# Patient Record
Sex: FEMALE | Race: WHITE | NOT HISPANIC OR LATINO | Employment: FULL TIME | URBAN - METROPOLITAN AREA
[De-identification: names, ages, dates, MRNs, and addresses within clinical notes are randomized per-mention and may not be internally consistent; named-entity substitution may affect disease eponyms.]

---

## 2017-12-01 ENCOUNTER — OFFICE VISIT (OUTPATIENT)
Dept: OBSTETRICS AND GYNECOLOGY | Facility: CLINIC | Age: 32
End: 2017-12-01
Payer: COMMERCIAL

## 2017-12-01 VITALS
WEIGHT: 127 LBS | HEIGHT: 63 IN | SYSTOLIC BLOOD PRESSURE: 118 MMHG | DIASTOLIC BLOOD PRESSURE: 80 MMHG | BODY MASS INDEX: 22.5 KG/M2

## 2017-12-01 DIAGNOSIS — Z01.419 ENCOUNTER FOR GYNECOLOGICAL EXAMINATION WITHOUT ABNORMAL FINDING: Primary | ICD-10-CM

## 2017-12-01 PROCEDURE — 99395 PREV VISIT EST AGE 18-39: CPT | Mod: S$GLB,,, | Performed by: OBSTETRICS & GYNECOLOGY

## 2017-12-01 PROCEDURE — 99999 PR PBB SHADOW E&M-EST. PATIENT-LVL II: CPT | Mod: PBBFAC,,, | Performed by: OBSTETRICS & GYNECOLOGY

## 2017-12-01 PROCEDURE — 88175 CYTOPATH C/V AUTO FLUID REDO: CPT

## 2017-12-01 NOTE — PROGRESS NOTES
PT HERE FOR ANNUAL.  DOENS'T LIKE THE IUD. FEELS IT DURING INTERCOURSE. WANTS TO TRY FOR PREGNANCY IN AUGUST.    ROS:  GENERAL: No fever, chills, fatigability or weight loss.  VULVAR: No pain, no lesions and no itching.  VAGINAL: No relaxation, no itching, no discharge, no abnormal bleeding and no lesions.  ABDOMEN: No abdominal pain. Denies nausea. Denies vomiting. No diarrhea. No constipation  BREAST: Denies pain. No lumps. No discharge.  URINARY: No incontinence, no nocturia, no frequency and no dysuria.  CARDIOVASCULAR: No chest pain. No shortness of breath. No leg cramps.  NEUROLOGICAL: No headaches. No vision changes.  The remainder of the review of systems was negative.    PE:  General Appearance: normal weight And Well developed. Well nourished. In no acute distress.  Vulva: Lesions: No.  Urethral Meatus: Normal size. Normal location. No lesions. No prolapse.  Urethra: No masses. No tenderness. No prolapse. No scarring.  Bladder: No masses. No tenderness.  Vagina: Mucosa NI:yes discharge no, atrophy no, cystocele no or rectocele no.  Cervix: Lesion: no  Stenotic: no Cervical motion tenderness: no  IUD STRINGS SEEN  Uterus: Uterus size: 5 weeks. Support good. Uterus size: Normal  Adnexa: Masses: No Tenderness: No CDS Nodularity: No  Abdomen: normal weight No masses. No tenderness.  Breasts: No bilateral masses. No bilateral discharge. No bilateral tenderness. No bilateral fibrocystic changes.  Neck: No thyroid enlargement. No thyroid masses.  Skin: Rashes: No      PROCEDURES:    PLAN:     DIAGNOSIS:  1. Encounter for gynecological examination without abnormal finding        MEDICATIONS & ORDERS:  Orders Placed This Encounter    Liquid-based pap smear, screening       Patient was counseled today on the new ACS guidelines for cervical cytology screening as well as the current recommendations for breast cancer screening. She was counseled to follow up with her PCP for other routine health maintenance.  Counseling session lasted approximately 10 minutes, and all her questions were answered.         FOLLOW-UP: With me in 12 month OR PRN REMOVAL

## 2017-12-11 ENCOUNTER — TELEPHONE (OUTPATIENT)
Dept: OBSTETRICS AND GYNECOLOGY | Facility: CLINIC | Age: 32
End: 2017-12-11

## 2018-05-23 ENCOUNTER — OFFICE VISIT (OUTPATIENT)
Dept: OPTOMETRY | Facility: CLINIC | Age: 33
End: 2018-05-23
Payer: COMMERCIAL

## 2018-05-23 DIAGNOSIS — H04.123 DRY EYE SYNDROME OF BOTH EYES: ICD-10-CM

## 2018-05-23 DIAGNOSIS — H40.013 OPEN ANGLE WITH BORDERLINE FINDINGS OF BOTH EYES: Primary | ICD-10-CM

## 2018-05-23 DIAGNOSIS — H52.13 MYOPIA OF BOTH EYES: ICD-10-CM

## 2018-05-23 PROCEDURE — 92015 DETERMINE REFRACTIVE STATE: CPT | Mod: S$GLB,,, | Performed by: OPTOMETRIST

## 2018-05-23 PROCEDURE — 92004 COMPRE OPH EXAM NEW PT 1/>: CPT | Mod: S$GLB,,, | Performed by: OPTOMETRIST

## 2018-05-23 PROCEDURE — 99999 PR PBB SHADOW E&M-EST. PATIENT-LVL II: CPT | Mod: PBBFAC,,, | Performed by: OPTOMETRIST

## 2018-05-23 NOTE — PROGRESS NOTES
HPI     Last eye exam was approximately 2-3 years ago.  Patient states had glasses but rarely wore them except at night. Was told   by last eye doctor that she should be monitored for glaucoma due to large   optic nerves.   Patient denies diplopia, headaches, flashes/floaters, and pain.      Last edited by Vonda Zhao on 5/23/2018  2:44 PM. (History)            Assessment /Plan     For exam results, see Encounter Report.    Open angle with borderline findings of both eyes  -     Christine Visual Field - OU - Extended - Both Eyes; Future  -     OCT - Optic Nerve; Future    Dry eye syndrome of both eyes    Myopia of both eyes              1.  Due to increased c/d ratio.  Did testing 3 years ago--normal.  Low risk for glaucoma.  Will schedule hvf and oct.  If normal monitor yearly.  2.  Recommend artificial tears at least 2x/day OU.  Works on computer all day.  3.  Distance rx given.  Eye health normal OU.

## 2018-08-03 ENCOUNTER — PROCEDURE VISIT (OUTPATIENT)
Dept: OBSTETRICS AND GYNECOLOGY | Facility: CLINIC | Age: 33
End: 2018-08-03
Payer: COMMERCIAL

## 2018-08-03 VITALS
HEIGHT: 64 IN | SYSTOLIC BLOOD PRESSURE: 100 MMHG | DIASTOLIC BLOOD PRESSURE: 62 MMHG | BODY MASS INDEX: 22.02 KG/M2 | WEIGHT: 129 LBS

## 2018-08-03 DIAGNOSIS — Z30.432 ENCOUNTER FOR IUD REMOVAL: Primary | ICD-10-CM

## 2018-08-03 PROCEDURE — 58301 REMOVE INTRAUTERINE DEVICE: CPT | Mod: S$GLB,,, | Performed by: OBSTETRICS & GYNECOLOGY

## 2018-08-03 NOTE — PROCEDURES
Removal of Intrauterine Device  Date/Time: 8/3/2018 4:52 PM  Performed by: KEARA ELIZABETH JR  Authorized by: KEARA ELIZABETH JR   Local anesthesia used: no    Anesthesia:  Local anesthesia used: no    Sedation:  Patient sedated: no  Patient tolerance: Patient tolerated the procedure well with no immediate complications          Lindy Ferraro is a 32 y.o. female  presents for IUD removal because WANTS PREGNANCY.      PRE-IUD REMOVAL COUNSELING:  The patient was advised of minimal risks of bleeding and pain and she agrees to proceed.    PROCEDURE:  TIME OUT PERFORMED.  IUD strings were  visualized at the os and grasped. IUD removed with gentle traction.  The patient tolerated the procedure well.    ASSESSMENT:  Contraceptive Management / Removal IUD. V25.0.    POST IUD REMOVAL COUNSELING:  Expect period-like flow to occur after Mirena IUD removal and periods to return to pre-IUD pattern.  Manage post IUD removal cramping with NSAIDs, Tylenol or Rx per [employer].    POST IUD REMOVAL CONTRACEPTION:  If planning pregnancy, RX for prenatal vitamins.    Counseling lasted approximately 15 minutes and all her questions were answered.    FOLLOW-UP: With me for annual gyn exam or prn.

## 2018-09-25 ENCOUNTER — TELEPHONE (OUTPATIENT)
Dept: OBSTETRICS AND GYNECOLOGY | Facility: CLINIC | Age: 33
End: 2018-09-25

## 2018-09-25 NOTE — TELEPHONE ENCOUNTER
----- Message from Marlene Walker sent at 9/25/2018  3:04 PM CDT -----  Contact: CAITY FERGUSON [[id number]]      Can the clinic reply in [facility]: no    Who Called: CAITY FERGUSON [[id number]]    Date of Positive Preg Test: 09/25/18    1st day of Last Menstrual Cycle: 08/25/18    List Any Difficulties: no    What Number to Call Back: [phone]        Left message for patient

## 2018-10-01 ENCOUNTER — TELEPHONE (OUTPATIENT)
Dept: OBSTETRICS AND GYNECOLOGY | Facility: CLINIC | Age: 33
End: 2018-10-01

## 2018-10-01 DIAGNOSIS — N91.2 AMENORRHEA: Primary | ICD-10-CM

## 2018-10-01 NOTE — TELEPHONE ENCOUNTER
----- Message from Marleen Arguello LPN sent at 10/1/2018 11:19 AM CDT -----  Pt wants to see Ebony.  Joseph Hawley      ----- Message -----  From: Fallon Hatfield MA  Sent: 10/1/2018  11:14 AM  To: Jose ANDERS Jr Staff        ----- Message -----  From: Lan Browne  Sent: 10/1/2018  11:06 AM  To: Ebony Vyas Staff    Pt returning your call [phone]        Spoke with patient patient states she couldn't talk at the moment. Patient states I will have to call her back. Advised patient I'm currently in clinic and taking calls between patients.

## 2018-10-02 ENCOUNTER — TELEPHONE (OUTPATIENT)
Dept: OBSTETRICS AND GYNECOLOGY | Facility: CLINIC | Age: 33
End: 2018-10-02

## 2018-10-02 NOTE — TELEPHONE ENCOUNTER
----- Message from Celina Oswald sent at 10/1/2018  2:12 PM CDT -----  Contact: pt            Name of Who is Calling: Lindy      What is the request in detail: returning call back to clinic. Please call and advise      Can the clinic reply by [facility]: no      [facility][phone] after 4pm      Spoke with patient to schedule patient new ob and ultrasound

## 2018-10-17 ENCOUNTER — PATIENT MESSAGE (OUTPATIENT)
Dept: OBSTETRICS AND GYNECOLOGY | Facility: CLINIC | Age: 33
End: 2018-10-17

## 2018-10-24 ENCOUNTER — HOSPITAL ENCOUNTER (EMERGENCY)
Facility: OTHER | Age: 33
Discharge: HOME | End: 2018-10-24
Attending: EMERGENCY MEDICINE
Payer: COMMERCIAL

## 2018-10-24 VITALS
SYSTOLIC BLOOD PRESSURE: 161 MMHG | TEMPERATURE: 98 F | DIASTOLIC BLOOD PRESSURE: 82 MMHG | WEIGHT: 135 LBS | RESPIRATION RATE: 16 BRPM | HEART RATE: 75 BPM | OXYGEN SATURATION: 100 % | BODY MASS INDEX: 23.05 KG/M2 | HEIGHT: 64 IN

## 2018-10-24 DIAGNOSIS — R10.12 LUQ ABDOMINAL PAIN: Primary | ICD-10-CM

## 2018-10-24 DIAGNOSIS — O21.9 NAUSEA AND VOMITING IN PREGNANCY: ICD-10-CM

## 2018-10-24 DIAGNOSIS — R10.13 EPIGASTRIC ABDOMINAL PAIN: ICD-10-CM

## 2018-10-24 DIAGNOSIS — R10.13 EPIGASTRIC PAIN: ICD-10-CM

## 2018-10-24 LAB
ALBUMIN SERPL BCP-MCNC: 4.8 G/DL
ALP SERPL-CCNC: 60 U/L
ALT SERPL W/O P-5'-P-CCNC: 14 U/L
ANION GAP SERPL CALC-SCNC: 12 MMOL/L
AST SERPL-CCNC: 21 U/L
B-HCG UR QL: POSITIVE
BASOPHILS # BLD AUTO: 0.02 K/UL
BASOPHILS NFR BLD: 0.3 %
BILIRUB SERPL-MCNC: 0.7 MG/DL
BILIRUB UR QL STRIP: NEGATIVE
BUN SERPL-MCNC: 10 MG/DL
CALCIUM SERPL-MCNC: 10.1 MG/DL
CHLORIDE SERPL-SCNC: 102 MMOL/L
CLARITY UR: CLEAR
CO2 SERPL-SCNC: 23 MMOL/L
COLOR UR: YELLOW
CREAT SERPL-MCNC: 0.7 MG/DL
CTP QC/QA: YES
DIFFERENTIAL METHOD: NORMAL
EOSINOPHIL # BLD AUTO: 0.1 K/UL
EOSINOPHIL NFR BLD: 0.7 %
ERYTHROCYTE [DISTWIDTH] IN BLOOD BY AUTOMATED COUNT: 12 %
EST. GFR  (AFRICAN AMERICAN): >60 ML/MIN/1.73 M^2
EST. GFR  (NON AFRICAN AMERICAN): >60 ML/MIN/1.73 M^2
GLUCOSE SERPL-MCNC: 86 MG/DL
GLUCOSE UR QL STRIP: NEGATIVE
HCT VFR BLD AUTO: 41.4 %
HGB BLD-MCNC: 14.2 G/DL
HGB UR QL STRIP: NEGATIVE
KETONES UR QL STRIP: NEGATIVE
LEUKOCYTE ESTERASE UR QL STRIP: NEGATIVE
LIPASE SERPL-CCNC: 36 U/L
LYMPHOCYTES # BLD AUTO: 1.7 K/UL
LYMPHOCYTES NFR BLD: 23.1 %
MCH RBC QN AUTO: 31 PG
MCHC RBC AUTO-ENTMCNC: 34.3 G/DL
MCV RBC AUTO: 90 FL
MONOCYTES # BLD AUTO: 0.4 K/UL
MONOCYTES NFR BLD: 5.8 %
NEUTROPHILS # BLD AUTO: 5.2 K/UL
NEUTROPHILS NFR BLD: 70 %
NITRITE UR QL STRIP: NEGATIVE
PH UR STRIP: 7 [PH] (ref 5–8)
PLATELET # BLD AUTO: 226 K/UL
PMV BLD AUTO: 9.9 FL
POTASSIUM SERPL-SCNC: 3.5 MMOL/L
PROT SERPL-MCNC: 8.4 G/DL
PROT UR QL STRIP: NEGATIVE
RBC # BLD AUTO: 4.58 M/UL
SODIUM SERPL-SCNC: 137 MMOL/L
SP GR UR STRIP: 1.01 (ref 1–1.03)
URN SPEC COLLECT METH UR: NORMAL
UROBILINOGEN UR STRIP-ACNC: NEGATIVE EU/DL
WBC # BLD AUTO: 7.41 K/UL

## 2018-10-24 PROCEDURE — 99284 EMERGENCY DEPT VISIT MOD MDM: CPT | Mod: 25

## 2018-10-24 PROCEDURE — 85025 COMPLETE CBC W/AUTO DIFF WBC: CPT

## 2018-10-24 PROCEDURE — 81025 URINE PREGNANCY TEST: CPT | Performed by: PHYSICIAN ASSISTANT

## 2018-10-24 PROCEDURE — 93010 ELECTROCARDIOGRAM REPORT: CPT | Mod: ,,, | Performed by: INTERNAL MEDICINE

## 2018-10-24 PROCEDURE — 93005 ELECTROCARDIOGRAM TRACING: CPT

## 2018-10-24 PROCEDURE — 80053 COMPREHEN METABOLIC PANEL: CPT

## 2018-10-24 PROCEDURE — 83690 ASSAY OF LIPASE: CPT

## 2018-10-24 PROCEDURE — 81003 URINALYSIS AUTO W/O SCOPE: CPT

## 2018-10-24 RX ORDER — FAMOTIDINE 10 MG/ML
20 INJECTION INTRAVENOUS
Status: DISCONTINUED | OUTPATIENT
Start: 2018-10-24 | End: 2018-10-24 | Stop reason: HOSPADM

## 2018-10-24 NOTE — ED TRIAGE NOTES
"+intermittent mid epigastric pain x1 week described as "stabbing" that has progressively gotten worse. Pt c/o n/v but denies fever, chills, dysuria, vaginal bleeding/discharge. Pt is 8 weeks  pregnant   "

## 2018-10-24 NOTE — ED PROVIDER NOTES
Encounter Date: 10/24/2018       History     Chief Complaint   Patient presents with    Abdominal Pain     LUQ abdominal pain with N/V x 1 week.      33-year-old female with no significant past medical history presents to the emergency department with complaints of left upper quadrant/epigastric abdominal pain. She states that she is approximately 8 weeks gestation with her 1st pregnancy.  She denies any pelvic pain or lower abdominal cramping.  She denies any vaginal bleeding or discharge. She states that she does have scheduled appointment with OBGYN tomorrow.  She states that she did e-mail the clinic due to her concern of her pain being persistent last week however was told that cramping was common in pregnancy and to make sure that she was staying hydrated.  She does report some changes in her diet admitting that she is not eating as much vegetables as she normally eats.  She states that she is vegetarian.  She complains of some associated constipation.  She states that she is drinking plenty of water at home.  She does report nausea vomiting. She denies any nausea at this time and states that it is worse when she is hungry.  She states that the pain is located to the left upper part of her stomach and became worse while walking today.  She states that the pain soft me in my tracks.  She states is currently a 6/10.  No current treatment.  She denies fever chills      The history is provided by the patient.     Review of patient's allergies indicates:   Allergen Reactions    Asa [aspirin] Other (See Comments)     Blood disorder per Patient    Pcn [penicillins]     Sulfa (sulfonamide antibiotics)      History reviewed. No pertinent past medical history.  Past Surgical History:   Procedure Laterality Date    INTRAUTERINE DEVICE INSERTION  2016    MIKI---REMOVED     Family History   Problem Relation Age of Onset    Breast cancer Neg Hx     Colon cancer Neg Hx     Ovarian cancer Neg Hx     Glaucoma Neg  Hx     Cataracts Neg Hx     Macular degeneration Neg Hx      Social History     Tobacco Use    Smoking status: Never Smoker    Smokeless tobacco: Never Used   Substance Use Topics    Alcohol use: Yes    Drug use: No     Review of Systems   Constitutional: Negative for chills and fever.   HENT: Negative for sore throat.    Respiratory: Negative for shortness of breath.    Cardiovascular: Negative for chest pain.   Gastrointestinal: Positive for abdominal pain, constipation, nausea and vomiting. Negative for diarrhea.   Genitourinary: Negative for difficulty urinating, dysuria, flank pain, frequency, hematuria, urgency, vaginal bleeding and vaginal discharge.   Musculoskeletal: Negative for back pain.   Skin: Negative for rash.   Neurological: Negative for weakness.   Hematological: Does not bruise/bleed easily.       Physical Exam     Initial Vitals [10/24/18 1808]   BP Pulse Resp Temp SpO2   (!) 161/82 75 16 98.4 °F (36.9 °C) 100 %      MAP       --         Physical Exam    Nursing note and vitals reviewed.  Constitutional: Vital signs are normal. She appears well-developed and well-nourished. She is not diaphoretic.  Non-toxic appearance. No distress.   HENT:   Head: Normocephalic and atraumatic.   Right Ear: External ear normal.   Left Ear: External ear normal.   Nose: Nose normal.   Mouth/Throat: Oropharynx is clear and moist.   Eyes: Conjunctivae and lids are normal. No scleral icterus.   Neck: Normal range of motion and phonation normal. Neck supple.   Cardiovascular: Normal rate, regular rhythm and normal heart sounds. Exam reveals no gallop and no friction rub.    No murmur heard.  Pulmonary/Chest: Effort normal and breath sounds normal. No respiratory distress. She has no decreased breath sounds. She has no wheezes. She has no rhonchi. She has no rales.   Abdominal: Soft. Normal appearance and bowel sounds are normal. She exhibits no distension. There is tenderness in the epigastric area and left upper  quadrant. There is no rigidity, no rebound, no guarding, no CVA tenderness, no tenderness at McBurney's point and negative Dobbs's sign.   Musculoskeletal: Normal range of motion.   No obvious deformities, moving all extremities, normal gait   Neurological: She is alert and oriented to person, place, and time. No sensory deficit.   Skin: Skin is warm, dry and intact. No lesion and no rash noted. No erythema.   Psychiatric: She has a normal mood and affect. Her speech is normal and behavior is normal. Judgment normal. Cognition and memory are normal.         ED Course   Procedures  Labs Reviewed   POCT URINE PREGNANCY - Abnormal; Notable for the following components:       Result Value    POC Preg Test, Ur Positive (*)     All other components within normal limits   CBC W/ AUTO DIFFERENTIAL   COMPREHENSIVE METABOLIC PANEL   LIPASE   URINALYSIS, REFLEX TO URINE CULTURE    Narrative:     Preferred Collection Type->Urine, Clean Catch     EKG Readings: (Independently Interpreted)   Initial Reading: No STEMI. Rhythm: Normal Sinus Rhythm. Heart Rate: 71. Ectopy: No Ectopy. Conduction: Normal. ST Segments: Normal ST Segments. T Waves: Normal. Clinical Impression: Normal Sinus Rhythm       Imaging Results    None          Medical Decision Making:   History:   Old Medical Records: I decided to obtain old medical records.  Initial Assessment:   33-year-old female with complaints consistent left upper quadrant abdominal pain and epigastric pain with reported nausea and vomiting at home.  Afebrile neurovascularly intact.  She is alert, healthy and nontoxic appearing.  She is in no apparent distress but does appear anxious.  On exam she has reproducible pain to the left upper quadrant and epigastric region.  Rest of her abdomen is soft and nontender with no evidence of acute surgical abdomen.  She denies NSAID use.  She states that the symptoms have been present for over a week however her gradually worsened.  She is currently 8  weeks gestation with her 1st pregnancy.  No active emesis in the emergency department and she denies any nausea at this time.  Independently Interpreted Test(s):   I have ordered and independently interpreted EKG Reading(s) - see prior notes  Clinical Tests:   Lab Tests: Ordered and Reviewed  Medical Tests: Ordered and Reviewed  ED Management:  Plan for IV fluids, Pepcid and blood work and urine.  Orders were placed. Per nurse, patient refuses IV, pepcid and fluids. States that she just wants to make sure nothing is wrong.  UPT is positive here.  Urinalysis, CBC, CMP and lipase obtained. Urinalysis shows no evidence of serious bacterial infection, UTI pyelonephritis.  No elevation in white blood cell count H&H stable. No electrolyte abnormality noted.  Lipase is not elevated.  UPT is positive. EKG was also obtained and consistent with normal sinus rhythm with no evidence of acute ischemia or STEMI.  Patient's pain is to the upper abdomen, specifically epigastric region and left upper quadrant.  She has no pain or tenderness palpation to the pelvic region.  She denies any abnormal vaginal discharge or bleeding.  At this time I do not feel that further workup is indicated.  Discussed with patient that her symptoms may be secondary to some gastritis.  I did consider gastric ulcer however patient denies NSAID use in follows pretty bland diet.  She does report change in diet recently since being pregnant.  Informed patient that this change could be causing some worsening symptoms as well. Discussed with patient that she can take Pepcid as it is a safe medication to use during pregnancy and urged to do so if her pain persists.  I do not believe that these symptoms are suggestive of ectopic pregnancy or miscarriage.  I do not feel that emergent ultrasound is indicated at this time based on history and exam.  Patient has scheduled follow-up with OBGYN tomorrow.  She is urged to follow up as scheduled or return to emergency  department for any worsening signs or symptoms. She states understanding agrees this plan.  This is the extent of patient's complaints today.  This patient was discussed with the attending physician who agrees with treatment plan.  Other:   I have discussed this case with another health care provider.       <> Summary of the Discussion: Liza  This note was created using "[employer]" Medical dictation.  There may be typographical errors secondary to dictation.                        Clinical Impression:     1. LUQ abdominal pain    2. Epigastric pain    3. Epigastric abdominal pain    4. Nausea and vomiting in pregnancy              Disposition:   Disposition: Discharged  Condition: Stable                        Sarah Roldan PA-C  10/24/18 2010

## 2018-10-25 ENCOUNTER — OFFICE VISIT (OUTPATIENT)
Dept: OBSTETRICS AND GYNECOLOGY | Facility: CLINIC | Age: 33
End: 2018-10-25
Attending: OBSTETRICS & GYNECOLOGY
Payer: COMMERCIAL

## 2018-10-25 ENCOUNTER — PROCEDURE VISIT (OUTPATIENT)
Dept: OBSTETRICS AND GYNECOLOGY | Facility: CLINIC | Age: 33
End: 2018-10-25
Payer: COMMERCIAL

## 2018-10-25 VITALS
HEIGHT: 64 IN | WEIGHT: 130.94 LBS | SYSTOLIC BLOOD PRESSURE: 110 MMHG | DIASTOLIC BLOOD PRESSURE: 70 MMHG | BODY MASS INDEX: 22.35 KG/M2

## 2018-10-25 DIAGNOSIS — Z34.90 PREGNANCY WITH ONE FETUS, ANTEPARTUM: Primary | ICD-10-CM

## 2018-10-25 DIAGNOSIS — N91.2 AMENORRHEA: ICD-10-CM

## 2018-10-25 DIAGNOSIS — R10.13 EPIGASTRIC PAIN: ICD-10-CM

## 2018-10-25 DIAGNOSIS — O36.80X0 ENCOUNTER TO DETERMINE FETAL VIABILITY OF PREGNANCY, SINGLE OR UNSPECIFIED FETUS: ICD-10-CM

## 2018-10-25 DIAGNOSIS — Z36.89 ESTABLISH GESTATIONAL AGE, ULTRASOUND: ICD-10-CM

## 2018-10-25 PROCEDURE — 87491 CHLMYD TRACH DNA AMP PROBE: CPT

## 2018-10-25 PROCEDURE — 99999 PR PBB SHADOW E&M-EST. PATIENT-LVL IV: CPT | Mod: PBBFAC,,, | Performed by: OBSTETRICS & GYNECOLOGY

## 2018-10-25 PROCEDURE — 87086 URINE CULTURE/COLONY COUNT: CPT

## 2018-10-25 PROCEDURE — 99214 OFFICE O/P EST MOD 30 MIN: CPT | Mod: S$GLB,,, | Performed by: OBSTETRICS & GYNECOLOGY

## 2018-10-25 PROCEDURE — 3008F BODY MASS INDEX DOCD: CPT | Mod: CPTII,S$GLB,, | Performed by: OBSTETRICS & GYNECOLOGY

## 2018-10-25 PROCEDURE — 76801 OB US < 14 WKS SINGLE FETUS: CPT | Mod: S$GLB,,, | Performed by: OBSTETRICS & GYNECOLOGY

## 2018-10-25 NOTE — PROGRESS NOTES
"HPI: Pt is a 33 y.o. female who presents complaining of missed menses and (+) home UPT. She denies vaginal bleeding or abdominal pain. 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  Has not had need for any GI F/U since childhood for this.  She presents today with her  Efrain, she was recommended to come here by her sister in law who I delivered earlier this year.      ROS:  GENERAL: Feeling well overall.   SKIN: Denies rash or lesions.   HEAD: Denies head injury or headache.   NODES: Denies enlarged lymph nodes.   CHEST: Denies chest pain or shortness of breath.   CARDIOVASCULAR: Denies palpitations or left sided chest pain.   ABDOMEN: No abdominal pain, constipation, diarrhea or rectal bleeding.   URINARY: No dysuria, hematuria, or burning on urination.  REPRODUCTIVE: See HPI.   BREASTS: Denies pain, lumps, or nipple discharge.   HEMATOLOGIC: No easy bruisability or excessive bleeding.   MUSCULOSKELETAL: Denies joint pain or swelling.   NEUROLOGIC: Denies syncope or weakness.   PSYCHIATRIC: Denies depression, anxiety or mood swings.    PE:   /70   Ht 5' 4" (1.626 m)   Wt 59.4 kg (130 lb 15.3 oz)   LMP 08/25/2018   BMI 22.48 kg/m²     APPEARANCE: Well nourished, well developed, in no acute distress.  AFFECT: WNL, alert and oriented x 3.  SKIN: No acne or hirsutism.  NECK: Neck symmetric, without masses or thyromegaly.  NODES: No inguinal, cervical, axillary or femoral lymph node enlargement.  CHEST: Good respiratory effort.   PELVIC: deferred  EXTREMITIES: No edema.    PROCEDURES:  - UPT: positive  - Urine dip: negative  - Dating U/S: to be scheduled with GynUS      Diagnosis:  1. Pregnancy with one fetus, antepartum    2. Jejunal atresia    3. Amenorrhea        Plan:     Orders Placed This Encounter    Urine culture    C. " trachomatis/N. gonorrhoeae by AMP DNA    US Abdomen Complete    Ambulatory consult to Gastroenterology    US MFM Procedure ([facility])       - Rx: Prenatal vitamins  - She does want 1st trimester screening with MFM.     Patient was counseled today on routine 1st trimester precautions, including vaginal bleeding and abdominal pain. We also discussed proper weight gain based on the [place] [employer]'s recommendations based on her pre-pregnancy weight, foods to avoid in pregnancy (i.e. sushi, fish that are high in mercury, cold deli meat, and unpasteurized cheeses), environmental precautions such as cat litter, and prenatal vitamin options (i.e. stool softener, DHA).     Total face to face time spent with the patient was 30 minutes and over half spent in counseling on the above related issues.     Follow-up with me in 4 weeks for OB check, labs at that time as she had labs drawn last night at ED visit.

## 2018-10-25 NOTE — PROCEDURES
Obstetrical ultrasound completed today.  See report in imaging section of [facility].

## 2018-10-27 LAB
C TRACH DNA SPEC QL NAA+PROBE: NOT DETECTED
N GONORRHOEA DNA SPEC QL NAA+PROBE: NOT DETECTED

## 2018-10-28 LAB — BACTERIA UR CULT: NO GROWTH

## 2018-11-08 ENCOUNTER — OFFICE VISIT (OUTPATIENT)
Dept: GASTROENTEROLOGY | Facility: CLINIC | Age: 33
End: 2018-11-08
Payer: COMMERCIAL

## 2018-11-08 VITALS
HEIGHT: 64 IN | WEIGHT: 135.13 LBS | HEART RATE: 67 BPM | DIASTOLIC BLOOD PRESSURE: 77 MMHG | BODY MASS INDEX: 23.07 KG/M2 | SYSTOLIC BLOOD PRESSURE: 128 MMHG

## 2018-11-08 DIAGNOSIS — R10.13 EPIGASTRIC PAIN: ICD-10-CM

## 2018-11-08 DIAGNOSIS — Z34.90 PREGNANCY, UNSPECIFIED GESTATIONAL AGE: ICD-10-CM

## 2018-11-08 DIAGNOSIS — R10.9 ABDOMINAL PAIN, UNSPECIFIED ABDOMINAL LOCATION: Primary | ICD-10-CM

## 2018-11-08 PROCEDURE — 3008F BODY MASS INDEX DOCD: CPT | Mod: CPTII,S$GLB,, | Performed by: INTERNAL MEDICINE

## 2018-11-08 PROCEDURE — 99203 OFFICE O/P NEW LOW 30 MIN: CPT | Mod: S$GLB,,, | Performed by: INTERNAL MEDICINE

## 2018-11-08 PROCEDURE — 99999 PR PBB SHADOW E&M-EST. PATIENT-LVL III: CPT | Mod: PBBFAC,,, | Performed by: INTERNAL MEDICINE

## 2018-11-09 ENCOUNTER — TELEPHONE (OUTPATIENT)
Dept: GASTROENTEROLOGY | Facility: CLINIC | Age: 33
End: 2018-11-09

## 2018-11-09 ENCOUNTER — HOSPITAL ENCOUNTER (OUTPATIENT)
Dept: RADIOLOGY | Facility: HOSPITAL | Age: 33
Discharge: HOME | End: 2018-11-09
Attending: INTERNAL MEDICINE
Payer: COMMERCIAL

## 2018-11-09 DIAGNOSIS — R10.9 ABDOMINAL PAIN, UNSPECIFIED ABDOMINAL LOCATION: ICD-10-CM

## 2018-11-09 PROCEDURE — 76705 ECHO EXAM OF ABDOMEN: CPT | Mod: 26,XS,, | Performed by: RADIOLOGY

## 2018-11-09 PROCEDURE — 76700 US EXAM ABDOM COMPLETE: CPT | Mod: 26,,, | Performed by: RADIOLOGY

## 2018-11-09 PROCEDURE — 76705 ECHO EXAM OF ABDOMEN: CPT | Mod: TC

## 2018-11-09 PROCEDURE — 76700 US EXAM ABDOM COMPLETE: CPT | Mod: TC

## 2018-11-09 NOTE — TELEPHONE ENCOUNTER
----- Message from Sunny Wilkins MD sent at 11/9/2018  2:01 PM CST -----  Hi, Please let Mrs Ferraro know that the ultrasound of her abdomen was normal. No evidence of any abnormalities or hernia on the scan.  Thanks, Dr Wilkins

## 2018-11-09 NOTE — PROGRESS NOTES
[facility]    Reason for visit: The encounter diagnosis was Abdominal pain, unspecified abdominal location.  Referring Provider/PCP: Milla Roque MD    History of Present Illness:  Lindy Ferraro is a 33 y.o. 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 Of note, Ms Ferraro is currently in her first trimester of pregnancy (GA 9 +4/7, due date 7/10/19).    She notes that as an infant she underwent surgery for jejunal atresia (only one operation) with reportedly no complications. She was evaluated as a teenager due to abdominal pain with negative ?UGI series, but otherwise has not had any complications from her surgery.    She presents today for evaluation of mid-epigastric pain. She first noted the onset of abdominal pain ~1 month ago after exercising (running). She noted the pain slowly estephanie, was a stabbing pain, and reached its maximum intensity on 10/24/18 when she felt like she could not move due to the pain so went to the ED. At the ED she was evaluated, with negative CMP, CBC, Lipase, and was discharged home feeling better. She notes that since then her pain has been improving and decreasing in frequency (currently 2x/week, 1hr duration) but due to the pain and prior GI history, her OBGYN wanted to have her evaluated by GI.    She localizes the pain to the left lateral aspect of her abdominal incision. She did not previously have any pain in this location, and currently rates her pain 0/10. She denies any associated symptoms during the worsening of her pain such as nausea (though endorses some nausea with pregnancy), vomiting, inability to tolerate PO intake, constipation (moving bowels q2 days, non-bloody, non-melenic) or obstipation. Denies any NSAID/OTC or herbal medications. Denies any RUQ abdominal pain, no increased pain with fatty foods or radiation of the pain to the back.  Denies any history of dysphagia or reflux. Pain is not positional and does not increase with movement.       PEndoHx:  - None    Review of Systems:   Constitutional: no fever, chills or sweats. Increased weight with pregnancy   Eyes: no visual changes   ENT: no sore throat or dysphagia  Respiratory: no cough or shortness of breath   Cardiovascular: no chest pain or palpitations   Gastrointestinal: as per HPI  Hematologic/Lymphatic: no easy bruising or lymphadenopathy   Musculoskeletal: no arthralgias or myalgias   Neurological: no change in mental status  Behavioral/Psych: no change in mood    Medical History:  Past Medical History:   Diagnosis Date    Jejunal atresia     repaired as an infant       Past Surgical History:   Procedure Laterality Date    INTRAUTERINE DEVICE INSERTION  2016    MIKI---REMOVED       Family History   Problem Relation Age of Onset    Breast cancer Neg Hx     Colon cancer Neg Hx     Ovarian cancer Neg Hx     Glaucoma Neg Hx     Cataracts Neg Hx     Macular degeneration Neg Hx    No family history of esophageal, stomach, pancreatic, SI/CRC. No family history of breast, ovarian, uterine, kidney, uterine or bladder cancer.     Social History     Socioeconomic History    Marital status:      Spouse name: Efrain    Number of children: 0    Years of education: Not on file    Highest education level: Not on file   Social Needs    Financial resource strain: Not on file    Food insecurity - worry: Not on file    Food insecurity - inability: Not on file    Transportation needs - medical: Not on file    Transportation needs - non-medical: Not on file   Occupational History    Not on file   Tobacco Use    Smoking status: Never Smoker    Smokeless tobacco: Never Used   Substance and Sexual Activity    Alcohol use: Yes    Drug use: No    Sexual activity: Yes     Partners: Male     Birth control/protection: IUD     Comment: s/p removal of miki   Other Topics Concern     Not on file   Social History Narrative    Not on file   No smoking, alcohol or drug use. , first pregnancy. Works as an  for the state.    Current Outpatient Medications on File Prior to Visit   Medication Sig Dispense Refill    prenatal vit/iron fum/folic ac (PRENATAL 1+1 ORAL) Take by mouth.       No current facility-administered medications on file prior to visit.        Review of patient's allergies indicates:   Allergen Reactions    Asa [aspirin] Other (See Comments)     Blood disorder per Patient    Pcn [penicillins]     Sulfa (sulfonamide antibiotics)        Physical Exam:  General: Alert and Oriented x3, no distress. Friendly young female, well dressed and groomed.  Vitals:    11/08/18 1545   BP: 128/77   Pulse: 67     HEENT: Normocephalic, Atraumatic. No scleral icterus.  Lymph: No cervical lymphadenopathy  Resp: Good air entry bilaterally, no adventitious sounds.  Cardiac: S1 and S2 normal.  Abdomen: Normoactive bowel sounds. Non-distended. Normal tympany. Soft. Tender to palpation minimal tenderness with palpation to the lateral aspect of the horrizontal scar.. No peritoneal signs. No hernia identified with standing position.  Extremities: No peripheral edema. Normal bilateral pedal and radial pulses.  Neurologic: No gross neurological Deficits  Psych: Calm, cooperative. Normal mood and affect.    Laboratory:  Lab Results   Component Value Date     10/24/2018    K 3.5 10/24/2018     10/24/2018    CO2 23 10/24/2018    BUN 10 10/24/2018    CREATININE 0.7 10/24/2018    CALCIUM 10.1 10/24/2018    ANIONGAP 12 10/24/2018    ESTGFRAFRICA >60 10/24/2018    EGFRNONAA >60 10/24/2018       Lab Results   Component Value Date    ALT 14 10/24/2018    AST 21 10/24/2018    ALKPHOS 60 10/24/2018    BILITOT 0.7 10/24/2018       Lab Results   Component Value Date    WBC 7.41 10/24/2018    HGB 14.2 10/24/2018    HCT 41.4 10/24/2018    MCV 90 10/24/2018     10/24/2018     Lab  Results   Component Value Date    LIPASE 36 10/24/2018         Microbiology:  No Pertinent Microbiology    Imaging:  No Pertinent Imaging    Assessment:  Lindy Ferraro is a 33 y.o. female who is presenting for initial evaluation of epigastric pain.    Unclear the etiology of her epigastric pain at this time. Given the lack of associated GI symptoms with the pain, and negative lab workup, and reassuring physical examination today it is unlikely there is an underlying GI pathology such as cholelithiasis, biliary colic, pancreatitis. Normal CBC, and no symptoms or risk factors (i.e. Smoking, NSAIDs) to suggest PUD. Symptoms are not suggestive of SBO from adhesions. No visible hernia at site of prior surgery. Given she had exercised the day prior to onset of symptoms and focal tenderness this could be MSK related. Given she is in first trimester of pregnancy, unlikely the uterus (would still be in the pelvis) would be in the region of her abdominal incision or be causing any mass effect symptoms.    Plan:  1. Given pregnancy would like to avoid any radiation exposure, therefore recommend further evaluation with abdominal ultrasound to exclude any abnormality and to evaluate for hernia  2. If symptoms continuing to resolve will hold on further evaluation such as imaging or endoscopic studies until post-  3. If symptoms are found to be from hernia can consider general surgery consultation to discuss management though recommended to avoid any unnecessary surgical procedures while pregnant  4. CRC Screening: Not currently applicable. No family history of CRC or colon polyps. Standard screening to start at 50 years old.  5. Follow-up in about 8 weeks (around 1/3/2019).    Sunny Wilkins MD  Gastroenterology Fellow (PGY IV)  Phone: [phone]  Pager: [phone]    Orders Placed This Encounter   Procedures    US Abdomen Complete

## 2018-11-09 NOTE — PROGRESS NOTES
I was present with Sunny Wilkins MD the fellow during the above evaluation, including history and exam.  I discussed the case with the fellow and agree with the findings and plan as documented in the fellow's note.

## 2018-11-27 ENCOUNTER — PROCEDURE VISIT (OUTPATIENT)
Dept: MATERNAL FETAL MEDICINE | Facility: CLINIC | Age: 33
End: 2018-11-27
Attending: OBSTETRICS & GYNECOLOGY
Payer: COMMERCIAL

## 2018-11-27 ENCOUNTER — PATIENT MESSAGE (OUTPATIENT)
Dept: ADMINISTRATIVE | Facility: OTHER | Age: 33
End: 2018-11-27

## 2018-11-27 ENCOUNTER — LAB VISIT (OUTPATIENT)
Dept: LAB | Facility: OTHER | Age: 33
End: 2018-11-27
Attending: OBSTETRICS & GYNECOLOGY
Payer: COMMERCIAL

## 2018-11-27 ENCOUNTER — PATIENT OUTREACH (OUTPATIENT)
Dept: OTHER | Facility: OTHER | Age: 33
End: 2018-11-27

## 2018-11-27 ENCOUNTER — ROUTINE PRENATAL (OUTPATIENT)
Dept: OBSTETRICS AND GYNECOLOGY | Facility: CLINIC | Age: 33
End: 2018-11-27
Attending: OBSTETRICS & GYNECOLOGY
Payer: COMMERCIAL

## 2018-11-27 VITALS
WEIGHT: 135.56 LBS | BODY MASS INDEX: 23.27 KG/M2 | DIASTOLIC BLOOD PRESSURE: 78 MMHG | SYSTOLIC BLOOD PRESSURE: 114 MMHG

## 2018-11-27 VITALS — BODY MASS INDEX: 23.65 KG/M2 | WEIGHT: 137.81 LBS

## 2018-11-27 DIAGNOSIS — Z36.82 ENCOUNTER FOR ANTENATAL SCREENING FOR NUCHAL TRANSLUCENCY: ICD-10-CM

## 2018-11-27 DIAGNOSIS — Z34.90 PREGNANCY WITH ONE FETUS, ANTEPARTUM: ICD-10-CM

## 2018-11-27 DIAGNOSIS — Z36.89 ENCOUNTER FOR FETAL ANATOMIC SURVEY: Primary | ICD-10-CM

## 2018-11-27 PROCEDURE — 36415 COLL VENOUS BLD VENIPUNCTURE: CPT

## 2018-11-27 PROCEDURE — 81508 FTL CGEN ABNOR TWO PROTEINS: CPT

## 2018-11-27 PROCEDURE — 0502F SUBSEQUENT PRENATAL CARE: CPT | Mod: S$GLB,,, | Performed by: OBSTETRICS & GYNECOLOGY

## 2018-11-27 PROCEDURE — 99499 UNLISTED E&M SERVICE: CPT | Mod: S$GLB,,, | Performed by: OBSTETRICS & GYNECOLOGY

## 2018-11-27 PROCEDURE — 76813 OB US NUCHAL MEAS 1 GEST: CPT | Mod: S$GLB,,, | Performed by: OBSTETRICS & GYNECOLOGY

## 2018-11-27 NOTE — PROGRESS NOTES
Doing well, NT today, discussed test, u/s, etc...  Precautions reinforced.  URI symptoms, discussed interventions.  F/U in 4 weeks for routine PNV

## 2018-11-27 NOTE — TELEPHONE ENCOUNTER
Initial introduction with Ms. Lindy Crhis Ferraro completed and the role of the health coaches for Connected [facility]OM was explained. Will send info via [facility] as well.    Reviewed the importance of taking weights and blood pressure readings, using the health  as a resource for physical activity and dietary habits, and that [facility] messages will be sent for weeks 20, 30, and 37 home urine tests.  Reviewed that the patient should contact her OB team with any specific questions regarding her pregnancy, and to contact [facility] or 911 in the case of a medical emergency.

## 2018-11-27 NOTE — PROGRESS NOTES
Obstetrical ultrasound completed today.  See report in imaging section of [facility].

## 2018-11-29 LAB
# FETUSES US: NORMAL
AGE AT DELIVERY: 31
B-HCG MOM SERPL: NORMAL
B-HCG SERPL-ACNC: 94.3 IU/ML
FET CRL US.MEAS: 66.9 MM
FET NASAL BONE LENGTH US.MEAS: NORMAL MM
FET NUCHAL FOLD MOM THICKNESS US.MEAS: NORMAL
FET NUCHAL FOLD THICKNESS US.MEAS: 1.5 MM
FET TS 21 RISK FROM MAT AGE: NORMAL
GA (DAYS): 0 D
GA (WEEKS): 13 WK
IDDM PATIENT QL: NORMAL
INTEGRATED SCN PATIENT-IMP: NEGATIVE
PAPP-A MOM SERPL: NORMAL
PAPP-A SERPL-MCNC: NORMAL NG/ML
SEQUENTIAL SCREEN I INTERP.: NORMAL
SMOKING STATUS FTND: NO
TS 18 RISK FETUS: NORMAL
TS 21 RISK FETUS: NORMAL
US DATE: NORMAL

## 2018-12-06 ENCOUNTER — TELEPHONE (OUTPATIENT)
Dept: OBSTETRICS AND GYNECOLOGY | Facility: CLINIC | Age: 33
End: 2018-12-06

## 2018-12-06 ENCOUNTER — PATIENT MESSAGE (OUTPATIENT)
Dept: OBSTETRICS AND GYNECOLOGY | Facility: CLINIC | Age: 33
End: 2018-12-06

## 2018-12-06 NOTE — TELEPHONE ENCOUNTER
Spoke with patient regarding her message. Advised patient she still need to get the second part of the test done. Patient verbalized and understand

## 2018-12-14 ENCOUNTER — LAB VISIT (OUTPATIENT)
Dept: LAB | Facility: OTHER | Age: 33
End: 2018-12-14
Attending: OBSTETRICS & GYNECOLOGY
Payer: COMMERCIAL

## 2018-12-14 ENCOUNTER — PATIENT MESSAGE (OUTPATIENT)
Dept: OBSTETRICS AND GYNECOLOGY | Facility: CLINIC | Age: 33
End: 2018-12-14

## 2018-12-14 DIAGNOSIS — R35.0 URINARY FREQUENCY: Primary | ICD-10-CM

## 2018-12-14 DIAGNOSIS — R35.0 URINARY FREQUENCY: ICD-10-CM

## 2018-12-14 PROCEDURE — 87086 URINE CULTURE/COLONY COUNT: CPT

## 2018-12-15 LAB — BACTERIA UR CULT: NORMAL

## 2018-12-21 ENCOUNTER — LAB VISIT (OUTPATIENT)
Dept: LAB | Facility: OTHER | Age: 33
End: 2018-12-21
Attending: OBSTETRICS & GYNECOLOGY
Payer: COMMERCIAL

## 2018-12-21 ENCOUNTER — ROUTINE PRENATAL (OUTPATIENT)
Dept: OBSTETRICS AND GYNECOLOGY | Facility: CLINIC | Age: 33
End: 2018-12-21
Attending: OBSTETRICS & GYNECOLOGY
Payer: COMMERCIAL

## 2018-12-21 VITALS
DIASTOLIC BLOOD PRESSURE: 74 MMHG | SYSTOLIC BLOOD PRESSURE: 126 MMHG | BODY MASS INDEX: 23.95 KG/M2 | WEIGHT: 139.56 LBS

## 2018-12-21 DIAGNOSIS — Z34.90 PREGNANCY WITH ONE FETUS, ANTEPARTUM: ICD-10-CM

## 2018-12-21 DIAGNOSIS — R00.2 HEART PALPITATIONS: Primary | ICD-10-CM

## 2018-12-21 DIAGNOSIS — R00.2 HEART PALPITATIONS: ICD-10-CM

## 2018-12-21 LAB
ALBUMIN SERPL BCP-MCNC: 3.5 G/DL
ALP SERPL-CCNC: 67 U/L
ALT SERPL W/O P-5'-P-CCNC: 14 U/L
ANION GAP SERPL CALC-SCNC: 9 MMOL/L
AST SERPL-CCNC: 17 U/L
BILIRUB SERPL-MCNC: 0.5 MG/DL
BUN SERPL-MCNC: 7 MG/DL
CALCIUM SERPL-MCNC: 9.2 MG/DL
CHLORIDE SERPL-SCNC: 104 MMOL/L
CO2 SERPL-SCNC: 23 MMOL/L
CREAT SERPL-MCNC: 0.7 MG/DL
EST. GFR  (AFRICAN AMERICAN): >60 ML/MIN/1.73 M^2
EST. GFR  (NON AFRICAN AMERICAN): >60 ML/MIN/1.73 M^2
GLUCOSE SERPL-MCNC: 96 MG/DL
POTASSIUM SERPL-SCNC: 3.5 MMOL/L
PROT SERPL-MCNC: 6.9 G/DL
SODIUM SERPL-SCNC: 136 MMOL/L

## 2018-12-21 PROCEDURE — 80053 COMPREHEN METABOLIC PANEL: CPT

## 2018-12-21 PROCEDURE — 0502F SUBSEQUENT PRENATAL CARE: CPT | Mod: S$GLB,,, | Performed by: OBSTETRICS & GYNECOLOGY

## 2018-12-21 PROCEDURE — 36415 COLL VENOUS BLD VENIPUNCTURE: CPT

## 2018-12-21 PROCEDURE — 81511 FTL CGEN ABNOR FOUR ANAL: CPT

## 2018-12-21 NOTE — PROGRESS NOTES
Round ligament pain- discussed  SI joint pain, sent to PT.  Discussed weight gain and normal expectations.  Doing well otherwise, precautions reinforced.  F/U in 4 weeks, sequential II today.

## 2018-12-24 LAB
# FETUSES US: NORMAL
AFP MOM SERPL: 0.78
AFP SERPL-MCNC: 28.1 NG/ML
AGE AT DELIVERY: 31
B-HCG MOM SERPL: 0.9
B-HCG SERPL-ACNC: 30.9 IU/ML
COLLECT DATE BLD: NORMAL
COLLECT DATE: NORMAL
FET NASAL BONE LENGTH US.MEAS: NORMAL MM
FET NUCHAL FOLD MOM THICKNESS US.MEAS: 0.9
FET NUCHAL FOLD THICKNESS US.MEAS: 1.5 MM
FET TS 21 RISK FROM MAT AGE: NORMAL
GA (DAYS): 0 D
GA (WEEKS): 13 WK
GA METHOD: NORMAL
GEST. AGE (DAYS) 2ND SAMPLE (SS2): 3
GEST. AGE (WKS) 2ND SAMPLE (SS2): 16
IDDM PATIENT QL: NORMAL
INHIBIN A MOM SERPL: 0.73
INHIBIN A SERPL-MCNC: 125.1 PG/ML
INTEGRATED SCN PATIENT-IMP: NEGATIVE
PAPP-A MOM SERPL: 2.06
PAPP-A SERPL-MCNC: NORMAL NG/ML
SEQUENTIAL SCREEN PART 2 INTERP: NORMAL
TS 18 RISK FETUS: NORMAL
TS 21 RISK FETUS: NORMAL
U ESTRIOL MOM SERPL: 0.71
U ESTRIOL SERPL-MCNC: 0.65 NG/ML

## 2019-01-15 ENCOUNTER — PROCEDURE VISIT (OUTPATIENT)
Dept: MATERNAL FETAL MEDICINE | Facility: CLINIC | Age: 34
End: 2019-01-15
Attending: OBSTETRICS & GYNECOLOGY
Payer: COMMERCIAL

## 2019-01-15 DIAGNOSIS — Z36.89 ENCOUNTER FOR FETAL ANATOMIC SURVEY: ICD-10-CM

## 2019-01-15 PROCEDURE — 99499 NO LOS: ICD-10-PCS | Mod: S$GLB,,, | Performed by: OBSTETRICS & GYNECOLOGY

## 2019-01-15 PROCEDURE — 99499 UNLISTED E&M SERVICE: CPT | Mod: S$GLB,,, | Performed by: OBSTETRICS & GYNECOLOGY

## 2019-01-15 PROCEDURE — 76805 PR US, OB 14+WKS, TRANSABD, SINGLE GESTATION: ICD-10-PCS | Mod: S$GLB,,, | Performed by: OBSTETRICS & GYNECOLOGY

## 2019-01-15 PROCEDURE — 76805 OB US >/= 14 WKS SNGL FETUS: CPT | Mod: S$GLB,,, | Performed by: OBSTETRICS & GYNECOLOGY

## 2019-01-19 ENCOUNTER — HOSPITAL ENCOUNTER (EMERGENCY)
Facility: OTHER | Age: 34
Discharge: HOME | End: 2019-01-19
Attending: OBSTETRICS & GYNECOLOGY
Payer: COMMERCIAL

## 2019-01-19 VITALS
DIASTOLIC BLOOD PRESSURE: 65 MMHG | RESPIRATION RATE: 18 BRPM | HEART RATE: 75 BPM | SYSTOLIC BLOOD PRESSURE: 119 MMHG | TEMPERATURE: 99 F

## 2019-01-19 DIAGNOSIS — Z3A.19 19 WEEKS GESTATION OF PREGNANCY: ICD-10-CM
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
## 2019-01-19 PROCEDURE — 99282 EMERGENCY DEPT VISIT SF MDM: CPT

## 2019-01-19 PROCEDURE — 99283 EMERGENCY DEPT VISIT LOW MDM: CPT | Mod: ,,, | Performed by: OBSTETRICS & GYNECOLOGY

## 2019-01-19 PROCEDURE — 99283 PR EMERGENCY DEPT VISIT,LEVEL III: ICD-10-PCS | Mod: ,,, | Performed by: OBSTETRICS & GYNECOLOGY

## 2019-01-19 NOTE — DISCHARGE INSTRUCTIONS
Call clinic [phone] or L & D after hours at [phone] for vaginal bleeding, leakage of fluids, contractions 4-5 in one hour, decreased fetal movements ( 10 kicks in 2 hours), headache not relieved by Tylenol, blurry vision, or temp of 100.4 or greater.  Begin doing fetal kick counts, at least 10 movements in 2 hours starting at 28 weeks gestation.  Keep next clinic appointment

## 2019-01-19 NOTE — ED PROVIDER NOTES
Encounter Date: 2019       History     Chief Complaint   Patient presents with    Fall     19 at 8pm     34 yo  presents at 19.5 weeks after a fall while walking her dogs. Patient states she tripped on the sidewalk and fell to her right elbow and side. She was bleeding from the elbow, now resolved. She denies abdominal trauma. She reports intermittant fetal movement, no contractions, vaginal bleeding or loss of fluid.  Pregnancy has been routine with Dr. Beck, patient is a connected mom.          Review of patient's allergies indicates:   Allergen Reactions    Asa [aspirin] Other (See Comments)     Blood disorder per Patient    Pcn [penicillins]     Sulfa (sulfonamide antibiotics)      Past Medical History:   Diagnosis Date    Jejunal atresia     repaired as an infant     Past Surgical History:   Procedure Laterality Date    INTRAUTERINE DEVICE INSERTION      MIKI---REMOVED     Family History   Problem Relation Age of Onset    Breast cancer Neg Hx     Colon cancer Neg Hx     Ovarian cancer Neg Hx     Glaucoma Neg Hx     Cataracts Neg Hx     Macular degeneration Neg Hx      Social History     Tobacco Use    Smoking status: Never Smoker    Smokeless tobacco: Never Used   Substance Use Topics    Alcohol use: Yes    Drug use: No     Review of Systems   Constitutional: Negative for fever.   HENT: Negative for sore throat.    Respiratory: Negative for shortness of breath.    Cardiovascular: Negative for chest pain.   Gastrointestinal: Positive for abdominal distention (Gravid). Negative for abdominal pain and nausea.   Genitourinary: Negative for dysuria, vaginal bleeding and vaginal discharge.   Musculoskeletal: Negative for back pain.   Skin: Positive for wound (Slight right elbow abrasion). Negative for rash.   Neurological: Negative for weakness.   Hematological: Does not bruise/bleed easily.       Physical Exam     Initial Vitals [19 1026]   BP Pulse Resp Temp SpO2   119/65 75  18 98.9 °F (37.2 °C) --      MAP       --         Physical Exam    Nursing note and vitals reviewed.  Constitutional: She appears well-developed and well-nourished. She is not diaphoretic. No distress.   HENT:   Head: Normocephalic and atraumatic.   Eyes: Conjunctivae and EOM are normal.   Neck: Normal range of motion.   Cardiovascular: Normal rate.   Pulmonary/Chest: No respiratory distress.   Abdominal: Soft.   Musculoskeletal: Normal range of motion.   Neurological: She is alert and oriented to person, place, and time.   Skin: Skin is warm and dry.   Psychiatric: She has a normal mood and affect.     OB LABOR EXAM:   Pre-Term Labor: No.   Membranes ruptured: No.               Amniotic Fluid Color: no fluid.     Comments: FHR: 135         ED Course   Procedures  Labs Reviewed - No data to display       Imaging Results    None          Medical Decision Making:   ED Management:  Reassured patient re absence of s/s placental abruption, encouraged to continue using precautions re walking the dogs.                      Clinical Impression:   The primary encounter diagnosis was Fall (on)(from) sidewalk curb, initial encounter. A diagnosis of 19 weeks gestation of pregnancy was also pertinent to this visit.                             Emely Wallis MD  01/19/19 7705

## 2019-01-23 ENCOUNTER — PATIENT MESSAGE (OUTPATIENT)
Dept: ADMINISTRATIVE | Facility: OTHER | Age: 34
End: 2019-01-23

## 2019-01-31 ENCOUNTER — OFFICE VISIT (OUTPATIENT)
Dept: GASTROENTEROLOGY | Facility: CLINIC | Age: 34
End: 2019-01-31
Payer: COMMERCIAL

## 2019-01-31 VITALS
WEIGHT: 154.13 LBS | SYSTOLIC BLOOD PRESSURE: 114 MMHG | DIASTOLIC BLOOD PRESSURE: 73 MMHG | HEIGHT: 64 IN | HEART RATE: 69 BPM | BODY MASS INDEX: 26.31 KG/M2

## 2019-01-31 DIAGNOSIS — R10.11 RUQ ABDOMINAL PAIN: Primary | ICD-10-CM

## 2019-01-31 PROCEDURE — 3008F PR BODY MASS INDEX (BMI) DOCUMENTED: ICD-10-PCS | Mod: CPTII,S$GLB,, | Performed by: INTERNAL MEDICINE

## 2019-01-31 PROCEDURE — 99999 PR PBB SHADOW E&M-EST. PATIENT-LVL III: CPT | Mod: PBBFAC,,, | Performed by: INTERNAL MEDICINE

## 2019-01-31 PROCEDURE — 99999 PR PBB SHADOW E&M-EST. PATIENT-LVL III: ICD-10-PCS | Mod: PBBFAC,,, | Performed by: INTERNAL MEDICINE

## 2019-01-31 PROCEDURE — 99213 OFFICE O/P EST LOW 20 MIN: CPT | Mod: S$GLB,,, | Performed by: INTERNAL MEDICINE

## 2019-01-31 PROCEDURE — 99213 PR OFFICE/OUTPT VISIT, EST, LEVL III, 20-29 MIN: ICD-10-PCS | Mod: S$GLB,,, | Performed by: INTERNAL MEDICINE

## 2019-01-31 PROCEDURE — 3008F BODY MASS INDEX DOCD: CPT | Mod: CPTII,S$GLB,, | Performed by: INTERNAL MEDICINE

## 2019-01-31 NOTE — PROGRESS NOTES
[facility]    Reason for visit: There were no encounter diagnoses.  Referring Provider/PCP: Milla Roque MD    History of Present Illness:  Lindy Ferraro is a 33 y.o. female with a history of pregnancy ( at 21wk3d), jejunal atresia s/p resection (infancy) who is presenting for follow-up evaluation of abdominal pain.    She was seen in GI clinic (18) due to mid-epigastric abdominal pain of 1 month duration which started after exercising. The pain culminated in ED presentation with negative laboratory workup (CMP, CBC, lipase) and was referred to GI clinic for evaluation. During our initial interview her pain localized to left lateral aspect of her abdominal incision (from prior jejunal resection), and was currently 0/10. Denied any other GI symptoms on review. Given first trimester pregnancy negative laboratory evaluation and resolved symptoms we recommended conservative management. An abdominal ultrasound was negative, including evaluating for incisional hernia.    She reports that she has been doing well since she was last in the clinic. She reports the incisional-located pain has fully resolved, but she has developed an intermittent RUQ pain. She notes the pain is primarily after eating when she is sitting down. She notes that it generally only occurs after lunch. The pain is at most 5/10 but notes that at baseline not severe enough that she would have presented for evaluation. Pain does not radiate. No associated nausea/vomiting. Has not noticed any specific food group that provokes the pain. Generally lasts for <1hr before alleviating with standing/stretching. Does not notice the pain outside of eating; not associated with physical activity (running or tennis). No diarrhea, constipation, reflux, melena, hematochezia or hematemesis. Otherwise review of systems unremarkable.    No smoking, drinking, drugs. No NSAIDs.    PEndoHx:  - none    Review of Systems:    Constitutional: no fever, chills. change in weight  (weight gain)  Eyes: no visual changes   ENT: no sore throat or dysphagia  Respiratory: no cough or shortness of breath   Cardiovascular: no chest pain or palpitations   Gastrointestinal: as per HPI  Hematologic/Lymphatic: no easy bruising or lymphadenopathy   Musculoskeletal: no arthralgias or myalgias   Neurological: no change in mental status  Behavioral/Psych: no change in mood    Medical History:  Past Medical History:   Diagnosis Date    Jejunal atresia     repaired as an infant       Past Surgical History:   Procedure Laterality Date    INTRAUTERINE DEVICE INSERTION  2016    MIKI---REMOVED       Family History   Problem Relation Age of Onset    Breast cancer Neg Hx     Colon cancer Neg Hx     Ovarian cancer Neg Hx     Glaucoma Neg Hx     Cataracts Neg Hx     Macular degeneration Neg Hx        Social History     Socioeconomic History    Marital status:      Spouse name: Efrain    Number of children: 0    Years of education: Not on file    Highest education level: Not on file   Social Needs    Financial resource strain: Not on file    Food insecurity - worry: Not on file    Food insecurity - inability: Not on file    Transportation needs - medical: Not on file    Transportation needs - non-medical: Not on file   Occupational History    Not on file   Tobacco Use    Smoking status: Never Smoker    Smokeless tobacco: Never Used   Substance and Sexual Activity    Alcohol use: Yes    Drug use: No    Sexual activity: Yes     Partners: Male     Birth control/protection: IUD     Comment: s/p removal of miki   Other Topics Concern    Not on file   Social History Narrative    Not on file       Current Outpatient Medications on File Prior to Visit   Medication Sig Dispense Refill    prenatal vit/iron fum/folic ac (PRENATAL 1+1 ORAL) Take by mouth.      FLUCELVAX QUAD [id number], PF, 60 mcg (15 mcg x 4)/0.5 mL Syrg vaccine TO BE  ADMINISTERED BY PHARMACIST FOR IMMUNIZATION  0     No current facility-administered medications on file prior to visit.        Review of patient's allergies indicates:   Allergen Reactions    Asa [aspirin] Other (See Comments)     Blood disorder per Patient    Pcn [penicillins]     Sulfa (sulfonamide antibiotics)        Physical Exam:  General: Alert and Oriented x3, no distress. Well dressed and groomed, no distress.  Vitals:    01/31/19 1602   BP: 114/73   Pulse: 69     HEENT: Normocephalic, Atraumatic. No scleral icterus.  Lymph: No cervical lymphadenopathy  Resp: Good air entry bilaterally, no adventitious sounds.  Cardiac: S1 and S2 normal.  Abdomen: Normoactive bowel sounds. Non-distended. Normal tympany. Soft. Non-tender. No peritoneal signs. Localizes pain to the right anterior costal margin ~2nd lowest ICS. No skin changes. Pain not reproduced with palpation. No palpable gallbladder or liver. Negative murphys.  Extremities: No peripheral edema. Normal bilateral pedal and radial pulses.  Neurologic: No gross neurological Deficits  Psych: Calm, cooperative. Normal mood and affect.    Laboratory:  Lab Results   Component Value Date     12/21/2018    K 3.5 12/21/2018     12/21/2018    CO2 23 12/21/2018    BUN 7 12/21/2018    CREATININE 0.7 12/21/2018    CALCIUM 9.2 12/21/2018    ANIONGAP 9 12/21/2018    ESTGFRAFRICA >60 12/21/2018    EGFRNONAA >60 12/21/2018       Lab Results   Component Value Date    ALT 14 12/21/2018    AST 17 12/21/2018    ALKPHOS 67 12/21/2018    BILITOT 0.5 12/21/2018       Lab Results   Component Value Date    WBC 7.41 10/24/2018    HGB 14.2 10/24/2018    HCT 41.4 10/24/2018    MCV 90 10/24/2018     10/24/2018       Microbiology:  No Pertinent Microbiology    Imaging:  - Abd US (11/9/18) no masses or hernia. Normal liver. No CBD dilatation (2mm). GB unremarkable. No cholelithiasis. Visualized pancreas, IVC, and abdominal aorta unremarkable. Renal evaluation  unremarkable.      Assessment:  Lindy Ferraro is a 33 y.o. female who is presenting for follow-up evaluation of Abdominal pain.    The initial pain for which she was evaluated in 11/2018 has resolved but she is noting the development of a new RUQ discomfort. Unclear the etiology of her RUQ pain. Pain could be biliary colic, though she had a negative RUQ US 11/2018 for cholelithiasis; although would be at risk to develop gallstones in the setting of pregnancy. Does not endorse any reflux symptoms and would no atypical localization for reflux/gastritis. Could be MSK related as localizes to ribs though would expect changes on examination.       Plan:  1. Discussed with Ms Ferraro that the etiology of her pain is unclear at this time. We discussed that concern would be for potential cholelithiasis though we have less of a suspicion given recent negative ultrasound. We discussed/offered a repeat abd US though cautioned her that even if we saw gallstones now present we would be hesitant to completely ascribe her symptoms to her gallbladder and would be hesitant to recommend a CCY while pregnant (did discuss that ideal timing were this required would be in 2nd trimester).  2. She will monitor the pain and evolution of symptoms, if worsening/inability to tolerate PO/any new symptoms will present for evaluation/call the clinic. At this time we will likely repeat abdominal ultrasound for further evaluation.  3. Otherwise, if symptoms persist beyond delivery encouraged her to call and schedule a follow-up appointment during which we will consider further imaging (I.e. CT scan) and likely EGD.  4. CRC Screening: start at 50 years old  No Follow-up on file.    Sunny Wilkins MD  Gastroenterology Fellow (PGY IV)  Phone: [phone]    No orders of the defined types were placed in this encounter.

## 2019-02-21 ENCOUNTER — PATIENT MESSAGE (OUTPATIENT)
Dept: OBSTETRICS AND GYNECOLOGY | Facility: CLINIC | Age: 34
End: 2019-02-21

## 2019-02-21 ENCOUNTER — ROUTINE PRENATAL (OUTPATIENT)
Dept: OBSTETRICS AND GYNECOLOGY | Facility: CLINIC | Age: 34
End: 2019-02-21
Attending: OBSTETRICS & GYNECOLOGY
Payer: COMMERCIAL

## 2019-02-21 VITALS
DIASTOLIC BLOOD PRESSURE: 72 MMHG | WEIGHT: 154.75 LBS | BODY MASS INDEX: 26.57 KG/M2 | SYSTOLIC BLOOD PRESSURE: 112 MMHG

## 2019-02-21 DIAGNOSIS — Z34.90 PREGNANCY WITH ONE FETUS, ANTEPARTUM: Primary | ICD-10-CM

## 2019-02-21 PROCEDURE — 99999 PR PBB SHADOW E&M-EST. PATIENT-LVL II: CPT | Mod: PBBFAC,,, | Performed by: OBSTETRICS & GYNECOLOGY

## 2019-02-21 PROCEDURE — 0502F SUBSEQUENT PRENATAL CARE: CPT | Mod: CPTII,S$GLB,, | Performed by: OBSTETRICS & GYNECOLOGY

## 2019-02-21 PROCEDURE — 99999 PR PBB SHADOW E&M-EST. PATIENT-LVL II: ICD-10-PCS | Mod: PBBFAC,,, | Performed by: OBSTETRICS & GYNECOLOGY

## 2019-02-21 PROCEDURE — 0502F PR SUBSEQUENT PRENATAL CARE: ICD-10-PCS | Mod: CPTII,S$GLB,, | Performed by: OBSTETRICS & GYNECOLOGY

## 2019-02-22 NOTE — PROGRESS NOTES
Doing well, discussed anatomy scan.  Discussed glucose screen.  Some rib pain (RUQ) normal GB u/s discussed options for management including PT, stretching and acupuncture.  Will consider these options.  Recent fall, discussed changing some exercise, will stop playing tennis.  F/U in 4 weeks.  Precautions reviewed.

## 2019-03-19 ENCOUNTER — LAB VISIT (OUTPATIENT)
Dept: LAB | Facility: OTHER | Age: 34
End: 2019-03-19
Attending: OBSTETRICS & GYNECOLOGY
Payer: COMMERCIAL

## 2019-03-19 DIAGNOSIS — Z34.90 PREGNANCY WITH ONE FETUS, ANTEPARTUM: ICD-10-CM

## 2019-03-19 LAB
BASOPHILS # BLD AUTO: 0.02 K/UL
BASOPHILS NFR BLD: 0.2 %
DIFFERENTIAL METHOD: ABNORMAL
EOSINOPHIL # BLD AUTO: 0.1 K/UL
EOSINOPHIL NFR BLD: 1.1 %
ERYTHROCYTE [DISTWIDTH] IN BLOOD BY AUTOMATED COUNT: 12.3 %
GLUCOSE SERPL-MCNC: 92 MG/DL
HCT VFR BLD AUTO: 36.5 %
HGB BLD-MCNC: 12.2 G/DL
LYMPHOCYTES # BLD AUTO: 1.5 K/UL
LYMPHOCYTES NFR BLD: 17.8 %
MCH RBC QN AUTO: 31 PG
MCHC RBC AUTO-ENTMCNC: 33.4 G/DL
MCV RBC AUTO: 93 FL
MONOCYTES # BLD AUTO: 0.5 K/UL
MONOCYTES NFR BLD: 5.6 %
NEUTROPHILS # BLD AUTO: 6.1 K/UL
NEUTROPHILS NFR BLD: 75.1 %
PLATELET # BLD AUTO: 192 K/UL
PMV BLD AUTO: 10.5 FL
RBC # BLD AUTO: 3.93 M/UL
WBC # BLD AUTO: 8.16 K/UL

## 2019-03-19 PROCEDURE — 85025 COMPLETE CBC W/AUTO DIFF WBC: CPT

## 2019-03-19 PROCEDURE — 82950 GLUCOSE TEST: CPT

## 2019-03-19 PROCEDURE — 36415 COLL VENOUS BLD VENIPUNCTURE: CPT

## 2019-03-21 ENCOUNTER — LAB VISIT (OUTPATIENT)
Dept: LAB | Facility: OTHER | Age: 34
End: 2019-03-21
Attending: OBSTETRICS & GYNECOLOGY
Payer: COMMERCIAL

## 2019-03-21 ENCOUNTER — ROUTINE PRENATAL (OUTPATIENT)
Dept: OBSTETRICS AND GYNECOLOGY | Facility: CLINIC | Age: 34
End: 2019-03-21
Attending: OBSTETRICS & GYNECOLOGY
Payer: COMMERCIAL

## 2019-03-21 VITALS
SYSTOLIC BLOOD PRESSURE: 110 MMHG | WEIGHT: 160.06 LBS | DIASTOLIC BLOOD PRESSURE: 78 MMHG | BODY MASS INDEX: 27.47 KG/M2

## 2019-03-21 DIAGNOSIS — Z34.90 PREGNANCY WITH ONE FETUS, ANTEPARTUM: Primary | ICD-10-CM

## 2019-03-21 DIAGNOSIS — O12.03 SWELLING OF LOWER EXTREMITY DURING PREGNANCY IN THIRD TRIMESTER: ICD-10-CM

## 2019-03-21 DIAGNOSIS — Z34.90 PREGNANCY WITH ONE FETUS, ANTEPARTUM: ICD-10-CM

## 2019-03-21 LAB
ABO + RH BLD: NORMAL
BLD GP AB SCN CELLS X3 SERPL QL: NORMAL

## 2019-03-21 PROCEDURE — 86703 HIV-1/HIV-2 1 RESULT ANTBDY: CPT

## 2019-03-21 PROCEDURE — 86901 BLOOD TYPING SEROLOGIC RH(D): CPT

## 2019-03-21 PROCEDURE — 81220 CFTR GENE COM VARIANTS: CPT

## 2019-03-21 PROCEDURE — 86762 RUBELLA ANTIBODY: CPT

## 2019-03-21 PROCEDURE — 87340 HEPATITIS B SURFACE AG IA: CPT

## 2019-03-21 PROCEDURE — 99999 PR PBB SHADOW E&M-EST. PATIENT-LVL II: ICD-10-PCS | Mod: PBBFAC,,, | Performed by: OBSTETRICS & GYNECOLOGY

## 2019-03-21 PROCEDURE — 86592 SYPHILIS TEST NON-TREP QUAL: CPT

## 2019-03-21 PROCEDURE — 99999 PR PBB SHADOW E&M-EST. PATIENT-LVL II: CPT | Mod: PBBFAC,,, | Performed by: OBSTETRICS & GYNECOLOGY

## 2019-03-21 PROCEDURE — 36415 COLL VENOUS BLD VENIPUNCTURE: CPT

## 2019-03-21 PROCEDURE — 0502F SUBSEQUENT PRENATAL CARE: CPT | Mod: CPTII,S$GLB,, | Performed by: OBSTETRICS & GYNECOLOGY

## 2019-03-21 PROCEDURE — 0502F PR SUBSEQUENT PRENATAL CARE: ICD-10-PCS | Mod: CPTII,S$GLB,, | Performed by: OBSTETRICS & GYNECOLOGY

## 2019-03-21 NOTE — PROGRESS NOTES
Some upper abd pain near old scar, discussed some of this can be normal, denies nausea, vomiting or issues with digestion.  Continued rib pain, discussed normals/ abnormals.  denies VB/LOF/Ctxns, reports good fetal movement. Precautions for Bleeding/ ROM/ Decreased fetal movement/ Pre-E reviewed.  F/U scheduled 4 weeks (connected mom), labs that were missing from Prenatal labs ordered.

## 2019-03-22 LAB
HBV SURFACE AG SERPL QL IA: NEGATIVE
HIV 1+2 AB+HIV1 P24 AG SERPL QL IA: NEGATIVE
RPR SER QL: NORMAL
RUBV IGG SER-ACNC: 52.2 IU/ML
RUBV IGG SER-IMP: REACTIVE

## 2019-03-25 LAB — CFTR MUT ANL BLD/T: NORMAL

## 2019-04-03 ENCOUNTER — PATIENT MESSAGE (OUTPATIENT)
Dept: ADMINISTRATIVE | Facility: OTHER | Age: 34
End: 2019-04-03

## 2019-04-05 ENCOUNTER — TELEPHONE (OUTPATIENT)
Dept: OBSTETRICS AND GYNECOLOGY | Facility: CLINIC | Age: 34
End: 2019-04-05

## 2019-04-05 ENCOUNTER — ROUTINE PRENATAL (OUTPATIENT)
Dept: OBSTETRICS AND GYNECOLOGY | Facility: CLINIC | Age: 34
End: 2019-04-05
Payer: COMMERCIAL

## 2019-04-05 VITALS — WEIGHT: 158.5 LBS | BODY MASS INDEX: 27.21 KG/M2 | SYSTOLIC BLOOD PRESSURE: 122 MMHG | DIASTOLIC BLOOD PRESSURE: 72 MMHG

## 2019-04-05 DIAGNOSIS — Z3A.30 30 WEEKS GESTATION OF PREGNANCY: Primary | ICD-10-CM

## 2019-04-05 DIAGNOSIS — O36.8131 DECREASED FETAL MOVEMENTS IN THIRD TRIMESTER, FETUS 1 OF MULTIPLE GESTATION: ICD-10-CM

## 2019-04-05 PROCEDURE — 99999 PR PBB SHADOW E&M-EST. PATIENT-LVL II: CPT | Mod: PBBFAC,,, | Performed by: NURSE PRACTITIONER

## 2019-04-05 PROCEDURE — 0502F SUBSEQUENT PRENATAL CARE: CPT | Mod: CPTII,S$GLB,, | Performed by: NURSE PRACTITIONER

## 2019-04-05 PROCEDURE — 99999 PR PBB SHADOW E&M-EST. PATIENT-LVL II: ICD-10-PCS | Mod: PBBFAC,,, | Performed by: NURSE PRACTITIONER

## 2019-04-05 PROCEDURE — 0502F PR SUBSEQUENT PRENATAL CARE: ICD-10-PCS | Mod: CPTII,S$GLB,, | Performed by: NURSE PRACTITIONER

## 2019-04-05 NOTE — TELEPHONE ENCOUNTER
Pt coming to clinic for decreased fetal movement. Pt would like to avoid LUDY if at all possible. Appt made and will schedule prenatal testing today as well.

## 2019-04-05 NOTE — TELEPHONE ENCOUNTER
Patient called concern decrease fetal movement for a couple of hours. Patient states she been drinking cold water and laying on her side to see if that would help. Advised patient if she still not feeling any movement to go to L&D, but patient states she didn't want to go there due to be being charged $300 dollars. Inform patient Dr Beck was in a meeting this morning, I spoke with Kaylyn and explained what was going on with the patient. Kaylyn agreed with seeing the patient this morning.

## 2019-04-05 NOTE — PROGRESS NOTES
Here for routine OB appt at 30w4d, with c/o decreased FM this morning. Baby is usually very active so she never does kick counts. This morning she only felt 6 kicks despite eating, changing positions, and drinking cold water. Since she has been in clinic and in the waiting room she is now feeling more movement. + FHTs in clinic. Declines PNT today. FKC reinforced. Denies LOF, denies VB, denies contractions.  Reviewed warning signs of Labor and Preeclampsia.  Daily FM counts reinforced.  F/U scheduled 2 weeks

## 2019-04-18 ENCOUNTER — CLINICAL SUPPORT (OUTPATIENT)
Dept: OBSTETRICS AND GYNECOLOGY | Facility: CLINIC | Age: 34
End: 2019-04-18
Attending: OBSTETRICS & GYNECOLOGY
Payer: COMMERCIAL

## 2019-04-18 VITALS — WEIGHT: 163.13 LBS | BODY MASS INDEX: 28 KG/M2 | SYSTOLIC BLOOD PRESSURE: 122 MMHG | DIASTOLIC BLOOD PRESSURE: 74 MMHG

## 2019-04-18 DIAGNOSIS — Z3A.32 32 WEEKS GESTATION OF PREGNANCY: Primary | ICD-10-CM

## 2019-04-18 PROCEDURE — 99999 PR PBB SHADOW E&M-EST. PATIENT-LVL II: CPT | Mod: PBBFAC,,, | Performed by: OBSTETRICS & GYNECOLOGY

## 2019-04-18 PROCEDURE — 99999 PR PBB SHADOW E&M-EST. PATIENT-LVL II: ICD-10-PCS | Mod: PBBFAC,,, | Performed by: OBSTETRICS & GYNECOLOGY

## 2019-04-18 PROCEDURE — 90471 IMMUNIZATION ADMIN: CPT | Mod: S$GLB,,, | Performed by: OBSTETRICS & GYNECOLOGY

## 2019-04-18 PROCEDURE — 90715 TDAP VACCINE 7 YRS/> IM: CPT | Mod: S$GLB,,, | Performed by: OBSTETRICS & GYNECOLOGY

## 2019-04-18 PROCEDURE — 0502F SUBSEQUENT PRENATAL CARE: CPT | Mod: CPTII,S$GLB,, | Performed by: OBSTETRICS & GYNECOLOGY

## 2019-04-18 PROCEDURE — 90471 TDAP VACCINE GREATER THAN OR EQUAL TO 7YO IM: ICD-10-PCS | Mod: S$GLB,,, | Performed by: OBSTETRICS & GYNECOLOGY

## 2019-04-18 PROCEDURE — 90715 TDAP VACCINE GREATER THAN OR EQUAL TO 7YO IM: ICD-10-PCS | Mod: S$GLB,,, | Performed by: OBSTETRICS & GYNECOLOGY

## 2019-04-18 PROCEDURE — 0502F PR SUBSEQUENT PRENATAL CARE: ICD-10-PCS | Mod: CPTII,S$GLB,, | Performed by: OBSTETRICS & GYNECOLOGY

## 2019-04-18 NOTE — PROGRESS NOTES
Doing well. Had GI viral illness the other week. Spec of blood in stool this morning. No constipation. Good FM. No OB complaints.  Vertex by bedside US.  FU in 2 weeks with Dr. Nargis Eldridge today.    Estela Guillory MD  Obstetrics and Gynecology  [facility]

## 2019-04-18 NOTE — PROGRESS NOTES
Tdap given IM, right deltoid, pt denies pain prior to and following injection. Pt instructed to wait in clinic 15 min following injection and report any signs of reaction.

## 2019-04-22 ENCOUNTER — PATIENT MESSAGE (OUTPATIENT)
Dept: ADMINISTRATIVE | Facility: OTHER | Age: 34
End: 2019-04-22

## 2019-05-03 ENCOUNTER — ROUTINE PRENATAL (OUTPATIENT)
Dept: OBSTETRICS AND GYNECOLOGY | Facility: CLINIC | Age: 34
End: 2019-05-03
Attending: OBSTETRICS & GYNECOLOGY
Payer: COMMERCIAL

## 2019-05-03 VITALS
DIASTOLIC BLOOD PRESSURE: 72 MMHG | BODY MASS INDEX: 28.14 KG/M2 | WEIGHT: 163.94 LBS | SYSTOLIC BLOOD PRESSURE: 114 MMHG

## 2019-05-03 DIAGNOSIS — Z34.90 PREGNANCY WITH ONE FETUS, ANTEPARTUM: ICD-10-CM

## 2019-05-03 PROCEDURE — 0502F SUBSEQUENT PRENATAL CARE: CPT | Mod: CPTII,S$GLB,, | Performed by: OBSTETRICS & GYNECOLOGY

## 2019-05-03 PROCEDURE — 0502F PR SUBSEQUENT PRENATAL CARE: ICD-10-PCS | Mod: CPTII,S$GLB,, | Performed by: OBSTETRICS & GYNECOLOGY

## 2019-05-10 ENCOUNTER — LAB VISIT (OUTPATIENT)
Dept: LAB | Facility: OTHER | Age: 34
End: 2019-05-10
Payer: COMMERCIAL

## 2019-05-10 ENCOUNTER — ROUTINE PRENATAL (OUTPATIENT)
Dept: OBSTETRICS AND GYNECOLOGY | Facility: CLINIC | Age: 34
End: 2019-05-10
Payer: COMMERCIAL

## 2019-05-10 VITALS — SYSTOLIC BLOOD PRESSURE: 118 MMHG | BODY MASS INDEX: 28.49 KG/M2 | WEIGHT: 166 LBS | DIASTOLIC BLOOD PRESSURE: 76 MMHG

## 2019-05-10 DIAGNOSIS — Z3A.35 35 WEEKS GESTATION OF PREGNANCY: ICD-10-CM

## 2019-05-10 DIAGNOSIS — Z3A.35 35 WEEKS GESTATION OF PREGNANCY: Primary | ICD-10-CM

## 2019-05-10 LAB
BASOPHILS # BLD AUTO: 0.01 K/UL (ref 0–0.2)
BASOPHILS NFR BLD: 0.1 % (ref 0–1.9)
DIFFERENTIAL METHOD: ABNORMAL
EOSINOPHIL # BLD AUTO: 0.1 K/UL (ref 0–0.5)
EOSINOPHIL NFR BLD: 0.9 % (ref 0–8)
ERYTHROCYTE [DISTWIDTH] IN BLOOD BY AUTOMATED COUNT: 13.1 % (ref 11.5–14.5)
HCT VFR BLD AUTO: 37.4 % (ref 37–48.5)
HGB BLD-MCNC: 12.4 G/DL (ref 12–16)
LYMPHOCYTES # BLD AUTO: 1.8 K/UL (ref 1–4.8)
LYMPHOCYTES NFR BLD: 17.9 % (ref 18–48)
MCH RBC QN AUTO: 29.9 PG (ref 27–31)
MCHC RBC AUTO-ENTMCNC: 33.2 G/DL (ref 32–36)
MCV RBC AUTO: 90 FL (ref 82–98)
MONOCYTES # BLD AUTO: 0.6 K/UL (ref 0.3–1)
MONOCYTES NFR BLD: 5.6 % (ref 4–15)
NEUTROPHILS # BLD AUTO: 7.6 K/UL (ref 1.8–7.7)
NEUTROPHILS NFR BLD: 75.1 % (ref 38–73)
PLATELET # BLD AUTO: 208 K/UL (ref 150–350)
PMV BLD AUTO: 10.1 FL (ref 9.2–12.9)
RBC # BLD AUTO: 4.15 M/UL (ref 4–5.4)
WBC # BLD AUTO: 10.07 K/UL (ref 3.9–12.7)

## 2019-05-10 PROCEDURE — 0502F PR SUBSEQUENT PRENATAL CARE: ICD-10-PCS | Mod: CPTII,S$GLB,, | Performed by: NURSE PRACTITIONER

## 2019-05-10 PROCEDURE — 99999 PR PBB SHADOW E&M-EST. PATIENT-LVL II: CPT | Mod: PBBFAC,,, | Performed by: NURSE PRACTITIONER

## 2019-05-10 PROCEDURE — 87081 CULTURE SCREEN ONLY: CPT

## 2019-05-10 PROCEDURE — 85025 COMPLETE CBC W/AUTO DIFF WBC: CPT

## 2019-05-10 PROCEDURE — 86703 HIV-1/HIV-2 1 RESULT ANTBDY: CPT

## 2019-05-10 PROCEDURE — 36415 COLL VENOUS BLD VENIPUNCTURE: CPT

## 2019-05-10 PROCEDURE — 86592 SYPHILIS TEST NON-TREP QUAL: CPT

## 2019-05-10 PROCEDURE — 99999 PR PBB SHADOW E&M-EST. PATIENT-LVL II: ICD-10-PCS | Mod: PBBFAC,,, | Performed by: NURSE PRACTITIONER

## 2019-05-10 PROCEDURE — 0502F SUBSEQUENT PRENATAL CARE: CPT | Mod: CPTII,S$GLB,, | Performed by: NURSE PRACTITIONER

## 2019-05-10 NOTE — PROGRESS NOTES
Here for routine OB appt at 35w4d, with no complaints.  Reports good FM.  Denies LOF, denies VB, denies contractions. Occ BHC. Answered questions regarding discharge in pregnancy, when to go to hospital, GBS results with PCN allergy, varicose vein in right leg versus DVT s/s. Gets occ dizzy spells that resolve on their own. Precautions reviewed.   Reviewed warning signs of Labor and Preeclampsia.  Daily FM counts reinforced.  F/U scheduled 1 week  Labs and GBS today    
29-Nov-2018

## 2019-05-13 LAB
HIV 1+2 AB+HIV1 P24 AG SERPL QL IA: NEGATIVE
RPR SER QL: NORMAL

## 2019-05-14 LAB — BACTERIA SPEC AEROBE CULT: NORMAL

## 2019-05-16 ENCOUNTER — ROUTINE PRENATAL (OUTPATIENT)
Dept: OBSTETRICS AND GYNECOLOGY | Facility: CLINIC | Age: 34
End: 2019-05-16
Attending: OBSTETRICS & GYNECOLOGY
Payer: COMMERCIAL

## 2019-05-16 VITALS — BODY MASS INDEX: 28.84 KG/M2 | DIASTOLIC BLOOD PRESSURE: 70 MMHG | WEIGHT: 168 LBS | SYSTOLIC BLOOD PRESSURE: 104 MMHG

## 2019-05-16 DIAGNOSIS — Z30.9 ENCOUNTER FOR CONTRACEPTIVE MANAGEMENT, UNSPECIFIED TYPE: Primary | ICD-10-CM

## 2019-05-16 PROCEDURE — 0502F PR SUBSEQUENT PRENATAL CARE: ICD-10-PCS | Mod: CPTII,S$GLB,, | Performed by: OBSTETRICS & GYNECOLOGY

## 2019-05-16 PROCEDURE — 99999 PR PBB SHADOW E&M-EST. PATIENT-LVL II: CPT | Mod: PBBFAC,,, | Performed by: OBSTETRICS & GYNECOLOGY

## 2019-05-16 PROCEDURE — 99999 PR PBB SHADOW E&M-EST. PATIENT-LVL II: ICD-10-PCS | Mod: PBBFAC,,, | Performed by: OBSTETRICS & GYNECOLOGY

## 2019-05-16 PROCEDURE — 0502F SUBSEQUENT PRENATAL CARE: CPT | Mod: CPTII,S$GLB,, | Performed by: OBSTETRICS & GYNECOLOGY

## 2019-05-17 DIAGNOSIS — Z30.9 ENCOUNTER FOR CONTRACEPTIVE MANAGEMENT, UNSPECIFIED TYPE: Primary | ICD-10-CM

## 2019-05-20 ENCOUNTER — ROUTINE PRENATAL (OUTPATIENT)
Dept: OBSTETRICS AND GYNECOLOGY | Facility: CLINIC | Age: 34
End: 2019-05-20
Attending: OBSTETRICS & GYNECOLOGY
Payer: COMMERCIAL

## 2019-05-20 VITALS
DIASTOLIC BLOOD PRESSURE: 72 MMHG | WEIGHT: 168.19 LBS | SYSTOLIC BLOOD PRESSURE: 112 MMHG | BODY MASS INDEX: 28.87 KG/M2

## 2019-05-20 DIAGNOSIS — Z34.90 PREGNANCY WITH ONE FETUS, ANTEPARTUM: Primary | ICD-10-CM

## 2019-05-20 PROCEDURE — 99999 PR PBB SHADOW E&M-EST. PATIENT-LVL II: CPT | Mod: PBBFAC,,, | Performed by: OBSTETRICS & GYNECOLOGY

## 2019-05-20 PROCEDURE — 0502F PR SUBSEQUENT PRENATAL CARE: ICD-10-PCS | Mod: CPTII,S$GLB,, | Performed by: OBSTETRICS & GYNECOLOGY

## 2019-05-20 PROCEDURE — 0502F SUBSEQUENT PRENATAL CARE: CPT | Mod: CPTII,S$GLB,, | Performed by: OBSTETRICS & GYNECOLOGY

## 2019-05-20 PROCEDURE — 99999 PR PBB SHADOW E&M-EST. PATIENT-LVL II: ICD-10-PCS | Mod: PBBFAC,,, | Performed by: OBSTETRICS & GYNECOLOGY

## 2019-05-20 NOTE — PROGRESS NOTES
Position verified by u/s.  Noted to have multiple, adequate pockets of fluid.  Patient seen and examined.  No complaints, denies VB/LOF/Ctxns, reports good fetal movement. Precautions for Bleeding/ ROM/ Decreased fetal movement/ Pre-E reviewed.  F/U scheduled 1 weeks

## 2019-05-21 ENCOUNTER — PATIENT MESSAGE (OUTPATIENT)
Dept: EMERGENCY MEDICINE | Facility: OTHER | Age: 34
End: 2019-05-21

## 2019-05-22 ENCOUNTER — TELEPHONE (OUTPATIENT)
Dept: OBSTETRICS AND GYNECOLOGY | Facility: CLINIC | Age: 34
End: 2019-05-22

## 2019-05-22 NOTE — TELEPHONE ENCOUNTER
----- Message from Kaylyn Hernandez NP sent at 5/22/2019  7:42 AM CDT -----  Regarding: FW: Roxanne  Please see below. Does not look like IUD is covered under specialty pharmacy.  ----- Message -----  From: Tin Alvarado  Sent: 5/21/2019   4:55 PM  To: Kaylyn Hernandez NP  Subject: Roxanne Diaz     [facility] received a prescription for Roxanne.   IUD's are excluded under the patient's pharmacy benefits. [facility] is unable to bill medical claims for medications.     The medication itself, and the administration of the medication, will both have to be billed under the medical benefit and may require a prior authorization. Please contact [facility] with any questions at [phone].     Thank you,  Tin

## 2019-05-28 ENCOUNTER — PATIENT MESSAGE (OUTPATIENT)
Dept: OBSTETRICS AND GYNECOLOGY | Facility: CLINIC | Age: 34
End: 2019-05-28

## 2019-05-30 ENCOUNTER — LAB VISIT (OUTPATIENT)
Dept: LAB | Facility: OTHER | Age: 34
End: 2019-05-30
Attending: OBSTETRICS & GYNECOLOGY
Payer: COMMERCIAL

## 2019-05-30 ENCOUNTER — TELEPHONE (OUTPATIENT)
Dept: OBSTETRICS AND GYNECOLOGY | Facility: CLINIC | Age: 34
End: 2019-05-30

## 2019-05-30 ENCOUNTER — ROUTINE PRENATAL (OUTPATIENT)
Dept: OBSTETRICS AND GYNECOLOGY | Facility: CLINIC | Age: 34
End: 2019-05-30
Attending: OBSTETRICS & GYNECOLOGY
Payer: COMMERCIAL

## 2019-05-30 VITALS
DIASTOLIC BLOOD PRESSURE: 60 MMHG | SYSTOLIC BLOOD PRESSURE: 110 MMHG | WEIGHT: 170.19 LBS | BODY MASS INDEX: 29.21 KG/M2

## 2019-05-30 DIAGNOSIS — Z34.90 PREGNANCY WITH ONE FETUS, ANTEPARTUM: ICD-10-CM

## 2019-05-30 DIAGNOSIS — L29.9 ITCHING: Primary | ICD-10-CM

## 2019-05-30 DIAGNOSIS — F41.9 ANXIETY: ICD-10-CM

## 2019-05-30 DIAGNOSIS — L29.9 ITCHING: ICD-10-CM

## 2019-05-30 LAB
ALBUMIN SERPL BCP-MCNC: 2.9 G/DL (ref 3.5–5.2)
ALP SERPL-CCNC: 213 U/L (ref 55–135)
ALT SERPL W/O P-5'-P-CCNC: 13 U/L (ref 10–44)
ANION GAP SERPL CALC-SCNC: 8 MMOL/L (ref 8–16)
AST SERPL-CCNC: 15 U/L (ref 10–40)
BILIRUB SERPL-MCNC: 0.4 MG/DL (ref 0.1–1)
BUN SERPL-MCNC: 6 MG/DL (ref 6–20)
CALCIUM SERPL-MCNC: 9 MG/DL (ref 8.7–10.5)
CHLORIDE SERPL-SCNC: 106 MMOL/L (ref 95–110)
CO2 SERPL-SCNC: 23 MMOL/L (ref 23–29)
CREAT SERPL-MCNC: 0.6 MG/DL (ref 0.5–1.4)
EST. GFR  (AFRICAN AMERICAN): >60 ML/MIN/1.73 M^2
EST. GFR  (NON AFRICAN AMERICAN): >60 ML/MIN/1.73 M^2
GLUCOSE SERPL-MCNC: 72 MG/DL (ref 70–110)
POTASSIUM SERPL-SCNC: 3.8 MMOL/L (ref 3.5–5.1)
PROT SERPL-MCNC: 6.5 G/DL (ref 6–8.4)
SODIUM SERPL-SCNC: 137 MMOL/L (ref 136–145)

## 2019-05-30 PROCEDURE — 99999 PR PBB SHADOW E&M-EST. PATIENT-LVL II: ICD-10-PCS | Mod: PBBFAC,,, | Performed by: OBSTETRICS & GYNECOLOGY

## 2019-05-30 PROCEDURE — 99999 PR PBB SHADOW E&M-EST. PATIENT-LVL II: CPT | Mod: PBBFAC,,, | Performed by: OBSTETRICS & GYNECOLOGY

## 2019-05-30 PROCEDURE — 82239 BILE ACIDS TOTAL: CPT

## 2019-05-30 PROCEDURE — 0502F SUBSEQUENT PRENATAL CARE: CPT | Mod: CPTII,S$GLB,, | Performed by: OBSTETRICS & GYNECOLOGY

## 2019-05-30 PROCEDURE — 36415 COLL VENOUS BLD VENIPUNCTURE: CPT

## 2019-05-30 PROCEDURE — 0502F PR SUBSEQUENT PRENATAL CARE: ICD-10-PCS | Mod: CPTII,S$GLB,, | Performed by: OBSTETRICS & GYNECOLOGY

## 2019-05-30 PROCEDURE — 80053 COMPREHEN METABOLIC PANEL: CPT

## 2019-05-30 NOTE — LETTER
May 30, 2019    Lindy Ferraro  [address] [address]              [facility] [id number] [facility] 5 [facility] 500  [address] [address]  Phone: [phone]  Fax: [phone]    To Whom It May Concern:    Ms. Lindy Ferraro is currently under our care for pregnancy she is 38 weeks 3 days. For this reason she is unable to participate in jury duty at this time. If you have any questions or concerns please call [phone].    Sincerely,    Lilliam Beck DO

## 2019-05-30 NOTE — PROGRESS NOTES
Doing well, occasional cramping.  Would like to schedule induction around due date, discussed [facility] trial and she is interested.  Has been seeing a therapist for the last month for anxiety and states that she is doing well, plans to continue in the PP period.  Discussed options for medication and early PP F/U.  Precautions reinforced.

## 2019-06-01 ENCOUNTER — HOSPITAL ENCOUNTER (EMERGENCY)
Facility: OTHER | Age: 34
Discharge: HOME | End: 2019-06-01
Attending: OBSTETRICS & GYNECOLOGY
Payer: COMMERCIAL

## 2019-06-01 VITALS
TEMPERATURE: 97 F | DIASTOLIC BLOOD PRESSURE: 71 MMHG | HEART RATE: 74 BPM | RESPIRATION RATE: 18 BRPM | SYSTOLIC BLOOD PRESSURE: 139 MMHG | OXYGEN SATURATION: 100 %

## 2019-06-01 DIAGNOSIS — Z3A.38 38 WEEKS GESTATION OF PREGNANCY: ICD-10-CM

## 2019-06-01 DIAGNOSIS — O36.8130 DECREASED FETAL MOVEMENTS IN THIRD TRIMESTER, SINGLE OR UNSPECIFIED FETUS: Primary | ICD-10-CM

## 2019-06-01 LAB — BILE AC SERPL-SCNC: 6 MCMOL/L

## 2019-06-01 PROCEDURE — 99283 PR EMERGENCY DEPT VISIT,LEVEL III: ICD-10-PCS | Mod: 25,,, | Performed by: OBSTETRICS & GYNECOLOGY

## 2019-06-01 PROCEDURE — 59025 PR FETAL 2N-STRESS TEST: ICD-10-PCS | Mod: 26,,, | Performed by: OBSTETRICS & GYNECOLOGY

## 2019-06-01 PROCEDURE — 59025 FETAL NON-STRESS TEST: CPT | Mod: 26,,, | Performed by: OBSTETRICS & GYNECOLOGY

## 2019-06-01 PROCEDURE — 99284 EMERGENCY DEPT VISIT MOD MDM: CPT | Mod: 25

## 2019-06-01 PROCEDURE — 99283 EMERGENCY DEPT VISIT LOW MDM: CPT | Mod: 25,,, | Performed by: OBSTETRICS & GYNECOLOGY

## 2019-06-01 PROCEDURE — 59025 FETAL NON-STRESS TEST: CPT

## 2019-06-01 NOTE — ED PROVIDER NOTES
Encounter Date: 2019       History     Chief Complaint   Patient presents with    Decreased Fetal Movement     HPI   Lindy Ferraro is a 33 y.o. [id number] at 38w5d presents complaining of decreased fetal movement upon awakening this am, now resolved. Patient denies s/s pre-eclampsia.  This IUP is uncomplicated.  Patient denies contractions, denies vaginal bleeding, denies LOF.   Fetal Movement: normal.     Review of patient's allergies indicates:   Allergen Reactions    Asa [aspirin] Other (See Comments)     Blood disorder per Patient    Pcn [penicillins]     Sulfa (sulfonamide antibiotics)      Past Medical History:   Diagnosis Date    Jejunal atresia     repaired as an infant     Past Surgical History:   Procedure Laterality Date    INTRAUTERINE DEVICE INSERTION      MIKI---REMOVED     Family History   Problem Relation Age of Onset    Breast cancer Neg Hx     Colon cancer Neg Hx     Ovarian cancer Neg Hx     Glaucoma Neg Hx     Cataracts Neg Hx     Macular degeneration Neg Hx      Social History     Tobacco Use    Smoking status: Never Smoker    Smokeless tobacco: Never Used   Substance Use Topics    Alcohol use: Yes    Drug use: No     Review of Systems   Constitutional: Negative for fever.   HENT: Negative for sore throat.    Respiratory: Negative for shortness of breath.    Cardiovascular: Negative for chest pain.   Gastrointestinal: Positive for abdominal distention (Gravid). Negative for abdominal pain and nausea.   Genitourinary: Negative for dysuria, vaginal bleeding and vaginal discharge.   Musculoskeletal: Negative for back pain.   Skin: Negative for rash.   Neurological: Negative for weakness and headaches.   Hematological: Does not bruise/bleed easily.       Physical Exam     Initial Vitals [19 0653]   BP Pulse Resp Temp SpO2   139/71 74 18 96.6 °F (35.9 °C) 100 %      MAP       --         Physical Exam    Constitutional: She appears well-developed and  well-nourished. No distress.   HENT:   Head: Normocephalic and atraumatic.   Cardiovascular: Normal rate and regular rhythm.   Pulmonary/Chest: No respiratory distress.   Abdominal: Soft.   Neurological: She is alert and oriented to person, place, and time.   Psychiatric: She has a normal mood and affect.     OB LABOR EXAM:   Pre-Term Labor: No.   Membranes ruptured: No.               Amniotic Fluid Color: no fluid.     Comments: FHT: reassuring, 120 bpm, mod BTB variability, accels present, decels absent  TOCO: occasional  Impression: reactive       ED Course   Procedures  Labs Reviewed - No data to display       Imaging Results    None          Medical Decision Making:   ED Management:  Patient reassured re resumption of fetal movement, reactive NST. Movement counts discussed.  S/s pre-eclampsia discussed, will follow up this week with MD, will call for headache, visual change, abdominal pain or BP>140/90                      Clinical Impression:       ICD-10-CM ICD-9-CM   1. Decreased fetal movements in third trimester, single or unspecified fetus O36.8130 655.73   2. 38 weeks gestation of pregnancy Z3A.38 V22.2                                Emely Wallis MD  06/01/19 0936

## 2019-06-07 ENCOUNTER — ROUTINE PRENATAL (OUTPATIENT)
Dept: OBSTETRICS AND GYNECOLOGY | Facility: CLINIC | Age: 34
End: 2019-06-07
Attending: OBSTETRICS & GYNECOLOGY
Payer: COMMERCIAL

## 2019-06-07 VITALS
SYSTOLIC BLOOD PRESSURE: 110 MMHG | BODY MASS INDEX: 28.38 KG/M2 | DIASTOLIC BLOOD PRESSURE: 70 MMHG | WEIGHT: 165.38 LBS

## 2019-06-07 DIAGNOSIS — F41.9 ANXIETY: ICD-10-CM

## 2019-06-07 DIAGNOSIS — Z34.90 PREGNANCY WITH ONE FETUS, ANTEPARTUM: ICD-10-CM

## 2019-06-07 PROCEDURE — 0502F PR SUBSEQUENT PRENATAL CARE: ICD-10-PCS | Mod: CPTII,S$GLB,, | Performed by: OBSTETRICS & GYNECOLOGY

## 2019-06-07 PROCEDURE — 0502F SUBSEQUENT PRENATAL CARE: CPT | Mod: CPTII,S$GLB,, | Performed by: OBSTETRICS & GYNECOLOGY

## 2019-06-07 NOTE — PROGRESS NOTES
Patient seen and examined.  No complaints, denies VB/LOF/Ctxns, reports good fetal movement. Precautions for Bleeding/ ROM/ Decreased fetal movement/ Pre-E reviewed.  Decreased movement over the weekend, was seen in OB ED and normal  Some hip pain.  Considering CORC, induction discussed

## 2019-06-10 DIAGNOSIS — Z34.90 ENCOUNTER FOR ELECTIVE INDUCTION OF LABOR: Primary | ICD-10-CM

## 2019-06-11 ENCOUNTER — HOSPITAL ENCOUNTER (OUTPATIENT)
Dept: PERINATAL CARE | Facility: OTHER | Age: 34
Discharge: HOME | End: 2019-06-11
Attending: OBSTETRICS & GYNECOLOGY
Payer: COMMERCIAL

## 2019-06-11 DIAGNOSIS — Z34.90 ENCOUNTER FOR ELECTIVE INDUCTION OF LABOR: ICD-10-CM

## 2019-06-11 DIAGNOSIS — Z34.90 ENCOUNTER FOR ELECTIVE INDUCTION OF LABOR: Primary | ICD-10-CM

## 2019-06-11 NOTE — PROGRESS NOTES
Patient was seen in  Testing for evaluation re [facility] Trial. Consents signed, NST was reactive with a baseline of 120 and few contractions. Her cervix was 1/60/-3. She was randomized to [place], # 3  were placed at 4 pm. She reported a pain score on insertion of 2.  Patient was discharged to home with a plan to return at 5 am for induction of labor. L+D charge nurse aware.

## 2019-06-12 ENCOUNTER — HOSPITAL ENCOUNTER (INPATIENT)
Facility: OTHER | Age: 34
LOS: 3 days | Discharge: HOME | End: 2019-06-15
Attending: OBSTETRICS & GYNECOLOGY | Admitting: OBSTETRICS & GYNECOLOGY
Payer: COMMERCIAL

## 2019-06-12 ENCOUNTER — ANESTHESIA EVENT (OUTPATIENT)
Dept: OBSTETRICS AND GYNECOLOGY | Facility: OTHER | Age: 34
End: 2019-06-12
Payer: COMMERCIAL

## 2019-06-12 ENCOUNTER — ANESTHESIA (OUTPATIENT)
Dept: OBSTETRICS AND GYNECOLOGY | Facility: OTHER | Age: 34
End: 2019-06-12
Payer: COMMERCIAL

## 2019-06-12 DIAGNOSIS — Z34.90 ENCOUNTER FOR INDUCTION OF LABOR: ICD-10-CM

## 2019-06-12 LAB
ABO + RH BLD: NORMAL
BASOPHILS # BLD AUTO: 0.02 K/UL (ref 0–0.2)
BASOPHILS NFR BLD: 0.2 % (ref 0–1.9)
BLD GP AB SCN CELLS X3 SERPL QL: NORMAL
DIFFERENTIAL METHOD: NORMAL
EOSINOPHIL # BLD AUTO: 0.1 K/UL (ref 0–0.5)
EOSINOPHIL NFR BLD: 0.8 % (ref 0–8)
ERYTHROCYTE [DISTWIDTH] IN BLOOD BY AUTOMATED COUNT: 13.8 % (ref 11.5–14.5)
HCT VFR BLD AUTO: 40.9 % (ref 37–48.5)
HGB BLD-MCNC: 13.8 G/DL (ref 12–16)
LYMPHOCYTES # BLD AUTO: 2.2 K/UL (ref 1–4.8)
LYMPHOCYTES NFR BLD: 24 % (ref 18–48)
MCH RBC QN AUTO: 30.5 PG (ref 27–31)
MCHC RBC AUTO-ENTMCNC: 33.7 G/DL (ref 32–36)
MCV RBC AUTO: 90 FL (ref 82–98)
MONOCYTES # BLD AUTO: 0.5 K/UL (ref 0.3–1)
MONOCYTES NFR BLD: 5.1 % (ref 4–15)
NEUTROPHILS # BLD AUTO: 6.3 K/UL (ref 1.8–7.7)
NEUTROPHILS NFR BLD: 69.6 % (ref 38–73)
PLATELET # BLD AUTO: 195 K/UL (ref 150–350)
PMV BLD AUTO: 10.4 FL (ref 9.2–12.9)
RBC # BLD AUTO: 4.53 M/UL (ref 4–5.4)
WBC # BLD AUTO: 9.09 K/UL (ref 3.9–12.7)

## 2019-06-12 PROCEDURE — 59409 OBSTETRICAL CARE: CPT | Mod: QY,,, | Performed by: ANESTHESIOLOGY

## 2019-06-12 PROCEDURE — 27200710 HC EPIDURAL INFUSION PUMP SET: Performed by: STUDENT IN AN ORGANIZED HEALTH CARE EDUCATION/TRAINING PROGRAM

## 2019-06-12 PROCEDURE — 59409 PRA ETRICAL CARE,VAG DELIV ONLY: ICD-10-PCS | Mod: QY,,, | Performed by: ANESTHESIOLOGY

## 2019-06-12 PROCEDURE — 59200 INSERT CERVICAL DILATOR: CPT

## 2019-06-12 PROCEDURE — 72100002 HC LABOR CARE, 1ST 8 HOURS

## 2019-06-12 PROCEDURE — 72100003 HC LABOR CARE, EA. ADDL. 8 HRS

## 2019-06-12 PROCEDURE — 25000003 PHARM REV CODE 250: Performed by: STUDENT IN AN ORGANIZED HEALTH CARE EDUCATION/TRAINING PROGRAM

## 2019-06-12 PROCEDURE — 11000001 HC ACUTE MED/SURG PRIVATE ROOM

## 2019-06-12 PROCEDURE — 63600175 PHARM REV CODE 636 W HCPCS: Performed by: STUDENT IN AN ORGANIZED HEALTH CARE EDUCATION/TRAINING PROGRAM

## 2019-06-12 PROCEDURE — 62326 NJX INTERLAMINAR LMBR/SAC: CPT | Performed by: ANESTHESIOLOGY

## 2019-06-12 PROCEDURE — 86850 RBC ANTIBODY SCREEN: CPT

## 2019-06-12 PROCEDURE — 85025 COMPLETE CBC W/AUTO DIFF WBC: CPT

## 2019-06-12 PROCEDURE — 27000181 HC CABLE, IUPC

## 2019-06-12 PROCEDURE — 27800517 HC TRAY,EPIDURAL-CONTINUOUS: Performed by: STUDENT IN AN ORGANIZED HEALTH CARE EDUCATION/TRAINING PROGRAM

## 2019-06-12 PROCEDURE — 51702 INSERT TEMP BLADDER CATH: CPT

## 2019-06-12 RX ORDER — SODIUM CHLORIDE 9 MG/ML
INJECTION, SOLUTION INTRAVENOUS
Status: DISCONTINUED | OUTPATIENT
Start: 2019-06-12 | End: 2019-06-15 | Stop reason: HOSPADM

## 2019-06-12 RX ORDER — OXYTOCIN/RINGER'S LACTATE 20/1000 ML
333 PLASTIC BAG, INJECTION (ML) INTRAVENOUS CONTINUOUS
Status: ACTIVE | OUTPATIENT
Start: 2019-06-12 | End: 2019-06-12

## 2019-06-12 RX ORDER — ONDANSETRON 8 MG/1
8 TABLET, ORALLY DISINTEGRATING ORAL EVERY 8 HOURS PRN
Status: DISCONTINUED | OUTPATIENT
Start: 2019-06-12 | End: 2019-06-15 | Stop reason: HOSPADM

## 2019-06-12 RX ORDER — MISOPROSTOL 200 UG/1
600 TABLET ORAL
Status: DISCONTINUED | OUTPATIENT
Start: 2019-06-12 | End: 2019-06-15 | Stop reason: HOSPADM

## 2019-06-12 RX ORDER — OXYTOCIN/RINGER'S LACTATE 20/1000 ML
41.65 PLASTIC BAG, INJECTION (ML) INTRAVENOUS CONTINUOUS
Status: ACTIVE | OUTPATIENT
Start: 2019-06-12 | End: 2019-06-12

## 2019-06-12 RX ORDER — DIPHENOXYLATE HYDROCHLORIDE AND ATROPINE SULFATE 2.5; .025 MG/1; MG/1
1 TABLET ORAL 4 TIMES DAILY PRN
Status: DISCONTINUED | OUTPATIENT
Start: 2019-06-12 | End: 2019-06-15 | Stop reason: HOSPADM

## 2019-06-12 RX ORDER — BUPIVACAINE HYDROCHLORIDE 2.5 MG/ML
INJECTION, SOLUTION EPIDURAL; INFILTRATION; INTRACAUDAL
Status: DISPENSED
Start: 2019-06-12 | End: 2019-06-13

## 2019-06-12 RX ORDER — METHYLERGONOVINE MALEATE 0.2 MG/ML
200 INJECTION INTRAVENOUS
Status: DISCONTINUED | OUTPATIENT
Start: 2019-06-12 | End: 2019-06-15 | Stop reason: HOSPADM

## 2019-06-12 RX ORDER — SODIUM CHLORIDE, SODIUM LACTATE, POTASSIUM CHLORIDE, CALCIUM CHLORIDE 600; 310; 30; 20 MG/100ML; MG/100ML; MG/100ML; MG/100ML
INJECTION, SOLUTION INTRAVENOUS CONTINUOUS
Status: DISCONTINUED | OUTPATIENT
Start: 2019-06-12 | End: 2019-06-13

## 2019-06-12 RX ORDER — OXYTOCIN/RINGER'S LACTATE 20/1000 ML
20 PLASTIC BAG, INJECTION (ML) INTRAVENOUS ONCE
Status: DISCONTINUED | OUTPATIENT
Start: 2019-06-12 | End: 2019-06-15 | Stop reason: HOSPADM

## 2019-06-12 RX ORDER — FENTANYL/BUPIVACAINE/NS/PF 2MCG/ML-.1
PLASTIC BAG, INJECTION (ML) INJECTION CONTINUOUS PRN
Status: DISCONTINUED | OUTPATIENT
Start: 2019-06-12 | End: 2019-06-13

## 2019-06-12 RX ORDER — OXYTOCIN/RINGER'S LACTATE 20/1000 ML
10 PLASTIC BAG, INJECTION (ML) INTRAVENOUS
Status: DISCONTINUED | OUTPATIENT
Start: 2019-06-12 | End: 2019-06-13

## 2019-06-12 RX ORDER — OXYTOCIN/RINGER'S LACTATE 20/1000 ML
2 PLASTIC BAG, INJECTION (ML) INTRAVENOUS CONTINUOUS
Status: DISCONTINUED | OUTPATIENT
Start: 2019-06-12 | End: 2019-06-13

## 2019-06-12 RX ORDER — CARBOPROST TROMETHAMINE 250 UG/ML
250 INJECTION, SOLUTION INTRAMUSCULAR
Status: DISCONTINUED | OUTPATIENT
Start: 2019-06-12 | End: 2019-06-15 | Stop reason: HOSPADM

## 2019-06-12 RX ORDER — OXYTOCIN/RINGER'S LACTATE 20/1000 ML
41.7 PLASTIC BAG, INJECTION (ML) INTRAVENOUS CONTINUOUS
Status: ACTIVE | OUTPATIENT
Start: 2019-06-12 | End: 2019-06-12

## 2019-06-12 RX ORDER — FENTANYL/BUPIVACAINE/NS/PF 2MCG/ML-.1
PLASTIC BAG, INJECTION (ML) INJECTION
Status: DISPENSED
Start: 2019-06-12 | End: 2019-06-13

## 2019-06-12 RX ADMIN — Medication 10 ML/HR: at 05:06

## 2019-06-12 RX ADMIN — Medication 2 MILLI-UNITS/MIN: at 06:06

## 2019-06-12 RX ADMIN — SODIUM CHLORIDE, SODIUM LACTATE, POTASSIUM CHLORIDE, AND CALCIUM CHLORIDE: .6; .31; .03; .02 INJECTION, SOLUTION INTRAVENOUS at 06:06

## 2019-06-12 RX ADMIN — SODIUM CHLORIDE, SODIUM LACTATE, POTASSIUM CHLORIDE, AND CALCIUM CHLORIDE: .6; .31; .03; .02 INJECTION, SOLUTION INTRAVENOUS at 01:06

## 2019-06-12 RX ADMIN — SODIUM CHLORIDE, SODIUM LACTATE, POTASSIUM CHLORIDE, AND CALCIUM CHLORIDE 1000 ML: .6; .31; .03; .02 INJECTION, SOLUTION INTRAVENOUS at 04:06

## 2019-06-12 NOTE — HPI
Lindy Ferraro is a 33 y.o. [id number] at 40w2d admitted for IOL. Patient part of [facility] trial. 3 laminaria placed yesterday due for removal this AM.    This IUP is uncomplicated.  Patient denies contractions, denies vaginal bleeding, denies LOF.   Fetal Movement: normal.

## 2019-06-12 NOTE — NURSING
Pt c/o intense back pain after epidural placement. Dr. Rodriguez called to bedside for SVE. Then anesth called to bedside for the following BP:      06/12/19 1713   Vital Signs   BP (!) 86/49   MAP (mmHg) 61

## 2019-06-12 NOTE — PROGRESS NOTES
LABOR PROGRESS NOTE    S:  MD to bedside for cervical exam. Complaints: No.  No epidural in place. Feeling contractions with increasing discomfort but not  Ready for epidural yet.    O: Temp:  [98 °F (36.7 °C)-98.9 °F (37.2 °C)] 98 °F (36.7 °C)  Pulse:  [60-86] 85  Resp:  [16-18] 16  SpO2:  [95 %-100 %] 100 %  BP: ()/(58-85) 105/74      FHT: 120 BPM/moderate beat to beat variability/+accels/-decels Cat 1 (reassuring)  CTX: q 2 minutes, pitocin 10 mU/min  SVE: 260/-2, fetal head is low in the pelvis. Cook balloon placed without difficulty.    ASSESSMENT:   33 y.o.  at 40w2d, IOL, enrolled in [facility]    FHT reassuring    Active Hospital Problems    Diagnosis  POA    *Encounter for induction of labor [Z34.90]  Not Applicable      Resolved Hospital Problems   No resolved problems to display.     PLAN:    Labor management  Continue Close Maternal/Fetal Monitoring.   Pitocin Augmentation per protocol, pit @ 10 mU  Recheck 4 hours or PRN    Labor course:  Admission 0600: arrival with 3 laminaria in place; 60/-3 when laminaria were removed; pitocin started @ 2 mU/min immediately after removal  0900: 2/60/-3, pitocin @ 10 mU/min, Cook balloon placed      Vonda Chanel MD

## 2019-06-12 NOTE — ANESTHESIA PREPROCEDURE EVALUATION
2019  Lindy Ferraro is a 33 y.o. female with PMH of G6PD deficiency and anxiety who presents for IOL secondary to dates.  Patient desires an epidural.     OB History    Para Term  AB Living   1 0 0 0 0 0   SAB TAB Ectopic Multiple Live Births   0 0 0 0        # Outcome Date GA Lbr Elie/2nd Weight Sex Delivery Anes PTL Lv   1 Current                Wt Readings from Last 1 Encounters:   19 0856 75 kg (165 lb 5.5 oz)       BP Readings from Last 3 Encounters:   19 115/68   19 110/70   19 139/71       Patient Active Problem List   Diagnosis    Epigastric pain    Pregnancy    Pregnancy with one fetus, antepartum    Anxiety    Encounter for induction of labor       Past Surgical History:   Procedure Laterality Date    INTRAUTERINE DEVICE INSERTION      ANNETTE---REMOVED       Social History     Socioeconomic History    Marital status:      Spouse name: Efrain    Number of children: 0    Years of education: Not on file    Highest education level: Not on file   Occupational History    Not on file   Social Needs    Financial resource strain: Not on file    Food insecurity:     Worry: Not on file     Inability: Not on file    Transportation needs:     Medical: Not on file     Non-medical: Not on file   Tobacco Use    Smoking status: Never Smoker    Smokeless tobacco: Never Used   Substance and Sexual Activity    Alcohol use: Yes    Drug use: No    Sexual activity: Yes     Partners: Male     Birth control/protection: IUD     Comment: s/p removal of annette   Lifestyle    Physical activity:     Days per week: Not on file     Minutes per session: Not on file    Stress: Not on file   Relationships    Social connections:     Talks on phone: Not on file     Gets together: Not on file     Attends [employer] witness service: Not on file     Active member  of club or organization: Not on file     Attends meetings of clubs or organizations: Not on file     Relationship status: Not on file   Other Topics Concern    Not on file   Social History Narrative    Not on file         Chemistry        Component Value Date/Time     05/30/2019 1902    K 3.8 05/30/2019 1902     05/30/2019 1902    CO2 23 05/30/2019 1902    BUN 6 05/30/2019 1902    CREATININE 0.6 05/30/2019 1902    GLU 72 05/30/2019 1902        Component Value Date/Time    CALCIUM 9.0 05/30/2019 1902    ALKPHOS 213 (H) 05/30/2019 1902    AST 15 05/30/2019 1902    ALT 13 05/30/2019 1902    BILITOT 0.4 05/30/2019 1902    ESTGFRAFRICA >60 05/30/2019 1902    EGFRNONAA >60 05/30/2019 1902            Lab Results   Component Value Date    WBC 10.07 05/10/2019    HGB 12.4 05/10/2019    HCT 37.4 05/10/2019    MCV 90 05/10/2019     05/10/2019       No results for input(s): PT, INR, PROTIME, APTT in the last 72 hours.                  Anesthesia Evaluation    I have reviewed the Patient Summary Reports.     I have reviewed the Medications.     Review of Systems  Anesthesia Hx:  No previous Anesthesia  Neg history of prior surgery. Denies Family Hx of Anesthesia complications.    Social:  Non-Smoker    Cardiovascular:  Cardiovascular Normal     Pulmonary:  Pulmonary Normal    Hepatic/GI:  Hepatic/GI Normal    Neurological:  Neurology Normal    Endocrine:  Endocrine Normal        Physical Exam  General:  Well nourished    Airway/Jaw/Neck:  Airway Findings: General Airway Assessment: Adult Mallampati: II  Improves to I with phonation.  TM Distance: Normal, at least 6 cm  Jaw/Neck Findings:     Neck ROM: Normal ROM      Dental:  Dental Findings: In tact   Chest/Lungs:  Chest/Lungs Findings: Clear to auscultation, Normal Respiratory Rate     Heart/Vascular:  Heart Findings: Rate: Normal  Rhythm: Regular Rhythm  Sounds: Normal             Anesthesia Plan  Type of Anesthesia, risks & benefits  discussed:  Anesthesia Type:  general, spinal, epidural  Patient's Preference:   Intra-op Monitoring Plan: standard ASA monitors  Intra-op Monitoring Plan Comments:   Post Op Pain Control Plan: multimodal analgesia  Post Op Pain Control Plan Comments:   Induction:   IV  Beta Blocker:  Patient is not currently on a Beta-Blocker (No further documentation required).       Informed Consent: Patient understands risks and agrees with Anesthesia plan.  Questions answered. Anesthesia consent signed with patient.  ASA Score: 2     Day of Surgery Review of History & Physical:    H&P update referred to the provider.         Ready For Surgery From Anesthesia Perspective.

## 2019-06-12 NOTE — ASSESSMENT & PLAN NOTE
- Consents signed and to chart  - Admit to Labor and Delivery unit  - Epidural per Anesthesia  - Draw CBC, T&S  - Notify Staff  - Ultrasound performed, fetus in cephalic position.   - Recheck in 2 hrs or PRN  - Post-Partum Hemorrhage risk - low     - 3 Laminaria removed  - Patient report no discomfort overnight  - Pitocin started immediately after laminaria removed

## 2019-06-12 NOTE — H&P
[facility]  Obstetrics  History & Physical    Patient Name: Lindy Ferraro  MRN: [id number]  Admission Date: 2019  Primary Care Provider: Milla Roque MD    Subjective:     Principal Problem:Encounter for induction of labor    History of Present Illness:  Lindy Ferraro is a 33 y.o. [id number] at 40w2d admitted for IOL. Patient part of [facility] trial. 3 laminaria placed yesterday due for removal this AM.    This IUP is uncomplicated.  Patient denies contractions, denies vaginal bleeding, denies LOF.   Fetal Movement: normal.           OB History    Para Term  AB Living   1 0 0 0 0 0   SAB TAB Ectopic Multiple Live Births   0 0 0 0 0      # Outcome Date GA Lbr Elie/2nd Weight Sex Delivery Anes PTL Lv   1 Current              Past Medical History:   Diagnosis Date    Jejunal atresia     repaired as an infant     Past Surgical History:   Procedure Laterality Date    INTRAUTERINE DEVICE INSERTION      ANNETTE---REMOVED       PTA Medications   Medication Sig    FLUCELVAX QUAD [id number], PF, 60 mcg (15 mcg x 4)/0.5 mL Syrg vaccine TO BE ADMINISTERED BY PHARMACIST FOR IMMUNIZATION    prenatal vit/iron fum/folic ac (PRENATAL 1+1 ORAL) Take by mouth.       Review of patient's allergies indicates:   Allergen Reactions    Asa [aspirin] Other (See Comments)     Blood disorder per Patient    Pcn [penicillins]     Sulfa (sulfonamide antibiotics)         Family History     None        Tobacco Use    Smoking status: Never Smoker    Smokeless tobacco: Never Used   Substance and Sexual Activity    Alcohol use: Yes    Drug use: No    Sexual activity: Yes     Partners: Male     Birth control/protection: IUD     Comment: s/p removal of annette     Review of Systems   Constitutional: Negative for activity change and fever.   Respiratory: Negative for cough and shortness of breath.    Cardiovascular: Negative for chest pain.   Gastrointestinal: Positive for bloating.  Negative for abdominal pain, nausea and vomiting.   Genitourinary: Negative for dyspareunia, vaginal bleeding, vaginal discharge and vaginal pain.      Objective:     Vital Signs (Most Recent):  Temp: 98.9 °F (37.2 °C) (19 0544)  Pulse: 69 (19 0548)  BP: 115/68 (19 0544)  SpO2: 97 % (19 0548) Vital Signs (24h Range):  Temp:  [98.9 °F (37.2 °C)] 98.9 °F (37.2 °C)  Pulse:  [63-69] 69  SpO2:  [97 %] 97 %  BP: (115)/(68) 115/68        There is no height or weight on file to calculate BMI.    FHT: Cat 1 (reassuring), 135 BPM, mod BTBV, pos accels  TOCO:  irregular    Physical Exam:   Constitutional: She is oriented to person, place, and time. She appears well-developed and well-nourished. No distress.    HENT:   Head: Normocephalic and atraumatic.    Eyes: Conjunctivae are normal. Right eye exhibits no discharge. Left eye exhibits no discharge.    Neck: Neck supple. No tracheal deviation present.    Cardiovascular: Normal rate.     Pulmonary/Chest: Effort normal.        Abdominal: Soft. Bowel sounds are normal.     Genitourinary: Vagina normal.               Neurological: She is alert and oriented to person, place, and time.    Skin: She is not diaphoretic.        Cervix:  Dilation:  2  Effacement:  60  Station: -3  Presentation: Vertex     Significant Labs:  Lab Results   Component Value Date    GROUPTRH O POS 2019    HEPBSAG Negative 2019    STREPBCULT No Group B Streptococcus isolated 05/10/2019       I have personallly reviewed all pertinent lab results from the last 24 hours.    Assessment/Plan:     33 y.o. female  at 40w2d for:    * Encounter for induction of labor  - Consents signed and to chart  - Admit to Labor and Delivery unit  - Epidural per Anesthesia  - Draw CBC, T&S  - Notify Staff  - Ultrasound performed, fetus in cephalic position.   - Recheck in 2 hrs or PRN  - Post-Partum Hemorrhage risk - low     - 3 Laminaria removed  - Patient report no discomfort  overnight  - Pitocin started immediately after laminaria removed        Fernando Ty MD  Obstetrics  [facility]

## 2019-06-12 NOTE — ANESTHESIA PROCEDURE NOTES
Epidural    Patient location during procedure: OB   Reason for block: primary anesthetic   Diagnosis: Labor   Start time: 6/12/2019 4:43 PM  Timeout: 6/12/2019 4:42 PM  End time: 6/12/2019 5:00 PM  Surgery related to: Vaginal Delivery  Staffing  Anesthesiologist: Phuong Curran MD  Resident/CRNA: Frankie Rock MD  Performed: resident/CRNA   Preanesthetic Checklist  Completed: patient identified, site marked, surgical consent, pre-op evaluation, timeout performed, IV checked, risks and benefits discussed, monitors and equipment checked, anesthesia consent given, hand hygiene performed and patient being monitored  Preparation  Patient position: sitting  Prep: ChloraPrep  Patient monitoring: Pulse Ox  Epidural  Skin Anesthetic: lidocaine 1%  Skin Wheal: 3 mL  Administration type: continuous  Approach: midline  Interspace: L3-4    Injection technique: LIO saline  Needle and Epidural Catheter  Needle type: Tuohy   Needle gauge: 17  Needle length: 3.5 inches  Needle insertion depth: 5 cm  Catheter type: springwound  Catheter size: 19 G  Catheter at skin depth: 9 cm  Test dose: 3 mL of lidocaine 1.5% with Epi 1-to-200,000  Additional Documentation: incremental injection, negative aspiration for heme and CSF, no paresthesia on injection, no signs/symptoms of IV or SA injection, no significant pain on injection and no significant complaints from patient  Needle localization: anatomical landmarks  Medications:  Volume per aspiration: 5 mL  Time between aspirations: 5 minutes  Assessment  Ease of block: easy  Patient's tolerance of the procedure: comfortable throughout block and no complaints  Additional Notes  10 cc administered via epidural from infusion bag.  No inadvertent dural puncture with Tuohy.

## 2019-06-12 NOTE — SUBJECTIVE & OBJECTIVE
OB History    Para Term  AB Living   1 0 0 0 0 0   SAB TAB Ectopic Multiple Live Births   0 0 0 0 0      # Outcome Date GA Lbr Elie/2nd Weight Sex Delivery Anes PTL Lv   1 Current              Past Medical History:   Diagnosis Date    Jejunal atresia     repaired as an infant     Past Surgical History:   Procedure Laterality Date    INTRAUTERINE DEVICE INSERTION      MIKI---REMOVED       PTA Medications   Medication Sig    FLUCELVAX QUAD [id number], PF, 60 mcg (15 mcg x 4)/0.5 mL Syrg vaccine TO BE ADMINISTERED BY PHARMACIST FOR IMMUNIZATION    prenatal vit/iron fum/folic ac (PRENATAL 1+1 ORAL) Take by mouth.       Review of patient's allergies indicates:   Allergen Reactions    Asa [aspirin] Other (See Comments)     Blood disorder per Patient    Pcn [penicillins]     Sulfa (sulfonamide antibiotics)         Family History     None        Tobacco Use    Smoking status: Never Smoker    Smokeless tobacco: Never Used   Substance and Sexual Activity    Alcohol use: Yes    Drug use: No    Sexual activity: Yes     Partners: Male     Birth control/protection: IUD     Comment: s/p removal of miki     Review of Systems   Constitutional: Negative for activity change and fever.   Respiratory: Negative for cough and shortness of breath.    Cardiovascular: Negative for chest pain.   Gastrointestinal: Positive for bloating. Negative for abdominal pain, nausea and vomiting.   Genitourinary: Negative for dyspareunia, vaginal bleeding, vaginal discharge and vaginal pain.      Objective:     Vital Signs (Most Recent):  Temp: 98.9 °F (37.2 °C) (19 0544)  Pulse: 69 (19 0548)  BP: 115/68 (19 0544)  SpO2: 97 % (19 0548) Vital Signs (24h Range):  Temp:  [98.9 °F (37.2 °C)] 98.9 °F (37.2 °C)  Pulse:  [63-69] 69  SpO2:  [97 %] 97 %  BP: (115)/(68) 115/68        There is no height or weight on file to calculate BMI.    FHT: Cat 1 (reassuring), 135 BPM, mod BTBV, pos accels  TOCO:   irregular    Physical Exam:   Constitutional: She is oriented to person, place, and time. She appears well-developed and well-nourished. No distress.    HENT:   Head: Normocephalic and atraumatic.    Eyes: Conjunctivae are normal. Right eye exhibits no discharge. Left eye exhibits no discharge.    Neck: Neck supple. No tracheal deviation present.    Cardiovascular: Normal rate.     Pulmonary/Chest: Effort normal.        Abdominal: Soft. Bowel sounds are normal.     Genitourinary: Vagina normal.               Neurological: She is alert and oriented to person, place, and time.    Skin: She is not diaphoretic.        Cervix:  Dilation:  2  Effacement:  60  Station: -3  Presentation: Vertex     Significant Labs:  Lab Results   Component Value Date    GROUPTRH O POS 03/21/2019    HEPBSAG Negative 03/21/2019    STREPBCULT No Group B Streptococcus isolated 05/10/2019       I have personallly reviewed all pertinent lab results from the last 24 hours.

## 2019-06-12 NOTE — PROGRESS NOTES
FHT STRIP NOTE:    Patient being induced. Enrolled in the [facility] trial. Began at 2 cm after removal of laminaria. She is currently on pitocin @ 14 mU/min with a Cook balloon in place. This pregnancy has been uncomplicated.     FHT: reassuring, 130 bpm, moderate BTB variability, +accels, -decels  TOCO: q2 min     Plan:   Fetal strip is reactive and reassuring  Cervical checks q4h with the balloon in place in latent labor  Contraction pattern is appropriate  Pitocin augmentation per protocol  Continuous fetal monitoring  Patient has not yet requested an epidural    Labor course:  Admission 0600: arrival with 3 laminaria in place; 2/60/-3 when laminaria were removed; pitocin started @ 2 mU/min immediately after removal  0900: 2/60/-3, pitocin @ 10 mU/min, Cook balloon placed  1100: FHT strip note    Vonda Chanel MD, PhD  OBGYN, PGY-3

## 2019-06-13 PROBLEM — Z34.90 ENCOUNTER FOR INDUCTION OF LABOR: Status: ACTIVE | Noted: 2019-06-13

## 2019-06-13 PROBLEM — Z34.90 ENCOUNTER FOR INDUCTION OF LABOR: Status: RESOLVED | Noted: 2019-06-12 | Resolved: 2019-06-13

## 2019-06-13 PROCEDURE — 25000003 PHARM REV CODE 250: Performed by: STUDENT IN AN ORGANIZED HEALTH CARE EDUCATION/TRAINING PROGRAM

## 2019-06-13 PROCEDURE — 11000001 HC ACUTE MED/SURG PRIVATE ROOM

## 2019-06-13 PROCEDURE — 59400 PR FULL ROUT OBSTE CARE,VAGINAL DELIV: ICD-10-PCS | Mod: GB,,, | Performed by: OBSTETRICS & GYNECOLOGY

## 2019-06-13 PROCEDURE — 27000181 HC CABLE, IUPC

## 2019-06-13 PROCEDURE — 72200005 HC VAGINAL DELIVERY LEVEL II

## 2019-06-13 PROCEDURE — 25000003 PHARM REV CODE 250

## 2019-06-13 PROCEDURE — 59400 OBSTETRICAL CARE: CPT | Mod: GB,,, | Performed by: OBSTETRICS & GYNECOLOGY

## 2019-06-13 RX ORDER — HYDROCORTISONE 25 MG/G
CREAM TOPICAL 3 TIMES DAILY PRN
Status: DISCONTINUED | OUTPATIENT
Start: 2019-06-13 | End: 2019-06-15 | Stop reason: HOSPADM

## 2019-06-13 RX ORDER — LIDOCAINE HYDROCHLORIDE 10 MG/ML
INJECTION INFILTRATION; PERINEURAL
Status: COMPLETED
Start: 2019-06-13 | End: 2019-06-13

## 2019-06-13 RX ORDER — DIPHENHYDRAMINE HCL 25 MG
25 CAPSULE ORAL EVERY 4 HOURS PRN
Status: DISCONTINUED | OUTPATIENT
Start: 2019-06-13 | End: 2019-06-15 | Stop reason: HOSPADM

## 2019-06-13 RX ORDER — HYDROCODONE BITARTRATE AND ACETAMINOPHEN 5; 325 MG/1; MG/1
1 TABLET ORAL EVERY 4 HOURS PRN
Status: DISCONTINUED | OUTPATIENT
Start: 2019-06-13 | End: 2019-06-15 | Stop reason: HOSPADM

## 2019-06-13 RX ORDER — DOCUSATE SODIUM 100 MG/1
200 CAPSULE, LIQUID FILLED ORAL 2 TIMES DAILY PRN
Status: DISCONTINUED | OUTPATIENT
Start: 2019-06-13 | End: 2019-06-15 | Stop reason: HOSPADM

## 2019-06-13 RX ORDER — ONDANSETRON 8 MG/1
8 TABLET, ORALLY DISINTEGRATING ORAL EVERY 8 HOURS PRN
Status: DISCONTINUED | OUTPATIENT
Start: 2019-06-13 | End: 2019-06-15 | Stop reason: HOSPADM

## 2019-06-13 RX ORDER — HYDROCODONE BITARTRATE AND ACETAMINOPHEN 10; 325 MG/1; MG/1
1 TABLET ORAL EVERY 4 HOURS PRN
Status: DISCONTINUED | OUTPATIENT
Start: 2019-06-13 | End: 2019-06-15 | Stop reason: HOSPADM

## 2019-06-13 RX ORDER — DIPHENHYDRAMINE HYDROCHLORIDE 50 MG/ML
25 INJECTION INTRAMUSCULAR; INTRAVENOUS EVERY 4 HOURS PRN
Status: DISCONTINUED | OUTPATIENT
Start: 2019-06-13 | End: 2019-06-15 | Stop reason: HOSPADM

## 2019-06-13 RX ORDER — OXYTOCIN/RINGER'S LACTATE 20/1000 ML
41.65 PLASTIC BAG, INJECTION (ML) INTRAVENOUS CONTINUOUS
Status: ACTIVE | OUTPATIENT
Start: 2019-06-13 | End: 2019-06-13

## 2019-06-13 RX ORDER — ACETAMINOPHEN 325 MG/1
650 TABLET ORAL EVERY 6 HOURS PRN
Status: DISCONTINUED | OUTPATIENT
Start: 2019-06-13 | End: 2019-06-15 | Stop reason: HOSPADM

## 2019-06-13 RX ADMIN — HYDROCODONE BITARTRATE AND ACETAMINOPHEN 1 TABLET: 10; 325 TABLET ORAL at 10:06

## 2019-06-13 RX ADMIN — HYDROCODONE BITARTRATE AND ACETAMINOPHEN 1 TABLET: 10; 325 TABLET ORAL at 04:06

## 2019-06-13 RX ADMIN — DOCUSATE SODIUM 200 MG: 100 CAPSULE, LIQUID FILLED ORAL at 10:06

## 2019-06-13 RX ADMIN — LIDOCAINE HYDROCHLORIDE 200 MG: 10 INJECTION, SOLUTION INFILTRATION; PERINEURAL at 04:06

## 2019-06-13 RX ADMIN — HYDROCODONE BITARTRATE AND ACETAMINOPHEN 1 TABLET: 10; 325 TABLET ORAL at 12:06

## 2019-06-13 RX ADMIN — HYDROCODONE BITARTRATE AND ACETAMINOPHEN 1 TABLET: 10; 325 TABLET ORAL at 08:06

## 2019-06-13 RX ADMIN — DOCUSATE SODIUM 200 MG: 100 CAPSULE, LIQUID FILLED ORAL at 01:06

## 2019-06-13 NOTE — PLAN OF CARE
Problem: Breastfeeding  Goal: Effective Breastfeeding  Based on pt's stated feeding goals, the Plan of care for today is for pt to do skin-to-skin, to feed frequently on demand, to observe for signs of effective milk transfer, to monitor voids and stools, and to call for assistance. Pt may breastfeed and/ or express breastmilk with hand expression, and provide EBM to baby with cup, or spoon, as needed. Pt verbalizes understanding.

## 2019-06-13 NOTE — PROGRESS NOTES
LABOR PROGRESS NOTE    S:  MD to bedside for cervical exam. Complaints: No.     O: Temp:  [98 °F (36.7 °C)-99.7 °F (37.6 °C)] 98.3 °F (36.8 °C)  Pulse:  [45-98] 61  Resp:  [16-18] 16  SpO2:  [94 %-100 %] 96 %  BP: ()/(49-85) 106/65      FHT: 130 BPM/moderate beat to beat variability/+accels/-decels Cat 1 (reassuring)  CTX: q 2 minutes  SVE: 6/80/-1, unchanged from previous check. IUPC placed.    ASSESSMENT:   33 y.o.  at 40w2d, IOL, enrolled in [facility]    FHT reassuring    Active Hospital Problems    Diagnosis  POA    *Encounter for induction of labor [Z34.90]  Not Applicable      Resolved Hospital Problems   No resolved problems to display.     PLAN:    Labor management  Continue Close Maternal/Fetal Monitoring.   Pitocin Augmentation per protocol, pit @ 12 mU  Recheck 2 hours or PRN    Labor course:  Admission 0600: arrival with 3 laminaria in place; 2/60/-3 when laminaria were removed; pitocin started @ 2 mU/min immediately after removal  0900: 2/60/-3, pitocin @ 10 mU/min, Cook balloon placed  1100: Reassuring FHT. Cook balloon remained in place.  1700: Decel after epidural. Cook balloon removed. 4 cm dilated. Pitocin stopped. Decels resolved with bolus.  1800: FHT reassuring. Pit restarted.  2030: 5/80/-2, AROM clear. Pit @ 4 mU/min  2230: 6/80/-1, pit @ 8 mU/min, head is transverse  0045: 6/80/-1, unchanged. Pit @ 12 mU/min. IUPC placed.      Claudia Wheat MD  OBGYN PGY-1

## 2019-06-13 NOTE — L&D DELIVERY NOTE
[facility]  Vaginal Delivery   Operative Note    SUMMARY     Normal spontaneous vaginal delivery of live infant, was placed on mothers abdomen for skin to skin and bulb suctioning performed.  Infant delivered position OA over intact perineum.  Nuchal cord: Yes, cord reduced following delivery.    Spontaneous delivery of placenta and IV pitocin given noting good uterine tone.  2nd degree laceration noted and repaired in normal fashion with 2-0 vicryl.  Patient tolerated delivery well. Sponge needle and lap counted correctly x2.    Indications:  (spontaneous vaginal delivery)  Pregnancy complicated by:   Patient Active Problem List   Diagnosis    Epigastric pain    Pregnancy    Pregnancy with one fetus, antepartum    Anxiety     (spontaneous vaginal delivery)    Encounter for induction of labor     Admitting GA: 40w3d    Delivery Information for  Ayaka Ferraro    Birth information:  YOB: 2017   Time of birth: 3:40 AM   Sex: female   Head Delivery Date/Time: 2019  3:40 AM   Delivery type: Vaginal, Spontaneous   Gestational Age: 40w3d    Delivery Providers    Delivering clinician:  Lilliam Beck DO   Provider Role    Claudia Wheat MD Resident    Essie Rock, RN Registered Nurse    Sarai Kessler, RN Registered Nurse    ST Humza Technician    Gabbie Bowman, RN Charge Nurse            Measurements    Weight:  2970 g  Length:  48.3 cm  Head circumference:  34.3 cm  Chest circumference:  33 cm         Apgars    Living status:  Living  Apgars:   1 min.:   5 min.:   10 min.:   15 min.:   20 min.:     Skin color:   1  1       Heart rate:   2  2       Reflex irritability:   2  2       Muscle tone:   2  2       Respiratory effort:   2  2       Total:   9  9       Apgars assigned by:  SARAI KESSLER RN         Operative Delivery    Forceps attempted?:  No  Vacuum extractor attempted?:  No         Shoulder Dystocia    Shoulder  dystocia present?:  No           Presentation    Presentation:  Vertex  Position:  Occiput Anterior           Interventions/Resuscitation    Method:  Bulb Suctioning, Tactile Stimulation       Cord    Vessels:  3 vessels  Complications:  Nuchal  Nuchal Intervention:  reduced  Nuchal Cord Description:  loose nuchal cord  Number of Loops:  1  Delayed Cord Clamping?:  No  Cord Clamped Date/Time:  2019  3:41 AM  Cord Blood Disposition:  Sent with Baby  Gases Sent?:  No  Stem Cell Collection (by MD):  No       Placenta    Placenta delivery date/time:  2019 0346  Placenta removal:  Spontaneous  Placenta appearance:  Intact  Placenta disposition:  discarded           Labor Events:       labor: No     Labor Onset Date/Time:         Dilation Complete Date/Time:         Start Pushing Date/Time:       Rupture Date/Time:              Rupture type:           Fluid Amount:        Fluid Color:        Fluid Odor:        Membrane Status (PeriCalm): ARM (Artificial Rupture)      Rupture Date/Time (PeriCalm): 2019 20:18:00      Fluid Amount (PeriCalm): Moderate      Fluid Color (PeriCalm): Clear       steroids: None     Antibiotics given for GBS: No     Induction:       Indications for induction:        Augmentation: oxytocin     Indications for augmentation: Ineffective Contraction Pattern     Labor complications: None     Additional complications:          Cervical ripening:                     Delivery:      Episiotomy: None     Indication for Episiotomy:       Perineal Lacerations: 2nd Repaired:  Yes   Periurethral Laceration:   Repaired:     Labial Laceration:   Repaired:     Sulcus Laceration:   Repaired:     Vaginal Laceration:   Repaired:     Cervical Laceration:   Repaired:     Repair suture:       Repair # of packets: 5     Last Value - EBL - Nursing (mL): 150     Sum - EBL - Nursing (mL): 150     Last Value - EBL - Anesthesia (mL):      Calculated QBL (mL):        Vaginal Sweep Performed: Yes      Surgicount Correct: Yes       Other providers:       Anesthesia    Method:  Epidural          Details (if applicable):  Trial of Labor      Categorization:      Priority:     Indications for :     Incision Type:       Additional  information:  Forceps:    Vacuum:    Breech:    Observed anomalies    Other (Comments):         Claudia Wheat MD  OBGYN PGY-1

## 2019-06-13 NOTE — PROGRESS NOTES
LABOR PROGRESS NOTE    S:  MD to bedside for cervical exam. Complaints: No. Epidural in place. Feeling contractions with some discomfort. Anesthesia at bedside to reassess.    O: Temp:  [98 °F (36.7 °C)-99.7 °F (37.6 °C)] 98.3 °F (36.8 °C)  Pulse:  [45-98] 80  Resp:  [16-18] 16  SpO2:  [95 %-100 %] 97 %  BP: ()/(49-85) 111/59      FHT: 130 BPM/moderate beat to beat variability/+accels/-decels Cat 1 (reassuring)  CTX: q 2 minutes  SVE: /-1    ASSESSMENT:   33 y.o.  at 40w2d, IOL, enrolled in [facility]    FHT reassuring    Active Hospital Problems    Diagnosis  POA    *Encounter for induction of labor [Z34.90]  Not Applicable      Resolved Hospital Problems   No resolved problems to display.     PLAN:    Labor management  Continue Close Maternal/Fetal Monitoring.   Pitocin Augmentation per protocol, pit @ 8 mU  May try peanut ball. IUPC if still 6 cm at next check.  Recheck 2 hours or PRN    Labor course:  Admission 0600: arrival with 3 laminaria in place; 60/-3 when laminaria were removed; pitocin started @ 2 mU/min immediately after removal  0900: 260/-3, pitocin @ 10 mU/min, Cook balloon placed  1100: Reassuring FHT. Cook balloon remained in place.  1700: Decel after epidural. Cook balloon removed. 4 cm dilated. Pitocin stopped. Decels resolved with bolus.  1800: FHT reassuring. Pit restarted.  2030: 5/80/-2, AROM clear. Pit @ 4 mU/min  2230: 80/-1, pit @ 8 mU/min, head is transverse      Claudia Wheat MD  OBGYN PGY-1

## 2019-06-13 NOTE — LACTATION NOTE
Infant sleepy, placed skin to skin and licking motions present. D/t perineal pain, difficult for pt to sit up, assisted to modified side lying/football, infant does not gape but licking and rooting at breast, no latch achieved after multiple attempts and position changes.  Hand expression achieved drops which were finger fed to infant. Infant placed skin to skin with FOB. Encouraged frequent skin to skin, attempting latch and if no latch achieved in 15 minutes to HE.   Lactation Basics education completed. LC reviewed Breastfeeding Guide and encouraged tracking feeds and output. Encouraged use of STS, frequent feeds on demand, and avoiding artificial nipples. Pt verbalized understanding.        06/13/19 1330   Maternal Assessment   Breast Shape round   Breast Density soft   Areola elastic   Nipples everted   Maternal Infant Feeding   Maternal Preparation breast care;hand hygiene   Maternal Emotional State anxious   Infant Positioning clutch/football   Signs of Milk Transfer   (no latch achieved)

## 2019-06-13 NOTE — DISCHARGE INSTRUCTIONS
Breastfeeding Discharge Instructions       Feed the baby at the earliest sign of hunger or comfort  o Hands to mouth, sucking motions  o Rooting or searching for something to suck on  o Dont wait for crying - it is a sign of distress     The feedings may be 8-12 times per 24hrs and will not follow a schedule   Avoid pacifiers and bottles for the first 4 weeks   Alternate the breast you start the feeding with, or start with the breast that feels the fullest   Switch breasts when the baby takes himself off the breast or falls asleep   Keep offering breasts until the baby looks full, no longer gives hunger signs, and stays asleep when placed on his back in the crib   If the baby is sleepy and wont wake for a feeding, put the baby skin-to-skin dressed in a diaper against the mothers bare chest   Sleep near your baby   The baby should be positioned and latched on to the breast correctly  o Chest-to-chest, chin in the breast  o Babys lips are flipped outward  o Babys mouth is stretched open wide like a shout  o Babys sucking should feel like tugging to the mother  - The baby should be drinking at the breast:  o You should hear swallowing or gulping throughout the feeding  o You should see milk on the babys lips when he comes off the breast  o Your breasts should be softer when the baby is finished feeding  o The baby should look relaxed at the end of feedings  o After the 4th day and your milk is in:  o The babys poop should turn bright yellow and be loose, watery, and seedy  o The baby should have at least 3-4 poops the size of the palm of your hand per day  o The baby should have at least 5-6 wet diapers per day  o The urine should be light yellow in color  You should drink when you are thirsty and eat a healthy diet when you are    hungry.     Take naps to get the rest you need.   Take medications and/or drink alcohol only with permission of your obstetrician    or the babys pediatrician.  You can  also call the Infant Risk Center,   ([phone]), Monday-Friday, 8am-5pm Central time, to get the most   up-to-date evidence-based information on the use of medications during   pregnancy and breastfeeding.      The baby should be examined by a pediatrician at 3-5 days of age.  Once your   milk comes in, the baby should be gaining at least ½ - 1oz each day and should be back to birthweight no later than 10-14 days of age.          Community Resources    [facility] Breastfeeding Warmline: [phone]   Local [facility] clinics: provide incentives and breastpumps to eligible mothers  [employer]he [employer] (LLLI):  mother-to-mother support group website        www.Jooobz!.BioDerm  Local La Leche League mother-to-mother support groups:        www.[employer]HI Hotels        La Leche League [facility]   Dr. Jeronimo Morris website for latch videos and general information:        www.breastfeedinginc.ca  Infant Risk Center is a call center that provides information about the safety of taking medications while breastfeeding.  Call 1-343.128.9088, M-F, 8am-5pm, CT.  International Lactation Consultant Association provides resources for assistance:        www.ilca.org  [place] Breastfeeding Coalition provides informationand resources for parents  and the community    www.LaBreastfeedingSupport.org     Aubree Harris is a mom-to-mom support group:                             www.nolanesting.Tizaro//breastfeedng-support/  Partners for Healthy Babies:  3-337-487-BABY(7149)  Cafe au Lait: a breastfeeding support group for women of color, 833.557.1586            [facility] can help obtain an insurance breastpump    Phone: [phone] ext 206     Address: [address] [place] [address] ( Across from [facility] next to [employer])    Information they will need:    Your name and phone number  Name of insured on health insurance plan  Policy number  Health insurance provider contact  phone number

## 2019-06-13 NOTE — PROGRESS NOTES
LABOR PROGRESS NOTE    S:  MD to bedside for cervical exam. Complaints: No. Epidural in place. Feeling contractions with some discomfort.    O: Temp:  [98 °F (36.7 °C)-99.7 °F (37.6 °C)] 98.3 °F (36.8 °C)  Pulse:  [45-98] 80  Resp:  [16-18] 16  SpO2:  [95 %-100 %] 97 %  BP: ()/(49-85) 111/59      FHT: 140 BPM/moderate beat to beat variability/+accels/-decels Cat 1 (reassuring)  CTX: q 2 minutes  SVE: 5/80/-2, AROM, clear fluid    ASSESSMENT:   33 y.o.  at 40w2d, IOL, enrolled in [facility]    FHT reassuring    Active Hospital Problems    Diagnosis  POA    *Encounter for induction of labor [Z34.90]  Not Applicable      Resolved Hospital Problems   No resolved problems to display.     PLAN:    Labor management  Continue Close Maternal/Fetal Monitoring.   Pitocin Augmentation per protocol, pit @ 10 mU  Recheck 4 hours or PRN    Labor course:  Admission 0600: arrival with 3 laminaria in place; 2/60/-3 when laminaria were removed; pitocin started @ 2 mU/min immediately after removal  0900: 2/60/-3, pitocin @ 10 mU/min, Cook balloon placed  1100: Reassuring FHT. Cook balloon remained in place.  1700: Decel after epidural. Cook balloon removed. 4 cm dilated. Pitocin stopped. Decels resolved with bolus.  1800: FHT reassuring. Pit restarted.  2030: 5/80/-2, AROM clear. Pit @ 4 mU/min      Claudia Wheat MD  OBGYN PGY-1

## 2019-06-14 LAB
BASOPHILS # BLD AUTO: 0.03 K/UL (ref 0–0.2)
BASOPHILS NFR BLD: 0.2 % (ref 0–1.9)
DIFFERENTIAL METHOD: ABNORMAL
EOSINOPHIL # BLD AUTO: 0.1 K/UL (ref 0–0.5)
EOSINOPHIL NFR BLD: 0.8 % (ref 0–8)
ERYTHROCYTE [DISTWIDTH] IN BLOOD BY AUTOMATED COUNT: 13.9 % (ref 11.5–14.5)
HCT VFR BLD AUTO: 33.1 % (ref 37–48.5)
HGB BLD-MCNC: 11 G/DL (ref 12–16)
LYMPHOCYTES # BLD AUTO: 2.5 K/UL (ref 1–4.8)
LYMPHOCYTES NFR BLD: 19.8 % (ref 18–48)
MCH RBC QN AUTO: 30.3 PG (ref 27–31)
MCHC RBC AUTO-ENTMCNC: 33.2 G/DL (ref 32–36)
MCV RBC AUTO: 91 FL (ref 82–98)
MONOCYTES # BLD AUTO: 0.6 K/UL (ref 0.3–1)
MONOCYTES NFR BLD: 4.5 % (ref 4–15)
NEUTROPHILS # BLD AUTO: 9.2 K/UL (ref 1.8–7.7)
NEUTROPHILS NFR BLD: 74.4 % (ref 38–73)
PLATELET # BLD AUTO: 166 K/UL (ref 150–350)
PMV BLD AUTO: 10.1 FL (ref 9.2–12.9)
RBC # BLD AUTO: 3.63 M/UL (ref 4–5.4)
WBC # BLD AUTO: 12.35 K/UL (ref 3.9–12.7)

## 2019-06-14 PROCEDURE — 11000001 HC ACUTE MED/SURG PRIVATE ROOM

## 2019-06-14 PROCEDURE — 85025 COMPLETE CBC W/AUTO DIFF WBC: CPT

## 2019-06-14 PROCEDURE — 25000003 PHARM REV CODE 250: Performed by: STUDENT IN AN ORGANIZED HEALTH CARE EDUCATION/TRAINING PROGRAM

## 2019-06-14 PROCEDURE — 36415 COLL VENOUS BLD VENIPUNCTURE: CPT

## 2019-06-14 RX ORDER — DEXTROMETHORPHAN HYDROBROMIDE, GUAIFENESIN 5; 100 MG/5ML; MG/5ML
650 LIQUID ORAL EVERY 8 HOURS
Qty: 30 TABLET | Refills: 1 | Status: SHIPPED | OUTPATIENT
Start: 2019-06-14 | End: 2020-09-25

## 2019-06-14 RX ADMIN — HYDROCODONE BITARTRATE AND ACETAMINOPHEN 1 TABLET: 10; 325 TABLET ORAL at 06:06

## 2019-06-14 RX ADMIN — DOCUSATE SODIUM 200 MG: 100 CAPSULE, LIQUID FILLED ORAL at 08:06

## 2019-06-14 RX ADMIN — HYDROCODONE BITARTRATE AND ACETAMINOPHEN 1 TABLET: 5; 325 TABLET ORAL at 05:06

## 2019-06-14 RX ADMIN — HYDROCODONE BITARTRATE AND ACETAMINOPHEN 1 TABLET: 10; 325 TABLET ORAL at 02:06

## 2019-06-14 RX ADMIN — DOCUSATE SODIUM 200 MG: 100 CAPSULE, LIQUID FILLED ORAL at 10:06

## 2019-06-14 RX ADMIN — HYDROCODONE BITARTRATE AND ACETAMINOPHEN 1 TABLET: 5; 325 TABLET ORAL at 10:06

## 2019-06-14 RX ADMIN — HYDROCODONE BITARTRATE AND ACETAMINOPHEN 1 TABLET: 10; 325 TABLET ORAL at 10:06

## 2019-06-14 NOTE — LACTATION NOTE
Infant skin to skin and rooting, minimal assistance to bring infant to right breast with wide gape and deep latch achieved after a few attempts. Encouraged breast compression and stimulation to keep infant active, swallows audible. Demonstrated to pt how to unlatch infant when sucking decreases, nipple round. Reviewed basics and encouraged frequent skin to skin with pt and FOB. Verbalized understanding and questions answered.        06/14/19 1400   Maternal Assessment   Breast Shape round   Breast Density soft   Areola elastic   Nipples everted   Maternal Infant Feeding   Maternal Emotional State assist needed   Infant Positioning cross-cradle;clutch/football   Signs of Milk Transfer audible swallow;infant jaw motion present  (with breast compression)   Pain with Feeding no   Nipple Shape After Feeding, Right round   Latch Assistance yes

## 2019-06-14 NOTE — PROGRESS NOTES
POSTPARTUM PROGRESS NOTE     Lindy Ferraro is a 33 y.o. female PPD #1 status post Spontaneous vaginal delivery at 40w3d in an uncomplicated pregnancy. Patient is doing well this morning. She denies nausea, vomiting, fever or chills.  Patient reports mild abdominal pain that is well relieved by oral pain medications. Lochia is mild and decreasing. Patient is voiding without difficulty and ambulating with no difficulty. She has passed flatus, and has not had BM.  Patient does plan to breast feed. Defer to post partum visit with Dr. Beck for contraception.     Objective:       Temp:  [97.9 °F (36.6 °C)-98.2 °F (36.8 °C)] 98.2 °F (36.8 °C)  Pulse:  [68-78] 73  Resp:  [18] 18  SpO2:  [96 %-99 %] 98 %  BP: ()/(58-74) 98/58    General:   alert, appears stated age and cooperative   Lungs:   clear to auscultation bilaterally   Heart:   regular rate and rhythm, S1, S2 normal, no murmur, click, rub or gallop   Abdomen:  soft, non-tender; bowel sounds normal; no masses,  no organomegaly   Uterus:  firm located at the umblicus.    Extremities: peripheral pulses normal, no pedal edema, no clubbing or cyanosis     Lab Review  Recent Results (from the past 4 hour(s))   CBC auto differential    Collection Time: 06/14/19  4:24 AM   Result Value Ref Range    WBC 12.35 3.90 - 12.70 K/uL    RBC 3.63 (L) 4.00 - 5.40 M/uL    Hemoglobin 11.0 (L) 12.0 - 16.0 g/dL    Hematocrit 33.1 (L) 37.0 - 48.5 %    Mean Corpuscular Volume 91 82 - 98 fL    Mean Corpuscular Hemoglobin 30.3 27.0 - 31.0 pg    Mean Corpuscular Hemoglobin Conc 33.2 32.0 - 36.0 g/dL    RDW 13.9 11.5 - 14.5 %    Platelets 166 150 - 350 K/uL    MPV 10.1 9.2 - 12.9 fL    Gran # (ANC) 9.2 (H) 1.8 - 7.7 K/uL    Lymph # 2.5 1.0 - 4.8 K/uL    Mono # 0.6 0.3 - 1.0 K/uL    Eos # 0.1 0.0 - 0.5 K/uL    Baso # 0.03 0.00 - 0.20 K/uL    Gran% 74.4 (H) 38.0 - 73.0 %    Lymph% 19.8 18.0 - 48.0 %    Mono% 4.5 4.0 - 15.0 %    Eosinophil% 0.8 0.0 - 8.0 %    Basophil% 0.2 0.0 -  1.9 %    Differential Method Automated        I/O    Intake/Output Summary (Last 24 hours) at 2019 0638  Last data filed at 2019 1800  Gross per 24 hour   Intake 350 ml   Output 3000 ml   Net -2650 ml        Assessment:     Patient Active Problem List   Diagnosis    Epigastric pain    Pregnancy    Pregnancy with one fetus, antepartum    Anxiety     (spontaneous vaginal delivery)    Encounter for induction of labor        Plan:   1. Postpartum care:  - Patient doing well. Continue routine management and advances.  - Continue PO pain meds. Pain well controlled.  - Heme: H/h: 13.8/40.9>11/33.1  - Encourage ambulation  - Contraception - defer to post partum visit with Dr. Beck  - Lactation consult PRN    Dispo: As patient meets milestones, will plan to discharge PPD#1-2    Oralia Alexander MD  OB/GYN  PGY-1    I have reviewed and concur with the resident's history, physical assessment and plan.  I have personally interviewed and examined the patient at bedside. Discharge tomorrow. Lactation consult today.     Estela Guillory MD  Obstetrics and Gynecology  [facility]

## 2019-06-14 NOTE — ANESTHESIA POSTPROCEDURE EVALUATION
Anesthesia Post Evaluation    Patient: Lindy Ferraro    Procedure(s) Performed: * No procedures listed *    Final Anesthesia Type: epidural  Patient location during evaluation: labor & delivery  Patient participation: Yes- Able to Participate  Level of consciousness: awake and alert  Post-procedure vital signs: reviewed and stable  Pain management: adequate  Airway patency: patent  PONV status at discharge: No PONV  Anesthetic complications: no      Cardiovascular status: blood pressure returned to baseline and stable  Respiratory status: room air, unassisted and spontaneous ventilation  Hydration status: euvolemic  Follow-up not needed.          Vitals Value Taken Time   /71 6/14/2019  7:45 AM   Temp 36.6 °C (97.8 °F) 6/14/2019  7:45 AM   Pulse 66 6/14/2019  7:45 AM   Resp 18 6/14/2019  7:45 AM   SpO2 98 % 6/14/2019  7:45 AM         No case tracking events are documented in the log.      Pain/Fish Score: Pain Rating Prior to Med Admin: 7 (6/14/2019  2:33 PM)  Pain Rating Post Med Admin: 3 (6/14/2019 11:30 AM)

## 2019-06-15 VITALS
BODY MASS INDEX: 29.02 KG/M2 | DIASTOLIC BLOOD PRESSURE: 66 MMHG | RESPIRATION RATE: 18 BRPM | TEMPERATURE: 98 F | OXYGEN SATURATION: 97 % | SYSTOLIC BLOOD PRESSURE: 112 MMHG | HEIGHT: 64 IN | HEART RATE: 70 BPM | WEIGHT: 170 LBS

## 2019-06-15 PROCEDURE — 25000003 PHARM REV CODE 250: Performed by: STUDENT IN AN ORGANIZED HEALTH CARE EDUCATION/TRAINING PROGRAM

## 2019-06-15 RX ORDER — HYDROCODONE BITARTRATE AND ACETAMINOPHEN 5; 325 MG/1; MG/1
1 TABLET ORAL EVERY 4 HOURS PRN
Qty: 8 TABLET | Refills: 0 | Status: SHIPPED | OUTPATIENT
Start: 2019-06-15 | End: 2019-06-27

## 2019-06-15 RX ADMIN — HYDROCODONE BITARTRATE AND ACETAMINOPHEN 1 TABLET: 5; 325 TABLET ORAL at 09:06

## 2019-06-15 RX ADMIN — HYDROCODONE BITARTRATE AND ACETAMINOPHEN 1 TABLET: 10; 325 TABLET ORAL at 04:06

## 2019-06-15 RX ADMIN — DOCUSATE SODIUM 200 MG: 100 CAPSULE, LIQUID FILLED ORAL at 09:06

## 2019-06-15 NOTE — LACTATION NOTE
Infant to right breast,wide gape and deep latch achieved, good tugs and pulls, audible swallows. Infant releases breast, mother independently latches infant to left breast, good tugs and pulls, audible swallows.    Lactation discharge education completed. Plan of care is for pt to follow basic breastfeeding education, frequent feeding on demand, and to monitor baby's voids and stools. Breastfeeding guide, including First Alert survey, resource list, and lactation warmline phone number reviewed. Pt to notify doctor for maternal or infant concerns, as reviewed with LC. Pt verbalizes understanding.        06/15/19 1000   Maternal Assessment   Breast Shape round   Breast Density soft   Areola elastic   Nipples everted   Left Nipple Symptoms tender   Right Nipple Symptoms tender   Maternal Infant Feeding   Maternal Emotional State assist needed   Infant Positioning clutch/football;cross-cradle   Signs of Milk Transfer audible swallow;infant jaw motion present   Pain with Feeding yes   Pain Location nipples, bilateral   Pain Description   (tender at latch, resolves <45 seconds)   Nipple Shape After Feeding, Left round   Nipple Shape After Feeding, Right round   Latch Assistance yes

## 2019-06-15 NOTE — DISCHARGE SUMMARY
Delivery Discharge Summary  Obstetrics      Primary OB Clinician: Lilliam Beck DO     Admission date: 2019  Discharge date: 06/15/2019    Disposition: To home, self care    Discharge Diagnosis List:      Patient Active Problem List   Diagnosis    Epigastric pain    Pregnancy    Pregnancy with one fetus, antepartum    Anxiety     (spontaneous vaginal delivery)    Encounter for induction of labor       Procedure:     Hospital Course:  Lindy Ferraro is a 33 y.o. now , PPD #2 who was admitted on 2019 at 40w2d for IOL. Patient was part of [facility] trial.  She was subsequently admitted to labor and delivery unit with signed consents.     Labor course was uncomplicated and resulted in  without complications.     Please see delivery note for further details. Her postpartum course was uncomplicated. On discharge day, patient's pain is controlled with oral pain medications. Pt is tolerating ambulation without SOB or CP, and regular diet without N/V. Reports lochia is mild. Denies any HA, vision changes, F/C, LE swelling. Denies any breast pain/soreness.    Pt in stable condition and ready for discharge. She has been instructed to start and/or continue medications and follow up with her obstetrics provider as listed below.    Pertinent studies:  CBC  Recent Labs   Lab 19  0540 19  0424   WBC 9.09 12.35   HGB 13.8 11.0*   HCT 40.9 33.1*   MCV 90 91    166      Immunization History   Administered Date(s) Administered    Tdap 2019        Delivery:    Episiotomy: None   Lacerations: 2nd   Repair suture:     Repair # of packets: 5   Blood loss (ml): 150     Birth information:  YOB: 2017   Time of birth: 3:40 AM   Sex: female   Delivery type: Vaginal, Spontaneous   Gestational Age: 40w3d    Delivery Clinician:      Other providers:       Additional  information:  Forceps:    Vacuum:    Breech:    Observed anomalies      Living?:           APGARS   One minute Five minutes Ten minutes   Skin color:         Heart rate:         Grimace:         Muscle tone:         Breathing:         Totals: 9  9        Placenta: Delivered:       appearance      Patient Instructions:   Current Discharge Medication List      START taking these medications    Details   acetaminophen (TYLENOL) 650 MG TbSR Take 1 tablet (650 mg total) by mouth every 8 (eight) hours.  Qty: 30 tablet, Refills: 1      HYDROcodone-acetaminophen (NORCO) 5-325 mg per tablet Take 1 tablet by mouth every 4 (four) hours as needed.  Qty: 8 tablet, Refills: 0         CONTINUE these medications which have NOT CHANGED    Details   prenatal vit/iron fum/folic ac (PRENATAL 1+1 ORAL) Take by mouth.         STOP taking these medications       FLUCELVAX QUAD [id number], PF, 60 mcg (15 mcg x 4)/0.5 mL Syrg vaccine Comments:   Reason for Stopping:               Discharge Procedure Orders   Other restrictions (specify):   Order Comments: Nothing in the vagina for six weeks     Notify your health care provider if you experience any of the following:  temperature >100.4     Notify your health care provider if you experience any of the following:  persistent nausea and vomiting or diarrhea     Notify your health care provider if you experience any of the following:  severe uncontrolled pain     Notify your health care provider if you experience any of the following:  difficulty breathing or increased cough     Notify your health care provider if you experience any of the following:  severe persistent headache     Notify your health care provider if you experience any of the following:  persistent dizziness, light-headedness, or visual disturbances     Notify your health care provider if you experience any of the following:  increased confusion or weakness     Notify your health care provider if you experience any of the following:   Order Comments: Heavy vaginal bleeding >1 pad/hour for greater than 2 hours     Activity as  tolerated       Follow-up Information     Lilliam Beck DO In 6 weeks.    Specialty:  Obstetrics and Gynecology  Why:  post partum visit  Contact information:  [address]  [facility] [id number] [phone]             Lilliam Beck DO In 1 week.    Specialty:  Obstetrics and Gynecology  Why:  post partum check in  Contact information:  [address]  [facility] [id number] [phone]                    Bonnie Rodriguez MD  OBGYN, PGY-1    I have reviewed and concur with the resident's history, physical assessment and plan.  I have personally interviewed and examined the patient at bedside.    Estela Guillory MD  Obstetrics and Gynecology  [facility]

## 2019-06-15 NOTE — PROGRESS NOTES
POSTPARTUM PROGRESS NOTE     Lindy Ferraro is a 33 y.o. female PPD #2 status post Spontaneous vaginal delivery at 40w3d in an uncomplicated pregnancy. Patient is doing well this morning. She denies nausea, vomiting, fever or chills.  Patient reports mild abdominal pain that is well relieved by oral pain medications. Lochia is mild and decreasing. Patient is voiding without difficulty and ambulating with no difficulty. She has passed flatus, and has not had BM.  Patient does plan to breast feed. Defer to post partum visit with Dr. Beck for contraception.     Objective:       Temp:  [97.8 °F (36.6 °C)-98.3 °F (36.8 °C)] 98.2 °F (36.8 °C)  Pulse:  [61-71] 71  Resp:  [18] 18  SpO2:  [96 %-98 %] 96 %  BP: (114-116)/(66-77) 114/66    General:   alert, appears stated age and cooperative   Lungs:   clear to auscultation bilaterally   Heart:   regular rate and rhythm, S1, S2 normal, no murmur, click, rub or gallop   Abdomen:  soft, non-tender; bowel sounds normal; no masses,  no organomegaly   Uterus:  firm located at the umblicus.    Extremities: peripheral pulses normal, no pedal edema, no clubbing or cyanosis     Lab Review  No results found for this or any previous visit (from the past 4 hour(s)).    I/O  No intake or output data in the 24 hours ending 06/15/19 0336     Assessment:     Patient Active Problem List   Diagnosis    Epigastric pain    Pregnancy    Pregnancy with one fetus, antepartum    Anxiety     (spontaneous vaginal delivery)    Encounter for induction of labor        Plan:   1. Postpartum care:  - Patient doing well. Continue routine management and advances.  - Continue PO pain meds. Pain well controlled.  - Heme: H/h: 13.8/40.9>11/33.1  - Encourage ambulation  - Contraception - defer to post partum visit with Dr. Beck  - Lactation consult PRN    Dispo: As patient meets milestones, will plan to discharge PPD#1-2    Bonnie Rodriguez MD  OBGYN, PGY-1    I have reviewed and concur with  the resident's history, physical assessment and plan.  I have personally interviewed and examined the patient at bedside. Desires discharge today. Interested in early postpartum visit in 1-2 weeks.     Estela Guillory MD  Obstetrics and Gynecology  [facility]

## 2019-06-16 ENCOUNTER — TELEPHONE (OUTPATIENT)
Dept: LACTATION | Facility: CLINIC | Age: 34
End: 2019-06-16

## 2019-06-16 NOTE — TELEPHONE ENCOUNTER
Pt called warmline to discuss baby having difficulty latching to one breast. Nipple is cracked. Recommendations: latch baby to easier breast then try more difficult breast. Pt can also hand express and give some ebm prior to latch, pt can pump for 5 minutes prior to latch. If baby is unable to latch, pt to pump for 15-20 minutes. Baby had 3 urine and 2 stools in last 24 hours. Pt will call  for more assistance if needed.

## 2019-06-18 ENCOUNTER — TELEPHONE (OUTPATIENT)
Dept: OBSTETRICS AND GYNECOLOGY | Facility: CLINIC | Age: 34
End: 2019-06-18

## 2019-06-18 NOTE — TELEPHONE ENCOUNTER
----- Message from Lan Browne sent at 6/18/2019  9:37 AM CDT -----  Please call pp pt [phone]        Returned patient call, patient states her nipples are cracked and bleeding. Patient states she called the on call and was told to call the office. Inform patient I will let Dr Beck know and will call the medication cream in for patient.         Jeronimo Nice Cream was called into pharmacy for patient.

## 2019-06-19 ENCOUNTER — TELEPHONE (OUTPATIENT)
Dept: OBSTETRICS AND GYNECOLOGY | Facility: OTHER | Age: 34
End: 2019-06-19

## 2019-06-19 ENCOUNTER — LACTATION CONSULT (OUTPATIENT)
Dept: LACTATION | Facility: CLINIC | Age: 34
End: 2019-06-19
Payer: COMMERCIAL

## 2019-06-19 PROCEDURE — 99199 UNLISTED SPECIAL SVC PX/RPRT: CPT | Mod: S$GLB,,, | Performed by: OBSTETRICS & GYNECOLOGY

## 2019-06-19 PROCEDURE — 99199 PR LACTATION CONSULT 1 HR: ICD-10-PCS | Mod: S$GLB,,, | Performed by: OBSTETRICS & GYNECOLOGY

## 2019-06-19 NOTE — PROGRESS NOTES
Pt here for assistance with latch. Pt has been breastfeeding baby on cue, more than 8 times daily. Pt having problems with sore nipples. Baby's weight check on 6/17/2019 6#3oz. Baby has jeovanny than 8 urine diapers daily and 3-4 stools (none large).     Pt with small breast. Nipples everted with scabbing to tip of nipples.3  Baby pink, active, and alert.  Baby placed on scale prior to breastfeeding. Baby's weight with diaper and gown 6#3oz 2818 grams.  Pt demonstrated positioning and latch. Pt shallowly latching baby to breast. Pt shown how to achieve deeper latch by bring baby to breast with open mouth. Baby nursing well with long rhythmic sucks. Pt able to achieve deep latch to both breast independently. Pt using breast compression and stimulation to keep baby actively nursing. Good tugs and pulls observed. Baby became sleepy during feeding, pt instructed to remove baby from breast, burp and relatch to breast. Baby more active.     Baby weight after nursing both breast 2878 grams. Gain of 60 grams.    Plan: Pt to breastfeed baby on cue at least 8 times daily. Pt to continue with use of APNO. Pt will latch baby deeply to breast as demonstrated during consult. Pt to observed for signs of milk transfer. Pt to pump 2 times daily for 10 minutes for extra stimulation. Pt  To call lc prn.

## 2019-06-20 ENCOUNTER — TELEPHONE (OUTPATIENT)
Dept: LACTATION | Facility: CLINIC | Age: 34
End: 2019-06-20

## 2019-06-20 NOTE — TELEPHONE ENCOUNTER
Baby is nursing longer and having heavier urine diapers and larger stools. Pt feels her breast are torres. She did pump this morning as recommended during consultation. Pt able to express 2.5 oz. Basic breastfeeding education provided. Questions answered. Pt to call  prn.

## 2019-06-27 ENCOUNTER — PATIENT MESSAGE (OUTPATIENT)
Dept: OBSTETRICS AND GYNECOLOGY | Facility: CLINIC | Age: 34
End: 2019-06-27

## 2019-06-27 ENCOUNTER — POSTPARTUM VISIT (OUTPATIENT)
Dept: OBSTETRICS AND GYNECOLOGY | Facility: CLINIC | Age: 34
End: 2019-06-27
Attending: OBSTETRICS & GYNECOLOGY
Payer: COMMERCIAL

## 2019-06-27 ENCOUNTER — TELEPHONE (OUTPATIENT)
Dept: OBSTETRICS AND GYNECOLOGY | Facility: CLINIC | Age: 34
End: 2019-06-27

## 2019-06-27 VITALS
WEIGHT: 151.25 LBS | BODY MASS INDEX: 25.82 KG/M2 | SYSTOLIC BLOOD PRESSURE: 110 MMHG | HEIGHT: 64 IN | DIASTOLIC BLOOD PRESSURE: 60 MMHG

## 2019-06-27 PROCEDURE — 3008F PR BODY MASS INDEX (BMI) DOCUMENTED: ICD-10-PCS | Mod: CPTII,S$GLB,, | Performed by: OBSTETRICS & GYNECOLOGY

## 2019-06-27 PROCEDURE — 99213 PR OFFICE/OUTPT VISIT, EST, LEVL III, 20-29 MIN: ICD-10-PCS | Mod: 24,S$GLB,, | Performed by: OBSTETRICS & GYNECOLOGY

## 2019-06-27 PROCEDURE — 99999 PR PBB SHADOW E&M-EST. PATIENT-LVL III: ICD-10-PCS | Mod: PBBFAC,,, | Performed by: OBSTETRICS & GYNECOLOGY

## 2019-06-27 PROCEDURE — 99999 PR PBB SHADOW E&M-EST. PATIENT-LVL III: CPT | Mod: PBBFAC,,, | Performed by: OBSTETRICS & GYNECOLOGY

## 2019-06-27 PROCEDURE — 99213 OFFICE O/P EST LOW 20 MIN: CPT | Mod: 24,S$GLB,, | Performed by: OBSTETRICS & GYNECOLOGY

## 2019-06-27 PROCEDURE — 3008F BODY MASS INDEX DOCD: CPT | Mod: CPTII,S$GLB,, | Performed by: OBSTETRICS & GYNECOLOGY

## 2019-06-27 NOTE — PROGRESS NOTES
"CC: Post-partum follow-up    HPI:  Lindy Ferraro is a 33 y.o. female  presents for post-partum visit s/p a  with 2nd degree laceration, states that she has had some pain where stitches are, and noticed increased vaginal pressure after getting out of the shower the other day.  She is breastfeeding and denies depression though reports some anxiety about the current situation.      Delivery Date: 2019  Delivery MD: Ebony  Gender: female  Birth Weight: 6 pounds 13 ounces  Breast Feeding: YES  Depression: NO    ROS:  GENERAL: No fever, chills, fatigability or weight loss.  VULVAR: No pain, no lesions and no itching.  VAGINAL: No relaxation, no itching, no discharge, no abnormal bleeding and no lesions.  ABDOMEN: No abdominal pain. Denies nausea. Denies vomiting. No diarrhea. No constipation  BREAST: Denies pain. No lumps. No discharge.  URINARY: No incontinence, no nocturia, no frequency and no dysuria.  CARDIOVASCULAR: No chest pain. No shortness of breath. No leg cramps.  NEUROLOGICAL: No headaches. No vision changes.    PHYSICAL EXAM:  /60   Ht 5' 4" (1.626 m)   Wt 68.6 kg (151 lb 3.8 oz)   LMP 2018   Breastfeeding? Yes   BMI 25.96 kg/m²    GEN: AAO x 3, appropriate mood and affect  PELVIC: normal external genitalia, healing vaginal laceration.  Slight prolapse of cervix, bladder, cervix approx 4cm from introitus.  No signs of infection of uterus or laceration.  PSYCH: normal mood and affect, denies depression or anxiety.        Diagnosis:  1.  (spontaneous vaginal delivery)    2. Obstetrical laceration        Plan:   Normal healing  Expected prolapse s/p , discussed pelvic floor PT after 6 weeks.         "

## 2019-06-27 NOTE — TELEPHONE ENCOUNTER
----- Message from Celina Oswald sent at 6/27/2019  8:21 AM CDT -----  Contact: pt  Name of Who is Calling: segun      What is the request in detail: requesting a call back to speak with nurse about problem with her stitches. I scheduled pt's postpartum appt for Tuesday due to the fact that she states that she needed to be seen sooner than 6 wks because something is wrong.Please call to advise      Can the clinic reply by [facility]: yes      [facility]: [phone]      Return patient call, patient states she wanted to get her stitches checked in vaginal area due to the pain she having. Schedule patient with the walk in clinic today.

## 2019-07-17 ENCOUNTER — PATIENT MESSAGE (OUTPATIENT)
Dept: OBSTETRICS AND GYNECOLOGY | Facility: CLINIC | Age: 34
End: 2019-07-17

## 2019-07-18 ENCOUNTER — TELEPHONE (OUTPATIENT)
Dept: OBSTETRICS AND GYNECOLOGY | Facility: CLINIC | Age: 34
End: 2019-07-18

## 2019-07-30 ENCOUNTER — POSTPARTUM VISIT (OUTPATIENT)
Dept: OBSTETRICS AND GYNECOLOGY | Facility: CLINIC | Age: 34
End: 2019-07-30
Attending: OBSTETRICS & GYNECOLOGY
Payer: COMMERCIAL

## 2019-07-30 VITALS
SYSTOLIC BLOOD PRESSURE: 100 MMHG | DIASTOLIC BLOOD PRESSURE: 78 MMHG | BODY MASS INDEX: 24.57 KG/M2 | WEIGHT: 143.94 LBS | HEIGHT: 64 IN

## 2019-07-30 DIAGNOSIS — F41.9 ANXIETY: ICD-10-CM

## 2019-07-30 DIAGNOSIS — Z34.90 PREGNANCY WITH ONE FETUS, ANTEPARTUM: ICD-10-CM

## 2019-07-30 RX ORDER — HYDROCORTISONE ACETATE 25 MG/1
25 SUPPOSITORY RECTAL 2 TIMES DAILY
Qty: 20 SUPPOSITORY | Refills: 2 | Status: SHIPPED | OUTPATIENT
Start: 2019-07-30 | End: 2019-08-09

## 2019-07-30 RX ORDER — ESTRADIOL 0.1 MG/G
1 CREAM VAGINAL DAILY
Qty: 42.5 G | Refills: 1 | Status: SHIPPED | OUTPATIENT
Start: 2019-07-30 | End: 2019-08-14

## 2019-07-30 NOTE — PROGRESS NOTES
"CC: Post-partum follow-up    HPI:  Lindy Ferraro is a 33 y.o. female  presents for post-partum visit s/p a  with 2nd degree laceration, states that she has had some pain where stitches are, and noticed increased vaginal pressure after getting out of the shower the other day.  She is breastfeeding and denies depression though reports some anxiety about the current situation.      Delivery Date: 2019  Delivery MD: Ebony  Gender: female  Birth Weight: 6 pounds 13 ounces  Breast Feeding: YES  Depression: NO    ROS:  GENERAL: No fever, chills, fatigability or weight loss.  VULVAR: No pain, no lesions and no itching.  VAGINAL: No relaxation, no itching, no discharge, no abnormal bleeding and no lesions.  ABDOMEN: No abdominal pain. Denies nausea. Denies vomiting. No diarrhea. No constipation  BREAST: Denies pain. No lumps. No discharge.  URINARY: No incontinence, no nocturia, no frequency and no dysuria.  CARDIOVASCULAR: No chest pain. No shortness of breath. No leg cramps.  NEUROLOGICAL: No headaches. No vision changes.    PHYSICAL EXAM:  /78 (BP Location: Right arm, Patient Position: Sitting, BP Method: Medium (Manual))   Ht 5' 4" (1.626 m)   Wt 65.3 kg (143 lb 15.4 oz)   LMP 2018   BMI 24.71 kg/m²    GEN: AAO x 3, appropriate mood and affect  PELVIC: normal external genitalia, healing vaginal laceration.  Slight prolapse of cervix, bladder, cervix approx 4cm from introitus.  No signs of infection of uterus or laceration.  PSYCH: normal mood and affect, denies depression or anxiety.        Diagnosis:  1. Anxiety    2. Pregnancy with one fetus, antepartum        Plan:   Normal healing  Expected prolapse s/p , discussed pelvic floor PT after 6 weeks.  Orders Placed This Encounter    hydrocortisone (ANUSOL-HC) 25 mg suppository    estradiol (ESTRACE) 0.01 % (0.1 mg/gram) vaginal cream       "

## 2019-07-31 ENCOUNTER — PATIENT MESSAGE (OUTPATIENT)
Dept: OBSTETRICS AND GYNECOLOGY | Facility: CLINIC | Age: 34
End: 2019-07-31

## 2019-08-13 ENCOUNTER — POSTPARTUM VISIT (OUTPATIENT)
Dept: OBSTETRICS AND GYNECOLOGY | Facility: CLINIC | Age: 34
End: 2019-08-13
Attending: OBSTETRICS & GYNECOLOGY
Payer: COMMERCIAL

## 2019-08-13 VITALS
SYSTOLIC BLOOD PRESSURE: 114 MMHG | HEIGHT: 64 IN | BODY MASS INDEX: 24 KG/M2 | DIASTOLIC BLOOD PRESSURE: 78 MMHG | WEIGHT: 140.56 LBS

## 2019-08-13 DIAGNOSIS — N81.9 FEMALE GENITAL PROLAPSE, UNSPECIFIED TYPE: Primary | ICD-10-CM

## 2019-08-13 DIAGNOSIS — Z30.9 ENCOUNTER FOR CONTRACEPTIVE MANAGEMENT, UNSPECIFIED TYPE: ICD-10-CM

## 2019-08-13 PROBLEM — R10.13 EPIGASTRIC PAIN: Status: RESOLVED | Noted: 2018-11-08 | Resolved: 2019-08-13

## 2019-08-13 PROBLEM — Z34.90 PREGNANCY WITH ONE FETUS, ANTEPARTUM: Status: RESOLVED | Noted: 2018-11-27 | Resolved: 2019-08-13

## 2019-08-13 PROBLEM — Z34.90 PREGNANCY: Status: RESOLVED | Noted: 2018-11-08 | Resolved: 2019-08-13

## 2019-08-13 PROBLEM — Z34.90 ENCOUNTER FOR INDUCTION OF LABOR: Status: RESOLVED | Noted: 2019-06-13 | Resolved: 2019-08-13

## 2019-08-13 PROCEDURE — 99213 OFFICE O/P EST LOW 20 MIN: CPT | Mod: 24,S$GLB,, | Performed by: OBSTETRICS & GYNECOLOGY

## 2019-08-13 PROCEDURE — 3008F PR BODY MASS INDEX (BMI) DOCUMENTED: ICD-10-PCS | Mod: CPTII,S$GLB,, | Performed by: OBSTETRICS & GYNECOLOGY

## 2019-08-13 PROCEDURE — 99213 PR OFFICE/OUTPT VISIT, EST, LEVL III, 20-29 MIN: ICD-10-PCS | Mod: 24,S$GLB,, | Performed by: OBSTETRICS & GYNECOLOGY

## 2019-08-13 PROCEDURE — 3008F BODY MASS INDEX DOCD: CPT | Mod: CPTII,S$GLB,, | Performed by: OBSTETRICS & GYNECOLOGY

## 2019-08-13 NOTE — PROGRESS NOTES
"CC: Post-partum follow-up    HPI:  Lindy Ferraro is a 33 y.o. female  presents for post-partum visit s/p a  with 2nd degree laceration, states that she has had some pain where stitches are, and noticed increased vaginal pressure after getting out of the shower the other day.  She is breastfeeding and denies depression though reports some anxiety about the current situation, this is improving, she is still seeing a therapist, she has also been attending a support group.  At last visit, she had some slow healing of the skin edge at the perineal laceration causing pain with sitting, this has improved and she feels like she has healed.  Reports that hemorrhoids have improved with suppositories and other interventions.      Delivery Date: 2019  Delivery MD: Ebony  Gender: female  Birth Weight: 6 pounds 13 ounces  Breast Feeding: YES  Depression: NO    ROS:  GENERAL: No fever, chills, fatigability or weight loss.  VULVAR: No pain, no lesions and no itching.  VAGINAL: No relaxation, no itching, no discharge, no abnormal bleeding and no lesions.  ABDOMEN: No abdominal pain. Denies nausea. Denies vomiting. No diarrhea. No constipation  BREAST: Denies pain. No lumps. No discharge.  URINARY: No incontinence, no nocturia, no frequency and no dysuria.  CARDIOVASCULAR: No chest pain. No shortness of breath. No leg cramps.  NEUROLOGICAL: No headaches. No vision changes.    PHYSICAL EXAM:  /78   Ht 5' 4" (1.626 m)   Wt 63.7 kg (140 lb 8.7 oz)   LMP 2018   Breastfeeding? Yes   BMI 24.12 kg/m²    GEN: AAO x 3, appropriate mood and affect  PELVIC: normal external genitalia, healed perineal and vaginal laceration, dense scar tissue at site of vaginal laceration, pain improved.  PSYCH: normal mood and affect, denies depression, anxiety is improving, sleeping more and able to rest well at night.        Diagnosis:  1. Female genital prolapse, unspecified type    2. Encounter for contraceptive " management, unspecified type        Plan:   Normal healing  Expected prolapse s/p , pelvic floor PT referral sent.    RTC for IUD insertion  Orders Placed This Encounter    Ambulatory referral to Physical Therapy    Device Authorization Order    levonorgestrel 14 mcg/24 hrs (3 yrs) 13.5 mg IUD 14 mcg

## 2019-08-14 ENCOUNTER — OFFICE VISIT (OUTPATIENT)
Dept: INTERNAL MEDICINE | Facility: CLINIC | Age: 34
End: 2019-08-14
Payer: COMMERCIAL

## 2019-08-14 VITALS
OXYGEN SATURATION: 98 % | WEIGHT: 149.5 LBS | HEIGHT: 64 IN | BODY MASS INDEX: 25.52 KG/M2 | HEART RATE: 67 BPM | SYSTOLIC BLOOD PRESSURE: 122 MMHG | DIASTOLIC BLOOD PRESSURE: 74 MMHG

## 2019-08-14 DIAGNOSIS — Z00.00 ANNUAL PHYSICAL EXAM: Primary | ICD-10-CM

## 2019-08-14 DIAGNOSIS — K64.9 HEMORRHOIDS, UNSPECIFIED HEMORRHOID TYPE: ICD-10-CM

## 2019-08-14 DIAGNOSIS — K59.00 CONSTIPATION, UNSPECIFIED CONSTIPATION TYPE: ICD-10-CM

## 2019-08-14 PROCEDURE — 99385 PREV VISIT NEW AGE 18-39: CPT | Mod: S$GLB,,, | Performed by: FAMILY MEDICINE

## 2019-08-14 PROCEDURE — 99999 PR PBB SHADOW E&M-EST. PATIENT-LVL III: CPT | Mod: PBBFAC,,, | Performed by: FAMILY MEDICINE

## 2019-08-14 PROCEDURE — 99385 PR PREVENTIVE VISIT,NEW,18-39: ICD-10-PCS | Mod: S$GLB,,, | Performed by: FAMILY MEDICINE

## 2019-08-14 PROCEDURE — 99999 PR PBB SHADOW E&M-EST. PATIENT-LVL III: ICD-10-PCS | Mod: PBBFAC,,, | Performed by: FAMILY MEDICINE

## 2019-08-14 NOTE — PATIENT INSTRUCTIONS
The white spots in your tonsils are called tonsil stones.    Try Dulcolax to help with constipation.     Eating a High-Fiber Diet  Fiber is what gives strength and structure to plants. Most grains, beans, vegetables, and fruits contain fiber. Foods rich in fiber are often low in calories and fat, and they fill you up more. They may also reduce your risks for certain health problems. To find out the amount of fiber in canned, packaged, or frozen foods, read the Nutrition Facts label. It tells you how much fiber is in a serving.    Types of fiber and their benefits  There are two types of fiber: insoluble and soluble. They both aid digestion and help you maintain a healthy weight.  · Insoluble fiber. This is found in whole grains, cereals, certain fruits and vegetables such as apple skin, corn, and carrots. Insoluble fiber may prevent constipation and reduce the risk for certain types of cancer.  · Soluble fiber. This type of fiber is in oats, beans, and certain fruits and vegetables such as strawberries and peas. Soluble fiber can reduce cholesterol, which may help lower the risk for heart disease. It also helps control blood sugar levels.  Look for high-fiber foods  Try these foods to add fiber to your diet:  · Whole-grain breads and cereals. Try to eat 6 to 8 ounces a day. Include wheat and oat bran cereals, whole-wheat muffins or toast, and corn tortillas in your meals.  · Fruits. Try to eat 2 cups a day. Apples, oranges, strawberries, pears, and bananas are good sources. (Note: Fruit juice is low in fiber.)  · Vegetables. Try to eat at least 2.5 cups a day. Add asparagus, carrots, broccoli, peas, and corn to your meals.  · Beans. One cup of cooked lentils gives you over 15 grams of fiber. Try navy beans, lentils, and chickpeas.  · Seeds. A small handful of seeds gives you about 3 grams of fiber. Try sunflower seeds.  Keep track of your fiber  Keep track of how much fiber you eat. Start by reading food labels.  Then eat a variety of foods high in fiber. As you begin to eat more fiber, ask your healthcare provider how much water you should be drinking to keep your digestive system working smoothly.  You should aim for a certain amount of fiber in your diet each day. If you are a woman, that amount is between 25 and 28 grams per day. Men should aim for 30 to 33 grams per day. After age 50, your daily fiber needs drop to 22 grams for women and 28 grams for men.  Before you reach for the fiber supplements, think about this. Fiber is found naturally in healthy whole foods. It gives you that feeling of fullness after you eat. Taking fiber supplements or eating fiber-enriched foods will not give you this full feeling.  Your fiber intake is a good measure for the quality of your overall diet. If you are missing out on your daily amount of fiber, you may be lacking other important nutrients as well.  Date Last Reviewed: 5/11/2015  © 4092-6742 [employer]. [address] [place] [address]. All rights reserved. This information is not intended as a substitute for professional medical care. Always follow your healthcare professional's instructions.        Treating Constipation    Constipation is a common and often uncomfortable problem. Constipation means you have bowel movements fewer than 3 times per week, or strain to pass hard, dry stool. It can last a short time. Or it can be a problem that never seems to go away. The good news is that it can often be treated and controlled.  Eat more fiber  One of the best ways to help treat constipation is to increase your fiber intake. You can do this either through diet or by using fiber supplements. Fiber (in whole grains, fruits, and vegetables) adds bulk and absorbs water to soften the stool. This helps the stool pass through the colon more easily. When you increase your fiber intake, do it slowly to avoid side effects such as bloating. Also increase the amount of  water that you drink. Eating more of the following foods can add fiber to your diet.  · High-fiber cereals  · Whole grains, bran, and brown rice  · Vegetables such as carrots, broccoli, and greens  · Fresh fruits (especially apples, pears, and dried fruits like raisins and apricots)  · Nuts and legumes (especially beans such as lentils, kidney beans, and lima beans)  Get physically active  Exercise helps improve the working of your colon which helps ease constipation. Try to get some physical activity every day. If you havent been active for a while, talk to your healthcare provider before starting again.  Laxatives  Your healthcare provider may suggest an over-the-counter product to help ease your constipation. He or she may suggest the use of bulk-forming agents or laxatives. The use of laxatives, if used as directed, is common and safe. Follow directions carefully when using them. See your healthcare provider for new-onset constipation, or long-term constipation, to rule out other causes such as medicines or thyroid disease.  Date Last Reviewed: 7/1/2016 © 2000-2017 [employer]. [address] [place] [address]. All rights reserved. This information is not intended as a substitute for professional medical care. Always follow your healthcare professional's instructions.        Treating Hemorrhoids: Self-Care    Follow your healthcare providers advice about caring for your hemorrhoids at home. Some treatments help relieve symptoms right away. Others involve making changes in your diet and exercise habits. These can help ease constipation and prevent hemorrhoid symptoms from coming back.  Relieving symptoms  Your healthcare provider may prescribe anti-inflammatory medicine to help ease your symptoms. The following tips will also help relieve pain and swelling.  · Take sitz baths. Taking a sitz bath means sitting in a few inches of warm bath water. Soaking for 10 minutes twice a day can  provide welcome relief from painful hemorrhoids. It can also help the area stay clean.  · Develop good bowel habits. Use the bathroom when you need to. Dont ignore the urge to move your bowels. This can lead to constipation, hard stools, and straining. Also, dont read while on the toilet. Sit only as long as needed. Wipe gently with soft, unscented toilet tissue or baby wipes.  · Use ice packs. Placing an ice pack on a thrombosed external hemorrhoid can help relieve pain right away. It will also help reduce the blood clot. Use the ice for 15 to 20 minutes at a time. Keep a cloth between the ice and your skin to prevent skin damage.  · Use other measures. Laxatives and enemas can help ease constipation. But use them only on your healthcare providers advice. For symptom relief, try using cotton pads soaked in witch hazel. These are available at most drugstores. Over-the-counter hemorrhoid ointments and petroleum jelly can also provide relief.  Add fiber to your diet  Adding fiber to your diet can help relieve constipation by making stools softer and easier to pass. To increase your fiber intake, your healthcare provider may recommend a bulking agent, such as psyllium. This is a high-fiber supplement available at most grocery stores and drugstores. Eating more fiber-rich foods will also help. There are two types of fiber:  · Insoluble fiber is the main ingredient in bulking agents. Its also found in foods such as wheat bran, whole-grain breads, fresh fruits, and vegetables.  · Soluble fiber is found in foods such as oat bran. Although soluble fiber is good for you, it may not ease constipation as much as foods high in insoluble fiber.  Drink more water  Along with a high-fiber diet, drinking more water can help ease constipation. This is because insoluble fiber absorbs water, making stools soft and bulky. Be sure to drink plenty of water throughout the day. Drinking fruit juices, such as prune juice or apple juice,  can also help prevent constipation.  Get more exercise  Regular exercise aids digestion and helps prevent constipation. Its also great for your health. So talk with your healthcare provider about starting an exercise program. Low-impact activities, such as swimming or walking, are good places to start. Take it easy at first. And remember to drink plenty of water when you exercise.  High-fiber foods  High-fiber foods offer many benefits. By making your stools softer, they help heal and prevent swollen hemorrhoids. They may also help reduce the risk of colon and rectal cancer. Best of all, theyre usually low in calories and taste great. Here are some examples of fiber-rich foods.  · Whole grains, such as wheat bran, corn bran, and brown rice.  · Vegetables, especially carrots, broccoli, cabbage, and peas.  · Fruits, such as apples, bananas, raisins, peaches, and pears.  · Nuts and legumes, especially peanuts, lentils, and kidney beans.  Easy ways to add fiber  The tips below offer some simple ways to add more high-fiber foods to your meals.  · Start your day with a high-fiber breakfast. Eat a wheat bran cereal along with a sliced banana. Or, try peanut butter on whole-wheat toast.  · Eat carrot sticks for snacks. Theyre easy to prepare, taste great, and are low in calories.  · Use whole-grain breads instead of white bread for sandwiches.  · Eat fruits for treats. Try an apple and some raisins instead of a candy bar.   Date Last Reviewed: 7/1/2016  © 9511-6312 [employer]. [address] [place] [address], [place] [address]. All rights reserved. This information is not intended as a substitute for professional medical care. Always follow your healthcare professional's instructions.

## 2019-08-14 NOTE — PROGRESS NOTES
Subjective:       Patient ID: Lindy Ferraro is a 33 y.o. female.    Chief Complaint: establish care    HPI  This patient is new to me.   Lindy Ferraro is a 33 y.o. year old female without significant PMH who presents today to establish care.     Complains of seeing white spots in her tonsils. Also has a little scratchy throat.     Dealing with hemorrhoids and constipation. Began during pregnancy. Has been using OTC hemorrhoid suppository Rx by gyn. Also seeing blood in stool.     She is currently seeing a counselor for post partum anxiety and this is helping.     OB/GYN History     LMP: none since delivery   Sexually active:  Contraception:     Health Maintenance  Pap smear: 19 - normal   Mammogram: n/a  Colon Cancer Screening: n/a  DEXA: n/a  Hepatitis C screening: n/a  Flu vaccine:   Tetanus vaccine: UTD  PNA vaccine: n/a  Shingles vaccine: n/a    I personally reviewed Past Medical History, Past Surgical History, Social History, and Family History    Review of Systems   Constitutional: Negative for activity change, chills, fatigue, fever and unexpected weight change.   HENT: Positive for sore throat. Negative for congestion, hearing loss, rhinorrhea and trouble swallowing.    Eyes: Negative for discharge and visual disturbance.   Respiratory: Negative for cough, chest tightness, shortness of breath and wheezing.    Cardiovascular: Negative for chest pain, palpitations and leg swelling.   Gastrointestinal: Positive for blood in stool and constipation. Negative for abdominal pain, diarrhea, nausea and vomiting.   Endocrine: Negative for polydipsia and polyuria.   Genitourinary: Negative for difficulty urinating, dysuria, frequency, hematuria, menstrual problem and urgency.   Musculoskeletal: Negative for arthralgias, joint swelling, myalgias and neck pain.   Skin: Negative for rash.   Neurological: Negative for dizziness, syncope, weakness and headaches.   Psychiatric/Behavioral:  "Negative for confusion, dysphoric mood and sleep disturbance. The patient is not nervous/anxious.        Objective:      Vitals:    08/14/19 1545   BP: 122/74   Pulse: 67   SpO2: 98%   Weight: 67.8 kg (149 lb 7.6 oz)   Height: 5' 4" (1.626 m)     Physical Exam   Constitutional: She is oriented to person, place, and time. She appears well-developed and well-nourished. No distress.   HENT:   Head: Normocephalic and atraumatic.   Right Ear: Hearing normal.   Left Ear: Hearing normal.   Nose: Nose normal.   Mouth/Throat: Oropharynx is clear and moist and mucous membranes are normal. No oropharyngeal exudate.   Presence of tonsil stone bilaterally    Eyes: Pupils are equal, round, and reactive to light. Conjunctivae and lids are normal.   Neck: Normal range of motion. No thyroid mass and no thyromegaly present.   Cardiovascular: Normal rate, regular rhythm, S1 normal, S2 normal and intact distal pulses.   No murmur heard.  No lower extremity edema.    Pulmonary/Chest: Effort normal and breath sounds normal. No respiratory distress.   Abdominal: Soft. Normal appearance and bowel sounds are normal. There is no tenderness.   Lymphadenopathy:     She has no cervical adenopathy.        Right: No supraclavicular adenopathy present.        Left: No supraclavicular adenopathy present.   Neurological: She is alert and oriented to person, place, and time.   Skin: Skin is warm and dry. No rash noted.   Psychiatric: She has a normal mood and affect. Her behavior is normal. Thought content normal.   Nursing note and vitals reviewed.      Assessment:       1. Annual physical exam    2. Constipation, unspecified constipation type    3. Hemorrhoids, unspecified hemorrhoid type        Plan:   Diagnoses and all orders for this visit:    Annual physical exam  - Flu vaccine due in the fall. She would like to wait to order lab work - will do next year. Pap up to date.     Constipation, unspecified constipation type  Hemorrhoids, unspecified " hemorrhoid type  - Patient given information about dealing with both conditions. Will try Dulcolax for constipation. If bleeding or rectal pain continues, will refer to GI.

## 2019-08-30 PROBLEM — M62.9 MUSCLE DYSFUNCTION: Status: ACTIVE | Noted: 2019-08-30

## 2019-08-30 PROBLEM — R53.1 GENERAL WEAKNESS: Status: ACTIVE | Noted: 2019-08-30

## 2019-09-04 ENCOUNTER — CLINICAL SUPPORT (OUTPATIENT)
Dept: REHABILITATION | Facility: OTHER | Age: 34
End: 2019-09-04
Attending: OBSTETRICS & GYNECOLOGY
Payer: COMMERCIAL

## 2019-09-04 DIAGNOSIS — R53.1 GENERAL WEAKNESS: ICD-10-CM

## 2019-09-04 DIAGNOSIS — M62.9 MUSCLE DYSFUNCTION: ICD-10-CM

## 2019-09-04 PROCEDURE — 97140 MANUAL THERAPY 1/> REGIONS: CPT

## 2019-09-04 PROCEDURE — 97161 PT EVAL LOW COMPLEX 20 MIN: CPT

## 2019-09-04 PROCEDURE — 97530 THERAPEUTIC ACTIVITIES: CPT | Mod: 59

## 2019-09-04 NOTE — PLAN OF CARE
OUTPATIENT PHYSICAL THERAPY EVALUATION        Name: Lindy Ferraro  Clinic Number: [id number]    Therapy Diagnosis:   Encounter Diagnoses   Name Primary?    General weakness     Muscle dysfunction      Physician: Lilliam Beck DO    Physician Orders: PT Eval and Treat   Medical Diagnosis: female genital prolapse  Evaluation Date: 2019  Authorization: 20  Plan of Care Certification Period: 2019 to 19    Today's Date: 2019  Visit #:   Time In: 1608  Time Out: 1705  Total Billable Time: 57 minutes    Precautions: universal      HISTORY      Lindy is a 34 y.o. female evaluated on 2019    Physician:  Lilliam Beck DO   Diagnosis:   Encounter Diagnoses   Name Primary?   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    angelika singer ointment Apply topically. Apply after feeding. Do not wash off. This compounded medication expires in 30 days.    prenatal vit/iron fum/folic ac (PRENATAL 1+1 ORAL) Take by mouth.     No current facility-administered medications for this visit.        Allergies:   Review of patient's allergies indicates:   Allergen Reactions    Asa [aspirin] Other (See Comments)     Blood disorder per Patient    Ibuprofen     Pcn [penicillins]     Sulfa (sulfonamide antibiotics)         OB/GYN History:   childbirth vaginal delivery and episiotomy  (2nd degree tear)      SUBJECTIVE     Date of onset:  "6-13-19  History of current complaint: Pt reports increased pelvic pressure that started after the birth of her baby. She has the sensation that "there is a tampon in my vagina."  She reports this sensation at least 10% of the time.  She has been limiting her work out routines due to fear of making her prolapse symptoms worse.  She reports the pelvic pressure is noticeable with running.  She is worried she might also have some stress incontinence.  She reports she continues to have pain from a second degree tear especially with vaginal exams.  She had external and internal hemorrhoids after delivery.  Reports pain with BM.      Activities that cause symptoms: vigorous activity or exercise, sexual activity and defecating    Previous treatment included none     Reproductive Symptoms  Sexually active: No- worried about pain  Pain: Yes  Dysfunction: Yes    Bladder/Bowel History: no issues reported  Difficulty initiating urine stream: Yes  Urine stream: strong  Complete emptying: Yes  Bladder leakage: Yes  Frequency of incidents: with running   Amount leaked (urine): drops  Urinary Urgency: No      Frequency of bowel movements: once every 1-2 days  Difficulty initiating BM: Yes but is avoiding straining as much as possible  Quality/Shape of BM: [place] Stool Chart 3-5  Colon leakage: No  Bowel Urgency: No    Pain:  Location: Pelvic pain   Current 0/10, worst 5/10, best 0/10 (with 0 being the lowest and 10 being the highest)  Pelvic Pain Duration Occurs only during provocation  Pain description: tender and sharp  Aggravating Factors: Vaginal exam/provocation      Social History  Previous treatment included medical management. No PT this calendar year.      Abuse History: none reported    Occupation: Pt works as a .      Home Environment: Pt lives with their family.     PLOF: Pt was independent with all ADLs and iADLs without pain, no reports of incontinence of bladder.        Pts goals: Eliminate pelvic " pressure, improve confidence in ability to exercise and have a comfortable vaginal exam.        OBJECTIVE     ORTHO SCREEN  Posture: WNL  Pelvic alignment: pelvic obliquity noted      ABDOMINALS  Palpation: moderate restrictions noted in abdominal wall musculature and connective tissue  Abdominal strength: Rectus: 3+/5     TA: 3+/5     Chaperone: refused  Consent signed: Yes    VAGINAL PELVIC FLOOR EXAM    EXTERNAL ASSESSMENT  Introitus: WNL  Skin condition: WNL  Scarring: episiotomy scar noted   Sensation: hypersensitivity noted   Pain:  Q tip test positive  Pelvic Clock Assessment: negative  Voluntary contraction: no lift observed.  Pt rocks pelvis up and back  Specificity: patient contracts: abdominal wall and gluts   Involuntary contraction: bulge  Bearing down: bulge  Perineal descent: absent    INTERNAL ASSESSMENT  Pain: tender areas noted as follows: introitus, bulbocavernosus, ischiocavernosus   Sensation: able to localize pressure appropriately  Vaginal vault: roomy   Muscle Bulk: hypertonus   Muscle Power: 2/5   Quality of contraction: slow rise, slow relaxation and minimal lift felt in 3rd layer of pelvic floor   Coordination: tends to hold breath during PFM contration   Prolapse check:Grade 1 cystocele        TREATMENT    Treatment Time In: 1630  Treatment Time Out: 1705  Total Treatment time separate from Evaluation: 35 minutes    Manual Therapy to develop flexibility, extensibility and desensitization for 15 minutes including: scar mobilization of perineal scar    Therapeutic Activity Patient participated in dynamic functional therapeutic activities to improve functional performance for 15 minutes. Including: Education as described below.     Education provided:   - Instructed on general anatomy/physiology  - Role of therapy in multi-disciplinary team  - Instructed in purpose of physical therapy and the benefits/risks of treatment  - Instructed in alternative methods of treatment  - Risks of refusing  "treatment  - POC and goals for therapy  - Instructed on general anatomy/physiology of urinary/bowel system     Also educated in: anatomy/physiology of pelvic floor and perineal stretching/massage    Patient/guardian agreed to treatment plan.     No spiritual or educational barriers to learning provided    Written Home Exercises Provided:   Pt was provided with a written copy of exercises to perform at home. Susan demonstrated good  understanding of the education provided.     See EMR under Patient Instructions for exercises provided 9/4/2019.    ASSESSMENT      This is a 34 y.o. female referred to outpatient physical therapy and presents with a medical diagnosis of pelvic prolapse. Pt reports increased pelvic pressure that started after the birth of her baby on 6-13-19. She has the sensation that "there is a tampon in my vagina."  She reports this sensation at least 10% of the time.  She has been limiting her work out routines due to fear of making her prolapse symptoms worse.  She reports the pelvic pressure is noticeable with running.  She is worried she might also have some stress incontinence.  She reports she continues to have pain from a second degree tear especially with vaginal exams.  She had external and internal hemorrhoids after delivery.  Reports pain with BM.  Pt  reports that the pain is chronic is impacting ADL participation.  Examination reveals lumbopelvic dysfunction and pelvic floor coordination deficits and increased muscular tone with soft tissue restrictions. The is expected to benefit from skilled intervention to work towards improving lumbopelvic dysfunction, pelvic floor relaxation and coordination as well as improving overall strength and ROM in order to return towards prior level of function and ADL participation.      Educational/Spiritual/Cultural needs: none  Pt's spiritual, cultural and educational needs considered and pt agreeable to plan of care and goals as stated below: " "    Abuse/Neglect: no signs  Nutritional Status: WDWN   Fall Risk: No    Anticipated Barriers for therapy: none    Medical Necessity is demonstrated by the following  History  Co-morbidities and personal factors that may impact the plan of care Co-morbidities:   recent vaginal delivery    Personal Factors:   no deficits     low   Examination  Body Structures and Functions, activity limitations and participation restrictions that may impact the plan of care Body Regions/Systems/Functions:  pelvic floor tenderness, adhered/painful perineal scar and decreased pelvic muscle strength     Activity Limitations:  Pain with ADLs    Participation Restrictions:  relationship with spouse/partner, well woman's exam, ADL participation affected by pain and Pelvic pressure with ADLs.          low   Clinical Presentation stable and uncomplicated low   Decision Making/ Complexity Score: low     Short Term Goals: 4 weeks   Pt to perform "the knack" prior to coughing, laughing or sneezing to decrease risk of incontinence.  Pt to demonstrate proper diaphragmatic breathing to help with calming the nervous system in order to decrease pain and to improve abdominal wall musculature extensibility.   Pt to demonstrate good body mechanics when performing ADLs such as lifting and bending to decrease strain to lumbopelvic structures and reduce risk of further injury.  Pt to demonstrate a decrease in scar restrictions to no more than mild restrictions needed to help decrease pain with functional mobility.  Pt to report a decrease in pain to no more than 4 at it's worst with vaginal provocation.    Pt to report a decrease in pelvic pressure and fullness to no more than 5% of the time to demonstrate improving pelvic support of pelvic structures.    Pt to demonstrate proper positioning on commode with breathing techniques to decrease strain with BM to enable pt to feel empty after BM.   Pt to demonstrate independence with performing bowel massage to " help with gut motility.     Long Term Goals: 12 weeks   Pt to report elimination of incontinence with ADLs to demonstrate improved pelvic floor muscle strength and coordination.  Pt to report being able to have a comfortable vaginal exam without significant increase in pelvic pain.  Pt to be able to perform a 5 second kegel x 10 reps to demonstrate improving strength and endurance needed for continence.   Pt to report a decrease in pain to no more than 2 at it's worst with vaginal provocation.    Pt to report a decrease in pelvic pressure and fullness to no more than 2% of the time to demonstrate improving pelvic support of pelvic structures.       PLAN     Certification Period: 9/4/2019 to 11-29-19    Outpatient Physical Therapy 2 time(s) every  week(s) for 12 weeks.     Physical therapy will include: therapeutic exercises, therapeutic activity, neuromuscular re-education, manual therapy, modalities PRN, dry needling and self care/home management to achieve established goals.     At next visit: scar mobilization, further assess abdominal wall for DR, hip stretching        Therapist: Margarita Atwood, PT  9/4/2019

## 2019-09-04 NOTE — PATIENT INSTRUCTIONS
Home Exercise Program: 09/04/2019     SCAR MOBILIZATION  - Gently massage your scar in all directions both superficially along the skin and deeply.   - Massage the area AROUND your scar, as well as directly on it.   - Apply only as much pressure as you can tolerate, so that you can do this again the next day.     Do for 3-5 minutes every day.        PERINEAL MASSAGE    1. Wash hands thoroughly with antibacterial soap.  2. Lay down comfortably with your back and head well supported by several pillows.  3. Apply water-based lubricant (ie. Slippery stuff, coconut oil, olive oil) on your thumbs and perineum.  4. Place one or both thumbs just inside the vagina.  5. Gently press downward, then slowly continue the stretch up both sides of the vaginal opening.   6. The amount of pressure for the stretch may cause discomfort, but not pain (ie. You can replicate the stretching sensation by bending your finger backward). Don't go above 4-5/10 pain during or after the exericse.  7. Focus on relaxed breathing and keeping the pelvic floor muscles dropped.   8. Continue for 3-5  minutes, 3-4 times per week.    STOP if there is increased bleeding, an infection, or extreme pain.         Some may prefer their partner to assist them or to perform the massage for them. Your partner needs to use clean hands and gently insert one or two index fingers inside the lower part of the vagina and follow the steps listed above. It is important to tell your partner how much pressure to apply without causing pain.

## 2019-09-05 ENCOUNTER — PATIENT MESSAGE (OUTPATIENT)
Dept: OBSTETRICS AND GYNECOLOGY | Facility: CLINIC | Age: 34
End: 2019-09-05

## 2019-10-10 NOTE — PROGRESS NOTES
Physical Therapy Daily Treatment Note     Name: Lindy Ferraro  Clinic Number: [id number]    Therapy Diagnosis:   Encounter Diagnoses   Name Primary?    General weakness     Muscle dysfunction      Physician: Lilliam Beck DO    Visit Date: 10/11/2019    Physician Orders: PT Eval and Treat  Medical Diagnosis: female genital prolapse  Evaluation Date: 9-4-19  Authorization Period Expiration: 12-31-19  Plan of Care Certification Period: 11-29-19  Visit #/Visits authorized: 2/12     Time In: 810  Time Out: 9035  Total Billable Time: 53 minutes    Precautions: Standard    Subjective     Pt reports: She has less pain with her scar on the outside but inside vaginal canal.  She partially compliant with home exercise program.  Response to previous treatment: no issues reported  Functional change: decrease in perineal scar    Pain: 0/10  Location: pelvic pain     Objective     Therapeutic Exercise to develop  strength for 13 minutes including:   Groin stretch 3x30 sec  Supine HS stretch with strap 3x30 sec Ramon  Piriformis stretch 3x30 sec Ramon  Happy baby stretch edu (pants did not allow the stretch today)      Manual Therapy to develop flexibility, extensibility and desensitization for 40 minutes including: soft tissue mobilization of abdominal wall musculature and connective tissue and scar mobilization of abdominal wall scar and internal pelvic floor scar tissue (CF and gentle stretching)   Sacral PA gr III   Internal PF STM of 3rd layer ramon     Home Exercises Provided and Patient Education Provided     Education provided:   anatomy/physiology of pelvic floor  Discussed progression of plan of care with patient; educated pt in activity modification; reviewed HEP with pt. Pt demonstrated and verbalized understanding of all instruction and was provided with a handout of HEP (see Patient Instructions).    Written Home Exercises Provided: yes.  Exercises were reviewed and Susan was able to demonstrate them  "prior to the end of the session.  Susan demonstrated good  understanding of the education provided.     See EMR under Patient Instructions for exercises provided 10/11/2019.    Assessment     Pt noted to have a 2 finger-with diastasis recti at and above umbilicus. Worked on addressing restrictions in pelvic floor musculature and scar tissue.  Additionally worked on abdominal wall soft tissue restrictions to help with pain.  Initiated hip stretching program to decrease strain to pelvic structures.   Pt will continue to benefit from skilled outpatient physical therapy to address the deficits listed in the problem list box on initial evaluation, provide pt/family education and to maximize pt's level of independence in the home and community environment.       Susan is progressing well towards her goals.   Pt prognosis is Excellent.     Medical necessity demonstrated by: pelvic pain with ADLs   Anticipated barriers to physical therapy: none    Progress towards goals:  excellent  Goals:   Short Term Goals: 4 weeks   Pt to perform "the knack" prior to coughing, laughing or sneezing to decrease risk of incontinence.  Pt to demonstrate proper diaphragmatic breathing to help with calming the nervous system in order to decrease pain and to improve abdominal wall musculature extensibility.   Pt to demonstrate good body mechanics when performing ADLs such as lifting and bending to decrease strain to lumbopelvic structures and reduce risk of further injury.  Pt to demonstrate a decrease in scar restrictions to no more than mild restrictions needed to help decrease pain with functional mobility.  Pt to report a decrease in pain to no more than 4 at it's worst with vaginal provocation.    Pt to report a decrease in pelvic pressure and fullness to no more than 5% of the time to demonstrate improving pelvic support of pelvic structures.    Pt to demonstrate proper positioning on commode with breathing techniques to decrease strain " with BM to enable pt to feel empty after BM.   Pt to demonstrate independence with performing bowel massage to help with gut motility.      Long Term Goals: 12 weeks   Pt to report elimination of incontinence with ADLs to demonstrate improved pelvic floor muscle strength and coordination.  Pt to report being able to have a comfortable vaginal exam without significant increase in pelvic pain.  Pt to be able to perform a 5 second kegel x 10 reps to demonstrate improving strength and endurance needed for continence.   Pt to report a decrease in pain to no more than 2 at it's worst with vaginal provocation.    Pt to report a decrease in pelvic pressure and fullness to no more than 2% of the time to demonstrate improving pelvic support of pelvic structures.     New/Revised goals:  Continue with current established goals at this time.      Pt's spiritual, cultural and educational needs considered and pt agreeable to plan of care and goals.    Plan     Continue with established Plan of Care, working toward established PT goals.    At next visit:  scar mobilization, DR lopez,     aMrgarita Atwood, PT   10/10/2019

## 2019-10-11 ENCOUNTER — CLINICAL SUPPORT (OUTPATIENT)
Dept: REHABILITATION | Facility: OTHER | Age: 34
End: 2019-10-11
Attending: FAMILY MEDICINE
Payer: COMMERCIAL

## 2019-10-11 DIAGNOSIS — R53.1 GENERAL WEAKNESS: ICD-10-CM

## 2019-10-11 DIAGNOSIS — M62.9 MUSCLE DYSFUNCTION: ICD-10-CM

## 2019-10-11 PROCEDURE — 97110 THERAPEUTIC EXERCISES: CPT

## 2019-10-11 PROCEDURE — 97140 MANUAL THERAPY 1/> REGIONS: CPT

## 2019-10-11 NOTE — PATIENT INSTRUCTIONS
"Home Exercise Program: 10/11/2019     ABDOMINAL WALL MOBILIZATION  - Gently massage your abdominal wall muscles in all directions both superficially and deeply.   - Apply as much pressure as you are able to tolerate without more than a slight increase in discomfort.    - While applying pressure you can perform circular motions or you can "scoop and pull" your tissue up for a sustained stretch.      Do for 3-5  minutes 3-4x/wk.       SCAR MOBILIZATION  - Gently massage your scar in all directions both superficially along the skin and deeply.   - Massage the area AROUND your scar, as well as directly on it.   - Do not pull your scar apart with both hands - Use one hand/finger to anchor and use the other to pull along or across the scar.  - Apply only as much pressure as you can tolerate, so that you can do this again the next day.     Do for 3-5 minutes 3-4x/wk.       Hip and Pelvic Stretching Program                             "

## 2019-10-16 ENCOUNTER — CLINICAL SUPPORT (OUTPATIENT)
Dept: REHABILITATION | Facility: OTHER | Age: 34
End: 2019-10-16
Attending: OBSTETRICS & GYNECOLOGY
Payer: COMMERCIAL

## 2019-10-16 DIAGNOSIS — M62.9 MUSCLE DYSFUNCTION: ICD-10-CM

## 2019-10-16 DIAGNOSIS — R53.1 GENERAL WEAKNESS: ICD-10-CM

## 2019-10-16 PROCEDURE — 97110 THERAPEUTIC EXERCISES: CPT

## 2019-10-16 PROCEDURE — 97140 MANUAL THERAPY 1/> REGIONS: CPT

## 2019-10-16 PROCEDURE — 97112 NEUROMUSCULAR REEDUCATION: CPT

## 2019-10-16 NOTE — PROGRESS NOTES
Physical Therapy Daily Treatment Note     Name: Lindy Ferraro  Clinic Number: [id number]    Therapy Diagnosis:   Encounter Diagnoses   Name Primary?    General weakness     Muscle dysfunction      Physician: Lilliam Beck DO    Visit Date: 10/16/2019    Physician Orders: PT Eval and Treat  Medical Diagnosis: female genital prolapse  Evaluation Date: 9-4-19  Authorization Period Expiration: 12-31-19  Plan of Care Certification Period: 11-29-19  Visit #/Visits authorized: 3/12     Time In: 810  Time Out: 906  Total Billable Time: 56 minutes    Precautions: Standard    Subjective     Pt reports: Minimal issues reported with her pelvic pressure.    She partially compliant with home exercise program.  Response to previous treatment: abdominal wall soreness for about 20 min.  Functional change: decrease in perineal scar    Pain: 0/10  Location: pelvic pain     Objective     Therapeutic Exercise to develop  strength for 10 minutes including:   Bridge x 10  Clamshells and reverse clamshells   hooklying hip ABD with GTB 5 sec x 10    Not performed today:  Groin stretch 3x30 sec  Supine HS stretch with strap 3x30 sec Richard  Piriformis stretch 3x30 sec Richard  Happy baby stretch edu (pants did not allow the stretch today)    Neuromuscular Re-education to develop Coordination for 8 minutes including:   Pt edu in TA contraction.  Pt struggles to isolate TA initially but improves with practice. Tactile and verbal cues required for correct performance.   TA 5 sec x 5 reps   TA+BKFO/Marching x10 reps.         Manual Therapy to develop flexibility, extensibility and desensitization for 38 minutes including: soft tissue mobilization of abdominal wall musculature and connective tissue and scar mobilization of abdominal wall scar and internal pelvic floor scar tissue (CF and gentle stretching)   Internal PF STM of 3rd layer richard     Home Exercises Provided and Patient Education Provided     Education provided:  "  anatomy/physiology of pelvic floor  Discussed progression of plan of care with patient; educated pt in activity modification; reviewed HEP with pt. Pt demonstrated and verbalized understanding of all instruction and was provided with a handout of HEP (see Patient Instructions).    Written Home Exercises Provided: yes.  Exercises were reviewed and Susan was able to demonstrate them prior to the end of the session.  Susan demonstrated good  understanding of the education provided.     See EMR under Patient Instructions for exercises provided 10/11/2019.    Assessment     Pt noted to have a 2 finger-with diastasis recti at and above umbilicus.     Continued working on addressing restrictions in pelvic floor musculature and scar tissue.  Additionally worked on abdominal wall soft tissue restrictions to help with pain.  Initiated hip an core strengthening program to decrease strain to pelvic structures.   Pt will continue to benefit from skilled outpatient physical therapy to address the deficits listed in the problem list box on initial evaluation, provide pt/family education and to maximize pt's level of independence in the home and community environment.       Susan is progressing well towards her goals.   Pt prognosis is Excellent.     Medical necessity demonstrated by: pelvic pain with ADLs   Anticipated barriers to physical therapy: none    Progress towards goals:  excellent  Goals:   Short Term Goals: 4 weeks   Pt to perform "the knack" prior to coughing, laughing or sneezing to decrease risk of incontinence.  Pt to demonstrate proper diaphragmatic breathing to help with calming the nervous system in order to decrease pain and to improve abdominal wall musculature extensibility.   Pt to demonstrate good body mechanics when performing ADLs such as lifting and bending to decrease strain to lumbopelvic structures and reduce risk of further injury.  Pt to demonstrate a decrease in scar restrictions to no more than " mild restrictions needed to help decrease pain with functional mobility.  Pt to report a decrease in pain to no more than 4 at it's worst with vaginal provocation.    Pt to report a decrease in pelvic pressure and fullness to no more than 5% of the time to demonstrate improving pelvic support of pelvic structures.    Pt to demonstrate proper positioning on commode with breathing techniques to decrease strain with BM to enable pt to feel empty after BM.   Pt to demonstrate independence with performing bowel massage to help with gut motility.      Long Term Goals: 12 weeks   Pt to report elimination of incontinence with ADLs to demonstrate improved pelvic floor muscle strength and coordination.  Pt to report being able to have a comfortable vaginal exam without significant increase in pelvic pain.  Pt to be able to perform a 5 second kegel x 10 reps to demonstrate improving strength and endurance needed for continence.   Pt to report a decrease in pain to no more than 2 at it's worst with vaginal provocation.    Pt to report a decrease in pelvic pressure and fullness to no more than 2% of the time to demonstrate improving pelvic support of pelvic structures.     New/Revised goals:  Continue with current established goals at this time.      Pt's spiritual, cultural and educational needs considered and pt agreeable to plan of care and goals.    Plan     Continue with established Plan of Care, working toward established PT goals.    At next visit:  scar mobilization, DR lopez,     Margarita Atwood, PT   10/16/2019

## 2019-10-16 NOTE — PATIENT INSTRUCTIONS
Home Exercise Program: 10/16/2019                Home Exercise Program: 10/16/2019    Hip Strengthening

## 2019-10-18 ENCOUNTER — CLINICAL SUPPORT (OUTPATIENT)
Dept: REHABILITATION | Facility: OTHER | Age: 34
End: 2019-10-18
Attending: FAMILY MEDICINE
Payer: COMMERCIAL

## 2019-10-18 DIAGNOSIS — M62.9 MUSCLE DYSFUNCTION: ICD-10-CM

## 2019-10-18 DIAGNOSIS — R53.1 GENERAL WEAKNESS: ICD-10-CM

## 2019-10-18 PROCEDURE — 97112 NEUROMUSCULAR REEDUCATION: CPT

## 2019-10-18 PROCEDURE — 97110 THERAPEUTIC EXERCISES: CPT

## 2019-10-18 PROCEDURE — 97140 MANUAL THERAPY 1/> REGIONS: CPT

## 2019-10-18 NOTE — PROGRESS NOTES
Physical Therapy Daily Treatment Note     Name: Lindy Ferraro  Clinic Number: [id number]    Therapy Diagnosis:   Encounter Diagnoses   Name Primary?    General weakness     Muscle dysfunction      Physician: Lilliam Beck DO    Visit Date: 10/18/2019    Physician Orders: PT Eval and Treat  Medical Diagnosis: female genital prolapse  Evaluation Date: 9-4-19  Authorization Period Expiration: 12-31-19  Plan of Care Certification Period: 11-29-19  Visit #/Visits authorized: 4/12     Time In: 810  Time Out: 900  Total Billable Time: 50 minutes    Precautions: Standard    Subjective     Pt reports: no issues reported.    Response to previous treatment: pelvic floor swelling for about an hour.    Functional change: increased discomfort with sitting for an hour     Pain: 0/10  Location: pelvic pain     Objective     Therapeutic Exercise to develop  strength for 8 minutes including:   Clamshells and reverse clamshells   hooklying hip ABD with GTB 5 sec x 10    Not performed today:  Bridge x 10  Groin stretch 3x30 sec  Supine HS stretch with strap 3x30 sec Richard  Piriformis stretch 3x30 sec Richard  Happy baby stretch edu (pants did not allow the stretch today)    Neuromuscular Re-education to develop Coordination for 10 minutes including:   TA 5 sec x 5 reps   TA+BKFO/Marching 5 se x10 reps.         Manual Therapy to develop flexibility, extensibility and desensitization for 30 minutes including: soft tissue mobilization of abdominal wall musculature and connective tissue and scar mobilization of abdominal wall scar and internal pelvic floor scar tissue (CF and gentle stretching)   Internal PF STM of 3rd layer richard     Home Exercises Provided and Patient Education Provided     Education provided:   anatomy/physiology of pelvic floor  Discussed progression of plan of care with patient; educated pt in activity modification; reviewed HEP with pt. Pt demonstrated and verbalized understanding of all instruction and was  "provided with a handout of HEP (see Patient Instructions).    Written Home Exercises Provided: yes.  Exercises were reviewed and Susan was able to demonstrate them prior to the end of the session.  Susan demonstrated good  understanding of the education provided.     See EMR under Patient Instructions for exercises provided 10/11/2019.    Assessment     Pt noted to have a 2 finger-with diastasis recti at and above umbilicus.     Continued working on addressing restrictions in pelvic floor musculature and scar tissue.  Continued  hip and core strengthening program to decrease strain to pelvic structures.   Improved ability to contract TrA today. Pt will continue to benefit from skilled outpatient physical therapy to address the deficits listed in the problem list box on initial evaluation, provide pt/family education and to maximize pt's level of independence in the home and community environment.       Susan is progressing well towards her goals.   Pt prognosis is Excellent.     Medical necessity demonstrated by: pelvic pain with ADLs   Anticipated barriers to physical therapy: none    Progress towards goals:  excellent  Goals:   Short Term Goals: 4 weeks   Pt to perform "the knack" prior to coughing, laughing or sneezing to decrease risk of incontinence.  Pt to demonstrate proper diaphragmatic breathing to help with calming the nervous system in order to decrease pain and to improve abdominal wall musculature extensibility.   Pt to demonstrate good body mechanics when performing ADLs such as lifting and bending to decrease strain to lumbopelvic structures and reduce risk of further injury.  Pt to demonstrate a decrease in scar restrictions to no more than mild restrictions needed to help decrease pain with functional mobility.  Pt to report a decrease in pain to no more than 4 at it's worst with vaginal provocation.    Pt to report a decrease in pelvic pressure and fullness to no more than 5% of the time to " demonstrate improving pelvic support of pelvic structures.    Pt to demonstrate proper positioning on commode with breathing techniques to decrease strain with BM to enable pt to feel empty after BM.   Pt to demonstrate independence with performing bowel massage to help with gut motility.      Long Term Goals: 12 weeks   Pt to report elimination of incontinence with ADLs to demonstrate improved pelvic floor muscle strength and coordination.  Pt to report being able to have a comfortable vaginal exam without significant increase in pelvic pain.  Pt to be able to perform a 5 second kegel x 10 reps to demonstrate improving strength and endurance needed for continence.   Pt to report a decrease in pain to no more than 2 at it's worst with vaginal provocation.    Pt to report a decrease in pelvic pressure and fullness to no more than 2% of the time to demonstrate improving pelvic support of pelvic structures.     New/Revised goals:  Continue with current established goals at this time.      Pt's spiritual, cultural and educational needs considered and pt agreeable to plan of care and goals.    Plan     Continue with established Plan of Care, working toward established PT goals.    At next visit:  scar mobilization, DR lopez, hip and core strengthening     Margarita Atwood, PT   10/18/2019

## 2019-10-22 ENCOUNTER — CLINICAL SUPPORT (OUTPATIENT)
Dept: REHABILITATION | Facility: OTHER | Age: 34
End: 2019-10-22
Attending: OBSTETRICS & GYNECOLOGY
Payer: COMMERCIAL

## 2019-10-22 DIAGNOSIS — M62.9 MUSCLE DYSFUNCTION: ICD-10-CM

## 2019-10-22 DIAGNOSIS — R53.1 GENERAL WEAKNESS: ICD-10-CM

## 2019-10-22 PROCEDURE — 97110 THERAPEUTIC EXERCISES: CPT

## 2019-10-22 PROCEDURE — 97140 MANUAL THERAPY 1/> REGIONS: CPT

## 2019-10-22 NOTE — PROGRESS NOTES
Physical Therapy Daily Treatment Note     Name: Lindy Ferraro  Clinic Number: [id number]    Therapy Diagnosis:   Encounter Diagnoses   Name Primary?    General weakness     Muscle dysfunction      Physician: Lilliam Beck DO    Visit Date: 10/22/2019    Physician Orders: PT Eval and Treat  Medical Diagnosis: female genital prolapse  Evaluation Date: 9-4-19  Authorization Period Expiration: 12-31-19  Plan of Care Certification Period: 11-29-19  Visit #/Visits authorized: 5/12     Time In: 810  Time Out: 900  Total Billable Time: 50 minutes    Precautions: Standard    Subjective     Pt reports: no issues reported.    Response to previous treatment: no issues reported  Functional change: less pain with ADLs    Pain: 0/10 without provocation  Location: pelvic pain     Objective     Therapeutic Exercise to develop  strength for 10 minutes including:   Clamshells and reverse clamshells  x15 reps   hooklying hip ABD with GTB 5 sec x 15  Bridge 5 sec x 15  Open books 10 sec x 10    Not performed today:  Groin stretch 3x30 sec  Supine HS stretch with strap 3x30 sec Richard  Piriformis stretch 3x30 sec Richard  Happy baby stretch edu (pants did not allow the stretch today)    Neuromuscular Re-education to develop Coordination for 0 minutes including:   TA 5 sec x 5 reps   TA+BKFO/Marching 5 se x10 reps.         Manual Therapy to develop flexibility, extensibility and desensitization for 40 minutes including: scar mobilization of  internal pelvic floor scar tissue (CF and gentle stretching)   Internal PF STM of 1st and 3rd layer richard   Review edu on self-stretching techniques.     Home Exercises Provided and Patient Education Provided     Education provided:   anatomy/physiology of pelvic floor  Discussed progression of plan of care with patient; educated pt in activity modification; reviewed HEP with pt. Pt demonstrated and verbalized understanding of all instruction and was provided with a handout of HEP (see  "Patient Instructions).    Written Home Exercises Provided: yes.  Exercises were reviewed and Susan was able to demonstrate them prior to the end of the session.  Susan demonstrated good  understanding of the education provided.     See EMR under Patient Instructions for exercises provided 10/11/2019.    Assessment     Pt noted to have a 2 finger-with diastasis recti at and above umbilicus.     Continued working on addressing restrictions in pelvic floor musculature and scar tissue.  Continued  hip and core strengthening program to decrease strain to pelvic structures.   Pt able to tolerate increased pressure with pelvic scar mobilization.  Pt will continue to benefit from skilled outpatient physical therapy to address the deficits listed in the problem list box on initial evaluation, provide pt/family education and to maximize pt's level of independence in the home and community environment.       Susan is progressing well towards her goals.   Pt prognosis is Excellent.     Medical necessity demonstrated by: pelvic pain with ADLs   Anticipated barriers to physical therapy: none    Progress towards goals:  excellent  Goals:   Short Term Goals: 4 weeks   Pt to perform "the knack" prior to coughing, laughing or sneezing to decrease risk of incontinence.  Pt to demonstrate proper diaphragmatic breathing to help with calming the nervous system in order to decrease pain and to improve abdominal wall musculature extensibility.   Pt to demonstrate good body mechanics when performing ADLs such as lifting and bending to decrease strain to lumbopelvic structures and reduce risk of further injury.  Pt to demonstrate a decrease in scar restrictions to no more than mild restrictions needed to help decrease pain with functional mobility.  Pt to report a decrease in pain to no more than 4 at it's worst with vaginal provocation.    Pt to report a decrease in pelvic pressure and fullness to no more than 5% of the time to demonstrate " improving pelvic support of pelvic structures.    Pt to demonstrate proper positioning on commode with breathing techniques to decrease strain with BM to enable pt to feel empty after BM.   Pt to demonstrate independence with performing bowel massage to help with gut motility.      Long Term Goals: 12 weeks   Pt to report elimination of incontinence with ADLs to demonstrate improved pelvic floor muscle strength and coordination.  Pt to report being able to have a comfortable vaginal exam without significant increase in pelvic pain.  Pt to be able to perform a 5 second kegel x 10 reps to demonstrate improving strength and endurance needed for continence.   Pt to report a decrease in pain to no more than 2 at it's worst with vaginal provocation.    Pt to report a decrease in pelvic pressure and fullness to no more than 2% of the time to demonstrate improving pelvic support of pelvic structures.     New/Revised goals:  Continue with current established goals at this time.      Pt's spiritual, cultural and educational needs considered and pt agreeable to plan of care and goals.    Plan     Continue with established Plan of Care, working toward established PT goals.    At next visit:  scar mobilization, DR lopez, hip and core strengthening, positioning on commode for BM with breathing technique      Margarita Atwood, PT   10/22/2019

## 2019-10-24 NOTE — PROGRESS NOTES
Physical Therapy Daily Treatment Note     Name: Lindy Ferraro  Clinic Number: [id number]    Therapy Diagnosis:   Encounter Diagnoses   Name Primary?    General weakness     Muscle dysfunction      Physician: Lilliam Beck DO    Visit Date: 10/25/2019    Physician Orders: PT Eval and Treat  Medical Diagnosis: female genital prolapse  Evaluation Date: 9-4-19  Authorization Period Expiration: 12-31-19  Plan of Care Certification Period: 11-29-19  Visit #/Visits authorized: 6/12     Time In: 805  Time Out: 900  Total Billable Time: 55 minutes    Precautions: Standard    Subjective     Pt reports: no issues reported.    Response to previous treatment: no issues reported  Functional change: less pain with ADLs    Pain: 0/10 without provocation  Location: pelvic pain     Objective     Therapeutic Exercise to develop  strength for 10 minutes including:   Clamshells and reverse clamshells  x15 reps   hooklying hip ABD with GTB 5 sec x 15  Bridge 5 sec x 15      Not performed today:  Open books 10 sec x 10Groin stretch 3x30 sec  Supine HS stretch with strap 3x30 sec Richard  Piriformis stretch 3x30 sec Richard  Happy baby stretch edu (pants did not allow the stretch today)    Neuromuscular Re-education to develop Coordination for 15 minutes including:     TA+Marching 5 sec x10 reps.   Fire hydrant x10 Richard  Quad hip ext x 10 Richard     Not performed today:  TA+BKFO 5 sec x10 reps.       Manual Therapy to develop flexibility, extensibility and desensitization for 25 minutes including: scar mobilization of  internal pelvic floor scar tissue (CF and gentle stretching)   Internal PF STM of 1st and 3rd layer richard       Home Exercises Provided and Patient Education Provided     Education provided:   anatomy/physiology of pelvic floor  Discussed progression of plan of care with patient; educated pt in activity modification; reviewed HEP with pt. Pt demonstrated and verbalized understanding of all instruction and was provided  "with a handout of HEP (see Patient Instructions).    Written Home Exercises Provided: yes.  Exercises were reviewed and Susan was able to demonstrate them prior to the end of the session.  Susan demonstrated good  understanding of the education provided.     See EMR under Patient Instructions for exercises provided 10/11/2019.    Assessment     Pt noted to have a 2 finger-with diastasis recti at and above umbilicus.     Continued working on addressing restrictions in pelvic floor musculature and scar tissue.  Continued  hip and core strengthening program to decrease strain to pelvic structures.   Pt able to tolerate increased pressure with pelvic scar mobilization.  Pt will continue to benefit from skilled outpatient physical therapy to address the deficits listed in the problem list box on initial evaluation, provide pt/family education and to maximize pt's level of independence in the home and community environment.       Susan is progressing well towards her goals.   Pt prognosis is Excellent.     Medical necessity demonstrated by: pelvic pain with ADLs   Anticipated barriers to physical therapy: none    Progress towards goals:  excellent  Goals:   Short Term Goals: 4 weeks   Pt to perform "the knack" prior to coughing, laughing or sneezing to decrease risk of incontinence.  Pt to demonstrate proper diaphragmatic breathing to help with calming the nervous system in order to decrease pain and to improve abdominal wall musculature extensibility.   Pt to demonstrate good body mechanics when performing ADLs such as lifting and bending to decrease strain to lumbopelvic structures and reduce risk of further injury.  Pt to demonstrate a decrease in scar restrictions to no more than mild restrictions needed to help decrease pain with functional mobility.  Pt to report a decrease in pain to no more than 4 at it's worst with vaginal provocation.    Pt to report a decrease in pelvic pressure and fullness to no more than 5% " of the time to demonstrate improving pelvic support of pelvic structures.    Pt to demonstrate proper positioning on commode with breathing techniques to decrease strain with BM to enable pt to feel empty after BM.   Pt to demonstrate independence with performing bowel massage to help with gut motility.      Long Term Goals: 12 weeks   Pt to report elimination of incontinence with ADLs to demonstrate improved pelvic floor muscle strength and coordination.  Pt to report being able to have a comfortable vaginal exam without significant increase in pelvic pain.  Pt to be able to perform a 5 second kegel x 10 reps to demonstrate improving strength and endurance needed for continence.   Pt to report a decrease in pain to no more than 2 at it's worst with vaginal provocation.    Pt to report a decrease in pelvic pressure and fullness to no more than 2% of the time to demonstrate improving pelvic support of pelvic structures.     New/Revised goals:  Continue with current established goals at this time.      Pt's spiritual, cultural and educational needs considered and pt agreeable to plan of care and goals.    Plan     Continue with established Plan of Care, working toward established PT goals.    At next visit:  scar mobilization, DR lopez, hip and core strengthening, positioning on commode for BM with breathing technique      Margarita Atwood, PT   10/24/2019

## 2019-10-25 ENCOUNTER — CLINICAL SUPPORT (OUTPATIENT)
Dept: REHABILITATION | Facility: OTHER | Age: 34
End: 2019-10-25
Attending: OBSTETRICS & GYNECOLOGY
Payer: COMMERCIAL

## 2019-10-25 DIAGNOSIS — M62.9 MUSCLE DYSFUNCTION: ICD-10-CM

## 2019-10-25 DIAGNOSIS — R53.1 GENERAL WEAKNESS: ICD-10-CM

## 2019-10-25 PROCEDURE — 97110 THERAPEUTIC EXERCISES: CPT

## 2019-10-25 PROCEDURE — 97140 MANUAL THERAPY 1/> REGIONS: CPT

## 2019-10-25 PROCEDURE — 97112 NEUROMUSCULAR REEDUCATION: CPT

## 2019-10-28 ENCOUNTER — TELEPHONE (OUTPATIENT)
Dept: OBSTETRICS AND GYNECOLOGY | Facility: CLINIC | Age: 34
End: 2019-10-28

## 2019-10-28 ENCOUNTER — OFFICE VISIT (OUTPATIENT)
Dept: OBSTETRICS AND GYNECOLOGY | Facility: CLINIC | Age: 34
End: 2019-10-28
Payer: COMMERCIAL

## 2019-10-28 VITALS — WEIGHT: 147 LBS | BODY MASS INDEX: 25.23 KG/M2 | DIASTOLIC BLOOD PRESSURE: 80 MMHG | SYSTOLIC BLOOD PRESSURE: 124 MMHG

## 2019-10-28 DIAGNOSIS — O92.79 CLOGGED DUCT, POSTPARTUM: Primary | ICD-10-CM

## 2019-10-28 DIAGNOSIS — O92.79 ENGORGEMENT OF BREASTS, POSTPARTUM: ICD-10-CM

## 2019-10-28 PROCEDURE — 99214 PR OFFICE/OUTPT VISIT, EST, LEVL IV, 30-39 MIN: ICD-10-PCS | Mod: S$GLB,,, | Performed by: NURSE PRACTITIONER

## 2019-10-28 PROCEDURE — 3008F PR BODY MASS INDEX (BMI) DOCUMENTED: ICD-10-PCS | Mod: CPTII,S$GLB,, | Performed by: NURSE PRACTITIONER

## 2019-10-28 PROCEDURE — 99999 PR PBB SHADOW E&M-EST. PATIENT-LVL II: ICD-10-PCS | Mod: PBBFAC,,, | Performed by: NURSE PRACTITIONER

## 2019-10-28 PROCEDURE — 3008F BODY MASS INDEX DOCD: CPT | Mod: CPTII,S$GLB,, | Performed by: NURSE PRACTITIONER

## 2019-10-28 PROCEDURE — 99214 OFFICE O/P EST MOD 30 MIN: CPT | Mod: S$GLB,,, | Performed by: NURSE PRACTITIONER

## 2019-10-28 PROCEDURE — 99999 PR PBB SHADOW E&M-EST. PATIENT-LVL II: CPT | Mod: PBBFAC,,, | Performed by: NURSE PRACTITIONER

## 2019-10-28 RX ORDER — CLINDAMYCIN HYDROCHLORIDE 300 MG/1
300 CAPSULE ORAL EVERY 8 HOURS
Qty: 21 CAPSULE | Refills: 0 | Status: SHIPPED | OUTPATIENT
Start: 2019-10-28 | End: 2019-11-04

## 2019-10-28 NOTE — PROGRESS NOTES
Lindy Ferraro  complains of left breast pain that began 5 days ago.  She denies fever.  She is breastfeeding or pumping.  She complains of breast mass. She reports recent issues with latch and sizing of flanges of her breast pump, issues with latch began in the past week after her daughter's tongue tie was corrected. Expresses significant emotional distress related to current situation. She has tried supplements, warm compresses, increased feedings/pumping, breast massage without relief of clogged ducts, engorgement, and pain.        ROS:  GENERAL: No fever, chills, fatigability or weight loss.  VULVAR: No pain, no lesions and no itching.  VAGINAL: No relaxation, no itching, no discharge, no abnormal bleeding and no lesions.  ABDOMEN: No abdominal pain. Denies nausea. Denies vomiting. No diarrhea. No constipation  BREAST: Pain and lumps. No discharge.  URINARY: No incontinence, no nocturia, no frequency and no dysuria.  CARDIOVASCULAR: No chest pain. No shortness of breath. No leg cramps.  NEUROLOGICAL: no headaches. No vision changes.      Vitals:    10/28/19 1648   BP: 124/80     GENERAL: healthy, alert, cooperative, crying  Neck: normal to inspection and palpation and neck supply, no thryomegaly  LYMPH: no supraclavicular or axillary lymphadenopathy  BREAST: positive findings: Mild engorgment of right breast. Palpable 4 cm lump at 12'o'clock region of left breast and palpable 3 cm lump at 4'o'clock region of left breast with mild erythema. Areas are TTP. Moderate engorgement of left breast.  ABDOMEN: Normal, benign. and no masses, hepatosplenomegaly, hernias    Lindy was seen today for mastitis.    Diagnoses and all orders for this visit:    Clogged duct, postpartum  -     clindamycin (CLEOCIN) 300 MG capsule; Take 1 capsule (300 mg total) by mouth every 8 (eight) hours. for 7 days    Engorgement of breasts, postpartum  -     clindamycin (CLEOCIN) 300 MG capsule; Take 1 capsule (300 mg total)  by mouth every 8 (eight) hours. for 7 days    Patient was scheduled for lactation consult tomorrow morning at 9:30 AM for further evaluation of her issues regarding latch and flange sizing. No signs of acute infection. Oral temperature 98.9 F without reports fever/chills. Will give prescription for Clindamycin to be taken only if patient experiences fever, verbalized understanding. FU at [facility] prn.      GIOVANNY Nichols

## 2019-10-28 NOTE — TELEPHONE ENCOUNTER
----- Message from Josefina Alaniz sent at 10/28/2019  3:25 PM CDT -----  Contact: pt  Name of Who is Calling:CAITY FERGUSON [[id number]]       What is the request in detail: Pt is requesting a call back from clinical team in regard to having possible mastitis. Please contact to further discuss and advise.           Can the clinic reply by [facility]: No         [facility] [phone]       Spoke with patient inform patient per Kaylyn to see if patient can come in to the walk in clinic to be checked. Schedule patient for today at 4:20. Patient verbalized and understand

## 2019-10-29 ENCOUNTER — LACTATION CONSULT (OUTPATIENT)
Dept: LACTATION | Facility: CLINIC | Age: 34
End: 2019-10-29
Payer: COMMERCIAL

## 2019-10-29 ENCOUNTER — TELEPHONE (OUTPATIENT)
Dept: OBSTETRICS AND GYNECOLOGY | Facility: CLINIC | Age: 34
End: 2019-10-29

## 2019-10-29 ENCOUNTER — PATIENT MESSAGE (OUTPATIENT)
Dept: OBSTETRICS AND GYNECOLOGY | Facility: CLINIC | Age: 34
End: 2019-10-29

## 2019-10-29 DIAGNOSIS — N63.25 BREAST LUMP ON LEFT SIDE AT 12 O'CLOCK POSITION: Primary | ICD-10-CM

## 2019-10-29 DIAGNOSIS — O91.13 ABSCESS OF BREAST ASSOCIATED WITH LACTATION: Primary | ICD-10-CM

## 2019-10-29 PROCEDURE — 99199 UNLISTED SPECIAL SVC PX/RPRT: CPT | Mod: S$GLB,,, | Performed by: OBSTETRICS & GYNECOLOGY

## 2019-10-29 PROCEDURE — 99199 PR LACTATION CONSULT 1 HR: ICD-10-PCS | Mod: S$GLB,,, | Performed by: OBSTETRICS & GYNECOLOGY

## 2019-10-29 NOTE — PROGRESS NOTES
Consult start: 930  Mother wants to come in and talk with LC about her mastitis/plugged ducts.    Baby's - 2017    Her baby had a revision 3 weeks ago by [facility] and is going to [facility] for follow up. Baby also DX with Laryngomalacia and reflux.    MD gave mother an RX of ABXS for Mastitis but told her not to take them unless she has fever. Mother is allergic to first line drug per LC's conversation with mother on 10/28/2019.     On 10/28/2019 LC gave mother instructions on plugged ducts and mastitis by phone but mother would still like to be seen. Baby sleeps throught the night.     Mother presents in lactation office to be seen by LC    LC can feel large lump in left breast at about 12 o'clock. LC checked flange size and the 21cm ones mother is using are fine. Nipple moves in and out freely. Mother reports using massage, heat, vibration, frequent pumping and nursing but lump gets smaller but does not go down completely.     Mother is also concerned because she has started her period. LC explained to mother she may see a decrease in milk during her period but most women see an increase after period ends. Mother needs to empty 8 or more times in 24 hours with no more than a 5 hour stretch at night. Mother may be starting to get more plugged ducts due to baby sleeping longer. Mother may have to pump or feed after 5 hours to decrease plugged ducts.    LC put slight pressure on lump while mother pumped, lump went down but is still there. Mother did a 30 minute pump session and LC can still feel lump.    Mother reports a good milk supply of about 25oz a day. Baby goes to  and will take a 4 oz EBM bottle at . Baby goes to DR. Lara. Baby was 13 lbs at last visit. MD concerned about low wt gain. Baby just had a tongue tie revised and is seeing an SLP at [facility]. Baby was able to transfer 3 oz after revision. Baby is going back for another weight check in 2 weeks to check on  weight issue. Mother has milk in freezer if baby needs more supplement.    TANYA sent a message to mother's OBGYN Dr. Beck, requested mother be sent for an U/S to rule out abscess. TANYA has had a patient in the past with an abscess that had no redness or fever. MD to advise about ABXS.    TANYA will call to check on Mother on Friday Nov, 1st.

## 2019-10-30 ENCOUNTER — HOSPITAL ENCOUNTER (OUTPATIENT)
Dept: RADIOLOGY | Facility: HOSPITAL | Age: 34
Discharge: HOME | End: 2019-10-30
Attending: OBSTETRICS & GYNECOLOGY
Payer: COMMERCIAL

## 2019-10-30 VITALS — HEIGHT: 64 IN | BODY MASS INDEX: 25.1 KG/M2 | WEIGHT: 147 LBS

## 2019-10-30 DIAGNOSIS — O91.13 ABSCESS OF BREAST ASSOCIATED WITH LACTATION: ICD-10-CM

## 2019-10-30 PROCEDURE — 76642 US BREAST LEFT LIMITED: ICD-10-PCS | Mod: 26,LT,, | Performed by: RADIOLOGY

## 2019-10-30 PROCEDURE — 76642 ULTRASOUND BREAST LIMITED: CPT | Mod: TC,PO,LT

## 2019-10-30 PROCEDURE — 76642 ULTRASOUND BREAST LIMITED: CPT | Mod: 26,LT,, | Performed by: RADIOLOGY

## 2019-12-09 ENCOUNTER — TELEPHONE (OUTPATIENT)
Dept: INTERNAL MEDICINE | Facility: CLINIC | Age: 34
End: 2019-12-09

## 2019-12-09 NOTE — TELEPHONE ENCOUNTER
----- Message from Adele Lynn sent at 12/9/2019  8:55 AM CST -----  Contact: Pt   Name of Who is Calling: LINDY FERGUSON [[id number]]    What is the request in detail: Lindy Ferguson calling regarding to having pink eye pt needs something called into the pharmacy for it she wanted to know did she have to come in yo be seen for this ............Please contact to further discuss and advise      Can the clinic reply by [facility]: Yes     [facility]:  [phone] (home)

## 2019-12-10 ENCOUNTER — OFFICE VISIT (OUTPATIENT)
Dept: INTERNAL MEDICINE | Facility: CLINIC | Age: 34
End: 2019-12-10
Payer: COMMERCIAL

## 2019-12-10 VITALS
WEIGHT: 150.13 LBS | BODY MASS INDEX: 25.63 KG/M2 | SYSTOLIC BLOOD PRESSURE: 112 MMHG | DIASTOLIC BLOOD PRESSURE: 70 MMHG | HEIGHT: 64 IN | HEART RATE: 59 BPM | OXYGEN SATURATION: 97 %

## 2019-12-10 DIAGNOSIS — B30.9 VIRAL CONJUNCTIVITIS OF RIGHT EYE: Primary | ICD-10-CM

## 2019-12-10 PROCEDURE — 3008F BODY MASS INDEX DOCD: CPT | Mod: CPTII,S$GLB,, | Performed by: FAMILY MEDICINE

## 2019-12-10 PROCEDURE — 99213 OFFICE O/P EST LOW 20 MIN: CPT | Mod: S$GLB,,, | Performed by: FAMILY MEDICINE

## 2019-12-10 PROCEDURE — 99999 PR PBB SHADOW E&M-EST. PATIENT-LVL III: CPT | Mod: PBBFAC,,, | Performed by: FAMILY MEDICINE

## 2019-12-10 PROCEDURE — 3008F PR BODY MASS INDEX (BMI) DOCUMENTED: ICD-10-PCS | Mod: CPTII,S$GLB,, | Performed by: FAMILY MEDICINE

## 2019-12-10 PROCEDURE — 99999 PR PBB SHADOW E&M-EST. PATIENT-LVL III: ICD-10-PCS | Mod: PBBFAC,,, | Performed by: FAMILY MEDICINE

## 2019-12-10 PROCEDURE — 99213 PR OFFICE/OUTPT VISIT, EST, LEVL III, 20-29 MIN: ICD-10-PCS | Mod: S$GLB,,, | Performed by: FAMILY MEDICINE

## 2019-12-10 NOTE — PATIENT INSTRUCTIONS
Conjunctivitis, Viral    Viral conjunctivitis (sometimes called pink eye) is a common infection of the eye. It is very contagious. Touching the infected eye, then touching another person passes this infection. It can also be spread from one eye to the other in this same way. The most common symptoms include redness, discharge from the eye, swollen eyelids, and a gritty or scratchy feeling in the eye.  This condition will take about 7 to 10 days to go away. Artificial tears (available without a prescription) are often recommended to moisten and clean the eyes. Antibiotic eye drops often are not recommended because they will not kill the virus. But sometimes they may be prescribed by eye doctors. This is to prevent a second, bacterial infection.  Home care  · Apply a towel soaked in cool water to the affected eye 3 to 4 times a day (just before applying medicine to the eye).  · It is common to have mucus drainage during the night, causing the eyelids to become crusted by morning. Use a warm, wet cloth to wipe this away.  · Launder cloths used to clean the eye after one use. Do not reuse them.  · If antibiotic medicines are prescribed, take them exactly as directed. Do not stop taking them until you are told to.  · You may use acetaminophen or ibuprofen to control pain, unless another medicine was prescribed. (Note:If you have chronic liver or kidney disease, or if you have ever had a stomach ulcer or gastrointestinal bleeding, talk with your healthcare provider before using these medicines.) Aspirin should never be used in anyone under 18 years of age who is ill with a fever. It may cause severe liver damage.  · Wash your hands before and after touching the affected eye. This helps to prevent spreading the infection to your other eye and to others.  · The infected person should avoid sharing towels, washcloths, and bedding with others. This is to prevent spreading the infection.  · This illness is contagious during  the first week. Children with this illness should be kept out of day care and school until the redness clears.  Follow-up care  Follow up with your healthcare provider, or as advised.  When to seek medical advice  Call your healthcare provider right away if any of these occur:  · Worsening vision  · Increasing pain in the eye  · Increasing swelling or redness of the eyelid  · Redness spreading to the face around the eye  · Large amount of green or yellow drainage from the eye  · Severe itching in or around the eye  · Fever of 100.4°F (38°C) or higher  Date Last Reviewed: 6/15/2015  © 7729-2588 [employer]. [address] [place] [address], [place] [address]. All rights reserved. This information is not intended as a substitute for professional medical care. Always follow your healthcare professional's instructions.

## 2019-12-10 NOTE — PROGRESS NOTES
"Subjective:       Patient ID: Lindy Ferraro is a 34 y.o. female.    Chief Complaint:  Red eye    HPI  Lindy Ferraro is a 34 y.o. year old female without significant PMH who presents today complaining of red eye.      Right eye is itchy, watery. Waking up with drainage and crusty eyes. Began 3 days ago. Some itching in left eye. No vision changes. Denies eye pain. She does not wear contacts.    Patient mentions a co-worker who was diagnosed with pinkeye and was given an ointment that resolved their symptoms in 24 hrs.  Patient would like this or ointment prescribed.  However, she does not want any antibiotics as this is pink eye.    Of note, she is currently breast-feeding.    OB/GYN History     LMP:   Sexually active:  Contraception:     Health Maintenance  Pap smear: 19 - normal   Mammogram: n/a  Colon Cancer Screening: n/a  DEXA: n/a  Hepatitis C screening: n/a  Flu vaccine: UTD  Tetanus vaccine: UTD  PNA vaccine: n/a  Shingles vaccine: n/a    I personally reviewed Past Medical History, Past Surgical History, Social History, and Family History    Review of Systems   Constitutional: Negative for chills, fatigue and fever.   HENT: Negative for congestion, hearing loss and rhinorrhea.    Eyes: Positive for discharge, redness and itching. Negative for visual disturbance.   Respiratory: Negative for cough, shortness of breath and wheezing.    Cardiovascular: Negative for chest pain.   Gastrointestinal: Negative for abdominal pain, constipation, diarrhea, nausea and vomiting.   Skin: Negative for rash.   Neurological: Negative for dizziness, syncope and headaches.   Psychiatric/Behavioral: Negative for dysphoric mood and sleep disturbance. The patient is not nervous/anxious.        Objective:      Vitals:    12/10/19 1416   BP: 112/70   Pulse: (!) 59   SpO2: 97%   Weight: 68.1 kg (150 lb 2.1 oz)   Height: 5' 4" (1.626 m)     Physical Exam   Constitutional: She is oriented to person, " place, and time. She appears well-developed and well-nourished. No distress.   HENT:   Head: Normocephalic and atraumatic.   Eyes: Pupils are equal, round, and reactive to light. EOM and lids are normal. Left conjunctiva is not injected.   Mild right conjunctival injection.  No active drainage from right eye.   Neck: Normal range of motion.   Cardiovascular: Normal rate, regular rhythm and normal heart sounds.   No murmur heard.  Pulmonary/Chest: Effort normal and breath sounds normal. No respiratory distress.   Neurological: She is alert and oriented to person, place, and time.   Skin: Skin is warm and dry. No rash noted.   Psychiatric: She has a normal mood and affect. Her behavior is normal. Thought content normal.   Nursing note and vitals reviewed.      Assessment:       1. Viral conjunctivitis of right eye        Plan:     Viral conjunctivitis of right eye  Discussed conservative treatment of pinkeye with patient.  Discussed importance of good hand hygiene and keeping her hand weight from her eyes and face.  Discussed with patient that antibiotic drops and ointments are unlikely to be effective in improving her symptoms.  She does have a young baby at home and I advised her to try to have the baby avoid touching her face as much as possible.  Patient is okay to return to work tomorrow assuming there is no active drainage and she practices good hand hygiene.  Advised patient to contact me or return to clinic if her symptoms worsen or do not improve.

## 2019-12-10 NOTE — LETTER
December 10, 2019      [facility] [place] [address] [facility] [address]  [facility] [address]  Phone: [phone]  Fax: [phone]       Patient: Lindy Ferraro   YOB: 1985  Date of Visit: 12/10/2019    To Whom It May Concern:    Jayden Ferraro  was at [facility] on 12/10/2019.She may return to work on 12/11/2019 with no restrictions. If you have any questions or concerns, or if I can be of further assistance, please do not hesitate to contact me.    Sincerely,    Ayaka Smith MA

## 2020-04-13 ENCOUNTER — DOCUMENTATION ONLY (OUTPATIENT)
Dept: REHABILITATION | Facility: HOSPITAL | Age: 35
End: 2020-04-13

## 2020-04-13 PROBLEM — M62.9 MUSCLE DYSFUNCTION: Status: RESOLVED | Noted: 2019-08-30 | Resolved: 2020-04-13

## 2020-04-13 PROBLEM — R53.1 GENERAL WEAKNESS: Status: RESOLVED | Noted: 2019-08-30 | Resolved: 2020-04-13

## 2020-04-13 NOTE — PROGRESS NOTES
PHYSICAL THERAPY DISCHARGE SUMMARY     Name: Lindy Ferraro  Clinic Number: [id number]    Diagnosis: female genital prolapse  Physician: No ref. provider found  Treatment Orders: PT Eval and Treat  Past Medical History:   Diagnosis Date    Jejunal atresia     repaired as an infant       Initial visit: 9-4-19  Date of Last visit: 10-25-19  Total Visits Received: 6    ASSESSMENT   Status Towards Goals Met:  Not met     Goals Not achieved and why:   Pt has not scheduled any additional visits and has not returned to therapy.     Discharge reason : Non-Compliance with attendance and Pt has not re-scheduled further follow-up sessions      PLAN   This patient is discharged from Physical Therapy Services.         Margarita Atwood, PT   4/13/2020

## 2020-07-13 ENCOUNTER — NURSE TRIAGE (OUTPATIENT)
Dept: ADMINISTRATIVE | Facility: CLINIC | Age: 35
End: 2020-07-13

## 2020-07-13 NOTE — TELEPHONE ENCOUNTER
Caller c/o headache, sore throat, heaviness in chest. X 5 days.   Unsure of exposure, would like testing info. Has 1 year old who had temp elevation during the week of 102.  Unsure of cause,  Ped did not think it was related to immunizations.     Reason for Disposition   COVID-19 Testing, questions about   [1] Adult with possible COVID-19 symptoms AND [2] triager concerned about severity of symptoms or other causes    Additional Information   Negative: SEVERE difficulty breathing (e.g., struggling for each breath, speaks in single words)   Negative: Difficult to awaken or acting confused (e.g., disoriented, slurred speech)   Negative: Bluish (or gray) lips or face now   Negative: Shock suspected (e.g., cold/pale/clammy skin, too weak to stand, low BP, rapid pulse)   Negative: Sounds like a life-threatening emergency to the triager   Negative: SEVERE or constant chest pain or pressure (Exception: mild central chest pain, present only when coughing)   Negative: MODERATE difficulty breathing (e.g., speaks in phrases, SOB even at rest, pulse 100-120)   Negative: [1] COVID-19 exposure AND [2] NO symptoms   Negative: COVID-19 and Breastfeeding, questions about   Negative: MILD difficulty breathing (e.g., minimal/no SOB at rest, SOB with walking, pulse <100)   Negative: Chest pain   Negative: Patient sounds very sick or weak to the triager   Negative: Fever > 103 F (39.4 C)   Negative: [1] Fever > 101 F (38.3 C) AND [2] age > 60   Negative: [1] Fever > 100.0 F (37.8 C) AND [2] bedridden (e.g., nursing home patient, CVA, chronic illness, recovering from surgery)   Negative: HIGH RISK patient (e.g., age > 64 years, diabetes, heart or lung disease, weak immune system)   Negative: Cough present > 3 weeks   Negative: [1] Continuous (nonstop) coughing interferes with work or school AND [2] no improvement using cough treatment per protocol   Negative: [1] Fever returns after gone for over 24 hours AND [2]  symptoms worse or not improved   Negative: Fever present > 3 days (72 hours)   Negative: [1] COVID-19 infection suspected by caller or triager AND [2] mild symptoms (cough, fever, or others) AND [3] no complications or SOB   Negative: COVID-19, questions about   Negative: COVID-19 Prevention and Healthy Living, questions about   Negative: COVID-19 Home Isolation, questions about   Negative: [1] COVID-19 diagnosed by HCP (doctor, NP or PA) AND [2] mild symptoms (e.g., cough, fever, others) AND [3] no complications or SOB   Negative: [1] COVID-19 diagnosed by positive lab test AND [2] mild symptoms (e.g., cough, fever, others) AND [3] no complications or SOB   Negative: Severe difficulty breathing (struggling for each breath, unable to speak or cry, making grunting noises with each breath, severe retractions) (Triage tip: Listen to the child's breathing.)   Negative: Slow, shallow, weak breathing   Negative: Bluish (or gray) lips or face now   Negative: Difficult to awaken or not alert when awake   Negative: Very weak (doesn't move or make eye contact)   Negative: Sounds like a life-threatening emergency to the triager   Negative: Stridor (harsh, raspy sound heard with breathing in) confirmed by triager   Negative: [1] COVID-19 exposure AND [2] NO symptoms   Negative: Difficulty breathing confirmed by triager BUT not severe (includes tight breathing and hard breathing)   Negative: Ribs are pulling in with each breath (retractions)   Negative: Age < 12 weeks with fever 100.4 F (38.0 C) or higher rectally   Negative: SEVERE chest pain (excruciating)   Negative: Child sounds very sick or weak to the triager    Protocols used: CORONAVIRUS (COVID-19) DIAGNOSED OR [id number], CORONAVIRUS (COVID-19) DIAGNOSED OR [id number]

## 2020-07-14 ENCOUNTER — OFFICE VISIT (OUTPATIENT)
Dept: URGENT CARE | Facility: CLINIC | Age: 35
End: 2020-07-14
Payer: COMMERCIAL

## 2020-07-14 VITALS
SYSTOLIC BLOOD PRESSURE: 132 MMHG | DIASTOLIC BLOOD PRESSURE: 84 MMHG | TEMPERATURE: 97 F | OXYGEN SATURATION: 100 % | HEART RATE: 64 BPM

## 2020-07-14 DIAGNOSIS — R05.9 COUGH: ICD-10-CM

## 2020-07-14 DIAGNOSIS — R51.9 HEAD ACHE: ICD-10-CM

## 2020-07-14 DIAGNOSIS — R06.02 SHORTNESS OF BREATH: ICD-10-CM

## 2020-07-14 PROCEDURE — 99214 PR OFFICE/OUTPT VISIT, EST, LEVL IV, 30-39 MIN: ICD-10-PCS | Mod: S$GLB,,, | Performed by: FAMILY MEDICINE

## 2020-07-14 PROCEDURE — U0003 INFECTIOUS AGENT DETECTION BY NUCLEIC ACID (DNA OR RNA); SEVERE ACUTE RESPIRATORY SYNDROME CORONAVIRUS 2 (SARS-COV-2) (CORONAVIRUS DISEASE [COVID-19]), AMPLIFIED PROBE TECHNIQUE, MAKING USE OF HIGH THROUGHPUT TECHNOLOGIES AS DESCRIBED BY CMS-2020-01-R: HCPCS

## 2020-07-14 PROCEDURE — 99214 OFFICE O/P EST MOD 30 MIN: CPT | Mod: S$GLB,,, | Performed by: FAMILY MEDICINE

## 2020-07-14 NOTE — PATIENT INSTRUCTIONS

## 2020-07-14 NOTE — PROGRESS NOTES
Subjective:       Patient ID: Lindy Ferraro is a 34 y.o. female.    Vitals:  temperature is 96.9 °F (36.1 °C). Her blood pressure is 132/84 and her pulse is 64. Her oxygen saturation is 100%.     Chief Complaint: Cough   34-year-old female with 7 day history of cough and sore throat shortness of breath and headache.  History of exercise-induced asthma, although this feels different.  No fever.      Constitution: Negative for chills, sweating, fatigue and fever.   HENT: Positive for sore throat. Negative for ear pain, congestion, sinus pain, sinus pressure and voice change.    Neck: Negative for painful lymph nodes.   Eyes: Negative for eye redness.   Respiratory: Positive for cough and shortness of breath. Negative for chest tightness, sputum production, bloody sputum, COPD, stridor, wheezing and asthma.    Gastrointestinal: Negative for nausea and vomiting.   Musculoskeletal: Negative for muscle ache.   Skin: Negative for rash.   Allergic/Immunologic: Negative for seasonal allergies and asthma.   Neurological: Positive for headaches.   Hematologic/Lymphatic: Negative for swollen lymph nodes.       Objective:      Physical Exam   Constitutional: She is oriented to person, place, and time. She appears well-developed. She is cooperative.  Non-toxic appearance. She does not appear ill. No distress.   HENT:   Head: Normocephalic and atraumatic.   Ears:   Right Ear: Hearing, tympanic membrane, external ear and ear canal normal.   Left Ear: Hearing, tympanic membrane, external ear and ear canal normal.   Nose: Nose normal. No mucosal edema, rhinorrhea or nasal deformity. No epistaxis. Right sinus exhibits no maxillary sinus tenderness and no frontal sinus tenderness. Left sinus exhibits no maxillary sinus tenderness and no frontal sinus tenderness.   Mouth/Throat: Uvula is midline, oropharynx is clear and moist and mucous membranes are normal. Mucous membranes are moist. No trismus in the jaw. Normal dentition.  No uvula swelling. No oropharyngeal exudate, posterior oropharyngeal edema or posterior oropharyngeal erythema.   Eyes: Conjunctivae and lids are normal. No scleral icterus.   Neck: Trachea normal, full passive range of motion without pain and phonation normal. Neck supple. No neck rigidity. No edema and no erythema present.   Cardiovascular: Normal rate, regular rhythm, normal heart sounds and normal pulses.   Pulmonary/Chest: Effort normal and breath sounds normal. No stridor. No respiratory distress. She has no decreased breath sounds. She has no wheezes. She has no rhonchi. She has no rales.   Abdominal: Normal appearance.   Musculoskeletal: Normal range of motion.         General: No deformity.   Neurological: She is alert and oriented to person, place, and time. She exhibits normal muscle tone. Coordination normal.   Skin: Skin is warm, dry, intact, not diaphoretic and not pale. Psychiatric: Her speech is normal and behavior is normal. Judgment and thought content normal.   Nursing note and vitals reviewed.        Assessment:       1. Shortness of breath    2. Cough    3. Head ache        Plan:         Shortness of breath  -     COVID-19 Routine Screening    Cough  -     COVID-19 Routine Screening    Head ache  -     COVID-19 Routine Screening    WE WILL CALL IN 4-6 DAYS WITH ANY POSITIVE RESULT, OR WE SEE YOU HAVE NOT VIEWED ON PATIENT PORTAL

## 2020-07-17 LAB — SARS-COV-2 RNA RESP QL NAA+PROBE: NOT DETECTED

## 2020-08-23 ENCOUNTER — HOSPITAL ENCOUNTER (EMERGENCY)
Facility: OTHER | Age: 35
Discharge: HOME | End: 2020-08-23
Attending: EMERGENCY MEDICINE
Payer: COMMERCIAL

## 2020-08-23 VITALS
DIASTOLIC BLOOD PRESSURE: 77 MMHG | HEIGHT: 64 IN | HEART RATE: 76 BPM | RESPIRATION RATE: 18 BRPM | WEIGHT: 150 LBS | SYSTOLIC BLOOD PRESSURE: 144 MMHG | BODY MASS INDEX: 25.61 KG/M2 | OXYGEN SATURATION: 100 %

## 2020-08-23 DIAGNOSIS — H10.213 CHEMICAL CONJUNCTIVITIS OF BOTH EYES: Primary | ICD-10-CM

## 2020-08-23 DIAGNOSIS — H11.89 CONJUNCTIVAL IRRITATION: ICD-10-CM

## 2020-08-23 PROCEDURE — 25000003 PHARM REV CODE 250: Performed by: EMERGENCY MEDICINE

## 2020-08-23 PROCEDURE — 99283 EMERGENCY DEPT VISIT LOW MDM: CPT

## 2020-08-23 RX ORDER — ACETAMINOPHEN 325 MG/1
650 TABLET ORAL
Status: COMPLETED | OUTPATIENT
Start: 2020-08-23 | End: 2020-08-23

## 2020-08-23 RX ORDER — TETRACAINE HYDROCHLORIDE 5 MG/ML
2 SOLUTION OPHTHALMIC
Status: COMPLETED | OUTPATIENT
Start: 2020-08-23 | End: 2020-08-23

## 2020-08-23 RX ORDER — ERYTHROMYCIN 5 MG/G
OINTMENT OPHTHALMIC
Status: COMPLETED | OUTPATIENT
Start: 2020-08-23 | End: 2020-08-23

## 2020-08-23 RX ORDER — ERYTHROMYCIN 5 MG/G
OINTMENT OPHTHALMIC
Qty: 1 TUBE | Refills: 1 | Status: SHIPPED | OUTPATIENT
Start: 2020-08-23 | End: 2020-11-18

## 2020-08-23 RX ADMIN — FLUORESCEIN SODIUM 1 EACH: 1 STRIP OPHTHALMIC at 08:08

## 2020-08-23 RX ADMIN — ERYTHROMYCIN 1 INCH: 5 OINTMENT OPHTHALMIC at 10:08

## 2020-08-23 RX ADMIN — ACETAMINOPHEN 650 MG: 325 TABLET ORAL at 08:08

## 2020-08-23 RX ADMIN — TETRACAINE HYDROCHLORIDE 2 DROP: 5 SOLUTION OPHTHALMIC at 08:08
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
## 2020-08-26 ENCOUNTER — TELEPHONE (OUTPATIENT)
Dept: OBSTETRICS AND GYNECOLOGY | Facility: CLINIC | Age: 35
End: 2020-08-26

## 2020-08-26 DIAGNOSIS — Z36.3 ENCOUNTER FOR ANTENATAL SCREENING FOR MALFORMATION USING ULTRASOUND: Primary | ICD-10-CM

## 2020-09-02 ENCOUNTER — OFFICE VISIT (OUTPATIENT)
Dept: INTERNAL MEDICINE | Facility: CLINIC | Age: 35
End: 2020-09-02
Payer: COMMERCIAL

## 2020-09-02 ENCOUNTER — TELEPHONE (OUTPATIENT)
Dept: INTERNAL MEDICINE | Facility: CLINIC | Age: 35
End: 2020-09-02

## 2020-09-02 DIAGNOSIS — M25.572 ACUTE LEFT ANKLE PAIN: Primary | ICD-10-CM

## 2020-09-02 PROCEDURE — 99213 OFFICE O/P EST LOW 20 MIN: CPT | Mod: 95,,, | Performed by: FAMILY MEDICINE

## 2020-09-02 PROCEDURE — 99213 PR OFFICE/OUTPT VISIT, EST, LEVL III, 20-29 MIN: ICD-10-PCS | Mod: 95,,, | Performed by: FAMILY MEDICINE

## 2020-09-02 NOTE — PROGRESS NOTES
Subjective:       Patient ID: Lindy Ferraro is a 35 y.o. female.    Chief Complaint:  Ankle pain    HPI  Lindy Ferraro is a 35 y.o. year old female without significant PMH who presents today complaining of left ankle pain.      The patient location is:  patient's home in [place]  The chief complaint leading to consultation is:  Left ankle pain  Visit type: audiovisual  Total time spent with patient: 17 mins   Each patient to whom he or she provides medical services by telemedicine is:  (1) informed of the relationship between the physician and patient and the respective role of any other health care provider with respect to management of the patient; and (2) notified that he or she may decline to receive medical services by telemedicine and may withdraw from such care at any time.  Patient agreed to this telemedicine visit today in an effort to avoid face-to-face visits due to the current COVID 19 pandemic.    Of note, patient is currently 6 weeks pregnant.    She reports that about 6-8 weeks ago she was walking on uneven surface when she twisted her left ankle.  She is unsure exactly which direction her ankle turned.  She did have swelling initially, but does not think there is any swelling currently.  She tried rest, ice, elevation but she is still having pain in her ankle when walking.  Says her pain is 1/10 in severity at rest and with a few steps.  The pain increases to about 6 to 8/10 after walking for about 15 minutes.  She does not feel that her ankle is unstable.  Says that sometimes it feels that she needs to crack or pop her ankle.    OB/GYN History     LMP:  Currently pregnant  Sexually active:  Contraception:     Health Maintenance  Pap smear: 17 - normal   Mammogram: n/a  Colon Cancer Screening: n/a  DEXA: n/a  Hepatitis C screening: n/a  Flu vaccine: due  Tetanus vaccine: UTD  PNA vaccine: n/a  Shingles vaccine: n/a    I personally reviewed Past Medical History,  Past Surgical History, Social History, and Family History    Review of Systems   Constitutional: Negative for chills, fatigue and fever.   HENT: Negative for congestion, hearing loss and rhinorrhea.    Eyes: Negative for visual disturbance.   Respiratory: Negative for cough, shortness of breath and wheezing.    Cardiovascular: Negative for chest pain.   Gastrointestinal: Negative for abdominal pain, constipation, diarrhea, nausea and vomiting.   Musculoskeletal:        +left ankle pain   Skin: Negative for rash.   Neurological: Negative for dizziness, syncope and headaches.   Psychiatric/Behavioral: Negative for dysphoric mood and sleep disturbance. The patient is not nervous/anxious.        Objective:      There were no vitals filed for this visit.  Physical Exam  Constitutional:       General: She is not in acute distress.     Appearance: She is well-developed. She is not diaphoretic.   HENT:      Head: Normocephalic and atraumatic.   Eyes:      Conjunctiva/sclera: Conjunctivae normal.   Neck:      Musculoskeletal: Normal range of motion.   Pulmonary:      Effort: Pulmonary effort is normal. No respiratory distress.   Musculoskeletal:      Left ankle: She exhibits no swelling and no deformity. No lateral malleolus and no medial malleolus tenderness found. Achilles tendon exhibits no pain.        Feet:    Neurological:      Mental Status: She is alert and oriented to person, place, and time.   Psychiatric:         Behavior: Behavior normal.         Thought Content: Thought content normal.           Assessment:       1. Acute left ankle pain        Plan:     Acute left ankle pain  Discussed home RICE treatment.  Recommended supporting ankle with a brace or Ace wrap.  Will refer to ortho/sports medicine for physical exam, possible x-ray, determine further management of her pain that has been present after 6-8 weeks of conservative treatment.  Patient advised to contact me if symptoms worsen or do not improve.  -      Ambulatory referral/consult to Orthopedics; Future; Expected date: 09/09/2020  -     Ambulatory referral/consult to Sports Medicine; Future; Expected date: 09/09/2020

## 2020-09-03 ENCOUNTER — PATIENT OUTREACH (OUTPATIENT)
Dept: ADMINISTRATIVE | Facility: OTHER | Age: 35
End: 2020-09-03

## 2020-09-03 ENCOUNTER — OFFICE VISIT (OUTPATIENT)
Dept: ORTHOPEDICS | Facility: CLINIC | Age: 35
End: 2020-09-03
Payer: COMMERCIAL

## 2020-09-03 DIAGNOSIS — M25.572 ACUTE LEFT ANKLE PAIN: ICD-10-CM

## 2020-09-03 DIAGNOSIS — S93.492A SPRAIN OF ANTERIOR TALOFIBULAR LIGAMENT OF LEFT ANKLE, INITIAL ENCOUNTER: Primary | ICD-10-CM

## 2020-09-03 DIAGNOSIS — M76.72 PERONEAL TENDONITIS OF LEFT LOWER EXTREMITY: ICD-10-CM

## 2020-09-03 PROCEDURE — 99999 PR PBB SHADOW E&M-EST. PATIENT-LVL III: CPT | Mod: PBBFAC,,, | Performed by: ORTHOPAEDIC SURGERY

## 2020-09-03 PROCEDURE — 99203 PR OFFICE/OUTPT VISIT, NEW, LEVL III, 30-44 MIN: ICD-10-PCS | Mod: S$GLB,,, | Performed by: ORTHOPAEDIC SURGERY

## 2020-09-03 PROCEDURE — 99999 PR PBB SHADOW E&M-EST. PATIENT-LVL III: ICD-10-PCS | Mod: PBBFAC,,, | Performed by: ORTHOPAEDIC SURGERY

## 2020-09-03 PROCEDURE — 99203 OFFICE O/P NEW LOW 30 MIN: CPT | Mod: S$GLB,,, | Performed by: ORTHOPAEDIC SURGERY

## 2020-09-03 NOTE — PROGRESS NOTES
CC:  Left ankle pain      HISTORY       HPI:  35-year-old female, 2 months pregnant, left ankle inversion injury approximately 2 months ago while walking on uneven surface.  Had immediate pain, swelling.  Was able to limp around for couple days and then able to walk normally.  Continued to have lateral ankle pain, dull, achy, worse on uneven surfaces.  Pain 2/10 along lateral peroneals, posterior lateral malleolus, anterior lateral malleolus.  No numbness no tingling.    , lives with her , has small child, expecting her 2nd in approximately 7 months  Works as a consultant at the [employer]  Denies diabetes, heart attack, stroke, blood clot, cancer  Denies tobacco use    ROS:  Constitutional: Denies fever/chills  Neurological: Denies numbness/tingling (any exceptions noted in orthopaedic exam)   Psychiatric/Behavioral: Denies change in normal mood  Eyes: Denies change in vision  Cardiovascular: Denies chest pain  Respiratory: Denies shortness of breath  Hematologic/Lymphatic: Denies easy bleeding/bruising   Skin: Denies new rash or skin lesions   Gastrointestinal: Denies nausea/vomitting/diarrhea, change in bowel habits, abdominal pain   Allergic/Immunologic: Denies adverse reactions to current medications  Musculoskeletal: see HPI    PAST MEDICAL HISTORY:   Past Medical History:   Diagnosis Date    Jejunal atresia     repaired as an infant     PAST SURGICAL HISTORY:   Past Surgical History:   Procedure Laterality Date    INTRAUTERINE DEVICE INSERTION  2016    MIKI---REMOVED    SMALL INTESTINE SURGERY  8/1987    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    WISDOM TOOTH EXTRACTION       FAMILY HISTORY:   Family History   Problem Relation Age of Onset    No Known Problems Father     Skin cancer Mother     Brain cancer Maternal Grandfather     Diabetes type II Paternal Grandmother     Heart attack Paternal Grandfather     Stroke Maternal Grandmother     Kidney cancer Maternal Uncle     Prostate  cancer Paternal Uncle     Breast cancer Neg Hx     Colon cancer Neg Hx     Ovarian cancer Neg Hx     Glaucoma Neg Hx     Cataracts Neg Hx     Macular degeneration Neg Hx      SOCIAL HISTORY:   Social History     Socioeconomic History    Marital status:      Spouse name: Efrain    Number of children: 1    Years of education: Not on file    Highest education level: Not on file   Occupational History     Comment:     Social Needs    Financial resource strain: Not very hard    Food insecurity     Worry: Never true     Inability: Never true    Transportation needs     Medical: No     Non-medical: No   Tobacco Use    Smoking status: Never Smoker    Smokeless tobacco: Never Used   Substance and Sexual Activity    Alcohol use: Yes     Alcohol/week: 5.0 standard drinks     Types: 5 Glasses of wine per week     Frequency: 2-3 times a week     Drinks per session: 1 or 2     Binge frequency: Never    Drug use: No    Sexual activity: Yes     Partners: Male     Birth control/protection: I.U.D., None     Comment: s/p removal of annette   Lifestyle    Physical activity     Days per week: 5 days     Minutes per session: 60 min    Stress: To some extent   Relationships    Social connections     Talks on phone: More than three times a week     Gets together: More than three times a week     Attends Rastafarian service: Not on file     Active member of club or organization: Yes     Attends meetings of clubs or organizations: More than 4 times per year     Relationship status:    Other Topics Concern    Not on file   Social History Narrative    Not on file     MEDICATIONS:   Current Outpatient Medications:     acetaminophen (TYLENOL) 650 MG TbSR, Take 1 tablet (650 mg total) by mouth every 8 (eight) hours. (Patient not taking: Reported on 7/14/2020), Disp: 30 tablet, Rfl: 1    erythromycin (ROMYCIN) ophthalmic ointment, Place a 1/2 inch ribbon of ointment into the lower eyelid 3 times a  day, Disp: 1 Tube, Rfl: 1    angelika singer ointment, Apply topically. Apply after feeding. Do not wash off. This compounded medication expires in 30 days. (Patient not taking: Reported on 7/14/2020), Disp: 30 g, Rfl: 1    prenatal vit/iron fum/folic ac (PRENATAL 1+1 ORAL), Take by mouth., Disp: , Rfl:   ALLERGIES:   Review of patient's allergies indicates:   Allergen Reactions    Asa [aspirin] Other (See Comments)     Blood disorder per Patient    Ibuprofen     Pcn [penicillins]     Sulfa (sulfonamide antibiotics)          EXAM      VITAL SIGNS:   There were no vitals taken for this visit.      PE:  General:  no acute distress, appears stated age    Neuro: alert and oriented x3  Psych: normal mood  Head: normocephalic, atraumatic.   Eyes: no scleral icterus  Mouth: moist mucous membranes  Cardiovascular: extremities warm and well perfused  Lungs: breathing comfortably, equal chest rise bilat  Skin: clean, dry, intact (any exceptions noted in below musculoskeletal exam)    Musculoskeletal:  RLE:  No gross deformities, wounds  No crepitus, No TTP  Motor intact hip flex, quad, Tib Ant, gastroc, EHL, FHL.  Sensation intact saphenous, sural, deep/superficial peroneal, tibial nerves  Palp DP/PT pulse, BCR    LLE:  No gross deformities, wounds  No crepitus  Very minimal tenderness to palpation along peroneal tendons  No bony tenderness at medial mallet, lateral mallet, 5th metatarsal, knee, tibia, foot  No pain with external rotation stress test.  No pain with squeeze test  Range of motion ankle 20° dorsiflexion, 20° plantar flexion, supple subtalar motion  Motor intact 5/5 hip flex, quad, Tib Ant, gastroc, peroneals, EHL, FHL.  Sensation intact saphenous, sural, deep/superficial peroneal, tibial nerves  Palp DP/PT pulse, BCR      XRAYS:  None - due to pregnancy  (I independently reviewed and interpreted the above imaging)    MEDICAL DECISION MAKING       Encounter Diagnoses   Name Primary?    Sprain of anterior  talofibular ligament of left ankle, initial encounter Yes    Peroneal tendonitis of left lower extremity     Acute left ankle pain        35-year-old female, left ankle sprain approximately 2 months ago.    Recommended physical therapy, home exercise program  OTC ankle brace/support as needed    --weight bearing:  WBAT  --followup:  P.r.n.  --XR at next visit:  None      =====================  Alphonse Stanley MD  Orthopaedic Surgery

## 2020-09-25 ENCOUNTER — PROCEDURE VISIT (OUTPATIENT)
Dept: OBSTETRICS AND GYNECOLOGY | Facility: CLINIC | Age: 35
End: 2020-09-25
Attending: OBSTETRICS & GYNECOLOGY
Payer: COMMERCIAL

## 2020-09-25 ENCOUNTER — OFFICE VISIT (OUTPATIENT)
Dept: OBSTETRICS AND GYNECOLOGY | Facility: CLINIC | Age: 35
End: 2020-09-25
Payer: COMMERCIAL

## 2020-09-25 VITALS
DIASTOLIC BLOOD PRESSURE: 60 MMHG | WEIGHT: 156.06 LBS | SYSTOLIC BLOOD PRESSURE: 120 MMHG | BODY MASS INDEX: 26.79 KG/M2

## 2020-09-25 DIAGNOSIS — Z32.01 POSITIVE PREGNANCY TEST: ICD-10-CM

## 2020-09-25 DIAGNOSIS — Z36.3 ENCOUNTER FOR ANTENATAL SCREENING FOR MALFORMATION USING ULTRASOUND: ICD-10-CM

## 2020-09-25 DIAGNOSIS — Z36.89 ESTABLISH GESTATIONAL AGE, ULTRASOUND: ICD-10-CM

## 2020-09-25 DIAGNOSIS — Z23 FLU VACCINE NEED: ICD-10-CM

## 2020-09-25 DIAGNOSIS — Z12.4 PAP SMEAR FOR CERVICAL CANCER SCREENING: ICD-10-CM

## 2020-09-25 DIAGNOSIS — N91.4 SECONDARY OLIGOMENORRHEA: Primary | ICD-10-CM

## 2020-09-25 LAB
B-HCG UR QL: POSITIVE
CTP QC/QA: YES

## 2020-09-25 PROCEDURE — 87086 URINE CULTURE/COLONY COUNT: CPT

## 2020-09-25 PROCEDURE — 88175 CYTOPATH C/V AUTO FLUID REDO: CPT

## 2020-09-25 PROCEDURE — 76817 PR US, OB, TRANSVAG APPROACH: ICD-10-PCS | Mod: S$GLB,,, | Performed by: OBSTETRICS & GYNECOLOGY

## 2020-09-25 PROCEDURE — 76817 TRANSVAGINAL US OBSTETRIC: CPT | Mod: S$GLB,,, | Performed by: OBSTETRICS & GYNECOLOGY

## 2020-09-25 PROCEDURE — 87491 CHLMYD TRACH DNA AMP PROBE: CPT

## 2020-09-25 PROCEDURE — 99999 PR PBB SHADOW E&M-EST. PATIENT-LVL III: ICD-10-PCS | Mod: PBBFAC,,, | Performed by: NURSE PRACTITIONER

## 2020-09-25 PROCEDURE — 99214 OFFICE O/P EST MOD 30 MIN: CPT | Mod: S$GLB,,, | Performed by: NURSE PRACTITIONER

## 2020-09-25 PROCEDURE — 87624 HPV HI-RISK TYP POOLED RSLT: CPT

## 2020-09-25 PROCEDURE — 99999 PR PBB SHADOW E&M-EST. PATIENT-LVL III: CPT | Mod: PBBFAC,,, | Performed by: NURSE PRACTITIONER

## 2020-09-25 PROCEDURE — 3008F BODY MASS INDEX DOCD: CPT | Mod: CPTII,S$GLB,, | Performed by: NURSE PRACTITIONER

## 2020-09-25 PROCEDURE — 99214 PR OFFICE/OUTPT VISIT, EST, LEVL IV, 30-39 MIN: ICD-10-PCS | Mod: S$GLB,,, | Performed by: NURSE PRACTITIONER

## 2020-09-25 PROCEDURE — 3008F PR BODY MASS INDEX (BMI) DOCUMENTED: ICD-10-PCS | Mod: CPTII,S$GLB,, | Performed by: NURSE PRACTITIONER

## 2020-09-25 NOTE — PROGRESS NOTES
CC: Positive Pregnancy Test    HISTORY OF PRESENT ILLNESS:    Lindy Ferraro is a 35 y.o. female, ,  Presents today for a routine exam complaining of amenorrhea and positive home urine pregnancy test.  Patient's last menstrual period was 2020 (exact date).  Second pregnancy-feels okay. Having some nausea-no vomiting, declines medication. Sprained her left ankle-having some calf pain in that leg, no swelling or redness, pain comes and goes. Told it was probably muscle pain related to sprain, has order for PT in. Issues with varicose veins in both legs-never went away after first pregnancy. Daughter at home- 15 mo. Noticed vision changes for past month-wears glasses, no headaches. No pelvic pain. No vaginal bleeding. Issues with perineal tear recovery and diastasis recti-interested in PT for this now. She is working from home-has questions re COVID precautions.     LMP: 20  EGA: 9w0d  EDC: 21    ROS:  GENERAL: No weight changes. No swelling. + fatigue. No fever.  CARDIOVASCULAR: No chest pain. No shortness of breath. No leg cramps.   NEUROLOGICAL: No headaches. + vision changes.  BREASTS: No pain. No lumps. No discharge.  ABDOMEN: No pain. No diarrhea. No constipation.  REPRODUCTIVE: No abnormal bleeding.   VULVA: No pain. No lesions. No itching.  VAGINA: No relaxation. No itching. No odor. No discharge. No lesions.  URINARY: No incontinence. No nocturia. No frequency. No dysuria.    MEDICATIONS AND ALLERGIES:  Reviewed    COMPREHENSIVE GYN HISTORY:  PAP History: Denies abnormal Paps.  Infection History: Denies STDs. Denies PID.  Benign History: Denies uterine fibroids. Denies ovarian cysts. Denies endometriosis. Denies other conditions.  Cancer History: Denies cervical cancer. Denies uterine cancer or hyperplasia. Denies ovarian cancer. Denies vulvar cancer or pre-cancer. Denies vaginal cancer or pre-cancer. Denies breast cancer. Denies colon cancer.  Sexual Activity History: Reports  currently being sexually active  Menstrual History: None.  Contraception: None    /60 (BP Location: Right arm, Patient Position: Sitting)   Wt 70.8 kg (156 lb 1.4 oz)   LMP 2020 (Exact Date)   Breastfeeding No   BMI 26.79 kg/m²     PE:  AFFECT: Calm, alert and oriented X 3. Interactive during exam  GENERAL: Appears well-nourished, well-developed, in no acute distress.  HEAD: Normocephalic, atruamatic  TEETH: Good dentition.  THYROID: No thyromegally   BREASTS: No masses, skin changes, nipple discharge or adenopathy bilaterally.  SKIN: Normal for race, warm, & dry. No lesions or rashes.  LUNGS: Easy and unlabored, clear to auscultation bilaterally.  HEART: Regular rate and rhythm   ABDOMEN: Soft and nontender without masses or organomegally.  VULVA: No lesions, masses or tenderness.  VAGINA: Moist and well rugated without lesions or discharge.  CERVIX: Moist and pink without lesions, discharge or tenderness.      UTERUS SIZE: 8 week size, nontender and without masses.  ADNEXA: No masses or tenderness.  ESTIMATE OF PELVIC CAPACITY: Adequate  EXTREMITIES: No cyanosis, clubbing or edema. No calf tenderness.  LYMPH NODES: No axillary or inguinal adenopathy.    PROCEDURES:  UPT Positive  Genprobe  Pap    ASSESSMENT/PLAN:  Amenorrhea  Positive urine pregnancy test   -  Routine prenatal care    Nausea and vomiting in pregnancy    -  Education regarding lifestyle and dietary modifications    -  Advised use of B6/Unisom. Pt will notify us if no relief/worsening symptoms, will consider Zofran if needed.    1st TRIMESTER COUNSELING: Discussed all, booklet provided:  Common complaints of pregnancy  HIV and other routine prenatal tests including  genetic screening  Risk factors identified by prenatal history  Oriented to practice - discussed anticipated course of prenatal care & indications for Ultrasound  Childbirth classes/Hospital facilities   Nutrition and weight gain counseling  Toxoplasmosis  precautions (Cats/Raw Meat)  Sexual activity and exercise  Environmental/Work hazards  Travel  Tobacco (Ask, Advise, Assess, Assist, and Arrange), as well as alcohol and drug use  Use of any medications (Including supplements, Vitamins, Herbs, or OTC Drugs)  Domestic violence  Seat belt use    TERATOLOGY COUNSELING:   Discussed indications and options for aneuploidy screening - pamphlets given    -  Pt desires NT scan and orders placed    FOLLOW-UP in 4 weeks with Dr. Beck  Pap/HPV updated  Dating US today  Flu vaccine and IOB labs next visit  SPADD scheduled  SS ordered  PT referral  Offered US of left calf-declines today, will continue to monitor  Start wearing compressions stockings    Kaylyn Hernandez NP  OB/GYN

## 2020-09-25 NOTE — PROGRESS NOTES
Normal dating US-  NICK by U/S: 4/30/2021   Assigned: based on ultrasound (CRL), selected on 09/25/2020   Assigned GA 9 w + 0 d   Assigned NICK: 4/30/2021   Pregnancy length 280 d

## 2020-09-25 NOTE — PATIENT INSTRUCTIONS
[facility] DELIVERY PHONE NUMBER, [phone] (OPEN 24/7, LOCATED ON 6TH FLOOR OF HOSPITAL)  [address] PHONE NUMBER, [phone] (OPEN MON-FRI, 8a-5p)

## 2020-09-26 LAB — BACTERIA UR CULT: NORMAL

## 2020-10-01 LAB
HPV HR 12 DNA SPEC QL NAA+PROBE: NEGATIVE
HPV16 AG SPEC QL: NEGATIVE
HPV18 DNA SPEC QL NAA+PROBE: NEGATIVE

## 2020-10-15 LAB
FINAL PATHOLOGIC DIAGNOSIS: NORMAL
Lab: NORMAL

## 2020-10-19 ENCOUNTER — LAB VISIT (OUTPATIENT)
Dept: LAB | Facility: OTHER | Age: 35
End: 2020-10-19
Attending: OBSTETRICS & GYNECOLOGY
Payer: COMMERCIAL

## 2020-10-19 ENCOUNTER — PROCEDURE VISIT (OUTPATIENT)
Dept: MATERNAL FETAL MEDICINE | Facility: CLINIC | Age: 35
End: 2020-10-19
Payer: COMMERCIAL

## 2020-10-19 VITALS — WEIGHT: 156.31 LBS | BODY MASS INDEX: 26.83 KG/M2

## 2020-10-19 DIAGNOSIS — Z36.82 ENCOUNTER FOR ANTENATAL SCREENING FOR NUCHAL TRANSLUCENCY: ICD-10-CM

## 2020-10-19 DIAGNOSIS — Z32.01 POSITIVE PREGNANCY TEST: ICD-10-CM

## 2020-10-19 DIAGNOSIS — Z36.89 ENCOUNTER FOR FETAL ANATOMIC SURVEY: Primary | ICD-10-CM

## 2020-10-19 PROCEDURE — 81508 FTL CGEN ABNOR TWO PROTEINS: CPT

## 2020-10-19 PROCEDURE — 76813 PR US, OB NUCHAL, TRANSABDOM/TRANSVAG, FIRST GESTATION: ICD-10-PCS | Mod: S$GLB,,, | Performed by: OBSTETRICS & GYNECOLOGY

## 2020-10-19 PROCEDURE — 36415 COLL VENOUS BLD VENIPUNCTURE: CPT

## 2020-10-19 PROCEDURE — 76813 OB US NUCHAL MEAS 1 GEST: CPT | Mod: S$GLB,,, | Performed by: OBSTETRICS & GYNECOLOGY

## 2020-10-22 LAB
# FETUSES US: NORMAL
AGE AT DELIVERY: 33
B-HCG MOM SERPL: NORMAL
B-HCG SERPL-ACNC: 72.7 IU/ML
FET CRL US.MEAS: 60.3 MM
FET NASAL BONE LENGTH US.MEAS: NORMAL MM
FET NUCHAL FOLD MOM THICKNESS US.MEAS: NORMAL
FET NUCHAL FOLD THICKNESS US.MEAS: 1.3 MM
FET TS 21 RISK FROM MAT AGE: NORMAL
GA (DAYS): 4 D
GA (WEEKS): 12 WK
IDDM PATIENT QL: NORMAL
INTEGRATED SCN PATIENT-IMP NAR: NORMAL
INTEGRATED SCN PATIENT-IMP: NEGATIVE
PAPP-A MOM SERPL: NORMAL
PAPP-A SERPL-MCNC: NORMAL NG/ML
SMOKING STATUS FTND: NO
TS 18 RISK FETUS: NORMAL
TS 21 RISK FETUS: NORMAL
US DATE: NORMAL

## 2020-10-23 ENCOUNTER — CLINICAL SUPPORT (OUTPATIENT)
Dept: OBSTETRICS AND GYNECOLOGY | Facility: CLINIC | Age: 35
End: 2020-10-23
Payer: COMMERCIAL

## 2020-10-23 ENCOUNTER — INITIAL PRENATAL (OUTPATIENT)
Dept: OBSTETRICS AND GYNECOLOGY | Facility: CLINIC | Age: 35
End: 2020-10-23
Attending: OBSTETRICS & GYNECOLOGY
Payer: COMMERCIAL

## 2020-10-23 VITALS
SYSTOLIC BLOOD PRESSURE: 120 MMHG | DIASTOLIC BLOOD PRESSURE: 68 MMHG | BODY MASS INDEX: 26.75 KG/M2 | WEIGHT: 155.88 LBS

## 2020-10-23 DIAGNOSIS — Z34.90 PREGNANCY WITH ONE FETUS, ANTEPARTUM: Primary | ICD-10-CM

## 2020-10-23 DIAGNOSIS — Z23 FLU VACCINE NEED: Primary | ICD-10-CM

## 2020-10-23 PROCEDURE — 0502F SUBSEQUENT PRENATAL CARE: CPT | Mod: CPTII,S$GLB,, | Performed by: OBSTETRICS & GYNECOLOGY

## 2020-10-23 PROCEDURE — 99999 PR PBB SHADOW E&M-EST. PATIENT-LVL III: CPT | Mod: PBBFAC,,, | Performed by: OBSTETRICS & GYNECOLOGY

## 2020-10-23 PROCEDURE — 99999 PR PBB SHADOW E&M-EST. PATIENT-LVL III: ICD-10-PCS | Mod: PBBFAC,,, | Performed by: OBSTETRICS & GYNECOLOGY

## 2020-10-23 PROCEDURE — 90686 FLU VACCINE (QUAD) GREATER THAN OR EQUAL TO 3YO PRESERVATIVE FREE IM: ICD-10-PCS | Mod: S$GLB,,, | Performed by: NURSE PRACTITIONER

## 2020-10-23 PROCEDURE — 90471 FLU VACCINE (QUAD) GREATER THAN OR EQUAL TO 3YO PRESERVATIVE FREE IM: ICD-10-PCS | Mod: S$GLB,,, | Performed by: NURSE PRACTITIONER

## 2020-10-23 PROCEDURE — 0502F PR SUBSEQUENT PRENATAL CARE: ICD-10-PCS | Mod: CPTII,S$GLB,, | Performed by: OBSTETRICS & GYNECOLOGY

## 2020-10-23 PROCEDURE — 99999 PR PBB SHADOW E&M-EST. PATIENT-LVL I: ICD-10-PCS | Mod: PBBFAC,,,

## 2020-10-23 PROCEDURE — 90471 IMMUNIZATION ADMIN: CPT | Mod: S$GLB,,, | Performed by: NURSE PRACTITIONER

## 2020-10-23 PROCEDURE — 90686 IIV4 VACC NO PRSV 0.5 ML IM: CPT | Mod: S$GLB,,, | Performed by: NURSE PRACTITIONER

## 2020-10-23 PROCEDURE — 99999 PR PBB SHADOW E&M-EST. PATIENT-LVL I: CPT | Mod: PBBFAC,,,

## 2020-10-23 NOTE — PROGRESS NOTES
Questions about diastasis.  Some varicosities bilaterally L.E, discussed compression, etc..  Flu shot done today.  Sequential II ordered.  All precautions reviewed,  F/U in 4 weeks.

## 2020-10-23 NOTE — PROGRESS NOTES
Here for Influenza - Quadrivalent - PF (ADULT) vaccine . Vaccine Information sheet given to patient. Patient without complaint of pain prior to or after injection. Immunization tolerated well and patient advised to wait in lobby 15 minutes. Report any adverse reactions.    Site: [facility]

## 2020-11-05 ENCOUNTER — LAB VISIT (OUTPATIENT)
Dept: LAB | Facility: OTHER | Age: 35
End: 2020-11-05
Attending: OBSTETRICS & GYNECOLOGY
Payer: COMMERCIAL

## 2020-11-05 DIAGNOSIS — Z34.90 PREGNANCY WITH ONE FETUS, ANTEPARTUM: ICD-10-CM

## 2020-11-05 LAB
ABO + RH BLD: NORMAL
ANION GAP SERPL CALC-SCNC: 9 MMOL/L (ref 8–16)
BASOPHILS # BLD AUTO: 0.03 K/UL (ref 0–0.2)
BASOPHILS NFR BLD: 0.4 % (ref 0–1.9)
BLD GP AB SCN CELLS X3 SERPL QL: NORMAL
BUN SERPL-MCNC: 7 MG/DL (ref 6–20)
CALCIUM SERPL-MCNC: 9.4 MG/DL (ref 8.7–10.5)
CHLORIDE SERPL-SCNC: 104 MMOL/L (ref 95–110)
CO2 SERPL-SCNC: 22 MMOL/L (ref 23–29)
CREAT SERPL-MCNC: 0.6 MG/DL (ref 0.5–1.4)
DIFFERENTIAL METHOD: NORMAL
EOSINOPHIL # BLD AUTO: 0.1 K/UL (ref 0–0.5)
EOSINOPHIL NFR BLD: 1.1 % (ref 0–8)
ERYTHROCYTE [DISTWIDTH] IN BLOOD BY AUTOMATED COUNT: 12.1 % (ref 11.5–14.5)
EST. GFR  (AFRICAN AMERICAN): >60 ML/MIN/1.73 M^2
EST. GFR  (NON AFRICAN AMERICAN): >60 ML/MIN/1.73 M^2
GLUCOSE SERPL-MCNC: 70 MG/DL (ref 70–110)
HCT VFR BLD AUTO: 37.4 % (ref 37–48.5)
HGB BLD-MCNC: 12.6 G/DL (ref 12–16)
HIV 1+2 AB+HIV1 P24 AG SERPL QL IA: NEGATIVE
IMM GRANULOCYTES # BLD AUTO: 0.03 K/UL (ref 0–0.04)
IMM GRANULOCYTES NFR BLD AUTO: 0.4 % (ref 0–0.5)
LYMPHOCYTES # BLD AUTO: 1.8 K/UL (ref 1–4.8)
LYMPHOCYTES NFR BLD: 23.3 % (ref 18–48)
MCH RBC QN AUTO: 30.1 PG (ref 27–31)
MCHC RBC AUTO-ENTMCNC: 33.7 G/DL (ref 32–36)
MCV RBC AUTO: 89 FL (ref 82–98)
MONOCYTES # BLD AUTO: 0.4 K/UL (ref 0.3–1)
MONOCYTES NFR BLD: 5.3 % (ref 4–15)
NEUTROPHILS # BLD AUTO: 5.3 K/UL (ref 1.8–7.7)
NEUTROPHILS NFR BLD: 69.5 % (ref 38–73)
NRBC BLD-RTO: 0 /100 WBC
PLATELET # BLD AUTO: 215 K/UL (ref 150–350)
PMV BLD AUTO: 10.1 FL (ref 9.2–12.9)
POTASSIUM SERPL-SCNC: 3.6 MMOL/L (ref 3.5–5.1)
RBC # BLD AUTO: 4.19 M/UL (ref 4–5.4)
RPR SER QL: NORMAL
SODIUM SERPL-SCNC: 135 MMOL/L (ref 136–145)
WBC # BLD AUTO: 7.6 K/UL (ref 3.9–12.7)

## 2020-11-05 PROCEDURE — 85025 COMPLETE CBC W/AUTO DIFF WBC: CPT

## 2020-11-05 PROCEDURE — 36415 COLL VENOUS BLD VENIPUNCTURE: CPT

## 2020-11-05 PROCEDURE — 87340 HEPATITIS B SURFACE AG IA: CPT

## 2020-11-05 PROCEDURE — 86703 HIV-1/HIV-2 1 RESULT ANTBDY: CPT

## 2020-11-05 PROCEDURE — 81511 FTL CGEN ABNOR FOUR ANAL: CPT

## 2020-11-05 PROCEDURE — 86762 RUBELLA ANTIBODY: CPT

## 2020-11-05 PROCEDURE — 86592 SYPHILIS TEST NON-TREP QUAL: CPT

## 2020-11-05 PROCEDURE — 86850 RBC ANTIBODY SCREEN: CPT

## 2020-11-05 PROCEDURE — 80048 BASIC METABOLIC PNL TOTAL CA: CPT

## 2020-11-06 LAB
HBV SURFACE AG SERPL QL IA: NEGATIVE
RUBV IGG SER-ACNC: 68.5 IU/ML
RUBV IGG SER-IMP: REACTIVE

## 2020-11-09 LAB
# FETUSES US: NORMAL
AFP MOM SERPL: 0.9
AFP SERPL-MCNC: 23.3 NG/ML
AGE AT DELIVERY: 33
B-HCG MOM SERPL: 0.63
B-HCG SERPL-ACNC: 29.8 IU/ML
COLLECT DATE BLD: NORMAL
COLLECT DATE: NORMAL
FET NASAL BONE LENGTH US.MEAS: NORMAL MM
FET NUCHAL FOLD MOM THICKNESS US.MEAS: 0.9
FET NUCHAL FOLD THICKNESS US.MEAS: 1.3 MM
FET TS 21 RISK FROM MAT AGE: NORMAL
GA (DAYS): 4 D
GA (WEEKS): 12 WK
GA METHOD: NORMAL
GEST. AGE (DAYS) 2ND SAMPLE (SS2): 0
GEST. AGE (WKS) 2ND SAMPLE (SS2): 15
IDDM PATIENT QL: NORMAL
INHIBIN A MOM SERPL: 0.51
INHIBIN A SERPL-MCNC: 79.2 PG/ML
INTEGRATED SCN PATIENT-IMP: NEGATIVE
PAPP-A MOM SERPL: 1.45
PAPP-A SERPL-MCNC: NORMAL NG/ML
SEQUENTIAL SCREEN PART 2 INTERP: NORMAL
SMOKING STATUS FTND: NO
TS 18 RISK FETUS: NORMAL
TS 21 RISK FETUS: NORMAL
U ESTRIOL MOM SERPL: 1.1
U ESTRIOL SERPL-MCNC: 0.66 NG/ML

## 2020-11-10 ENCOUNTER — TELEPHONE (OUTPATIENT)
Dept: OBSTETRICS AND GYNECOLOGY | Facility: CLINIC | Age: 35
End: 2020-11-10

## 2020-11-10 NOTE — TELEPHONE ENCOUNTER
----- Message from Carla Melara sent at 11/10/2020 12:43 PM CST -----  Regarding: Callback  Name of Who is Calling: CAITY FERGUSON What is the request in detail: Pt is requesting a callback concerning her appt pt advised that she would like to be seen by Dr. Beck  Can the clinic reply by [facility]: NO  [facility] [phone]      Left message for patient

## 2020-11-10 NOTE — TELEPHONE ENCOUNTER
----- Message from Lan Browne sent at 11/10/2020  2:34 PM CST -----  Please call pt she has a question [phone]      Returned patient call, patient states she can not come in at the time that was given. Patient states she can only be seen in the mornings. I reschedule patient appointment to a morning appointment. Patient verbalized and understand

## 2020-11-17 ENCOUNTER — PATIENT MESSAGE (OUTPATIENT)
Dept: OBSTETRICS AND GYNECOLOGY | Facility: CLINIC | Age: 35
End: 2020-11-17

## 2020-11-18 ENCOUNTER — ROUTINE PRENATAL (OUTPATIENT)
Dept: OBSTETRICS AND GYNECOLOGY | Facility: CLINIC | Age: 35
End: 2020-11-18
Attending: OBSTETRICS & GYNECOLOGY
Payer: COMMERCIAL

## 2020-11-18 VITALS
DIASTOLIC BLOOD PRESSURE: 64 MMHG | BODY MASS INDEX: 27.59 KG/M2 | SYSTOLIC BLOOD PRESSURE: 118 MMHG | WEIGHT: 160.69 LBS

## 2020-11-18 DIAGNOSIS — O99.891 BACK PAIN AFFECTING PREGNANCY, ANTEPARTUM: ICD-10-CM

## 2020-11-18 DIAGNOSIS — M54.9 BACK PAIN AFFECTING PREGNANCY, ANTEPARTUM: ICD-10-CM

## 2020-11-18 DIAGNOSIS — O22.00 VARICOSE VEINS OF LOWER EXTREMITY DURING PREGNANCY, ANTEPARTUM: ICD-10-CM

## 2020-11-18 DIAGNOSIS — Z34.80 SUPERVISION OF OTHER NORMAL PREGNANCY, ANTEPARTUM: Primary | ICD-10-CM

## 2020-11-18 PROCEDURE — 0502F PR SUBSEQUENT PRENATAL CARE: ICD-10-PCS | Mod: CPTII,S$GLB,, | Performed by: NURSE PRACTITIONER

## 2020-11-18 PROCEDURE — 99999 PR PBB SHADOW E&M-EST. PATIENT-LVL III: ICD-10-PCS | Mod: PBBFAC,,, | Performed by: NURSE PRACTITIONER

## 2020-11-18 PROCEDURE — 0502F SUBSEQUENT PRENATAL CARE: CPT | Mod: CPTII,S$GLB,, | Performed by: NURSE PRACTITIONER

## 2020-11-18 PROCEDURE — 99999 PR PBB SHADOW E&M-EST. PATIENT-LVL III: CPT | Mod: PBBFAC,,, | Performed by: NURSE PRACTITIONER

## 2020-11-18 NOTE — PROGRESS NOTES
Here for routine OB appt at 16w5d, with complaints of worsening back pain.  Referral to [facility]. Reports varicosities are worsening in sight - but pain is better.  RX for compression stockings. No FM yet- has anterior placenta.  Denies VB and cramping.  Pain, bleeding, and PTL precautions given.  MFM anatomy scan scheduled for 12/4/20.   OB glucose and CBC with next appt.   F/U scheduled 8 weeks- [facility].

## 2020-11-29 ENCOUNTER — PATIENT MESSAGE (OUTPATIENT)
Dept: ADMINISTRATIVE | Facility: OTHER | Age: 35
End: 2020-11-29

## 2020-12-01 ENCOUNTER — CLINICAL SUPPORT (OUTPATIENT)
Dept: REHABILITATION | Facility: OTHER | Age: 35
End: 2020-12-01
Payer: COMMERCIAL

## 2020-12-01 DIAGNOSIS — O99.891 BACK PAIN AFFECTING PREGNANCY, ANTEPARTUM: ICD-10-CM

## 2020-12-01 DIAGNOSIS — M53.3 SI (SACROILIAC) JOINT DYSFUNCTION: ICD-10-CM

## 2020-12-01 DIAGNOSIS — M54.9 BACK PAIN AFFECTING PREGNANCY, ANTEPARTUM: ICD-10-CM

## 2020-12-01 PROCEDURE — 97110 THERAPEUTIC EXERCISES: CPT | Mod: PN

## 2020-12-01 PROCEDURE — 97163 PT EVAL HIGH COMPLEX 45 MIN: CPT | Mod: PN

## 2020-12-02 NOTE — PLAN OF CARE
"[facility]  Physical Therapy Initial Evaluation    Date: 12/1/2020   Name: Lindy Ferraro  Clinic Number: [id number]    Therapy Diagnosis:   Encounter Diagnoses   Name Primary?    Back pain affecting pregnancy, antepartum     SI (sacroiliac) joint dysfunction      Physician: Layla Arroyo,*    Physician Orders: PT Eval and Treat   Medical Diagnosis from Referral: O99.891,M54.9 (ICD-10-CM) - Back pain affecting pregnancy, antepartum  Evaluation Date: 12/1/2020  Authorization Period Expiration: 12/31/2020  Plan of Care Expiration: 3/1/2021  Visit # / Visits authorized: 1/ 20    Time In: 3:00  Time Out: 3:45  Total Appointment Time (timed & untimed codes): 45 minutes    Precautions: Standard and pregnancy    Subjective   Date of onset: 4 weeks ago  History of current condition - Susan reports: Pregnancy related LBP that started ~4 weeks ago with repeated lifting of her 17 mo/old daughter.    Pt is 18.5 weeks as of 12/1/2020 into her 2nd pregnancy. Reports that she started having similar symptoms during her 1st pregnancy but towards the end of the 3rd trimester. Symptoms resolved on their own quickly. Also reports a Hx of similar symptoms that originally started in the back and L hip pain in 2017 that strated with running marathons. Feels that because she has not been able to workout in over a year and she is "overdoing it" at home that her pain is now constant and worsens with bending and lifting daughter. Reports that when she had a sharp increase in pain, it makes all activities difficult - including walking, getting OOB, and HHCs.    Denies radiating pain, saddle paresthesias, and B/B changes.    Medical History:   Past Medical History:   Diagnosis Date    Jejunal atresia     repaired as an infant       Surgical History:   Lindy Ferraro  has a past surgical history that includes Intrauterine device insertion (2016); Small intestine surgery (8/1987); and " [place] tooth extraction.    Medications:   Lindy has a current medication list which includes the following prescription(s): prenatal vit/iron fum/folic ac.    Allergies:   Review of patient's allergies indicates:   Allergen Reactions    Asa [aspirin] Other (See Comments)     Blood disorder per Patient    Ibuprofen     Pcn [penicillins]     Sulfa (sulfonamide antibiotics)         Imaging, none in EPIC    Prior Therapy: yes - [facility]  Social History: [facility] lives with their spouse and 17 mo/old daughter  Occupation: .  Prior Level of Function: independent  Current Level of Function: pain and difficulty with all functional activities    Pain:  Current 3/10, worst 8/10, best 2/10   Location: bilateral low back (indicates SIJ)   Description: Aching, Dull, Sharp and Shooting  Aggravating Factors: Sitting, Standing, Bending, Walking, Lifting and Getting out of bed/chair  Easing Factors: rest    Patients goals: get stronger, enjoy rest of pregnancy, reduce pain with lifting and playing with daughter    Objective     Postural examination/scapula alignment: increased elevation of L iliac crest in standing, similar pelvic position in sitting. increased posterior rotation of R innominate  Sensory deficit: none  Reflexes: intact    Palpation: TTP Richard sacral sulcus R>L      Thoracic/Lumbar AROM: Pain/Dysfunction with Movement:   Flexion Mod bradley, tension in Richard HS, decreased reversal of lumbar curve   Extension WNL, mild pinch pain in richard LSP   Right side bending WNL, no pain   Left side bending WNL, no pain   Right rotation WNL, no pain   Left rotation WNL, no pain     Hip ROM: WNL, no pain  Knee ROM: WNL no pain    Lower Extremity Strength  Right LE  Left LE    Hip flexion: 4/5 Hip flexion: 4-/5 c/o LBP   Hip extension:  4/5 Hip extension: 4-/5   Hip abduction: 4/5 Hip abduction: 4/5   Hip adduction:  4+/5 Hip adduction:  4+/5   Hip Internal rotation   4-/5 Hip  "Internal rotation 3+/5     Flexibility:   Kyle test   Hip flexors: good   Quads: good  Roland test   ITB: NT  Hamstring (SLR): min bradley ramon    Joint Mobility: NT    Special Tests:   Test Name  Test Result   Prone Instability Test    Lumbar Quadrant test (--)   Straight Leg Raise (--) for neural tension, (+) for trunk instability   Crunch test for diastasis recti (+) but with <1 fingerwidth for depth, 2 fingerwidth wide - superior > inferior to umbilicus   Walking on toes (--)   Walking on heels  (--)   Supine to long sit (+) for LLD on RLE (posterior innominate rotation)   ZAINAB (--)   FADIR (--)   SI Joint Provocation Test (compression / distraction) (--)   (+) posterior shear     Functional Movement Analysis:   Gait: I  Squat: dysfunctional, UA to achieve //, mild WS to LLE  Balance: SLS = 10" ramon          Limitation/Restriction for FOTO lumbar spine Survey    Therapist reviewed FOTO scores for Lindy Ferraro on 12/1/2020.   FOTO documents entered into [employer] - see Media section.    Limitation Score: 42%         TREATMENT   Treatment Time In: 3:20  Treatment Time Out: 3:45  Total Treatment time (time-based codes) separate from Evaluation: 25 minutes    Susan received therapeutic exercises to develop strength, endurance, ROM, flexibility, posture and core stabilization for 20 minutes including:  Self MET to correct R posterior innominate rotation 10 x 5"  HL glute set 10 x 10"  SL clams 10 x 10"  SL hip abd 10 x 10"  Plan to progress TA activation and core stability series next visit        Home Exercises and Patient Education Provided    Education provided:   - Patient educated regarding pathogenesis, diagnosis, protocol, prognosis, POC, and HEP. Written Home Exercises Provided with written and verbal instructions for frequency and duration of the following exercises: see list above. Pt educated on HEP and activity modifications to reduce c/o pain and improve overall function.   - Heavy edu on standing " posture, holding children and switching holding sides frequently to limit innominate rotation unilaterally. Also educated on diastasis recti and proper techniques with rolling, getting in/out of bed, and lifting using breathing.   - Pt was educated in posture and body mechanics.  Use of a SIJ stability belt was recommended and demonstrated here today.  Purchase information provided for Serola belt.  - Pt also educated on use of modalities prn to reduce c/o pain and dysfunction.     Written Home Exercises Provided: yes.  Exercises were reviewed and Susan was able to demonstrate them prior to the end of the session.  Susan demonstrated good  understanding of the education provided.     See EMR under Patient Instructions for exercises provided 12/1/2020.    Assessment   Lindy is a 35 y.o. female referred to outpatient Physical Therapy with a medical diagnosis of Back pain affecting pregnancy, antepartum. Patient presents with marked limitations in ROM, joint mobility and positioning, flexibility, strength, postural awareness/endurance, motor control and coordination. S/s associated with referring diagnosis, as well as possible SIJ instability due to pregnancy and old untreated injury. Impairments limit pt with all functional activities including  at home and [facility].      Patient prognosis is Good.   Patientt will benefit from skilled outpatient Physical Therapy to address the deficits stated above and in the chart below, provide patient /family education, and to maximize patientt's level of independence.     Plan of care discussed with patient: Yes  Patient's spiritual, cultural and educational needs considered and patient is agreeable to the plan of care and goals as stated below:     Anticipated Barriers for therapy: standard, pregnancy    Medical Necessity is demonstrated by the following  History  Co-morbidities and personal factors that may impact the plan of care Co-morbidities:   coping  style/mechanism, difficulty sleeping, level of undertstanding of current condition and pregnancy    Personal Factors:   coping style  social background  lifestyle     high   Examination  Body Structures and Functions, activity limitations and participation restrictions that may impact the plan of care Body Regions:   back  lower extremities  trunk    Body Systems:    gross symmetry  ROM  strength  gross coordinated movement  transfers  transitions  motor control  motor learning  joint mobility/stability, postural awareness/endurance    Participation Restrictions:   ADLs, HHCs, bed mobility, self care, , driving, walking/standing    Activity limitations:   Learning and applying knowledge  no deficits    General Tasks and Commands  no deficits    Communication  no deficits    Mobility  lifting and carrying objects  walking  driving (bike, car, motorcycle)    Self care  toileting  dressing  looking after one's health    Domestic Life  shopping  cooking  doing house work (cleaning house, washing dishes, laundry)  assisting others    Interactions/Relationships  no deficits    Life Areas  employment    Community and Social Life  community life  recreation and leisure         high   Clinical Presentation unstable clinical presentation with unpredictable characteristics high   Decision Making/ Complexity Score: high     Goals:  Short Term Goals (6 Weeks):   1. Pt will report 20% reduction in pain of the lumbar spine and LE for ease with ADL's  2. PT will demonstrate improved trunk strength by a half grade in for ease with upright posture during standing activities.  3. Pt will demonstrate improved lumbar spine ROM in all directions by a half grade for ease with bending activities.   4. Pt to demonstrate improved functional ability with FOTO limitation <=35% disability.    Long Term Goals (12 Weeks):   1. Pt will report being independent with HEP for maintenance of improvements gained during therapy sessions  2. PT  will report 50% reduction of pain of the back and LE for ease with dressing and grooming activities.   3. Pt will demonstrate trunk and extremity strength to >=4+/5 without the provocation of pain for ease with household chores  4. Pt will demonstrate appropriate upright posture without external cueing for ease with work related activities.   5. Pt to demonstrate improved functional ability with FOTO limitation <=28% disability.      Plan   Plan of care Certification: 12/1/2020 to 3/1/2021.    Outpatient Physical Therapy 2 times weekly for 12 weeks to include the following interventions: Aquatic Therapy, Cervical/Lumbar Traction, Electrical Stimulation prn, Iontophoresis (with dexamethasone prn), Manual Therapy, Moist Heat/ Ice, Neuromuscular Re-ed, Patient Education, Self Care, Therapeutic Activites, Therapeutic Exercise and IASTYM, therapeutic taping, dry needling, cupping. Progress HEP towards D/C. Recommend F/U with MD if symptoms worsen or do not resolve. Patient may be seen by a PTA for treatment to carry out their plan of care.  Face-to-face conferences will be held.     Krystal Nix, PT

## 2020-12-04 ENCOUNTER — PROCEDURE VISIT (OUTPATIENT)
Dept: MATERNAL FETAL MEDICINE | Facility: CLINIC | Age: 35
End: 2020-12-04
Payer: COMMERCIAL

## 2020-12-04 DIAGNOSIS — Z36.89 ENCOUNTER FOR FETAL ANATOMIC SURVEY: ICD-10-CM

## 2020-12-04 PROCEDURE — 76805 OB US >/= 14 WKS SNGL FETUS: CPT | Mod: S$GLB,,, | Performed by: OBSTETRICS & GYNECOLOGY

## 2020-12-04 PROCEDURE — 76805 PR US, OB 14+WKS, TRANSABD, SINGLE GESTATION: ICD-10-PCS | Mod: S$GLB,,, | Performed by: OBSTETRICS & GYNECOLOGY

## 2020-12-14 ENCOUNTER — CLINICAL SUPPORT (OUTPATIENT)
Dept: REHABILITATION | Facility: OTHER | Age: 35
End: 2020-12-14
Attending: FAMILY MEDICINE
Payer: COMMERCIAL

## 2020-12-14 DIAGNOSIS — R29.898 DECREASED STRENGTH OF TRUNK AND BACK: ICD-10-CM

## 2020-12-14 DIAGNOSIS — R29.3 POOR POSTURE: ICD-10-CM

## 2020-12-14 DIAGNOSIS — M54.9 BACK PAIN AFFECTING PREGNANCY, ANTEPARTUM: ICD-10-CM

## 2020-12-14 DIAGNOSIS — O99.891 BACK PAIN AFFECTING PREGNANCY, ANTEPARTUM: ICD-10-CM

## 2020-12-14 PROCEDURE — 97110 THERAPEUTIC EXERCISES: CPT

## 2020-12-14 PROCEDURE — 97162 PT EVAL MOD COMPLEX 30 MIN: CPT

## 2020-12-18 PROBLEM — R29.3 POOR POSTURE: Status: ACTIVE | Noted: 2020-12-18

## 2020-12-18 PROBLEM — R29.898 DECREASED STRENGTH OF TRUNK AND BACK: Status: ACTIVE | Noted: 2020-12-18

## 2020-12-18 NOTE — PLAN OF CARE
"  [facility] - PHYSICAL THERAPY EVALUATION     Name: Lindy Ferraro  Clinic Number: [id number]    Therapy Diagnosis:   Encounter Diagnoses   Name Primary?    Back pain affecting pregnancy, antepartum     Decreased strength of trunk and back     Poor posture      Physician: Layla Arroyo,*    Physician Orders: PT Eval and Treat   Medical Diagnosis from Referral: O99.891,M54.9 (ICD-10-CM) - Back pain affecting pregnancy, antepartum  Evaluation Date: 12/14/2020  Authorization Period Expiration: 12/31/20  Plan of Care Expiration: 03/14/20  Reassessment Due: 01/14/21  Visit # / Visits authorized: 01 / 20    Time In: 12:35 pm  Time Out: 2:05 pm  Total Billable Time: 90 minutes    Precautions: Standard and pregnancy  (20 weeks), L ankle pain     Pattern of pain determined: 1PEN      Subjective   Date of onset:  3 yrs ago (3x yr exacerbation)  1 month ago acute exacerbation    History of current condition - Susan reports LBP slight migration to buttock. However, pt report sig dec in sx since a Thanksgiving holiday exacerbation.  With exacerbation pain starts suddenly as a "collapsing feeling" and lasts approx 1 week as a constant lingering severe ache. Pt deny n/t into BLE Pt reports inc bed rest, dec activity tolerance during that time.  At present, pt report improved sx and believes this is from stretches received at a recent PT eval and inc walking.  Pt reports PM LBP inc and believes this is due to fatigue.  Pt report inc sx /c transitional movements and difficulty lifting including hiring someone to assist her at home /c 18 month old child.      PMHx July L ankle twisted MD rx PT. Pt report not following up and L ankle "feels weak", pt is 20 weeks pregnant      From 12/01/20 PT note:   Susan reports: Pregnancy related LBP that started ~4 weeks ago with repeated lifting of her 17 mo/old daughter.     Pt is 18.5 weeks as of 12/1/2020 into her 2nd pregnancy. Reports that she started having " "similar symptoms during her 1st pregnancy but towards the end of the 3rd trimester. Symptoms resolved on their own quickly. Also reports a Hx of similar symptoms that originally started in the back and L hip pain in 2017 that strated with running marathons. Feels that because she has not been able to workout in over a year and she is "overdoing it" at home that her pain is now constant and worsens with bending and lifting daughter. Reports that when she had a sharp increase in pain, it makes all activities difficult - including walking, getting OOB, and HHCs.     Denies radiating pain, saddle paresthesias, and B/B changes.        Medical History:   Past Medical History:   Diagnosis Date    Jejunal atresia     repaired as an infant        Surgical History:   Lindy Ferraro  has a past surgical history that includes Intrauterine device insertion (2016); Small intestine surgery (8/1987); and McDade tooth extraction.    Medications:   Lindy has a current medication list which includes the following prescription(s): prenatal vit/iron fum/folic ac.    Allergies:   Review of patient's allergies indicates:   Allergen Reactions    Asa [aspirin] Other (See Comments)     Blood disorder per Patient    Ibuprofen     Pcn [penicillins]     Sulfa (sulfonamide antibiotics)         Imaging: none relevant due to pregnancy    Prior Therapy: 2 yrs ago PT L hip, PF PT    Prior Treatment: none   Social History: single story home, lives /c spouse and 18 month old daughter  Occupation:  (research /c extended sitting)   Leisure: playing /c child, walk (4-5 x wk, 2-3 miles)   Prior Level of Function: Ind /c ADLs running  Current Level of Function: Ind /c ADLs inc difficulty amb, picking up child   DME owned/used: [employer], [employer],  [employer]        Pain:  Current 1/10 seated, worst 2/10 end of day  (8/10 Thanksgiving time) , best  1/10   Location: bilateral low back, when exacerbated inferior to SIJ " "region   Description: When exacerbated Aching and Throbbing, At present dull ache   Aggravating Factors: Bending, Lifting and Getting out of bed/chair  Easing Factors:  stretching   Disturbed Sleep: N not at present, during exacerbation Y        Pattern of pain questions:  1.  Where is your pain the worst? Back   2.  Is your pain constant or intermittent? Constant  3.  Does bending forward make your typical pain worse? Y  4.  Since the start of your back pain, has there been a change in your bowel or bladder? N  5.  What can't you do now that you use to be able to do? Running, lifting child       Pts goals: "having less episodes of acute pain /c dec severity and knowing what to do /c the pain"       Red Flag Screening:   Cough  Sneeze  Strain: (+) during acute exacerbation  Bladder/ bowel: (--)  Falls: (--)  Night pain: (--)  Unexplained weight loss: (--)  General health: pt report fair - good     OBJECTIVE     Postural examination/scapula alignment: Rounded shoulder, WBOS /c inc L WB  Joint integrity: norm L3 - L5  Deferred T, upper L 2/2 pt concern for pregnancy  Sitting: R WS   Correction of posture: better with lumbar roll   Palpation:   No TTP noted lumbar paraspinals    No hypertonicity noted    MOVEMENT LOSS    ROM Loss   Flexion minimal loss dec curve reversal   Extension minimal loss   Side bending Right within functional limits   Side bending Left within functional limits   Rotation Right within functional limits   Rotation Left within functional limits   Pelvic Shift Right WFL   Pelvic Shift Left  WFL      Lower Extremity Strength  Right LE  Left LE    Hip flexion: 4/5 Hip flexion: 4/5   Hip extension:  4/5 Hip extension: 4/5   Hip abduction: 4/5 Hip abduction: 4/5   Hip adduction:  4+/5 Hip adduction:  4+/5   Hip Internal rotation   NT Hip Internal rotation NT   Knee Flexion 4+/5 Knee Flexion 4+/5   Knee Extension 4+/5 Knee Extension 4+/5   Ankle dorsiflexion: 4+/5 Ankle dorsiflexion: 4+/5   Ankle " plantarflexion: 4+/5 Ankle plantarflexion: 4+/5       GAIT:  Assistive Device used: none  Level of Assistance: independent  Patient displays the following gait deviations:  no gait deviations observed.     Special Tests:   Test Name  Test Result   Prone Instability Test (--) pain /c BLE ext   SI Joint Provocation Test (--) except (+) R thigh thrust   Straight Leg Raise (--)   Neural Tension Test (--)   Crossed Straight Leg Raise (--)   Walking on toes (--) able    Walking on heels  (--) able      ASIS equal   LLD (+)  RLE 87.5 cm  LLE 89 cm  HS min tightness BL   Kyle test (-)   B Roland (-)     NEUROLOGICAL SCREENING     Sensory deficit:   BLE LT intact  L5 myotome intact  Saddle sensation     Reflexes:    Left Right   Patella Tendon 1+ 1+   Achilles Tendon 1+ 1+   Babinski  NT  NT    Clonus (--) (--)     REPEATED TEST MOVEMENTS:  Repeated Flexion in Standing worse inc tightness in LB   Repeated Extension in Standing no effect, cont tightness from prior mvmt    Repeated Flexion in lying no effect   Repeated Extension in lying  worse /c movement (LB tightness), no effect after        STATIC TESTS   Sitting slouched  no effect   Sitting erect better   Standing slouched NT   Standing erect  NT   Lying prone in extension  no effect   Long sitting   NT       Baseline Isometric Testing on Med X equipment: Testing administered by PT  Date of testin20  ROM 0 - 42 deg   Max Peak Torque 110   Min Peak Torque 29   Flex/Ext Ratio 3.79   % below normative data -60%         Limitation/Restriction for FOTO Lumbar Survey    Therapist reviewed FOTO scores for Lindy Ferraro on 2020.   FOTO documents entered into [employer] - see Media section.    Limitation Score: 44%  Visit 5:  Visit 10:    Goal: 28%             Treatment   Treatment Time In: 1:35 pm  Treatment Time Out: 2:05 pm  Total Treatment time separate from Evaluation: 30 minutes      Lindy received therapeutic exercises to develop/improve posture,  lumbar/cervical ROM, strength and muscular endurance for 30 minutes including the following exercises:     LTR x 10   SL Clamshells x 5 each side    Perform ex in supine and prone to tolerance    Med x dynamic exercise and baseline IM test    [facility] 12/14/2020   Visit Number 1   VAS Pain Rating 1   Lumbar Extension Seat Pad 1   Femur Restraint 5   Top Dead Center 24   Counterweight 197   Lumbar Flexion 42   Lumbar Extension 0   Lumbar Peak Torque 110   Min Torque 29   Test Percent Below Normative Data 60   Lumbar Weight 45   Repetitions 5   Ice - Z Lie (in min.) 10         Written Home Exercises Provided: yes.  Exercises were reviewed and Susan was able to demonstrate them prior to the end of the session.  Susan demonstrated good  understanding of the education provided.     Glute set  Clamshells  SL hip ab     See EMR under Patient Instructions for exercises provided 12/14/2020.    Education provided:   - Patient received education regarding proper posture and body mechanics.  Patient was given top [facility] Visit 1 handouts which discuss what to expect in therapy, the purpose and opportunity for health coaching, the program,  wellness when discharged from therapy, back education and care specifically for posture seated, standing, lifting correctly, components of exercise, importance of nutrition and hydration, and importance of sleep.   Information on lumbar rolls provided.  - Santino roll tried, recommended, and purchase information was provided.    - Patient received a handout regarding anticipated muscular soreness following the isometric test and strategies for management were reviewed with patient including stretching, using ice and scheduled rest.   - Patient received education on the [facility] program, purpose of the isometric test, progression of back strengthening as well as wellness approach and systemic strengthening.  Details of the program were discussed.  Reviewed that  "patient should feel support/pressure from med ex restraints but no pain or discomfort and patient expressed understanding.    Susan received cold pack for 10 minutes to lumbar region in Z lying.    Assessment   Lindy is a 35 y.o. female referred to [facility] with a medical diagnosis of O99.891,M54.9 (ICD-10-CM) - Back pain affecting pregnancy, antepartum. Pt presents with dec lumbar ROM and strength, low back pain, poor posture leading to dec functional mobility and activity tolerance.  MedX lumbar IM testing indicate global dec in lumbar paraspinal strength at 60% below norms.  As for ROM, pt demo dec flex mobility likely from hesitancy due to progressing pregnancy.  ROM expectations on lumbar MedX follow that ROM may dec due to advancing pregnancy, however, exercises performed to tolerance in seated, supine and SL are recommended to cont to challenge available ROM.  Although no pelvic inominate rotation noted today, prior PT notes identified displacement and, as pregnancy progresses, SI involvement has inc likelihood.  Therefore, PT awareness and continued reassessment of pelvic rotation/position is warranted as tx progresses.  Pt note dec ability to lift child /c fear of accident /c lifting and, therefore, declining participation in activity.  To avoid progression of fear avoidance, recommend PT perform lifting tasks or relate lumbar exercises to application of lifting for inc pt participation and inc functional activity tolerance.  As pregnancy progresses, PT should inc awareness for diastasis recti including noting any "doming" during core exs.   Pt should also be advised not to hold her breath as she exercises.    HEP issued for core activation and prep for likely SI instability as pregnancy progresses.     Pain Pattern: 1PEN       Pt prognosis is Good.   Pt will benefit from skilled outpatient Physical Therapy to address the deficits stated above and in the chart below, provide pt/family " education, and to maximize pt's level of independence. Based on the above history and physical examination an active physical therapy program is recommended.  Pt will continue to benefit from skilled outpatient physical therapy to address the deficits listed below in the chart, provide pt/family education and to maximize pt's level of independence in the home and community environment. .       Plan of care discussed with patient: Yes  Pt's spiritual, cultural and educational needs considered and patient is agreeable to the plan of care and goals as stated below:     Anticipated Barriers for therapy: advancing pregnancy    PT Evaluation Completed? Yes    Medical necessity is demonstrated by the following problem list.    Pt presents with the following impairments:     History  Co-morbidities and personal factors that may impact the plan of care Co-morbidities:   Pregnancy     Personal Factors:   no deficits     low   Examination  Body Structures and Functions, activity limitations and participation restrictions that may impact the plan of care Body Regions:   back  trunk    Body Systems:    ROM  strength  transitions  motor control    Participation Restrictions:   Advancing pregnancy    Activity limitations:   Learning and applying knowledge  no deficits    General Tasks and Commands  no deficits    Communication  no deficits    Mobility  lifting and carrying objects  walking    Self care  no deficits    Domestic Life  shopping  cooking  doing house work (cleaning house, washing dishes, laundry)    Interactions/Relationships  no deficits    Life Areas  no deficits    Community and Social Life  community life  recreation and leisure         low   Clinical Presentation evolving clinical presentation with changing clinical characteristics moderate   Decision Making/ Complexity Score: moderate       GOALS: Pt is in agreement with the following goals.    Short term goals:  6 weeks or 10 visits   1.  Pt will demonstrate  increased lumbar ROM by at least 3 degrees from the initial ROM value with improvements noted in functional ROM and ability to perform ADLs.  2.  Pt will demonstrate increased MedX average isometric strength value  by 10% from initial test resulting in improved ability to perform bending, lifting, and carrying activities safely, confidently.    3.  Patient report a reduction in worst pain score by 1-2 points for improved tolerance for household chores.  4.  Pt able to perform HEP correctly with minimal cueing or supervision from therapist to encourage independent management of symptoms.       Long term goals: 10 weeks or 20 visits   1. Pt will demonstrate increased lumbar ROM by at least 6 degrees from initial ROM value, resulting in improved ability to perform functional fwd bending while standing and sitting.   2. Pt will demonstrate increased MedX average isometric strength value  by 20% from initial test resulting in improved ability to perform bending, lifting, and carrying activities safely, confidently.  3. Pt to demonstrate ability to independently control and reduce their pain through posture positioning and mechanical movements throughout a typical day.  4.  Pt will demonstrate reduced pain and improved functional outcomes as reported on the FOTO by reaching a limitation score of < or = 28% or less in order to demonstrate subjective improvement in pt's condition.    5. Pt will demonstrate independence with the HEP at discharge  6.  Pt will report Peloton workouts > 30 min /s inc LBP for inc recreational tolerance  7.  Pt will lift 20# box floor to chest x10 /s inc LBP for inc tolerance to parenting movement (picking up child).      Plan   Outpatient physical therapy 2x week for 10 weeks or 20 visits to include the following:   - Patient education  - Therapeutic exercise  - Manual therapy  - Performance testing   - Neuromuscular Re-education  - Therapeutic activity   - Modalities    Pt may be seen by PTA as  "part of the rehabilitation team.     Therapist: Stuart East, PT  12/18/2020    "I certify the need for these services furnished under this plan of treatment and while under my care."    ____________________________________  Physician/Referring Practitioner    _______________  Date of Signature              "

## 2020-12-21 ENCOUNTER — CLINICAL SUPPORT (OUTPATIENT)
Dept: REHABILITATION | Facility: OTHER | Age: 35
End: 2020-12-21
Payer: COMMERCIAL

## 2020-12-21 DIAGNOSIS — M53.3 SI (SACROILIAC) JOINT DYSFUNCTION: Primary | ICD-10-CM

## 2020-12-21 PROCEDURE — 97140 MANUAL THERAPY 1/> REGIONS: CPT | Mod: PN

## 2020-12-21 PROCEDURE — 97112 NEUROMUSCULAR REEDUCATION: CPT | Mod: PN

## 2020-12-21 PROCEDURE — 97110 THERAPEUTIC EXERCISES: CPT | Mod: PN

## 2020-12-21 NOTE — PATIENT INSTRUCTIONS
Home Program 12/21/20:    1) Piston Breathing    Inhale long, slow and deep, so belly expands with the breath. See if you can feel your pelvic floor gently dropping and lengthening as you breath in. As you exhale, squeeze your pelvic floor muscles, holding the contraction the length of the exhalation.     Repeat 5-10 times. Perform 3 sets/day.

## 2020-12-21 NOTE — PROGRESS NOTES
Pelvic Health Physical Therapy   Treatment Note     Name: Lindy Ferraro  Clinic Number: [id number]    Therapy Diagnosis: No diagnosis found.  Physician: Layla Arroyo,*    Visit Date: 12/21/2020    Physician Orders: PT Eval and Treat   Medical Diagnosis from Referral: O99.891,M54.9 (ICD-10-CM) - Back pain affecting pregnancy, antepartum  Evaluation Date: 12/1/2020  Authorization Period Expiration: 12/31/2020  Plan of Care Expiration: 3/1/2021  Visit # / Visits authorized: 2/ 20     Cancelled Visits: 0  No Show Visits: 0    Time In: 3:30  Time Out: 4:25  Total Billable Time: 55 minutes    Precautions: Standard    Subjective     Pt reports: Had some pelvic floor issues starting after first pregnancy. Had pelvic PT some things got better. Now pregnant again and some of symptoms have come back. A lot of heaviness in pelvis and vaginal area feels swollen. May be varicose veins. Worse with activity. Also feeling a lot of abdominal weakness. Feels like back issues are related. Just today started having shooting pain down inside/back of leg. 21 weeks gestational. Heaviness started earlier in pregnancy. Feels very uncomfortable.  No leakage, mild amount of urgency, no increased frequency. No nocturia. Has constipation, but she is hesitant to want to have BM because of swelling. Goes every other day, was daily before pregnancy. Sex is not comfortable.Sex only became comfortable at 12 months after first baby and then was pregnant 15 months after first delivery. Daughter is 18 months. Reports no libido, sex is not a goal at this time.   She was compliant with home exercise program.  Response to previous treatment: felt fine  Functional change: no change    Pain: 3/10  Location: right upper, inner thigh      Objective   Pelvic exam:  Overactive and TTP R>L, pelvic floor mm and OI  Strength 2/5, compensatory patterns and breath holding  Endurance 10 sec x 1  QFs: 4 in 10 sec  Involuntary contraction:  bulge  Bearing down: drop, but breath holding and excessively increased IAP  Scar is painful and erythremic, potentially small amount of granulation tissue    Susan received therapeutic exercises to develop strength and endurance for 10 minutes including: seated flexion, repeated  Quadruped<>PPU x 10    Susan received the following manual therapy techniques: to develop flexibility and extensibility for 30 minutes including: trigger point/myofascial release of pelvic floor mm      Susan participated in neuromuscular re-education activities to develop Coordination, Control and Down training for 15 minutes including: diaphragmatic breathing and diaphragmatic breathing with Kegel    Susan participated in dynamic functional therapeutic activities to improve functional performance for 00 minutes, including:  Not performed today       Home Exercises Provided and Patient Education Provided     Education provided:   - anatomy/physiology of pelvic floor and diaphragmatic breathing  Discussed progression of plan of care with patient; educated pt in activity modification; reviewed HEP with pt. Pt demonstrated and verbalized understanding of all instruction and was provided with a handout of HEP (see Patient Instructions).    Written Home Exercises Provided: yes.  Exercises were reviewed and Susan was able to demonstrate them prior to the end of the session.  Susan demonstrated good  understanding of the education provided.     See EMR under Patient Instructions for exercises provided 12/21/2020.    Assessment     Pelvic exam completed. Overactivity of mm noted with palpation tender and reproducing R upper thigh pain. Poor coordination of mm with poor strength and endurance, and excessive strain and increased IAP with bulge with minimal descent of mm. She will benefit from regular PT for improved relaxation and coordination of pelvic floor mm followed by strengthening. Will spend significant time preparing for labor and delivery  with perineal massage and instruction on pushing as nears due date.   Susan is progressing well towards her goals.   Pt prognosis is Excellent.     Pt will continue to benefit from skilled outpatient physical therapy to address the deficits listed in the problem list box on initial evaluation, provide pt/family education and to maximize pt's level of independence in the home and community environment.     Pt's spiritual, cultural and educational needs considered and pt agreeable to plan of care and goals.     Anticipated barriers to physical therapy: none    Goals:  Short Term Goals (6 Weeks):   1. Pt will report 20% reduction in pain of the lumbar spine and LE for ease with ADL's  2. PT will demonstrate improved trunk strength by a half grade in for ease with upright posture during standing activities.  3. Pt will demonstrate improved lumbar spine ROM in all directions by a half grade for ease with bending activities.   4. Pt to demonstrate improved functional ability with FOTO limitation <=35% disability.     Long Term Goals (12 Weeks):   1. Pt will report being independent with HEP for maintenance of improvements gained during therapy sessions  2. PT will report 50% reduction of pain of the back and LE for ease with dressing and grooming activities.   3. Pt will demonstrate trunk and extremity strength to >=4+/5 without the provocation of pain for ease with household chores  4. Pt will demonstrate appropriate upright posture without external cueing for ease with work related activities.   5. Pt to demonstrate improved functional ability with FOTO limitation <=28% disability.     Plan     Downtraining, manual, TrA and proximal hip strengthening    Martina Chu, PT

## 2020-12-27 ENCOUNTER — PATIENT MESSAGE (OUTPATIENT)
Dept: OBSTETRICS AND GYNECOLOGY | Facility: CLINIC | Age: 35
End: 2020-12-27

## 2020-12-28 ENCOUNTER — PATIENT MESSAGE (OUTPATIENT)
Dept: INTERNAL MEDICINE | Facility: CLINIC | Age: 35
End: 2020-12-28

## 2020-12-29 ENCOUNTER — OFFICE VISIT (OUTPATIENT)
Dept: INTERNAL MEDICINE | Facility: CLINIC | Age: 35
End: 2020-12-29
Attending: FAMILY MEDICINE
Payer: COMMERCIAL

## 2020-12-29 DIAGNOSIS — L30.9 DERMATITIS: Primary | ICD-10-CM

## 2020-12-29 DIAGNOSIS — Z3A.22 22 WEEKS GESTATION OF PREGNANCY: ICD-10-CM

## 2020-12-29 PROCEDURE — 99214 PR OFFICE/OUTPT VISIT, EST, LEVL IV, 30-39 MIN: ICD-10-PCS | Mod: 95,,, | Performed by: FAMILY MEDICINE

## 2020-12-29 PROCEDURE — 99214 OFFICE O/P EST MOD 30 MIN: CPT | Mod: 95,,, | Performed by: FAMILY MEDICINE

## 2020-12-29 NOTE — PROGRESS NOTES
The patient location is:  home  The chief complaint leading to consultation is:  Rash    Visit type: audiovisual    Face to Face time with patient:  10 min  Fifteen minutes of total time spent on the encounter, which includes face to face time and non-face to face time preparing to see the patient (eg, review of tests), Obtaining and/or reviewing separately obtained history, Documenting clinical information in the electronic or other health record, Independently interpreting results (not separately reported) and communicating results to the patient/family/caregiver, or Care coordination (not separately reported).         Each patient to whom he or she provides medical services by telemedicine is:  (1) informed of the relationship between the physician and patient and the respective role of any other health care provider with respect to management of the patient; and (2) notified that he or she may decline to receive medical services by telemedicine and may withdraw from such care at any time.    Notes:   CHIEF COMPLAINT: ramon eyes rash 1 week worsening    HISTORY OF PRESENT ILLNESS: The patient is a generally healthy 35 year-old white female who is 22 weeks pregnant.  The patient has about a week of bilateral turner orbital, perioral and perinasal rash and itching.  She has not tried anything over-the-counter due to the pregnancy.  We will try over-the-counter hydrocortisone and have her see dermatology in a week or to if things do not get better.    REVIEW OF SYSTEMS:  GENERAL: No fever, chills, fatigability or weight loss.  SKIN: She has rashes, itching, changes in color and texture of skin.  HEAD: No headaches or recent head trauma.  EYES: Visual acuity fine. No photophobia, ocular pain or diplopia.  EARS: Denies ear pain, discharge or vertigo.  NOSE: No loss of smell, no epistaxis or postnasal drip.  MOUTH & THROAT: No hoarseness or change in voice. No excessive gum bleeding.  NODES: Denies swollen glands.  CHEST:  Denies SALVADOR, cyanosis, wheezing, cough and sputum production.  CARDIOVASCULAR: Denies chest pain, PND, orthopnea or reduced exercise tolerance.  ABDOMEN: Appetite fine. No weight loss. Denies diarrhea, abdominal pain, hematemesis or blood in stool.  URINARY: No flank pain, dysuria or hematuria.  PERIPHERAL VASCULAR: No claudication or cyanosis.  MUSCULOSKELETAL: No joint stiffness or swelling. Denies back pain.  NEUROLOGIC: No history of seizures, paralysis, alteration of gait or coordination.    SOCIAL HISTORY: The patient does not smoke.  The patient consumes alcohol socially.  The patient is  and pregnant.    PHYSICAL EXAMINATION:   APPEARANCE: Well nourished, well developed, in no acute distress.    SKIN:  There is turner orbital perinasal and perioral skin irritation noted  HEAD: Normocephalic, atraumatic.  EYES: PERRL. EOMI.  Conjunctivae without injection and  anicteric  NEUROLOGIC:       Normal speech development.      Hearing normal.    PSYCHIATRIC: Patient is alert and oriented x3.  Thought processes are all normal.  There is no homicidality.  There is no suicidality.  There is no evidence of psychosis.    LABORATORY/RADIOLOGY:   Chart reviewed.      ASSESSMENT:   Dermatitis  Twenty-two week pregnancy    PLAN:  Follow up OB  OTC hydrocortisone  Dermatology  Answers for HPI/ROS submitted by the patient on 12/29/2020   Rash  Chronicity: new  Onset: in the past 7 days  Progression since onset: gradually worsening  Affected locations: face  Characteristics: itchiness  Exposed to: nothing  anorexia: No  congestion: No  cough: No  diarrhea: No  eye pain: No  facial edema: Yes  fatigue: No  fever: No  joint pain: No  nail changes: No  rhinorrhea: No  shortness of breath: No  sore throat: No  vomiting: No  Treatments tried: moisturizer  Improvement on treatment: no relief  asthma: No  allergies: Yes  eczema: No  varicella: Yes

## 2021-01-05 ENCOUNTER — PROCEDURE VISIT (OUTPATIENT)
Dept: MATERNAL FETAL MEDICINE | Facility: CLINIC | Age: 36
End: 2021-01-05
Payer: COMMERCIAL

## 2021-01-05 PROCEDURE — 76816 OB US FOLLOW-UP PER FETUS: CPT | Mod: S$GLB,,, | Performed by: OBSTETRICS & GYNECOLOGY

## 2021-01-05 PROCEDURE — 76816 PR  US,PREGNANT UTERUS,F/U,TRANSABD APP: ICD-10-PCS | Mod: S$GLB,,, | Performed by: OBSTETRICS & GYNECOLOGY

## 2021-01-06 ENCOUNTER — CLINICAL SUPPORT (OUTPATIENT)
Dept: REHABILITATION | Facility: OTHER | Age: 36
End: 2021-01-06
Attending: FAMILY MEDICINE
Payer: COMMERCIAL

## 2021-01-06 DIAGNOSIS — M51.36 DDD (DEGENERATIVE DISC DISEASE), LUMBAR: Primary | ICD-10-CM

## 2021-01-06 PROCEDURE — 97110 THERAPEUTIC EXERCISES: CPT | Mod: CQ

## 2021-01-07 ENCOUNTER — PATIENT MESSAGE (OUTPATIENT)
Dept: REHABILITATION | Facility: OTHER | Age: 36
End: 2021-01-07

## 2021-01-13 ENCOUNTER — ROUTINE PRENATAL (OUTPATIENT)
Dept: OBSTETRICS AND GYNECOLOGY | Facility: CLINIC | Age: 36
End: 2021-01-13
Attending: OBSTETRICS & GYNECOLOGY
Payer: COMMERCIAL

## 2021-01-13 ENCOUNTER — LAB VISIT (OUTPATIENT)
Dept: LAB | Facility: OTHER | Age: 36
End: 2021-01-13
Payer: COMMERCIAL

## 2021-01-13 VITALS
SYSTOLIC BLOOD PRESSURE: 118 MMHG | BODY MASS INDEX: 29.52 KG/M2 | DIASTOLIC BLOOD PRESSURE: 78 MMHG | WEIGHT: 171.94 LBS

## 2021-01-13 DIAGNOSIS — Z34.80 SUPERVISION OF OTHER NORMAL PREGNANCY, ANTEPARTUM: Primary | ICD-10-CM

## 2021-01-13 DIAGNOSIS — Z23 NEED FOR DIPHTHERIA-TETANUS-PERTUSSIS (TDAP) VACCINE: ICD-10-CM

## 2021-01-13 DIAGNOSIS — Z34.80 SUPERVISION OF OTHER NORMAL PREGNANCY, ANTEPARTUM: ICD-10-CM

## 2021-01-13 LAB
BASOPHILS # BLD AUTO: 0.03 K/UL (ref 0–0.2)
BASOPHILS NFR BLD: 0.3 % (ref 0–1.9)
DIFFERENTIAL METHOD: ABNORMAL
EOSINOPHIL # BLD AUTO: 0.1 K/UL (ref 0–0.5)
EOSINOPHIL NFR BLD: 0.9 % (ref 0–8)
ERYTHROCYTE [DISTWIDTH] IN BLOOD BY AUTOMATED COUNT: 12.5 % (ref 11.5–14.5)
GLUCOSE SERPL-MCNC: 90 MG/DL (ref 70–140)
HCT VFR BLD AUTO: 37.1 % (ref 37–48.5)
HGB BLD-MCNC: 12.1 G/DL (ref 12–16)
IMM GRANULOCYTES # BLD AUTO: 0.03 K/UL (ref 0–0.04)
IMM GRANULOCYTES NFR BLD AUTO: 0.3 % (ref 0–0.5)
LYMPHOCYTES # BLD AUTO: 1.5 K/UL (ref 1–4.8)
LYMPHOCYTES NFR BLD: 15.6 % (ref 18–48)
MCH RBC QN AUTO: 30.1 PG (ref 27–31)
MCHC RBC AUTO-ENTMCNC: 32.6 G/DL (ref 32–36)
MCV RBC AUTO: 92 FL (ref 82–98)
MONOCYTES # BLD AUTO: 0.4 K/UL (ref 0.3–1)
MONOCYTES NFR BLD: 4.3 % (ref 4–15)
NEUTROPHILS # BLD AUTO: 7.4 K/UL (ref 1.8–7.7)
NEUTROPHILS NFR BLD: 78.6 % (ref 38–73)
NRBC BLD-RTO: 0 /100 WBC
PLATELET # BLD AUTO: 219 K/UL (ref 150–350)
PMV BLD AUTO: 10.4 FL (ref 9.2–12.9)
RBC # BLD AUTO: 4.02 M/UL (ref 4–5.4)
WBC # BLD AUTO: 9.4 K/UL (ref 3.9–12.7)

## 2021-01-13 PROCEDURE — 82950 GLUCOSE TEST: CPT

## 2021-01-13 PROCEDURE — 0502F SUBSEQUENT PRENATAL CARE: CPT | Mod: CPTII,S$GLB,, | Performed by: NURSE PRACTITIONER

## 2021-01-13 PROCEDURE — 85025 COMPLETE CBC W/AUTO DIFF WBC: CPT

## 2021-01-13 PROCEDURE — 36415 COLL VENOUS BLD VENIPUNCTURE: CPT

## 2021-01-13 PROCEDURE — 0502F PR SUBSEQUENT PRENATAL CARE: ICD-10-PCS | Mod: CPTII,S$GLB,, | Performed by: NURSE PRACTITIONER

## 2021-01-13 PROCEDURE — 99999 PR PBB SHADOW E&M-EST. PATIENT-LVL II: CPT | Mod: PBBFAC,,, | Performed by: NURSE PRACTITIONER

## 2021-01-13 PROCEDURE — 99999 PR PBB SHADOW E&M-EST. PATIENT-LVL II: ICD-10-PCS | Mod: PBBFAC,,, | Performed by: NURSE PRACTITIONER

## 2021-02-12 ENCOUNTER — ROUTINE PRENATAL (OUTPATIENT)
Dept: OBSTETRICS AND GYNECOLOGY | Facility: CLINIC | Age: 36
End: 2021-02-12
Attending: OBSTETRICS & GYNECOLOGY
Payer: COMMERCIAL

## 2021-02-12 ENCOUNTER — CLINICAL SUPPORT (OUTPATIENT)
Dept: OBSTETRICS AND GYNECOLOGY | Facility: CLINIC | Age: 36
End: 2021-02-12
Payer: COMMERCIAL

## 2021-02-12 VITALS
SYSTOLIC BLOOD PRESSURE: 122 MMHG | BODY MASS INDEX: 30.58 KG/M2 | WEIGHT: 178.13 LBS | DIASTOLIC BLOOD PRESSURE: 78 MMHG

## 2021-02-12 DIAGNOSIS — Z34.90 PREGNANCY WITH ONE FETUS, ANTEPARTUM: ICD-10-CM

## 2021-02-12 DIAGNOSIS — Z23 NEED FOR DIPHTHERIA-TETANUS-PERTUSSIS (TDAP) VACCINE: Primary | ICD-10-CM

## 2021-02-12 PROCEDURE — 0502F PR SUBSEQUENT PRENATAL CARE: ICD-10-PCS | Mod: CPTII,S$GLB,, | Performed by: OBSTETRICS & GYNECOLOGY

## 2021-02-12 PROCEDURE — 90715 TDAP VACCINE 7 YRS/> IM: CPT | Mod: S$GLB,,, | Performed by: NURSE PRACTITIONER

## 2021-02-12 PROCEDURE — 99999 PR PBB SHADOW E&M-EST. PATIENT-LVL II: CPT | Mod: PBBFAC,,, | Performed by: OBSTETRICS & GYNECOLOGY

## 2021-02-12 PROCEDURE — 90715 TDAP VACCINE GREATER THAN OR EQUAL TO 7YO IM: ICD-10-PCS | Mod: S$GLB,,, | Performed by: NURSE PRACTITIONER

## 2021-02-12 PROCEDURE — 99999 PR PBB SHADOW E&M-EST. PATIENT-LVL I: CPT | Mod: PBBFAC,,,

## 2021-02-12 PROCEDURE — 0502F SUBSEQUENT PRENATAL CARE: CPT | Mod: CPTII,S$GLB,, | Performed by: OBSTETRICS & GYNECOLOGY

## 2021-02-12 PROCEDURE — 99999 PR PBB SHADOW E&M-EST. PATIENT-LVL I: ICD-10-PCS | Mod: PBBFAC,,,

## 2021-02-12 PROCEDURE — 99999 PR PBB SHADOW E&M-EST. PATIENT-LVL II: ICD-10-PCS | Mod: PBBFAC,,, | Performed by: OBSTETRICS & GYNECOLOGY

## 2021-02-12 PROCEDURE — 90471 TDAP VACCINE GREATER THAN OR EQUAL TO 7YO IM: ICD-10-PCS | Mod: S$GLB,,, | Performed by: NURSE PRACTITIONER

## 2021-02-12 PROCEDURE — 90471 IMMUNIZATION ADMIN: CPT | Mod: S$GLB,,, | Performed by: NURSE PRACTITIONER

## 2021-02-25 ENCOUNTER — IMMUNIZATION (OUTPATIENT)
Dept: PHARMACY | Facility: CLINIC | Age: 36
End: 2021-02-25
Payer: COMMERCIAL

## 2021-02-25 DIAGNOSIS — Z23 NEED FOR VACCINATION: Primary | ICD-10-CM

## 2021-03-03 ENCOUNTER — OFFICE VISIT (OUTPATIENT)
Dept: INTERNAL MEDICINE | Facility: CLINIC | Age: 36
End: 2021-03-03
Payer: COMMERCIAL

## 2021-03-03 VITALS
DIASTOLIC BLOOD PRESSURE: 82 MMHG | HEART RATE: 99 BPM | OXYGEN SATURATION: 98 % | HEIGHT: 64 IN | SYSTOLIC BLOOD PRESSURE: 135 MMHG | WEIGHT: 182.75 LBS | BODY MASS INDEX: 31.2 KG/M2

## 2021-03-03 DIAGNOSIS — Z13.6 ENCOUNTER FOR LIPID SCREENING FOR CARDIOVASCULAR DISEASE: ICD-10-CM

## 2021-03-03 DIAGNOSIS — Z11.59 NEED FOR HEPATITIS C SCREENING TEST: ICD-10-CM

## 2021-03-03 DIAGNOSIS — G89.29 CHRONIC LOW BACK PAIN, UNSPECIFIED BACK PAIN LATERALITY, UNSPECIFIED WHETHER SCIATICA PRESENT: ICD-10-CM

## 2021-03-03 DIAGNOSIS — Z00.00 ANNUAL PHYSICAL EXAM: Primary | ICD-10-CM

## 2021-03-03 DIAGNOSIS — M54.50 CHRONIC LOW BACK PAIN, UNSPECIFIED BACK PAIN LATERALITY, UNSPECIFIED WHETHER SCIATICA PRESENT: ICD-10-CM

## 2021-03-03 DIAGNOSIS — Z80.8 FAMILY HISTORY OF SKIN CANCER: ICD-10-CM

## 2021-03-03 DIAGNOSIS — Z13.220 ENCOUNTER FOR LIPID SCREENING FOR CARDIOVASCULAR DISEASE: ICD-10-CM

## 2021-03-03 PROCEDURE — 99999 PR PBB SHADOW E&M-EST. PATIENT-LVL IV: ICD-10-PCS | Mod: PBBFAC,,, | Performed by: INTERNAL MEDICINE

## 2021-03-03 PROCEDURE — 3008F PR BODY MASS INDEX (BMI) DOCUMENTED: ICD-10-PCS | Mod: CPTII,S$GLB,, | Performed by: INTERNAL MEDICINE

## 2021-03-03 PROCEDURE — 99395 PR PREVENTIVE VISIT,EST,18-39: ICD-10-PCS | Mod: S$GLB,,, | Performed by: INTERNAL MEDICINE

## 2021-03-03 PROCEDURE — 99395 PREV VISIT EST AGE 18-39: CPT | Mod: S$GLB,,, | Performed by: INTERNAL MEDICINE

## 2021-03-03 PROCEDURE — 3008F BODY MASS INDEX DOCD: CPT | Mod: CPTII,S$GLB,, | Performed by: INTERNAL MEDICINE

## 2021-03-03 PROCEDURE — 1126F PR PAIN SEVERITY QUANTIFIED, NO PAIN PRESENT: ICD-10-PCS | Mod: S$GLB,,, | Performed by: INTERNAL MEDICINE

## 2021-03-03 PROCEDURE — 1126F AMNT PAIN NOTED NONE PRSNT: CPT | Mod: S$GLB,,, | Performed by: INTERNAL MEDICINE

## 2021-03-03 PROCEDURE — 99999 PR PBB SHADOW E&M-EST. PATIENT-LVL IV: CPT | Mod: PBBFAC,,, | Performed by: INTERNAL MEDICINE

## 2021-03-05 ENCOUNTER — ROUTINE PRENATAL (OUTPATIENT)
Dept: OBSTETRICS AND GYNECOLOGY | Facility: CLINIC | Age: 36
End: 2021-03-05
Attending: OBSTETRICS & GYNECOLOGY
Payer: COMMERCIAL

## 2021-03-05 ENCOUNTER — PATIENT MESSAGE (OUTPATIENT)
Dept: ADMINISTRATIVE | Facility: OTHER | Age: 36
End: 2021-03-05

## 2021-03-05 VITALS
SYSTOLIC BLOOD PRESSURE: 110 MMHG | BODY MASS INDEX: 30.84 KG/M2 | DIASTOLIC BLOOD PRESSURE: 70 MMHG | WEIGHT: 179.69 LBS

## 2021-03-05 DIAGNOSIS — Z34.90 PREGNANCY WITH ONE FETUS, ANTEPARTUM: Primary | ICD-10-CM

## 2021-03-05 PROCEDURE — 99999 PR PBB SHADOW E&M-EST. PATIENT-LVL II: ICD-10-PCS | Mod: PBBFAC,,, | Performed by: OBSTETRICS & GYNECOLOGY

## 2021-03-05 PROCEDURE — 0502F SUBSEQUENT PRENATAL CARE: CPT | Mod: CPTII,S$GLB,, | Performed by: OBSTETRICS & GYNECOLOGY

## 2021-03-05 PROCEDURE — 99999 PR PBB SHADOW E&M-EST. PATIENT-LVL II: CPT | Mod: PBBFAC,,, | Performed by: OBSTETRICS & GYNECOLOGY

## 2021-03-05 PROCEDURE — 0502F PR SUBSEQUENT PRENATAL CARE: ICD-10-PCS | Mod: CPTII,S$GLB,, | Performed by: OBSTETRICS & GYNECOLOGY

## 2021-03-15 ENCOUNTER — ROUTINE PRENATAL (OUTPATIENT)
Dept: OBSTETRICS AND GYNECOLOGY | Facility: CLINIC | Age: 36
End: 2021-03-15
Payer: COMMERCIAL

## 2021-03-15 VITALS
BODY MASS INDEX: 31.11 KG/M2 | DIASTOLIC BLOOD PRESSURE: 74 MMHG | SYSTOLIC BLOOD PRESSURE: 122 MMHG | WEIGHT: 181.19 LBS

## 2021-03-15 DIAGNOSIS — Z3A.33 33 WEEKS GESTATION OF PREGNANCY: Primary | ICD-10-CM

## 2021-03-15 PROCEDURE — 0502F SUBSEQUENT PRENATAL CARE: CPT | Mod: CPTII,S$GLB,, | Performed by: NURSE PRACTITIONER

## 2021-03-15 PROCEDURE — 99999 PR PBB SHADOW E&M-EST. PATIENT-LVL III: ICD-10-PCS | Mod: PBBFAC,,, | Performed by: NURSE PRACTITIONER

## 2021-03-15 PROCEDURE — 99999 PR PBB SHADOW E&M-EST. PATIENT-LVL III: CPT | Mod: PBBFAC,,, | Performed by: NURSE PRACTITIONER

## 2021-03-15 PROCEDURE — 0502F PR SUBSEQUENT PRENATAL CARE: ICD-10-PCS | Mod: CPTII,S$GLB,, | Performed by: NURSE PRACTITIONER

## 2021-03-19 ENCOUNTER — PATIENT MESSAGE (OUTPATIENT)
Dept: OBSTETRICS AND GYNECOLOGY | Facility: CLINIC | Age: 36
End: 2021-03-19

## 2021-03-24 ENCOUNTER — ROUTINE PRENATAL (OUTPATIENT)
Dept: OBSTETRICS AND GYNECOLOGY | Facility: CLINIC | Age: 36
End: 2021-03-24
Attending: OBSTETRICS & GYNECOLOGY
Payer: COMMERCIAL

## 2021-03-24 VITALS
BODY MASS INDEX: 31.37 KG/M2 | WEIGHT: 182.75 LBS | DIASTOLIC BLOOD PRESSURE: 76 MMHG | SYSTOLIC BLOOD PRESSURE: 112 MMHG

## 2021-03-24 DIAGNOSIS — R09.81 SINUS CONGESTION: Primary | ICD-10-CM

## 2021-03-24 DIAGNOSIS — Z34.90 PREGNANCY WITH ONE FETUS, ANTEPARTUM: ICD-10-CM

## 2021-03-24 PROCEDURE — 0502F PR SUBSEQUENT PRENATAL CARE: ICD-10-PCS | Mod: CPTII,S$GLB,, | Performed by: OBSTETRICS & GYNECOLOGY

## 2021-03-24 PROCEDURE — 0502F SUBSEQUENT PRENATAL CARE: CPT | Mod: CPTII,S$GLB,, | Performed by: OBSTETRICS & GYNECOLOGY

## 2021-03-24 PROCEDURE — 99999 PR PBB SHADOW E&M-EST. PATIENT-LVL II: ICD-10-PCS | Mod: PBBFAC,,, | Performed by: OBSTETRICS & GYNECOLOGY

## 2021-03-24 PROCEDURE — 99999 PR PBB SHADOW E&M-EST. PATIENT-LVL II: CPT | Mod: PBBFAC,,, | Performed by: OBSTETRICS & GYNECOLOGY

## 2021-03-24 RX ORDER — AZITHROMYCIN 250 MG/1
TABLET, FILM COATED ORAL
Qty: 6 TABLET | Refills: 0 | Status: SHIPPED | OUTPATIENT
Start: 2021-03-24 | End: 2021-03-29

## 2021-03-24 RX ORDER — FLUTICASONE PROPIONATE 50 MCG
1 SPRAY, SUSPENSION (ML) NASAL DAILY
Qty: 15.8 ML | Refills: 0 | Status: SHIPPED | OUTPATIENT
Start: 2021-03-24 | End: 2022-03-22

## 2021-03-25 ENCOUNTER — IMMUNIZATION (OUTPATIENT)
Dept: PHARMACY | Facility: CLINIC | Age: 36
End: 2021-03-25
Payer: COMMERCIAL

## 2021-03-25 DIAGNOSIS — Z23 NEED FOR VACCINATION: Primary | ICD-10-CM

## 2021-03-31 ENCOUNTER — LAB VISIT (OUTPATIENT)
Dept: LAB | Facility: OTHER | Age: 36
End: 2021-03-31
Attending: OBSTETRICS & GYNECOLOGY
Payer: COMMERCIAL

## 2021-03-31 ENCOUNTER — ROUTINE PRENATAL (OUTPATIENT)
Dept: OBSTETRICS AND GYNECOLOGY | Facility: CLINIC | Age: 36
End: 2021-03-31
Attending: OBSTETRICS & GYNECOLOGY
Payer: COMMERCIAL

## 2021-03-31 VITALS
SYSTOLIC BLOOD PRESSURE: 118 MMHG | BODY MASS INDEX: 31.11 KG/M2 | WEIGHT: 181.19 LBS | DIASTOLIC BLOOD PRESSURE: 80 MMHG

## 2021-03-31 DIAGNOSIS — Z34.90 PREGNANCY WITH ONE FETUS, ANTEPARTUM: ICD-10-CM

## 2021-03-31 DIAGNOSIS — Z34.93 PREGNANCY WITH ONE FETUS, THIRD TRIMESTER: ICD-10-CM

## 2021-03-31 DIAGNOSIS — R10.2 PELVIC PAIN AFFECTING PREGNANCY IN THIRD TRIMESTER, ANTEPARTUM: Primary | ICD-10-CM

## 2021-03-31 DIAGNOSIS — O26.893 PELVIC PAIN AFFECTING PREGNANCY IN THIRD TRIMESTER, ANTEPARTUM: Primary | ICD-10-CM

## 2021-03-31 LAB
BASOPHILS # BLD AUTO: 0.03 K/UL (ref 0–0.2)
BASOPHILS NFR BLD: 0.3 % (ref 0–1.9)
DIFFERENTIAL METHOD: ABNORMAL
EOSINOPHIL # BLD AUTO: 0.1 K/UL (ref 0–0.5)
EOSINOPHIL NFR BLD: 0.7 % (ref 0–8)
ERYTHROCYTE [DISTWIDTH] IN BLOOD BY AUTOMATED COUNT: 13.9 % (ref 11.5–14.5)
HCT VFR BLD AUTO: 37.1 % (ref 37–48.5)
HGB BLD-MCNC: 11.6 G/DL (ref 12–16)
IMM GRANULOCYTES # BLD AUTO: 0.02 K/UL (ref 0–0.04)
IMM GRANULOCYTES NFR BLD AUTO: 0.2 % (ref 0–0.5)
LYMPHOCYTES # BLD AUTO: 2.1 K/UL (ref 1–4.8)
LYMPHOCYTES NFR BLD: 20.2 % (ref 18–48)
MCH RBC QN AUTO: 27.1 PG (ref 27–31)
MCHC RBC AUTO-ENTMCNC: 31.3 G/DL (ref 32–36)
MCV RBC AUTO: 87 FL (ref 82–98)
MONOCYTES # BLD AUTO: 0.6 K/UL (ref 0.3–1)
MONOCYTES NFR BLD: 5.8 % (ref 4–15)
NEUTROPHILS # BLD AUTO: 7.6 K/UL (ref 1.8–7.7)
NEUTROPHILS NFR BLD: 72.8 % (ref 38–73)
NRBC BLD-RTO: 0 /100 WBC
PLATELET # BLD AUTO: 254 K/UL (ref 150–450)
PMV BLD AUTO: 10.2 FL (ref 9.2–12.9)
RBC # BLD AUTO: 4.28 M/UL (ref 4–5.4)
WBC # BLD AUTO: 10.43 K/UL (ref 3.9–12.7)

## 2021-03-31 PROCEDURE — 0502F SUBSEQUENT PRENATAL CARE: CPT | Mod: CPTII,S$GLB,, | Performed by: OBSTETRICS & GYNECOLOGY

## 2021-03-31 PROCEDURE — 86592 SYPHILIS TEST NON-TREP QUAL: CPT | Performed by: OBSTETRICS & GYNECOLOGY

## 2021-03-31 PROCEDURE — 0502F PR SUBSEQUENT PRENATAL CARE: ICD-10-PCS | Mod: CPTII,S$GLB,, | Performed by: OBSTETRICS & GYNECOLOGY

## 2021-03-31 PROCEDURE — 36415 COLL VENOUS BLD VENIPUNCTURE: CPT | Performed by: OBSTETRICS & GYNECOLOGY

## 2021-03-31 PROCEDURE — 99999 PR PBB SHADOW E&M-EST. PATIENT-LVL II: CPT | Mod: PBBFAC,,, | Performed by: OBSTETRICS & GYNECOLOGY

## 2021-03-31 PROCEDURE — 99999 PR PBB SHADOW E&M-EST. PATIENT-LVL II: ICD-10-PCS | Mod: PBBFAC,,, | Performed by: OBSTETRICS & GYNECOLOGY

## 2021-03-31 PROCEDURE — 85025 COMPLETE CBC W/AUTO DIFF WBC: CPT | Performed by: OBSTETRICS & GYNECOLOGY

## 2021-03-31 PROCEDURE — 86703 HIV-1/HIV-2 1 RESULT ANTBDY: CPT | Performed by: OBSTETRICS & GYNECOLOGY

## 2021-04-01 ENCOUNTER — TELEPHONE (OUTPATIENT)
Dept: OBSTETRICS AND GYNECOLOGY | Facility: CLINIC | Age: 36
End: 2021-04-01

## 2021-04-01 LAB
HIV 1+2 AB+HIV1 P24 AG SERPL QL IA: NEGATIVE
RPR SER QL: NORMAL

## 2021-04-03 ENCOUNTER — HOSPITAL ENCOUNTER (EMERGENCY)
Facility: OTHER | Age: 36
Discharge: HOME | End: 2021-04-04
Attending: OBSTETRICS & GYNECOLOGY
Payer: COMMERCIAL

## 2021-04-03 DIAGNOSIS — R03.0 ELEVATED BLOOD PRESSURE READING: ICD-10-CM

## 2021-04-03 DIAGNOSIS — R11.2 NAUSEA AND VOMITING, INTRACTABILITY OF VOMITING NOT SPECIFIED, UNSPECIFIED VOMITING TYPE: Primary | ICD-10-CM

## 2021-04-03 LAB
ALBUMIN SERPL BCP-MCNC: 3 G/DL (ref 3.5–5.2)
ALP SERPL-CCNC: 218 U/L (ref 55–135)
ALT SERPL W/O P-5'-P-CCNC: 11 U/L (ref 10–44)
ANION GAP SERPL CALC-SCNC: 12 MMOL/L (ref 8–16)
AST SERPL-CCNC: 18 U/L (ref 10–40)
BASOPHILS # BLD AUTO: 0.03 K/UL (ref 0–0.2)
BASOPHILS NFR BLD: 0.2 % (ref 0–1.9)
BILIRUB SERPL-MCNC: 0.5 MG/DL (ref 0.1–1)
BILIRUB SERPL-MCNC: NORMAL MG/DL
BLOOD URINE, POC: NORMAL
BUN SERPL-MCNC: 4 MG/DL (ref 6–20)
CALCIUM SERPL-MCNC: 9 MG/DL (ref 8.7–10.5)
CHLORIDE SERPL-SCNC: 106 MMOL/L (ref 95–110)
CO2 SERPL-SCNC: 20 MMOL/L (ref 23–29)
COLOR, POC UA: NORMAL
CREAT SERPL-MCNC: 0.7 MG/DL (ref 0.5–1.4)
DIFFERENTIAL METHOD: ABNORMAL
EOSINOPHIL # BLD AUTO: 0 K/UL (ref 0–0.5)
EOSINOPHIL NFR BLD: 0.3 % (ref 0–8)
ERYTHROCYTE [DISTWIDTH] IN BLOOD BY AUTOMATED COUNT: 13.9 % (ref 11.5–14.5)
EST. GFR  (AFRICAN AMERICAN): >60 ML/MIN/1.73 M^2
EST. GFR  (NON AFRICAN AMERICAN): >60 ML/MIN/1.73 M^2
GLUCOSE SERPL-MCNC: 83 MG/DL (ref 70–110)
GLUCOSE UR QL STRIP: NORMAL
HCT VFR BLD AUTO: 39 % (ref 37–48.5)
HGB BLD-MCNC: 12.5 G/DL (ref 12–16)
IMM GRANULOCYTES # BLD AUTO: 0.06 K/UL (ref 0–0.04)
IMM GRANULOCYTES NFR BLD AUTO: 0.4 % (ref 0–0.5)
KETONES UR QL STRIP: NORMAL
LEUKOCYTE ESTERASE URINE, POC: NORMAL
LYMPHOCYTES # BLD AUTO: 1.2 K/UL (ref 1–4.8)
LYMPHOCYTES NFR BLD: 7.7 % (ref 18–48)
MCH RBC QN AUTO: 27.2 PG (ref 27–31)
MCHC RBC AUTO-ENTMCNC: 32.1 G/DL (ref 32–36)
MCV RBC AUTO: 85 FL (ref 82–98)
MONOCYTES # BLD AUTO: 0.7 K/UL (ref 0.3–1)
MONOCYTES NFR BLD: 4.4 % (ref 4–15)
NEUTROPHILS # BLD AUTO: 13.1 K/UL (ref 1.8–7.7)
NEUTROPHILS NFR BLD: 87 % (ref 38–73)
NITRITE, POC UA: NORMAL
NRBC BLD-RTO: 0 /100 WBC
PH, POC UA: 6
PLATELET # BLD AUTO: 260 K/UL (ref 150–450)
PMV BLD AUTO: 9.8 FL (ref 9.2–12.9)
POTASSIUM SERPL-SCNC: 3.6 MMOL/L (ref 3.5–5.1)
PROT SERPL-MCNC: 7 G/DL (ref 6–8.4)
PROTEIN, POC: NORMAL
RBC # BLD AUTO: 4.59 M/UL (ref 4–5.4)
SODIUM SERPL-SCNC: 138 MMOL/L (ref 136–145)
SPECIFIC GRAVITY, POC UA: 1
UROBILINOGEN, POC UA: NORMAL
WBC # BLD AUTO: 15.01 K/UL (ref 3.9–12.7)

## 2021-04-03 PROCEDURE — 59025 FETAL NON-STRESS TEST: CPT

## 2021-04-03 PROCEDURE — 80053 COMPREHEN METABOLIC PANEL: CPT | Performed by: STUDENT IN AN ORGANIZED HEALTH CARE EDUCATION/TRAINING PROGRAM

## 2021-04-03 PROCEDURE — 99284 PR EMERGENCY DEPT VISIT,LEVEL IV: ICD-10-PCS | Mod: 25,,, | Performed by: OBSTETRICS & GYNECOLOGY

## 2021-04-03 PROCEDURE — 59025 PR FETAL 2N-STRESS TEST: ICD-10-PCS | Mod: 26,,, | Performed by: OBSTETRICS & GYNECOLOGY

## 2021-04-03 PROCEDURE — 59025 FETAL NON-STRESS TEST: CPT | Mod: 26,,, | Performed by: OBSTETRICS & GYNECOLOGY

## 2021-04-03 PROCEDURE — 85025 COMPLETE CBC W/AUTO DIFF WBC: CPT | Performed by: STUDENT IN AN ORGANIZED HEALTH CARE EDUCATION/TRAINING PROGRAM

## 2021-04-03 PROCEDURE — 99284 EMERGENCY DEPT VISIT MOD MDM: CPT | Mod: 25,,, | Performed by: OBSTETRICS & GYNECOLOGY

## 2021-04-03 PROCEDURE — 99284 EMERGENCY DEPT VISIT MOD MDM: CPT | Mod: 25

## 2021-04-03 RX ORDER — ONDANSETRON 4 MG/1
4 TABLET, FILM COATED ORAL ONCE
Status: COMPLETED | OUTPATIENT
Start: 2021-04-04 | End: 2021-04-04

## 2021-04-04 VITALS
TEMPERATURE: 98 F | OXYGEN SATURATION: 99 % | HEART RATE: 90 BPM | RESPIRATION RATE: 18 BRPM | BODY MASS INDEX: 30.93 KG/M2 | DIASTOLIC BLOOD PRESSURE: 56 MMHG | SYSTOLIC BLOOD PRESSURE: 108 MMHG | HEIGHT: 64 IN | WEIGHT: 181.19 LBS

## 2021-04-04 PROCEDURE — 25000003 PHARM REV CODE 250: Performed by: STUDENT IN AN ORGANIZED HEALTH CARE EDUCATION/TRAINING PROGRAM

## 2021-04-04 RX ORDER — ONDANSETRON 4 MG/1
4 TABLET, ORALLY DISINTEGRATING ORAL EVERY 6 HOURS PRN
Qty: 20 TABLET | Refills: 1 | Status: SHIPPED | OUTPATIENT
Start: 2021-04-04 | End: 2021-05-04

## 2021-04-04 RX ADMIN — ONDANSETRON HYDROCHLORIDE 4 MG: 4 TABLET, FILM COATED ORAL at 12:04

## 2021-04-07 ENCOUNTER — ROUTINE PRENATAL (OUTPATIENT)
Dept: OBSTETRICS AND GYNECOLOGY | Facility: CLINIC | Age: 36
End: 2021-04-07
Attending: OBSTETRICS & GYNECOLOGY
Payer: COMMERCIAL

## 2021-04-07 VITALS
WEIGHT: 181.44 LBS | DIASTOLIC BLOOD PRESSURE: 76 MMHG | SYSTOLIC BLOOD PRESSURE: 124 MMHG | BODY MASS INDEX: 31.14 KG/M2

## 2021-04-07 DIAGNOSIS — Z34.90 TERM PREGNANCY: ICD-10-CM

## 2021-04-07 DIAGNOSIS — Z34.93 PREGNANCY WITH ONE FETUS IN THIRD TRIMESTER: Primary | ICD-10-CM

## 2021-04-07 PROCEDURE — 99999 PR PBB SHADOW E&M-EST. PATIENT-LVL II: CPT | Mod: PBBFAC,,, | Performed by: OBSTETRICS & GYNECOLOGY

## 2021-04-07 PROCEDURE — 87081 CULTURE SCREEN ONLY: CPT | Performed by: OBSTETRICS & GYNECOLOGY

## 2021-04-07 PROCEDURE — 99999 PR PBB SHADOW E&M-EST. PATIENT-LVL II: ICD-10-PCS | Mod: PBBFAC,,, | Performed by: OBSTETRICS & GYNECOLOGY

## 2021-04-07 PROCEDURE — 0502F PR SUBSEQUENT PRENATAL CARE: ICD-10-PCS | Mod: CPTII,S$GLB,, | Performed by: OBSTETRICS & GYNECOLOGY

## 2021-04-07 PROCEDURE — 0502F SUBSEQUENT PRENATAL CARE: CPT | Mod: CPTII,S$GLB,, | Performed by: OBSTETRICS & GYNECOLOGY

## 2021-04-12 LAB — BACTERIA SPEC AEROBE CULT: NORMAL

## 2021-04-16 ENCOUNTER — ROUTINE PRENATAL (OUTPATIENT)
Dept: OBSTETRICS AND GYNECOLOGY | Facility: CLINIC | Age: 36
End: 2021-04-16
Attending: OBSTETRICS & GYNECOLOGY
Payer: COMMERCIAL

## 2021-04-16 VITALS — SYSTOLIC BLOOD PRESSURE: 126 MMHG | DIASTOLIC BLOOD PRESSURE: 70 MMHG | BODY MASS INDEX: 31.07 KG/M2 | WEIGHT: 181 LBS

## 2021-04-16 DIAGNOSIS — Z3A.38 38 WEEKS GESTATION OF PREGNANCY: Primary | ICD-10-CM

## 2021-04-16 PROCEDURE — 0502F PR SUBSEQUENT PRENATAL CARE: ICD-10-PCS | Mod: CPTII,S$GLB,, | Performed by: NURSE PRACTITIONER

## 2021-04-16 PROCEDURE — 99999 PR PBB SHADOW E&M-EST. PATIENT-LVL II: CPT | Mod: PBBFAC,,, | Performed by: NURSE PRACTITIONER

## 2021-04-16 PROCEDURE — 99999 PR PBB SHADOW E&M-EST. PATIENT-LVL II: ICD-10-PCS | Mod: PBBFAC,,, | Performed by: NURSE PRACTITIONER

## 2021-04-16 PROCEDURE — 0502F SUBSEQUENT PRENATAL CARE: CPT | Mod: CPTII,S$GLB,, | Performed by: NURSE PRACTITIONER

## 2021-04-21 ENCOUNTER — ROUTINE PRENATAL (OUTPATIENT)
Dept: OBSTETRICS AND GYNECOLOGY | Facility: CLINIC | Age: 36
End: 2021-04-21
Attending: OBSTETRICS & GYNECOLOGY
Payer: COMMERCIAL

## 2021-04-21 VITALS — BODY MASS INDEX: 31.07 KG/M2 | DIASTOLIC BLOOD PRESSURE: 66 MMHG | SYSTOLIC BLOOD PRESSURE: 124 MMHG | WEIGHT: 181 LBS

## 2021-04-21 DIAGNOSIS — Z34.90 PREGNANCY WITH ONE FETUS, ANTEPARTUM: Primary | ICD-10-CM

## 2021-04-21 PROCEDURE — 0502F SUBSEQUENT PRENATAL CARE: CPT | Mod: CPTII,S$GLB,, | Performed by: OBSTETRICS & GYNECOLOGY

## 2021-04-21 PROCEDURE — 0502F PR SUBSEQUENT PRENATAL CARE: ICD-10-PCS | Mod: CPTII,S$GLB,, | Performed by: OBSTETRICS & GYNECOLOGY

## 2021-04-21 PROCEDURE — 99999 PR PBB SHADOW E&M-EST. PATIENT-LVL II: ICD-10-PCS | Mod: PBBFAC,,, | Performed by: OBSTETRICS & GYNECOLOGY

## 2021-04-21 PROCEDURE — 99999 PR PBB SHADOW E&M-EST. PATIENT-LVL II: CPT | Mod: PBBFAC,,, | Performed by: OBSTETRICS & GYNECOLOGY

## 2021-04-25 ENCOUNTER — PATIENT MESSAGE (OUTPATIENT)
Dept: REHABILITATION | Facility: OTHER | Age: 36
End: 2021-04-25

## 2021-04-28 ENCOUNTER — ROUTINE PRENATAL (OUTPATIENT)
Dept: OBSTETRICS AND GYNECOLOGY | Facility: CLINIC | Age: 36
End: 2021-04-28
Attending: OBSTETRICS & GYNECOLOGY
Payer: COMMERCIAL

## 2021-04-28 VITALS
WEIGHT: 183.44 LBS | DIASTOLIC BLOOD PRESSURE: 70 MMHG | BODY MASS INDEX: 31.48 KG/M2 | SYSTOLIC BLOOD PRESSURE: 112 MMHG

## 2021-04-28 DIAGNOSIS — Z34.90 TERM PREGNANCY: Primary | ICD-10-CM

## 2021-04-28 PROCEDURE — 99999 PR PBB SHADOW E&M-EST. PATIENT-LVL III: CPT | Mod: PBBFAC,,, | Performed by: OBSTETRICS & GYNECOLOGY

## 2021-04-28 PROCEDURE — 0502F SUBSEQUENT PRENATAL CARE: CPT | Mod: CPTII,S$GLB,, | Performed by: OBSTETRICS & GYNECOLOGY

## 2021-04-28 PROCEDURE — 0502F PR SUBSEQUENT PRENATAL CARE: ICD-10-PCS | Mod: CPTII,S$GLB,, | Performed by: OBSTETRICS & GYNECOLOGY

## 2021-04-28 PROCEDURE — 99999 PR PBB SHADOW E&M-EST. PATIENT-LVL III: ICD-10-PCS | Mod: PBBFAC,,, | Performed by: OBSTETRICS & GYNECOLOGY

## 2021-04-29 ENCOUNTER — HOSPITAL ENCOUNTER (INPATIENT)
Facility: OTHER | Age: 36
LOS: 1 days | Discharge: HOME | End: 2021-04-30
Attending: OBSTETRICS & GYNECOLOGY | Admitting: OBSTETRICS & GYNECOLOGY
Payer: COMMERCIAL

## 2021-04-29 ENCOUNTER — ANESTHESIA EVENT (OUTPATIENT)
Dept: OBSTETRICS AND GYNECOLOGY | Facility: OTHER | Age: 36
End: 2021-04-29
Payer: COMMERCIAL

## 2021-04-29 ENCOUNTER — ANESTHESIA (OUTPATIENT)
Dept: OBSTETRICS AND GYNECOLOGY | Facility: OTHER | Age: 36
End: 2021-04-29
Payer: COMMERCIAL

## 2021-04-29 DIAGNOSIS — Z3A.39 39 WEEKS GESTATION OF PREGNANCY: ICD-10-CM

## 2021-04-29 DIAGNOSIS — Z37.9 NORMAL LABOR: ICD-10-CM

## 2021-04-29 DIAGNOSIS — O13.3 GESTATIONAL HYPERTENSION, THIRD TRIMESTER: ICD-10-CM

## 2021-04-29 LAB
ABO + RH BLD: NORMAL
ALBUMIN SERPL BCP-MCNC: 2.6 G/DL (ref 3.5–5.2)
ALP SERPL-CCNC: 232 U/L (ref 55–135)
ALT SERPL W/O P-5'-P-CCNC: 15 U/L (ref 10–44)
ANION GAP SERPL CALC-SCNC: 10 MMOL/L (ref 8–16)
AST SERPL-CCNC: 19 U/L (ref 10–40)
BASOPHILS # BLD AUTO: 0.03 K/UL (ref 0–0.2)
BASOPHILS NFR BLD: 0.3 % (ref 0–1.9)
BILIRUB SERPL-MCNC: 0.4 MG/DL (ref 0.1–1)
BLD GP AB SCN CELLS X3 SERPL QL: NORMAL
BUN SERPL-MCNC: 7 MG/DL (ref 6–20)
CALCIUM SERPL-MCNC: 8.7 MG/DL (ref 8.7–10.5)
CHLORIDE SERPL-SCNC: 108 MMOL/L (ref 95–110)
CO2 SERPL-SCNC: 20 MMOL/L (ref 23–29)
CREAT SERPL-MCNC: 0.6 MG/DL (ref 0.5–1.4)
CREAT UR-MCNC: 125.2 MG/DL (ref 15–325)
DIFFERENTIAL METHOD: ABNORMAL
EOSINOPHIL # BLD AUTO: 0.1 K/UL (ref 0–0.5)
EOSINOPHIL NFR BLD: 0.4 % (ref 0–8)
ERYTHROCYTE [DISTWIDTH] IN BLOOD BY AUTOMATED COUNT: 15.6 % (ref 11.5–14.5)
EST. GFR  (AFRICAN AMERICAN): >60 ML/MIN/1.73 M^2
EST. GFR  (NON AFRICAN AMERICAN): >60 ML/MIN/1.73 M^2
GLUCOSE SERPL-MCNC: 83 MG/DL (ref 70–110)
HCT VFR BLD AUTO: 37.3 % (ref 37–48.5)
HGB BLD-MCNC: 11.8 G/DL (ref 12–16)
IMM GRANULOCYTES # BLD AUTO: 0.04 K/UL (ref 0–0.04)
IMM GRANULOCYTES NFR BLD AUTO: 0.4 % (ref 0–0.5)
LYMPHOCYTES # BLD AUTO: 1.8 K/UL (ref 1–4.8)
LYMPHOCYTES NFR BLD: 16.1 % (ref 18–48)
MCH RBC QN AUTO: 27.3 PG (ref 27–31)
MCHC RBC AUTO-ENTMCNC: 31.6 G/DL (ref 32–36)
MCV RBC AUTO: 86 FL (ref 82–98)
MONOCYTES # BLD AUTO: 0.5 K/UL (ref 0.3–1)
MONOCYTES NFR BLD: 4.5 % (ref 4–15)
NEUTROPHILS # BLD AUTO: 8.8 K/UL (ref 1.8–7.7)
NEUTROPHILS NFR BLD: 78.3 % (ref 38–73)
NRBC BLD-RTO: 0 /100 WBC
PLATELET # BLD AUTO: 197 K/UL (ref 150–450)
PMV BLD AUTO: 10.7 FL (ref 9.2–12.9)
POTASSIUM SERPL-SCNC: 3.9 MMOL/L (ref 3.5–5.1)
PROT SERPL-MCNC: 6.5 G/DL (ref 6–8.4)
PROT UR-MCNC: 14 MG/DL (ref 0–15)
PROT/CREAT UR: 0.11 MG/G{CREAT} (ref 0–0.2)
RBC # BLD AUTO: 4.33 M/UL (ref 4–5.4)
SARS-COV-2 RDRP RESP QL NAA+PROBE: NEGATIVE
SODIUM SERPL-SCNC: 138 MMOL/L (ref 136–145)
WBC # BLD AUTO: 11.24 K/UL (ref 3.9–12.7)

## 2021-04-29 PROCEDURE — C1751 CATH, INF, PER/CENT/MIDLINE: HCPCS | Performed by: ANESTHESIOLOGY

## 2021-04-29 PROCEDURE — 25000003 PHARM REV CODE 250: Performed by: STUDENT IN AN ORGANIZED HEALTH CARE EDUCATION/TRAINING PROGRAM

## 2021-04-29 PROCEDURE — 62326 NJX INTERLAMINAR LMBR/SAC: CPT | Performed by: STUDENT IN AN ORGANIZED HEALTH CARE EDUCATION/TRAINING PROGRAM

## 2021-04-29 PROCEDURE — 63600175 PHARM REV CODE 636 W HCPCS: Performed by: STUDENT IN AN ORGANIZED HEALTH CARE EDUCATION/TRAINING PROGRAM

## 2021-04-29 PROCEDURE — 11000001 HC ACUTE MED/SURG PRIVATE ROOM

## 2021-04-29 PROCEDURE — 86900 BLOOD TYPING SEROLOGIC ABO: CPT | Performed by: OBSTETRICS & GYNECOLOGY

## 2021-04-29 PROCEDURE — 59409 PRA ETRICAL CARE,VAG DELIV ONLY: ICD-10-PCS | Mod: QY,,, | Performed by: ANESTHESIOLOGY

## 2021-04-29 PROCEDURE — 59400 OBSTETRICAL CARE: CPT | Mod: GB,,, | Performed by: OBSTETRICS & GYNECOLOGY

## 2021-04-29 PROCEDURE — U0002 COVID-19 LAB TEST NON-CDC: HCPCS | Performed by: OBSTETRICS & GYNECOLOGY

## 2021-04-29 PROCEDURE — 59025 FETAL NON-STRESS TEST: CPT

## 2021-04-29 PROCEDURE — 85025 COMPLETE CBC W/AUTO DIFF WBC: CPT | Performed by: OBSTETRICS & GYNECOLOGY

## 2021-04-29 PROCEDURE — 72200005 HC VAGINAL DELIVERY LEVEL II

## 2021-04-29 PROCEDURE — 27200710 HC EPIDURAL INFUSION PUMP SET: Performed by: ANESTHESIOLOGY

## 2021-04-29 PROCEDURE — 99285 EMERGENCY DEPT VISIT HI MDM: CPT | Mod: 25

## 2021-04-29 PROCEDURE — 80053 COMPREHEN METABOLIC PANEL: CPT | Performed by: OBSTETRICS & GYNECOLOGY

## 2021-04-29 PROCEDURE — 59409 OBSTETRICAL CARE: CPT | Mod: QY,,, | Performed by: ANESTHESIOLOGY

## 2021-04-29 PROCEDURE — 84156 ASSAY OF PROTEIN URINE: CPT | Performed by: OBSTETRICS & GYNECOLOGY

## 2021-04-29 PROCEDURE — 59400 PR FULL ROUT OBSTE CARE,VAGINAL DELIV: ICD-10-PCS | Mod: GB,,, | Performed by: OBSTETRICS & GYNECOLOGY

## 2021-04-29 RX ORDER — PRENATAL WITH FERROUS FUM AND FOLIC ACID 3080; 920; 120; 400; 22; 1.84; 3; 20; 10; 1; 12; 200; 27; 25; 2 [IU]/1; [IU]/1; MG/1; [IU]/1; MG/1; MG/1; MG/1; MG/1; MG/1; MG/1; UG/1; MG/1; MG/1; MG/1; MG/1
1 TABLET ORAL DAILY
Status: DISCONTINUED | OUTPATIENT
Start: 2021-04-29 | End: 2021-04-30 | Stop reason: HOSPADM

## 2021-04-29 RX ORDER — CALCIUM CARBONATE 200(500)MG
500 TABLET,CHEWABLE ORAL 3 TIMES DAILY PRN
Status: DISCONTINUED | OUTPATIENT
Start: 2021-04-29 | End: 2021-04-29

## 2021-04-29 RX ORDER — MISOPROSTOL 200 UG/1
800 TABLET ORAL
Status: CANCELLED | OUTPATIENT
Start: 2021-04-29

## 2021-04-29 RX ORDER — ONDANSETRON 8 MG/1
8 TABLET, ORALLY DISINTEGRATING ORAL EVERY 8 HOURS PRN
Status: DISCONTINUED | OUTPATIENT
Start: 2021-04-29 | End: 2021-04-29

## 2021-04-29 RX ORDER — ONDANSETRON 8 MG/1
8 TABLET, ORALLY DISINTEGRATING ORAL EVERY 8 HOURS PRN
Status: DISCONTINUED | OUTPATIENT
Start: 2021-04-29 | End: 2021-04-30 | Stop reason: HOSPADM

## 2021-04-29 RX ORDER — LIDOCAINE HYDROCHLORIDE AND EPINEPHRINE 15; 5 MG/ML; UG/ML
INJECTION, SOLUTION EPIDURAL
Status: DISCONTINUED | OUTPATIENT
Start: 2021-04-29 | End: 2021-04-29

## 2021-04-29 RX ORDER — DIPHENHYDRAMINE HCL 25 MG
25 CAPSULE ORAL EVERY 4 HOURS PRN
Status: DISCONTINUED | OUTPATIENT
Start: 2021-04-29 | End: 2021-04-30 | Stop reason: HOSPADM

## 2021-04-29 RX ORDER — SODIUM CHLORIDE, SODIUM LACTATE, POTASSIUM CHLORIDE, CALCIUM CHLORIDE 600; 310; 30; 20 MG/100ML; MG/100ML; MG/100ML; MG/100ML
INJECTION, SOLUTION INTRAVENOUS CONTINUOUS
Status: DISCONTINUED | OUTPATIENT
Start: 2021-04-29 | End: 2021-04-29

## 2021-04-29 RX ORDER — OXYTOCIN/RINGER'S LACTATE 30/500 ML
0-30 PLASTIC BAG, INJECTION (ML) INTRAVENOUS CONTINUOUS
Status: DISCONTINUED | OUTPATIENT
Start: 2021-04-29 | End: 2021-04-29

## 2021-04-29 RX ORDER — ACETAMINOPHEN 325 MG/1
650 TABLET ORAL EVERY 6 HOURS PRN
Status: DISCONTINUED | OUTPATIENT
Start: 2021-04-29 | End: 2021-04-30 | Stop reason: HOSPADM

## 2021-04-29 RX ORDER — FENTANYL/BUPIVACAINE/NS/PF 2MCG/ML-.1
PLASTIC BAG, INJECTION (ML) INJECTION
Status: COMPLETED
Start: 2021-04-29 | End: 2021-04-29

## 2021-04-29 RX ORDER — PROCHLORPERAZINE EDISYLATE 5 MG/ML
5 INJECTION INTRAMUSCULAR; INTRAVENOUS EVERY 6 HOURS PRN
Status: DISCONTINUED | OUTPATIENT
Start: 2021-04-29 | End: 2021-04-30 | Stop reason: HOSPADM

## 2021-04-29 RX ORDER — BUPIVACAINE HYDROCHLORIDE 2.5 MG/ML
INJECTION, SOLUTION EPIDURAL; INFILTRATION; INTRACAUDAL
Status: DISPENSED
Start: 2021-04-29 | End: 2021-04-29

## 2021-04-29 RX ORDER — SODIUM CHLORIDE 0.9 % (FLUSH) 0.9 %
10 SYRINGE (ML) INJECTION
Status: DISCONTINUED | OUTPATIENT
Start: 2021-04-29 | End: 2021-04-30 | Stop reason: HOSPADM

## 2021-04-29 RX ORDER — SODIUM CHLORIDE 9 MG/ML
INJECTION, SOLUTION INTRAVENOUS
Status: DISCONTINUED | OUTPATIENT
Start: 2021-04-29 | End: 2021-04-29

## 2021-04-29 RX ORDER — FAMOTIDINE 10 MG/ML
20 INJECTION INTRAVENOUS ONCE
Status: DISCONTINUED | OUTPATIENT
Start: 2021-04-29 | End: 2021-04-29

## 2021-04-29 RX ORDER — SIMETHICONE 80 MG
1 TABLET,CHEWABLE ORAL EVERY 6 HOURS PRN
Status: DISCONTINUED | OUTPATIENT
Start: 2021-04-29 | End: 2021-04-30 | Stop reason: HOSPADM

## 2021-04-29 RX ORDER — FENTANYL CITRATE 50 UG/ML
INJECTION, SOLUTION INTRAMUSCULAR; INTRAVENOUS
Status: DISCONTINUED | OUTPATIENT
Start: 2021-04-29 | End: 2021-04-29

## 2021-04-29 RX ORDER — FENTANYL CITRATE 50 UG/ML
INJECTION, SOLUTION INTRAMUSCULAR; INTRAVENOUS
Status: COMPLETED
Start: 2021-04-29 | End: 2021-04-29

## 2021-04-29 RX ORDER — HYDROCORTISONE 25 MG/G
CREAM TOPICAL 3 TIMES DAILY PRN
Status: DISCONTINUED | OUTPATIENT
Start: 2021-04-29 | End: 2021-04-30 | Stop reason: HOSPADM

## 2021-04-29 RX ORDER — CHLORHEXIDINE GLUCONATE ORAL RINSE 1.2 MG/ML
10 SOLUTION DENTAL 2 TIMES DAILY
Status: DISCONTINUED | OUTPATIENT
Start: 2021-04-29 | End: 2021-04-29

## 2021-04-29 RX ORDER — CLINDAMYCIN PHOSPHATE 900 MG/50ML
900 INJECTION, SOLUTION INTRAVENOUS ONCE AS NEEDED
Status: DISCONTINUED | OUTPATIENT
Start: 2021-04-29 | End: 2021-04-29

## 2021-04-29 RX ORDER — ACETAMINOPHEN 500 MG
1000 TABLET ORAL
Status: DISCONTINUED | OUTPATIENT
Start: 2021-04-29 | End: 2021-04-30 | Stop reason: HOSPADM

## 2021-04-29 RX ORDER — FENTANYL/BUPIVACAINE/NS/PF 2MCG/ML-.1
PLASTIC BAG, INJECTION (ML) INJECTION CONTINUOUS
Status: DISCONTINUED | OUTPATIENT
Start: 2021-04-29 | End: 2021-04-29

## 2021-04-29 RX ORDER — SODIUM CITRATE AND CITRIC ACID MONOHYDRATE 334; 500 MG/5ML; MG/5ML
30 SOLUTION ORAL ONCE
Status: DISCONTINUED | OUTPATIENT
Start: 2021-04-29 | End: 2021-04-29

## 2021-04-29 RX ORDER — DOCUSATE SODIUM 100 MG/1
200 CAPSULE, LIQUID FILLED ORAL 2 TIMES DAILY PRN
Status: DISCONTINUED | OUTPATIENT
Start: 2021-04-29 | End: 2021-04-30 | Stop reason: HOSPADM

## 2021-04-29 RX ORDER — DIPHENHYDRAMINE HYDROCHLORIDE 50 MG/ML
25 INJECTION INTRAMUSCULAR; INTRAVENOUS EVERY 4 HOURS PRN
Status: DISCONTINUED | OUTPATIENT
Start: 2021-04-29 | End: 2021-04-30 | Stop reason: HOSPADM

## 2021-04-29 RX ORDER — DIPHENOXYLATE HYDROCHLORIDE AND ATROPINE SULFATE 2.5; .025 MG/1; MG/1
1 TABLET ORAL 4 TIMES DAILY PRN
Status: CANCELLED | OUTPATIENT
Start: 2021-04-29

## 2021-04-29 RX ORDER — LIDOCAINE HYDROCHLORIDE 10 MG/ML
INJECTION INFILTRATION; PERINEURAL
Status: DISPENSED
Start: 2021-04-29 | End: 2021-04-30

## 2021-04-29 RX ORDER — OXYCODONE HYDROCHLORIDE 5 MG/1
5 TABLET ORAL EVERY 4 HOURS PRN
Status: DISCONTINUED | OUTPATIENT
Start: 2021-04-29 | End: 2021-04-30 | Stop reason: HOSPADM

## 2021-04-29 RX ORDER — OXYCODONE HYDROCHLORIDE 5 MG/1
10 TABLET ORAL EVERY 6 HOURS PRN
Status: DISCONTINUED | OUTPATIENT
Start: 2021-04-29 | End: 2021-04-30 | Stop reason: HOSPADM

## 2021-04-29 RX ORDER — FENTANYL/BUPIVACAINE/NS/PF 2MCG/ML-.1
PLASTIC BAG, INJECTION (ML) INJECTION CONTINUOUS PRN
Status: DISCONTINUED | OUTPATIENT
Start: 2021-04-29 | End: 2021-04-29

## 2021-04-29 RX ORDER — PROCHLORPERAZINE EDISYLATE 5 MG/ML
5 INJECTION INTRAMUSCULAR; INTRAVENOUS EVERY 6 HOURS PRN
Status: DISCONTINUED | OUTPATIENT
Start: 2021-04-29 | End: 2021-04-29

## 2021-04-29 RX ORDER — SIMETHICONE 80 MG
1 TABLET,CHEWABLE ORAL 4 TIMES DAILY PRN
Status: DISCONTINUED | OUTPATIENT
Start: 2021-04-29 | End: 2021-04-29

## 2021-04-29 RX ORDER — METHYLERGONOVINE MALEATE 0.2 MG/ML
200 INJECTION INTRAVENOUS
Status: CANCELLED | OUTPATIENT
Start: 2021-04-29

## 2021-04-29 RX ORDER — OXYTOCIN/RINGER'S LACTATE 30/500 ML
95 PLASTIC BAG, INJECTION (ML) INTRAVENOUS ONCE
Status: COMPLETED | OUTPATIENT
Start: 2021-04-29 | End: 2021-04-29

## 2021-04-29 RX ORDER — CARBOPROST TROMETHAMINE 250 UG/ML
250 INJECTION, SOLUTION INTRAMUSCULAR
Status: CANCELLED | OUTPATIENT
Start: 2021-04-29

## 2021-04-29 RX ADMIN — SODIUM CHLORIDE, SODIUM LACTATE, POTASSIUM CHLORIDE, AND CALCIUM CHLORIDE: .6; .31; .03; .02 INJECTION, SOLUTION INTRAVENOUS at 08:04

## 2021-04-29 RX ADMIN — Medication 10 ML: at 07:04

## 2021-04-29 RX ADMIN — Medication 2 MILLI-UNITS/MIN: at 10:04

## 2021-04-29 RX ADMIN — ACETAMINOPHEN 650 MG: 325 TABLET, FILM COATED ORAL at 09:04

## 2021-04-29 RX ADMIN — FENTANYL CITRATE 100 MCG: 50 INJECTION, SOLUTION INTRAMUSCULAR; INTRAVENOUS at 07:04

## 2021-04-29 RX ADMIN — LIDOCAINE HYDROCHLORIDE,EPINEPHRINE BITARTRATE 3 ML: 15; .005 INJECTION, SOLUTION EPIDURAL; INFILTRATION; INTRACAUDAL; PERINEURAL at 07:04

## 2021-04-29 RX ADMIN — DOCUSATE SODIUM 200 MG: 100 CAPSULE, LIQUID FILLED ORAL at 09:04

## 2021-04-29 RX ADMIN — Medication 10 ML/HR: at 07:04

## 2021-04-29 RX ADMIN — ACETAMINOPHEN 650 MG: 325 TABLET, FILM COATED ORAL at 03:04

## 2021-04-29 RX ADMIN — Medication 95 MILLI-UNITS/MIN: at 01:04

## 2021-04-29 RX ADMIN — SODIUM CHLORIDE, SODIUM LACTATE, POTASSIUM CHLORIDE, AND CALCIUM CHLORIDE 1000 ML: .6; .31; .03; .02 INJECTION, SOLUTION INTRAVENOUS at 07:04

## 2021-04-30 VITALS
TEMPERATURE: 98 F | DIASTOLIC BLOOD PRESSURE: 57 MMHG | BODY MASS INDEX: 31.24 KG/M2 | SYSTOLIC BLOOD PRESSURE: 106 MMHG | WEIGHT: 183 LBS | OXYGEN SATURATION: 97 % | RESPIRATION RATE: 18 BRPM | HEIGHT: 64 IN | HEART RATE: 72 BPM

## 2021-04-30 LAB
BASOPHILS # BLD AUTO: 0.04 K/UL (ref 0–0.2)
BASOPHILS NFR BLD: 0.3 % (ref 0–1.9)
DIFFERENTIAL METHOD: ABNORMAL
EOSINOPHIL # BLD AUTO: 0.1 K/UL (ref 0–0.5)
EOSINOPHIL NFR BLD: 0.5 % (ref 0–8)
ERYTHROCYTE [DISTWIDTH] IN BLOOD BY AUTOMATED COUNT: 15.7 % (ref 11.5–14.5)
HCT VFR BLD AUTO: 34.8 % (ref 37–48.5)
HGB BLD-MCNC: 11.1 G/DL (ref 12–16)
IMM GRANULOCYTES # BLD AUTO: 0.04 K/UL (ref 0–0.04)
IMM GRANULOCYTES NFR BLD AUTO: 0.3 % (ref 0–0.5)
LYMPHOCYTES # BLD AUTO: 2.3 K/UL (ref 1–4.8)
LYMPHOCYTES NFR BLD: 18.8 % (ref 18–48)
MCH RBC QN AUTO: 27.6 PG (ref 27–31)
MCHC RBC AUTO-ENTMCNC: 31.9 G/DL (ref 32–36)
MCV RBC AUTO: 87 FL (ref 82–98)
MONOCYTES # BLD AUTO: 0.6 K/UL (ref 0.3–1)
MONOCYTES NFR BLD: 4.9 % (ref 4–15)
NEUTROPHILS # BLD AUTO: 9.3 K/UL (ref 1.8–7.7)
NEUTROPHILS NFR BLD: 75.2 % (ref 38–73)
NRBC BLD-RTO: 0 /100 WBC
PLATELET # BLD AUTO: 198 K/UL (ref 150–450)
PMV BLD AUTO: 10.9 FL (ref 9.2–12.9)
RBC # BLD AUTO: 4.02 M/UL (ref 4–5.4)
WBC # BLD AUTO: 12.36 K/UL (ref 3.9–12.7)

## 2021-04-30 PROCEDURE — 85025 COMPLETE CBC W/AUTO DIFF WBC: CPT | Performed by: STUDENT IN AN ORGANIZED HEALTH CARE EDUCATION/TRAINING PROGRAM

## 2021-04-30 PROCEDURE — 36415 COLL VENOUS BLD VENIPUNCTURE: CPT | Performed by: STUDENT IN AN ORGANIZED HEALTH CARE EDUCATION/TRAINING PROGRAM

## 2021-04-30 PROCEDURE — 25000003 PHARM REV CODE 250: Performed by: STUDENT IN AN ORGANIZED HEALTH CARE EDUCATION/TRAINING PROGRAM

## 2021-04-30 RX ORDER — HYDROCODONE BITARTRATE AND ACETAMINOPHEN 5; 325 MG/1; MG/1
1 TABLET ORAL EVERY 6 HOURS PRN
Qty: 10 TABLET | Refills: 0 | Status: SHIPPED | OUTPATIENT
Start: 2021-04-30 | End: 2021-05-10

## 2021-04-30 RX ADMIN — ACETAMINOPHEN 1000 MG: 500 TABLET ORAL at 05:04

## 2021-04-30 RX ADMIN — DOCUSATE SODIUM 200 MG: 100 CAPSULE, LIQUID FILLED ORAL at 09:04

## 2021-04-30 RX ADMIN — ACETAMINOPHEN 1000 MG: 500 TABLET ORAL at 02:04

## 2021-04-30 RX ADMIN — PRENATAL VIT W/ FE FUMARATE-FA TAB 27-0.8 MG 1 TABLET: 27-0.8 TAB at 09:04

## 2021-06-03 ENCOUNTER — PATIENT MESSAGE (OUTPATIENT)
Dept: OBSTETRICS AND GYNECOLOGY | Facility: CLINIC | Age: 36
End: 2021-06-03

## 2021-06-03 RX ORDER — HYDROCORTISONE ACETATE 25 MG/1
25 SUPPOSITORY RECTAL 2 TIMES DAILY PRN
Qty: 24 SUPPOSITORY | Refills: 1 | Status: SHIPPED | OUTPATIENT
Start: 2021-06-03 | End: 2022-03-22

## 2021-06-15 ENCOUNTER — POSTPARTUM VISIT (OUTPATIENT)
Dept: OBSTETRICS AND GYNECOLOGY | Facility: CLINIC | Age: 36
End: 2021-06-15
Attending: OBSTETRICS & GYNECOLOGY
Payer: COMMERCIAL

## 2021-06-15 VITALS
WEIGHT: 168.88 LBS | HEIGHT: 64 IN | SYSTOLIC BLOOD PRESSURE: 120 MMHG | DIASTOLIC BLOOD PRESSURE: 72 MMHG | BODY MASS INDEX: 28.83 KG/M2

## 2021-06-15 DIAGNOSIS — R30.0 DYSURIA: ICD-10-CM

## 2021-06-15 PROCEDURE — 0503F PR POSTPARTUM CARE VISIT: ICD-10-PCS | Mod: S$GLB,,, | Performed by: OBSTETRICS & GYNECOLOGY

## 2021-06-15 PROCEDURE — 99999 PR PBB SHADOW E&M-EST. PATIENT-LVL III: ICD-10-PCS | Mod: PBBFAC,,, | Performed by: OBSTETRICS & GYNECOLOGY

## 2021-06-15 PROCEDURE — 87086 URINE CULTURE/COLONY COUNT: CPT | Performed by: OBSTETRICS & GYNECOLOGY

## 2021-06-15 PROCEDURE — 99999 PR PBB SHADOW E&M-EST. PATIENT-LVL III: CPT | Mod: PBBFAC,,, | Performed by: OBSTETRICS & GYNECOLOGY

## 2021-06-15 PROCEDURE — 0503F POSTPARTUM CARE VISIT: CPT | Mod: S$GLB,,, | Performed by: OBSTETRICS & GYNECOLOGY

## 2021-06-16 LAB — BACTERIA UR CULT: NO GROWTH

## 2021-06-25 ENCOUNTER — CLINICAL SUPPORT (OUTPATIENT)
Dept: REHABILITATION | Facility: OTHER | Age: 36
End: 2021-06-25
Payer: COMMERCIAL

## 2021-06-25 DIAGNOSIS — O26.893 PELVIC PAIN AFFECTING PREGNANCY IN THIRD TRIMESTER, ANTEPARTUM: ICD-10-CM

## 2021-06-25 DIAGNOSIS — R10.2 PELVIC PAIN AFFECTING PREGNANCY IN THIRD TRIMESTER, ANTEPARTUM: ICD-10-CM

## 2021-06-25 DIAGNOSIS — R19.8 ABDOMINAL WEAKNESS: ICD-10-CM

## 2021-06-25 PROCEDURE — 97112 NEUROMUSCULAR REEDUCATION: CPT | Mod: PN

## 2021-06-25 PROCEDURE — 97530 THERAPEUTIC ACTIVITIES: CPT | Mod: PN

## 2021-06-25 PROCEDURE — 97161 PT EVAL LOW COMPLEX 20 MIN: CPT | Mod: PN

## 2021-07-09 ENCOUNTER — CLINICAL SUPPORT (OUTPATIENT)
Dept: REHABILITATION | Facility: OTHER | Age: 36
End: 2021-07-09
Payer: COMMERCIAL

## 2021-07-09 DIAGNOSIS — R19.8 ABDOMINAL WEAKNESS: Primary | ICD-10-CM

## 2021-07-09 PROCEDURE — 97112 NEUROMUSCULAR REEDUCATION: CPT | Mod: PN

## 2021-07-09 PROCEDURE — 97530 THERAPEUTIC ACTIVITIES: CPT | Mod: PN

## 2021-08-02 PROBLEM — O13.3 GESTATIONAL HYPERTENSION, THIRD TRIMESTER: Status: RESOLVED | Noted: 2021-04-29 | Resolved: 2021-08-02

## 2021-09-02 ENCOUNTER — PATIENT MESSAGE (OUTPATIENT)
Dept: OBSTETRICS AND GYNECOLOGY | Facility: CLINIC | Age: 36
End: 2021-09-02

## 2021-09-03 ENCOUNTER — LAB VISIT (OUTPATIENT)
Dept: FAMILY MEDICINE | Facility: CLINIC | Age: 36
End: 2021-09-03
Payer: COMMERCIAL

## 2021-09-03 DIAGNOSIS — Z20.822 EXPOSURE TO COVID-19 VIRUS: Primary | ICD-10-CM

## 2021-09-03 PROCEDURE — U0005 INFEC AGEN DETEC AMPLI PROBE: HCPCS | Performed by: FAMILY MEDICINE

## 2021-09-03 PROCEDURE — U0003 INFECTIOUS AGENT DETECTION BY NUCLEIC ACID (DNA OR RNA); SEVERE ACUTE RESPIRATORY SYNDROME CORONAVIRUS 2 (SARS-COV-2) (CORONAVIRUS DISEASE [COVID-19]), AMPLIFIED PROBE TECHNIQUE, MAKING USE OF HIGH THROUGHPUT TECHNOLOGIES AS DESCRIBED BY CMS-2020-01-R: HCPCS | Performed by: FAMILY MEDICINE

## 2021-09-04 LAB
SARS-COV-2 RNA RESP QL NAA+PROBE: NOT DETECTED
SARS-COV-2- CYCLE NUMBER: NORMAL

## 2021-10-25 ENCOUNTER — TELEPHONE (OUTPATIENT)
Dept: OBSTETRICS AND GYNECOLOGY | Facility: CLINIC | Age: 36
End: 2021-10-25
Payer: COMMERCIAL

## 2021-10-25 DIAGNOSIS — Z30.9 ENCOUNTER FOR CONTRACEPTIVE MANAGEMENT, UNSPECIFIED TYPE: Primary | ICD-10-CM

## 2021-12-28 ENCOUNTER — TELEPHONE (OUTPATIENT)
Dept: OBSTETRICS AND GYNECOLOGY | Facility: CLINIC | Age: 36
End: 2021-12-28
Payer: COMMERCIAL

## 2022-03-22 ENCOUNTER — PROCEDURE VISIT (OUTPATIENT)
Dept: OBSTETRICS AND GYNECOLOGY | Facility: CLINIC | Age: 37
End: 2022-03-22
Attending: OBSTETRICS & GYNECOLOGY
Payer: COMMERCIAL

## 2022-03-22 VITALS
DIASTOLIC BLOOD PRESSURE: 72 MMHG | BODY MASS INDEX: 29.32 KG/M2 | HEIGHT: 64 IN | WEIGHT: 171.75 LBS | SYSTOLIC BLOOD PRESSURE: 120 MMHG

## 2022-03-22 DIAGNOSIS — Z30.430 ENCOUNTER FOR IUD INSERTION: Primary | ICD-10-CM

## 2022-03-22 LAB
B-HCG UR QL: NEGATIVE
CTP QC/QA: YES

## 2022-03-22 PROCEDURE — 58300 INSERT INTRAUTERINE DEVICE: CPT | Mod: S$GLB,,, | Performed by: OBSTETRICS & GYNECOLOGY

## 2022-03-22 PROCEDURE — 58300 INSERTION OF INTRAUTERINE DEVICE: ICD-10-PCS | Mod: S$GLB,,, | Performed by: OBSTETRICS & GYNECOLOGY

## 2022-03-22 PROCEDURE — 81025 URINE PREGNANCY TEST: CPT | Mod: S$GLB,,, | Performed by: OBSTETRICS & GYNECOLOGY

## 2022-03-22 PROCEDURE — 81025 POCT URINE PREGNANCY: ICD-10-PCS | Mod: S$GLB,,, | Performed by: OBSTETRICS & GYNECOLOGY

## 2022-03-22 NOTE — PROCEDURES
INSERTION OF INTRAUTERINE DEVICE    Date/Time: 3/22/2022 9:30 AM  Performed by: Lilliam Beck DO  Authorized by: Lilliam Beck DO     Consent:     Consent obtained:  Written    Consent given by:  Patient    Procedure risks and benefits discussed: yes      Patient questions answered: yes      Patient agrees, verbalizes understanding, and wants to proceed: yes      Educational handouts given: yes      Instructions and paperwork completed: yes    Procedure:     Pelvic exam performed: yes      Negative urine pregnancy test: yes      Cervix cleaned and prepped: yes      Speculum placed in vagina: yes      Tenaculum applied to cervix: allis placed on anterior lip of the cervix.      Uterus sounded: yes      Uterus sound depth (cm):  9    IUD inserted with no complications: yes      IUD type:  Mirena    Strings trimmed: yes    1 Intra Uterine Device levonorgestreL 20 mcg/24 hours (7 yrs) 52 mg       Post-procedure:     Patient tolerated procedure well: yes      Patient will follow up after next period: yes (scheduled for 4 week f/u. warning signs reviewed.)

## 2022-04-11 ENCOUNTER — PATIENT MESSAGE (OUTPATIENT)
Dept: OBSTETRICS AND GYNECOLOGY | Facility: CLINIC | Age: 37
End: 2022-04-11
Payer: COMMERCIAL

## 2022-04-11 DIAGNOSIS — N81.89 PELVIC FLOOR WEAKNESS IN FEMALE: Primary | ICD-10-CM

## 2022-04-20 ENCOUNTER — OFFICE VISIT (OUTPATIENT)
Dept: INTERNAL MEDICINE | Facility: CLINIC | Age: 37
End: 2022-04-20
Payer: COMMERCIAL

## 2022-04-20 ENCOUNTER — OFFICE VISIT (OUTPATIENT)
Dept: OBSTETRICS AND GYNECOLOGY | Facility: CLINIC | Age: 37
End: 2022-04-20
Payer: COMMERCIAL

## 2022-04-20 VITALS
DIASTOLIC BLOOD PRESSURE: 72 MMHG | SYSTOLIC BLOOD PRESSURE: 120 MMHG | BODY MASS INDEX: 30.11 KG/M2 | WEIGHT: 176.38 LBS | HEIGHT: 64 IN

## 2022-04-20 VITALS
HEART RATE: 66 BPM | WEIGHT: 176.81 LBS | SYSTOLIC BLOOD PRESSURE: 118 MMHG | DIASTOLIC BLOOD PRESSURE: 60 MMHG | OXYGEN SATURATION: 100 % | BODY MASS INDEX: 30.19 KG/M2 | HEIGHT: 64 IN

## 2022-04-20 DIAGNOSIS — E66.3 OVERWEIGHT (BMI 25.0-29.9): ICD-10-CM

## 2022-04-20 DIAGNOSIS — D64.9 NORMOCYTIC ANEMIA: ICD-10-CM

## 2022-04-20 DIAGNOSIS — R22.31 AXILLARY MASS, RIGHT: ICD-10-CM

## 2022-04-20 DIAGNOSIS — Z00.00 ANNUAL PHYSICAL EXAM: Primary | ICD-10-CM

## 2022-04-20 DIAGNOSIS — Z97.5 IUD (INTRAUTERINE DEVICE) IN PLACE: ICD-10-CM

## 2022-04-20 DIAGNOSIS — Z80.8 FAMILY HISTORY OF SKIN CANCER: ICD-10-CM

## 2022-04-20 DIAGNOSIS — Z30.431 IUD CHECK UP: Primary | ICD-10-CM

## 2022-04-20 PROBLEM — Z34.90 PREGNANCY WITH ONE FETUS, ANTEPARTUM: Status: RESOLVED | Noted: 2021-02-12 | Resolved: 2022-04-20

## 2022-04-20 PROCEDURE — 3008F PR BODY MASS INDEX (BMI) DOCUMENTED: ICD-10-PCS | Mod: CPTII,S$GLB,, | Performed by: INTERNAL MEDICINE

## 2022-04-20 PROCEDURE — 3078F PR MOST RECENT DIASTOLIC BLOOD PRESSURE < 80 MM HG: ICD-10-PCS | Mod: CPTII,S$GLB,, | Performed by: INTERNAL MEDICINE

## 2022-04-20 PROCEDURE — 3078F PR MOST RECENT DIASTOLIC BLOOD PRESSURE < 80 MM HG: ICD-10-PCS | Mod: CPTII,S$GLB,, | Performed by: NURSE PRACTITIONER

## 2022-04-20 PROCEDURE — 3074F SYST BP LT 130 MM HG: CPT | Mod: CPTII,S$GLB,, | Performed by: INTERNAL MEDICINE

## 2022-04-20 PROCEDURE — 99999 PR PBB SHADOW E&M-EST. PATIENT-LVL III: ICD-10-PCS | Mod: PBBFAC,,, | Performed by: NURSE PRACTITIONER

## 2022-04-20 PROCEDURE — 1159F PR MEDICATION LIST DOCUMENTED IN MEDICAL RECORD: ICD-10-PCS | Mod: CPTII,S$GLB,, | Performed by: NURSE PRACTITIONER

## 2022-04-20 PROCEDURE — 3074F PR MOST RECENT SYSTOLIC BLOOD PRESSURE < 130 MM HG: ICD-10-PCS | Mod: CPTII,S$GLB,, | Performed by: NURSE PRACTITIONER

## 2022-04-20 PROCEDURE — 99499 UNLISTED E&M SERVICE: CPT | Mod: S$GLB,,, | Performed by: NURSE PRACTITIONER

## 2022-04-20 PROCEDURE — 99499 NO LOS: ICD-10-PCS | Mod: S$GLB,,, | Performed by: NURSE PRACTITIONER

## 2022-04-20 PROCEDURE — 99999 PR PBB SHADOW E&M-EST. PATIENT-LVL III: ICD-10-PCS | Mod: PBBFAC,,, | Performed by: INTERNAL MEDICINE

## 2022-04-20 PROCEDURE — 1160F PR REVIEW ALL MEDS BY PRESCRIBER/CLIN PHARMACIST DOCUMENTED: ICD-10-PCS | Mod: CPTII,S$GLB,, | Performed by: INTERNAL MEDICINE

## 2022-04-20 PROCEDURE — 99999 PR PBB SHADOW E&M-EST. PATIENT-LVL III: CPT | Mod: PBBFAC,,, | Performed by: NURSE PRACTITIONER

## 2022-04-20 PROCEDURE — 99395 PREV VISIT EST AGE 18-39: CPT | Mod: S$GLB,,, | Performed by: INTERNAL MEDICINE

## 2022-04-20 PROCEDURE — 3074F SYST BP LT 130 MM HG: CPT | Mod: CPTII,S$GLB,, | Performed by: NURSE PRACTITIONER

## 2022-04-20 PROCEDURE — 1159F PR MEDICATION LIST DOCUMENTED IN MEDICAL RECORD: ICD-10-PCS | Mod: CPTII,S$GLB,, | Performed by: INTERNAL MEDICINE

## 2022-04-20 PROCEDURE — 3074F PR MOST RECENT SYSTOLIC BLOOD PRESSURE < 130 MM HG: ICD-10-PCS | Mod: CPTII,S$GLB,, | Performed by: INTERNAL MEDICINE

## 2022-04-20 PROCEDURE — 3078F DIAST BP <80 MM HG: CPT | Mod: CPTII,S$GLB,, | Performed by: NURSE PRACTITIONER

## 2022-04-20 PROCEDURE — 3008F BODY MASS INDEX DOCD: CPT | Mod: CPTII,S$GLB,, | Performed by: NURSE PRACTITIONER

## 2022-04-20 PROCEDURE — 99395 PR PREVENTIVE VISIT,EST,18-39: ICD-10-PCS | Mod: S$GLB,,, | Performed by: INTERNAL MEDICINE

## 2022-04-20 PROCEDURE — 99999 PR PBB SHADOW E&M-EST. PATIENT-LVL III: CPT | Mod: PBBFAC,,, | Performed by: INTERNAL MEDICINE

## 2022-04-20 PROCEDURE — 3078F DIAST BP <80 MM HG: CPT | Mod: CPTII,S$GLB,, | Performed by: INTERNAL MEDICINE

## 2022-04-20 PROCEDURE — 3008F PR BODY MASS INDEX (BMI) DOCUMENTED: ICD-10-PCS | Mod: CPTII,S$GLB,, | Performed by: NURSE PRACTITIONER

## 2022-04-20 PROCEDURE — 1159F MED LIST DOCD IN RCRD: CPT | Mod: CPTII,S$GLB,, | Performed by: NURSE PRACTITIONER

## 2022-04-20 PROCEDURE — 3008F BODY MASS INDEX DOCD: CPT | Mod: CPTII,S$GLB,, | Performed by: INTERNAL MEDICINE

## 2022-04-20 PROCEDURE — 1160F RVW MEDS BY RX/DR IN RCRD: CPT | Mod: CPTII,S$GLB,, | Performed by: INTERNAL MEDICINE

## 2022-04-20 PROCEDURE — 1159F MED LIST DOCD IN RCRD: CPT | Mod: CPTII,S$GLB,, | Performed by: INTERNAL MEDICINE

## 2022-04-20 NOTE — PROGRESS NOTES
CC: IUD check    Pt is a 35 y/o female  presents for IUD check. Patient had a Mirena placed on 3/22/22. She is not having problems with bleeding, cramping, fever or discharge. She has not tried to feel the strings. Still having irregular bleeding- nothing heavy just annoying.    ROS:  GENERAL: Feeling well overall. Denies fever or chills.   SKIN: Denies rash or lesions.   HEAD: Denies head injury or headache.   NODES: Denies enlarged lymph nodes.   CHEST: Denies chest pain or shortness of breath.   CARDIOVASCULAR: Denies palpitations or left sided chest pain.   ABDOMEN: No abdominal pain, constipation, diarrhea, nausea, vomiting or rectal bleeding.   URINARY: No dysuria, hematuria, or burning on urination.  REPRODUCTIVE: See HPI.   BREASTS: Denies pain, lumps, or nipple discharge.   HEMATOLOGIC: No easy bruisability or excessive bleeding.   MUSCULOSKELETAL: Denies joint pain or swelling.   NEUROLOGIC: Denies syncope or weakness.   PSYCHIATRIC: Denies depression, anxiety or mood swings.      PHYSICAL EXAM:  Abdomen: Soft, non-tender, non-distended  Vulva: No lesions  Vaginal: No lesions, no abnormal discharge  Cervix: No discharge, no CMT, IUD strings visible at os (2 cm out)  Uterus: Normal size, non-tender  Adnexa: No masses, non-tender    ASSESSMENT AND PLAN:  1. IUD surveillance    Patient counseled on IUD danger signs and how to check the strings reviewed.    Return for annual GYN exam

## 2022-04-20 NOTE — PROGRESS NOTES
Subjective:       Patient ID: Lindy Ferraro is a 36 y.o. 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    Chief Complaint: Annual Exam     History was obtained from the patient and supplemented through chart review  Follows sal OBGYN for postpartum care.    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 She has 2 kids. Youngest is 1 year old.    HPI    R axillary mass:  Noticed this 2 days ago.  A small lump and feels TTP.  No recent illness, but her kids are sick.  Has some AR lately.  Itchy eyes. Not taking anything for it.  She stopped breast feeding last month.  No issues with mastitis. No erythema, increased warmth, discharge, fever.  No other LAD.    FHx skin cancer:  H/o being easily sunburned. Has very few nevi.    Normocytic anemia:  Borderline.  She is vegetarian. Was pregnant last year.  Has IUD placed 3/3022 d/t h/o menorrhagia, cramps.  No longer breastfeeding or taking PNV.  No results found for: IRON, TIBC, FERRITIN, SATURATEDIRO  No results found for: [id number]  No results found for: FOLATE    Overweight:  She is working gradual weight loss.  Exercise:  [place]. 5/week, 40 minutes.      ETOH: 2-3/week, 1-2 drinks at a time.  BMI Readings from Last 3 Encounters:   04/20/22 30.35 kg/m²   03/22/22 29.48 kg/m²   06/15/21 28.99 kg/m²               Not addressed today.  Pelvic weakness:  Concern for prolapse.  Following with OBGYN.  Referred to pelvic PT.    History of postpartum anxiety, adjustment disorder, anxiety:  Following with OSH therapist, Lilian Yee.    Review of Systems   Constitutional: Negative for activity change and fever.   Eyes: Negative for itching.   Respiratory: Negative for wheezing.    Cardiovascular: Negative for leg swelling.   Gastrointestinal: Negative for abdominal pain.   Genitourinary: Positive for pelvic pain.   Musculoskeletal: Negative for gait problem.   Skin: Negative for rash and wound.   Hematological: Positive for adenopathy.  "  Psychiatric/Behavioral: Negative for confusion. The patient is not nervous/anxious.        I personally reviewed Past Medical History, Past Surgical History, Social History, and Family History.    Objective:      Vitals:    04/20/22 1055   BP: 118/60   Pulse: 66   SpO2: 100%   Weight: 80.2 kg (176 lb 12.9 oz)   Height: 5' 4" (1.626 m)      Physical Exam  Constitutional:       General: She is not in acute distress.     Appearance: She is well-developed. She is not diaphoretic.   HENT:      Head: Normocephalic and atraumatic.      Nose: Nose normal.      Mouth/Throat:      Pharynx: Posterior oropharyngeal erythema ( mild) present. No oropharyngeal exudate.   Eyes:      General: No scleral icterus.        Right eye: No discharge.         Left eye: No discharge.   Neck:      Thyroid: No thyromegaly.      Trachea: No tracheal deviation.   Cardiovascular:      Rate and Rhythm: Normal rate and regular rhythm.      Heart sounds: Normal heart sounds. No murmur heard.  Pulmonary:      Effort: Pulmonary effort is normal. No respiratory distress.      Breath sounds: Normal breath sounds. No wheezing.   Chest:   Breasts:      Right: Axillary adenopathy present.       Abdominal:      General: Bowel sounds are normal. There is no distension.      Palpations: Abdomen is soft.      Tenderness: There is no abdominal tenderness.   Musculoskeletal:         General: No deformity.      Cervical back: Neck supple.      Right lower leg: No edema.      Left lower leg: No edema.   Lymphadenopathy:      Cervical: No cervical adenopathy.      Upper Body:      Right upper body: Axillary adenopathy present.      Comments: 0.5 oval mobile mass.  No erythema, increased warmth, induration, discharge.     Skin:     General: Skin is warm and dry.      Findings: No erythema.   Neurological:      Mental Status: She is alert.      Gait: Gait normal.   Psychiatric:         Behavior: Behavior normal.           Lab Results   Component Value Date    WBC " 12.36 04/30/2021    HGB 11.1 (L) 04/30/2021    HCT 34.8 (L) 04/30/2021     04/30/2021    ALT 15 04/29/2021    AST 19 04/29/2021     04/29/2021    K 3.9 04/29/2021     04/29/2021    CREATININE 0.6 04/29/2021    BUN 7 04/29/2021    CO2 20 (L) 04/29/2021       The ASCVD Risk score (Steven GEOFF Durham., et al., 2013) failed to calculate for the following reasons:    The 2013 ASCVD risk score is only valid for ages 40 to 79    (Imaging have been independently reviewed)  Breast ultrasound was negative.    Assessment:       1. Annual physical exam    2. Axillary mass, right    3. Family history of skin cancer    4. Normocytic anemia    5. Overweight (BMI 25.0-29.9)          Plan:       Lindy was seen today for annual exam.    Diagnoses and all orders for this visit:    Annual physical exam  -     Hepatitis C Antibody; Future  -     Lipid Panel; Future  -     CBC Auto Differential; Future  -     Comprehensive Metabolic Panel; Future    Axillary mass, right  Comments:  Suspect LAD d/t AR. No infx sx, folliculitis. Monitor sx. May schedule U/S in about 1 mo if persistent or enlarging.  Orders:  -     US Soft Tissue Axilla, Right; Future    Family history of skin cancer  Comments:  Refer to Derm for TBSE.  Orders:  -     Ambulatory referral/consult to Dermatology; Future    Normocytic anemia  Comments:  Hemoglobin mildly low.  Might be due to pregnancy, diet, h/o anemia.  Repeat CBC.  Orders:  -     CBC Auto Differential; Future    Overweight (BMI 25.0-29.9)  Comments:  Doing well with exercise.  Encouraged being consistent with lifestyle changes.         Health Maintenance   Topic Date Due    Hepatitis C Screening  Never done    Lipid Panel  Never done    TETANUS VACCINE  02/12/2031     Side effects of medication(s) were discussed in detail and patient voiced understanding.  Patient will call back for any issues or complications.     RTC in 1 year(s) or sooner PRN for annual.

## 2022-04-22 ENCOUNTER — LAB VISIT (OUTPATIENT)
Dept: LAB | Facility: OTHER | Age: 37
End: 2022-04-22
Attending: INTERNAL MEDICINE
Payer: COMMERCIAL

## 2022-04-22 DIAGNOSIS — Z00.00 ANNUAL PHYSICAL EXAM: ICD-10-CM

## 2022-04-22 DIAGNOSIS — D64.9 NORMOCYTIC ANEMIA: ICD-10-CM

## 2022-04-22 LAB
ALBUMIN SERPL BCP-MCNC: 4.1 G/DL (ref 3.5–5.2)
ALP SERPL-CCNC: 119 U/L (ref 55–135)
ALT SERPL W/O P-5'-P-CCNC: 17 U/L (ref 10–44)
ANION GAP SERPL CALC-SCNC: 10 MMOL/L (ref 8–16)
AST SERPL-CCNC: 19 U/L (ref 10–40)
BASOPHILS # BLD AUTO: 0.05 K/UL (ref 0–0.2)
BASOPHILS NFR BLD: 0.8 % (ref 0–1.9)
BILIRUB SERPL-MCNC: 0.7 MG/DL (ref 0.1–1)
BUN SERPL-MCNC: 11 MG/DL (ref 6–20)
CALCIUM SERPL-MCNC: 9.2 MG/DL (ref 8.7–10.5)
CHLORIDE SERPL-SCNC: 106 MMOL/L (ref 95–110)
CHOLEST SERPL-MCNC: 141 MG/DL (ref 120–199)
CHOLEST/HDLC SERPL: 2.5 {RATIO} (ref 2–5)
CO2 SERPL-SCNC: 24 MMOL/L (ref 23–29)
CREAT SERPL-MCNC: 0.8 MG/DL (ref 0.5–1.4)
DIFFERENTIAL METHOD: NORMAL
EOSINOPHIL # BLD AUTO: 0.1 K/UL (ref 0–0.5)
EOSINOPHIL NFR BLD: 2 % (ref 0–8)
ERYTHROCYTE [DISTWIDTH] IN BLOOD BY AUTOMATED COUNT: 12.9 % (ref 11.5–14.5)
EST. GFR  (AFRICAN AMERICAN): >60 ML/MIN/1.73 M^2
EST. GFR  (NON AFRICAN AMERICAN): >60 ML/MIN/1.73 M^2
GLUCOSE SERPL-MCNC: 90 MG/DL (ref 70–110)
HCT VFR BLD AUTO: 43 % (ref 37–48.5)
HDLC SERPL-MCNC: 57 MG/DL (ref 40–75)
HDLC SERPL: 40.4 % (ref 20–50)
HGB BLD-MCNC: 14.1 G/DL (ref 12–16)
IMM GRANULOCYTES # BLD AUTO: 0.02 K/UL (ref 0–0.04)
IMM GRANULOCYTES NFR BLD AUTO: 0.3 % (ref 0–0.5)
LDLC SERPL CALC-MCNC: 72 MG/DL (ref 63–159)
LYMPHOCYTES # BLD AUTO: 1.7 K/UL (ref 1–4.8)
LYMPHOCYTES NFR BLD: 28.2 % (ref 18–48)
MCH RBC QN AUTO: 30 PG (ref 27–31)
MCHC RBC AUTO-ENTMCNC: 32.8 G/DL (ref 32–36)
MCV RBC AUTO: 92 FL (ref 82–98)
MONOCYTES # BLD AUTO: 0.4 K/UL (ref 0.3–1)
MONOCYTES NFR BLD: 6.1 % (ref 4–15)
NEUTROPHILS # BLD AUTO: 3.8 K/UL (ref 1.8–7.7)
NEUTROPHILS NFR BLD: 62.6 % (ref 38–73)
NONHDLC SERPL-MCNC: 84 MG/DL
NRBC BLD-RTO: 0 /100 WBC
PLATELET # BLD AUTO: 257 K/UL (ref 150–450)
PMV BLD AUTO: 10.5 FL (ref 9.2–12.9)
POTASSIUM SERPL-SCNC: 3.8 MMOL/L (ref 3.5–5.1)
PROT SERPL-MCNC: 7.3 G/DL (ref 6–8.4)
RBC # BLD AUTO: 4.7 M/UL (ref 4–5.4)
SODIUM SERPL-SCNC: 140 MMOL/L (ref 136–145)
TRIGL SERPL-MCNC: 60 MG/DL (ref 30–150)
WBC # BLD AUTO: 6.03 K/UL (ref 3.9–12.7)

## 2022-04-22 PROCEDURE — 85025 COMPLETE CBC W/AUTO DIFF WBC: CPT | Performed by: INTERNAL MEDICINE

## 2022-04-22 PROCEDURE — 36415 COLL VENOUS BLD VENIPUNCTURE: CPT | Performed by: INTERNAL MEDICINE

## 2022-04-22 PROCEDURE — 86803 HEPATITIS C AB TEST: CPT | Performed by: INTERNAL MEDICINE

## 2022-04-22 PROCEDURE — 80053 COMPREHEN METABOLIC PANEL: CPT | Performed by: INTERNAL MEDICINE

## 2022-04-22 PROCEDURE — 80061 LIPID PANEL: CPT | Performed by: INTERNAL MEDICINE

## 2022-04-27 LAB — HCV AB SERPL QL IA: NEGATIVE

## 2022-05-07 ENCOUNTER — OFFICE VISIT (OUTPATIENT)
Dept: URGENT CARE | Facility: CLINIC | Age: 37
End: 2022-05-07
Payer: COMMERCIAL

## 2022-05-07 VITALS
RESPIRATION RATE: 20 BRPM | HEART RATE: 78 BPM | WEIGHT: 176 LBS | HEIGHT: 64 IN | BODY MASS INDEX: 30.05 KG/M2 | OXYGEN SATURATION: 98 % | DIASTOLIC BLOOD PRESSURE: 85 MMHG | SYSTOLIC BLOOD PRESSURE: 126 MMHG | TEMPERATURE: 98 F

## 2022-05-07 DIAGNOSIS — B96.89 ACUTE BACTERIAL SINUSITIS: Primary | ICD-10-CM

## 2022-05-07 DIAGNOSIS — J01.90 ACUTE BACTERIAL SINUSITIS: Primary | ICD-10-CM

## 2022-05-07 PROCEDURE — 3079F PR MOST RECENT DIASTOLIC BLOOD PRESSURE 80-89 MM HG: ICD-10-PCS | Mod: CPTII,S$GLB,, | Performed by: NURSE PRACTITIONER

## 2022-05-07 PROCEDURE — 1160F PR REVIEW ALL MEDS BY PRESCRIBER/CLIN PHARMACIST DOCUMENTED: ICD-10-PCS | Mod: CPTII,S$GLB,, | Performed by: NURSE PRACTITIONER

## 2022-05-07 PROCEDURE — 3079F DIAST BP 80-89 MM HG: CPT | Mod: CPTII,S$GLB,, | Performed by: NURSE PRACTITIONER

## 2022-05-07 PROCEDURE — 1160F RVW MEDS BY RX/DR IN RCRD: CPT | Mod: CPTII,S$GLB,, | Performed by: NURSE PRACTITIONER

## 2022-05-07 PROCEDURE — 3008F BODY MASS INDEX DOCD: CPT | Mod: CPTII,S$GLB,, | Performed by: NURSE PRACTITIONER

## 2022-05-07 PROCEDURE — 3074F PR MOST RECENT SYSTOLIC BLOOD PRESSURE < 130 MM HG: ICD-10-PCS | Mod: CPTII,S$GLB,, | Performed by: NURSE PRACTITIONER

## 2022-05-07 PROCEDURE — 1159F MED LIST DOCD IN RCRD: CPT | Mod: CPTII,S$GLB,, | Performed by: NURSE PRACTITIONER

## 2022-05-07 PROCEDURE — 99203 PR OFFICE/OUTPT VISIT, NEW, LEVL III, 30-44 MIN: ICD-10-PCS | Mod: S$GLB,,, | Performed by: NURSE PRACTITIONER

## 2022-05-07 PROCEDURE — 3008F PR BODY MASS INDEX (BMI) DOCUMENTED: ICD-10-PCS | Mod: CPTII,S$GLB,, | Performed by: NURSE PRACTITIONER

## 2022-05-07 PROCEDURE — 1159F PR MEDICATION LIST DOCUMENTED IN MEDICAL RECORD: ICD-10-PCS | Mod: CPTII,S$GLB,, | Performed by: NURSE PRACTITIONER

## 2022-05-07 PROCEDURE — 3074F SYST BP LT 130 MM HG: CPT | Mod: CPTII,S$GLB,, | Performed by: NURSE PRACTITIONER

## 2022-05-07 PROCEDURE — 99203 OFFICE O/P NEW LOW 30 MIN: CPT | Mod: S$GLB,,, | Performed by: NURSE PRACTITIONER

## 2022-05-07 RX ORDER — CEFDINIR 300 MG/1
300 CAPSULE ORAL 2 TIMES DAILY
Qty: 20 CAPSULE | Refills: 0 | Status: SHIPPED | OUTPATIENT
Start: 2022-05-07 | End: 2022-05-17

## 2022-05-07 NOTE — PROGRESS NOTES
"Subjective:       Patient ID: Lindy Ferraro is a 36 y.o. female.    Vitals:  height is 5' 4" (1.626 m) and weight is 79.8 kg (176 lb). Her temperature is 97.8 °F (36.6 °C). Her blood pressure is 126/85 and her pulse is 78. Her respiration is 20 and oxygen saturation is 98%.     Chief Complaint: Sinus Problem    Pt presents with complaint of sinus pressure, runny nose and jaw/dental pain x1 week.  Pt states she believes she has a sinus infection.  Pt states she has taken five covid tests which all resulted negative.  Pt states she has taken tylenol for her symptoms with no relief    Sinus Problem  This is a new problem. The current episode started in the past 7 days. The problem is unchanged. There has been no fever. Her pain is at a severity of 4/10. The pain is mild. Associated symptoms include congestion, ear pain, headaches, sinus pressure and sneezing. Pertinent negatives include no chills, coughing, diaphoresis, hoarse voice, neck pain, shortness of breath, sore throat or swollen glands. Past treatments include acetaminophen. The treatment provided no relief.       Constitution: Negative for chills, sweating, fatigue and fever.   HENT: Positive for ear pain, congestion, sinus pain and sinus pressure. Negative for dental problem, postnasal drip, sore throat, trouble swallowing and voice change.    Neck: Negative for neck pain.   Respiratory: Negative for cough and shortness of breath.    Allergic/Immunologic: Positive for sneezing.   Neurological: Positive for headaches.       Objective:      Physical Exam   Constitutional: She is oriented to person, place, and time. She appears well-developed. She is cooperative.  Non-toxic appearance. She does not appear ill. No distress.   HENT:   Head: Normocephalic and atraumatic.   Ears:   Right Ear: Hearing, external ear and ear canal normal. A middle ear effusion (serous) is present.   Left Ear: Hearing, tympanic membrane, external ear and ear canal normal. "   Nose: Purulent discharge and sinus tenderness present. No mucosal edema, rhinorrhea or nasal deformity. No epistaxis. Right sinus exhibits no maxillary sinus tenderness and no frontal sinus tenderness. Left sinus exhibits no maxillary sinus tenderness and no frontal sinus tenderness.   Mouth/Throat: Uvula is midline, oropharynx is clear and moist and mucous membranes are normal. No trismus in the jaw. Normal dentition. No uvula swelling. No oropharyngeal exudate, posterior oropharyngeal edema or posterior oropharyngeal erythema.   Eyes: Conjunctivae and lids are normal. No scleral icterus.   Neck: Trachea normal and phonation normal. Neck supple. No edema present. No erythema present. No neck rigidity present.   Cardiovascular: Normal rate, regular rhythm, normal heart sounds and normal pulses.   Pulmonary/Chest: Effort normal and breath sounds normal. No respiratory distress. She has no decreased breath sounds. She has no rhonchi.   Abdominal: Normal appearance.   Musculoskeletal: Normal range of motion.         General: No deformity. Normal range of motion.   Neurological: She is alert and oriented to person, place, and time. She exhibits normal muscle tone. Coordination normal.   Skin: Skin is warm, dry, intact, not diaphoretic and not pale.   Psychiatric: Her speech is normal and behavior is normal. Judgment and thought content normal.   Nursing note and vitals reviewed.        Assessment:       1. Acute bacterial sinusitis          Plan:         Acute bacterial sinusitis  -     cefdinir (OMNICEF) 300 MG capsule; Take 1 capsule (300 mg total) by mouth 2 (two) times daily. for 10 days  Dispense: 20 capsule; Refill: 0      Patient Instructions   Please drink plenty of fluids.  Please get plenty of rest.  Please return here or go to the Emergency Department for any concerns or worsening of condition.  If you were prescribed antibiotics, please take them to completion.  If you do not have Hypertension or any  history of palpitations, it is ok to take over the counter Sudafed or Mucinex D or Allegra-D or Claritin-D or Zyrtec-D.  If you do take one of the above, it is ok to combine that with plain over the counter Mucinex or Allegra or Claritin or Zyrtec.  If for example you are taking Zyrtec -D, you can combine that with Mucinex, but not Mucinex-D.  If you are taking Mucinex-D, you can combine that with plain Allegra or Claritin or Zyrtec.   If you do have Hypertension or palpitations, it is safe to take Coricidin HBP for relief of sinus symptoms.  We recommend you take over the counter Flonase (Fluticasone) or another nasally inhaled steroid unless you are already taking one.  Nasal irrigation with a saline spray or Netti Pot like device per their directions is also recommended.  If not allergic, please take over the counter Tylenol (Acetaminophen) and/or Motrin (Ibuprofen) as directed for control of pain and/or fever.  Please follow up with your primary care doctor or specialist as needed.    If you  smoke, please stop smoking.

## 2022-05-07 NOTE — PATIENT INSTRUCTIONS
Please drink plenty of fluids.  Please get plenty of rest.  Please return here or go to the Emergency Department for any concerns or worsening of condition.  If you were prescribed antibiotics, please take them to completion.  If you do not have Hypertension or any history of palpitations, it is ok to take over the counter Sudafed or Mucinex D or Allegra-D or Claritin-D or Zyrtec-D.  If you do take one of the above, it is ok to combine that with plain over the counter Mucinex or Allegra or Claritin or Zyrtec.  If for example you are taking Zyrtec -D, you can combine that with Mucinex, but not Mucinex-D.  If you are taking Mucinex-D, you can combine that with plain Allegra or Claritin or Zyrtec.   If you do have Hypertension or palpitations, it is safe to take Coricidin HBP for relief of sinus symptoms.  We recommend you take over the counter Flonase (Fluticasone) or another nasally inhaled steroid unless you are already taking one.  Nasal irrigation with a saline spray or Netti Pot like device per their directions is also recommended.  If not allergic, please take over the counter Tylenol (Acetaminophen) and/or Motrin (Ibuprofen) as directed for control of pain and/or fever.  Please follow up with your primary care doctor or specialist as needed.    If you  smoke, please stop smoking.

## 2022-05-13 ENCOUNTER — PATIENT MESSAGE (OUTPATIENT)
Dept: INTERNAL MEDICINE | Facility: CLINIC | Age: 37
End: 2022-05-13
Payer: COMMERCIAL

## 2022-06-07 PROBLEM — M62.81 MUSCLE WEAKNESS: Status: ACTIVE | Noted: 2022-06-07

## 2022-06-10 ENCOUNTER — CLINICAL SUPPORT (OUTPATIENT)
Dept: REHABILITATION | Facility: OTHER | Age: 37
End: 2022-06-10
Attending: OBSTETRICS & GYNECOLOGY
Payer: COMMERCIAL

## 2022-06-10 DIAGNOSIS — N81.89 PELVIC FLOOR WEAKNESS IN FEMALE: ICD-10-CM

## 2022-06-10 DIAGNOSIS — R19.8 ABDOMINAL WEAKNESS: Primary | ICD-10-CM

## 2022-06-10 DIAGNOSIS — M62.81 MUSCLE WEAKNESS: ICD-10-CM

## 2022-06-10 DIAGNOSIS — M79.18 MYALGIA OF PELVIC FLOOR: ICD-10-CM

## 2022-06-10 PROCEDURE — 97530 THERAPEUTIC ACTIVITIES: CPT

## 2022-06-10 PROCEDURE — 97161 PT EVAL LOW COMPLEX 20 MIN: CPT

## 2022-06-10 NOTE — PLAN OF CARE
"[facility]  Pelvic Health Physical Therapy Initial Evaluation    Date: 6/10/2022   Name: Lindy Ferraro  Clinic Number: [id number]  Therapy Diagnosis:   Encounter Diagnoses   Name Primary?    Abdominal weakness Yes    Muscle weakness     Myalgia of pelvic floor      Physician: Lilliam Beck DO    Physician Orders: PT Eval and Treat  Medical Diagnosis from Referral: N81.89 (ICD-10-CM) - Pelvic floor weakness in female  Evaluation Date: 6/10/2022  Authorization Period Expiration: 2023  Plan of Care Expiration: 2022  Visit # / Visits authorized:   FOTO: 1/3    Time In: 1100  Time Out: 1200  Total Appointment Time (timed & untimed codes): 60 minutes    Precautions: PMHx jejunal atresia    Pronouns: she/her    Subjective     Date of onset: 2021    History of current condition - Susan reports: having first daughter in 2019 without complication. She states that she was induced because "she was a nervous mom." Pt reports that she had 2nd degree tearing with difficult postpartum recovery.. She states that it took ~11 weeks for scar tissue to heal properly. She states her son was born in 2021 with 2nd degree tearing again She reports that she had two babies in two years which has resulted in feelings of prolapse since the birth of her second child. Pt reports she has sensations of "tampon falling out." She states that one year postpartum she is struggling with core and pelvic floor engagement. Pt states that she is not having incontinence. She reports that she also has umbilical hernia but is waiting to decide if they want a third child to have a repair.     OB/GYN History: , vaginal delivery and perineal laceration  Birth Control: Mirena IUD since March  Sexually active? Yes  Pain with vaginal exams, intercourse or tampon use? Yes - painful during initial penetration     Bladder/Bowel History:   Frequency of urination:   Daytime: every 2 hours         " "  Nighttime: 0x   Difficulty initiating urine stream: No   Urine stream: strong   Complete emptying: Yes intermittently    Bladder leakage: No   Urinary Urgency: No   Frequency of bowel movements: once a day   Difficulty initiating BM: No   Quality/Shape of BM: [place] Type 4   Does Patient Feel Empty after BM? Yes   Fiber Supplements or Laxative Use? 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     Surgical History: Lindy Ferraro  has a past surgical history that includes Intrauterine device insertion (2016); Small intestine surgery (8/1987); and [place] tooth extraction.    Medications: Lindy has a current medication list which includes the following Facility-Administered Medications: levonorgestrel.    Allergies:   Review of patient's allergies indicates:   Allergen Reactions    Asa [aspirin] Other (See Comments)     Blood disorder per Patient    Ibuprofen     Sulfa (sulfonamide antibiotics)     Pcn [penicillins] Rash          Prior Therapy/Previous treatment included: 3-4 rounds of pelvic PT at [facility] with previous pregnancies  Social History: lives with their family  Current exercise: postpartum Peloton core classes, Peloton, walking/light jogging daily   Occupation: Pt works as a maternal      Habitus: well developed, well nourished  Abuse/Neglect: No     Pts goals: "be able to run again, strengthen my core and not have pain after exercise or with sex"     OBJECTIVE     See EMR under MEDIA for written consent provided 6/10/2022  Chaperone: Declined    ORTHO SCREEN  Posture in sitting: sacral sitting  Posture in standing: WNL  Pelvic alignment: Not assessed today      ABDOMINAL WALL ASSESSMENT  Palpation: WNL - umbilical hernia present  Abdominal strength: good RA engagement, poor TrA activation  Scarring: small incision scars from childhood surgery, " well healed  Pelvic Floor Muscle and Transverse Abdominus Synergy: absent  Diastasis: 1 finger width at the umbilicus, 2 fingers width 4.5 cm above umbilicus, 1 finger width 4.5 cm below umbilicus     BREATHING MECHANICS ASSESSMENT   Thorax Assessment During Quiet Respiration: WNL excursion of ribcage and WNL excursion of abdominal wall  Thorax Assessment During Deep Respiration: WNL excursion of ribcage and WNL excursion of abdominal wall    VAGINAL PELVIC FLOOR EXAM    EXTERNAL ASSESSMENT  Introitus: WNL  Skin condition: redness noted  Scarring: well healed perineal scar   Sensation: WNL   Pain: none  Voluntary contraction: nil  Voluntary relaxation: nil  Involuntary contraction: visible drop  Bearing down: visible drop  Perineal descent: absent      INTERNAL ASSESSMENT  Pain: tender areas noted as follows: throughout L PFM   Sensation: WNL   Vaginal vault: WNL   Muscle Bulk: WFL   Muscle Power: 1/5  Muscle Endurance: 4 sec  # Reps To Fatigue: NT    Fast Contractions in 10 seconds: 4     Quality of contraction: slow rise and decreased hold   Specificity: patient contracts: abdominals   Coordination: tends to hold breath during PFM contration and cannot isolate PFM from abdominals   Prolapse check: Grade 1 cystocele    Limitation/Restriction for FOTO Initial Survey    Therapist reviewed FOTO scores for Lindy Ferraro on 6/10/2022.   FOTO documents entered into [employer] - see Media section.    Limitation Score:   PFDI Prolapse - 29%  PFDI Pain - 29%       TREATMENT     Treatment Time In: 1136  Treatment Time Out: 1200  Total Treatment time (time-based codes) separate from Evaluation: 24 minutes    Therapeutic Activity Patient participated in dynamic functional therapeutic activities to improve functional performance for 24 minutes. Including: Education as described below.   Edu on pressure management strategies to decrease symptoms of prolapse with ADLs and activity  Edu on positional modifications to address  pelvic pressure  Edu on double voiding techniques. Practiced technique with patient and utilized teach back strategy to confirm understanding.     Patient Education provided:   general anatomy/physiology of urinary/ bowel  system, benefits of treatment, risks of treatment and alternative methods of treatment were discussed with the pt. Additionally, anatomy/physiology of pelvic floor, posture/body mechanices, diaphragmatic breathing and behavior modifications were reviewed.     Home Exercises provided:  Written Home Exercises provided: Yes  Exercises were reviewed and Susan was able to demonstrate them prior to the end of the session.    Susan demonstrated good  understanding of the education provided.     See EMR under Patient Instructions for exercises provided prior visit.    Assessment     Lindy is a 36 y.o. female referred to outpatient Physical Therapy with a medical diagnosis of N81.89 (ICD-10-CM) - Pelvic floor weakness in female. Pt presents to PT reporting persistent symptoms of pelvic pressure that she has been experiencing since the birth of her second child in April 2021. Pt was previously seen by PT but was noncompliant with attendance due to schedule. She also endorses difficulty fully emptying her bladder and dyspareunia with initial penetration. Pt states that she is using lubrication during intercourse. Intravaginal assessment reveals poor PFM activation with decreased strength and endurance. Pt reports TTP throughout L PFM. She presents with grade 1 cystocele with valsalva. Pt also demonstrates MINDA and poor pressure management through abdomen. Provided education on pressure management strategies and positional modifications to decrease symptoms of prolapse. Pt presents with poor knowledge of body mechanics and posture, poor trunk stability, pelvic floor tenderness, decreased pelvic muscle strength, decreased endurance of the pelvic muscles, decreased phasic ability of the pelvic muscles, poor  quality of pelvic muscle contraction, incomplete urination and unable to co-contract or co-relax abdominal wall and pelvic floor muscles.      Pt prognosis is Excellent  Pt will benefit from skilled outpatient Physical Therapy to address the deficits stated above and in the chart below, provide pt/family education, and to maximize pt's level of independence.     Plan of care discussed with patient: Yes  Pt's spiritual, cultural and educational needs considered and patient is agreeable to the plan of care and goals as stated below:     Anticipated Barriers for therapy: None    Medical Necessity is demonstrated by the following:    History  Co-morbidities and personal factors that may impact the plan of care Co-morbidities   no significant PMHx reported    Personal Factors  previous poor compliance with PT     low   Examination  Body structures and functions, activity limitations and participation restrictions that may impact the plan of care Body Regions/Systems/Functions:  poor knowledge of body mechanics and posture, poor trunk stability, pelvic floor tenderness, decreased pelvic muscle strength, decreased endurance of the pelvic muscles, decreased phasic ability of the pelvic muscles, poor quality of pelvic muscle contraction, incomplete urination and unable to co-contract or co-relax abdominal wall and pelvic floor muscles.     Activity Limitations:  intercourse/vaginal exam/tampon use without pain, Pain with ADLs and Participating in social activities and ADLs due to pelvic pressure    Participation Restrictions:  well woman's exam, relationship with spouse/partner, ADL participation affected by pain, Pelvic pressure with ADLs.  and exercise restrictions due to pain     Activity limitations:   Learning and applying knowledge  No deficits    General Tasks and Commands  No deficits    Communication  No deficits    Mobility  No deficits    Self care  No deficits    Domestic Life  No  deficits    Interactions/Relationships  No deficits    Life Areas  No deficits    Community and Social Life  No deficits       low   Clinical Presentation stable and uncomplicated low   Decision Making/ Complexity Score: low       Goals:  Short Term Goals: 6 weeks   Pt indep in HEP  Pt indep in functional brace technique for decreased strain of pelvic structures or UI with activities which increase IAP  Pt demo complete contraction and deactivation of pelvic floor muscles x 10 reps  Able to isolate TrA x 10 reps     Long Term Goals: 12 weeks   Pt indep in progressive HEP  Pt reports no sensations of pelvic pressure in 2 weeks  Pt able to resume exercise for at least 45 minutes without symptoms  Pt reports able to have painfree intercourse for improved participation in ADLs and improved intimacy with partner    Plan     Plan of care Certification: 6/10/2022 to 9/8/2022.    Outpatient Physical Therapy 1 times weekly for 12 weeks to include the following interventions: therapeutic exercises, therapeutic activity, neuromuscular re-education, manual therapy, patient/family education and self care/home management    Progress TrA activation. Intravaginal STM to L side.     Anna Kimura, PT

## 2022-06-10 NOTE — PATIENT INSTRUCTIONS
PELVIC FLOOR PRESSURE MANAGEMENT         Your pelvic floor muscles and your diaphragm work closely together to regulate the pressure in your body. Each time you inhale, your diaphragm contracts, flattens, and moves downward. In response, your pelvic floor muscles relax and drop down as well. When you exhale, both muscles lift upwards. Throughout a diaphragmatic breath cycle, your pelvic floor goes through a full range of motion that contributes to its optimal function.    Think of your body like a canister, with one opening at the top where your mouth is and another opening at the bottom through your pelvic floor. If the top of the canister is closed off (as with straining during bowel movements or Valsalva maneuver during lifting) all of the pressure moves downward. If your pelvic floor muscles are not strong enough to counteract this increased pressure, you may experience leaking or increased sensations of pelvic organ prolapse.     This is why it is extremely important to implement the following pressure management techniques:  Avoiding Constipation - make high-fiber foods part of your regular diet (fruits, vegetables, bran, and beans), reduce the amount of processed foods in your diet, drink plenty of water and fluids every day, exercise daily, and manage your stress with meditation or other relaxation techniques.  No Straining! Straining (bearing down and holding your breath) puts additional downward pressure on our organs, causing increased prolapse and pelvic pressure. Instead, blow out the candles as you gently push down. Do NOT hold your breath!  Use a Stool - Utilize a squat position when voiding. Get your knees up higher than your hips. Squatting helps relax the pelvic floor muscles and reduces straining.  Avoid heavy lifting and high-impact activities until you can strengthen your core and pelvic floor muscles appropriately. When you do have to lift, hold the load close to your body to reduce strain  and do not hold your breath when lifting.  Do not Valsalva (hold your breath and push) at any time.  Use diaphragmatic breathing (belly breathing) to manage stress, help empty bladder, help empty bowels, and help lightly stretch pelvic floor muscles.     Positioning for Pelvic Pressure:     *Can sit with legs up the wall or with 2-3 pillows under hips in order to reduce pelvic pressure and bulging.       DOUBLE VOIDING - Double voiding is a technique that may assist the bladder to empty more effectively when urine is left in the bladder. It involves passing urine more than once each time that you go to the toilet. This makes sure that the bladder is completely empty.    Here are 3 strategies you can try to fully empty your bladder.  You do not have to do all of these things every single time. Find which ones work best for you.     Check to make sure your pelvic floor is relaxed   Do a body scan - make sure your legs, buttocks, and abdominals are relaxed.  Take a couple deep, slow breaths to encourage your pelvic floor muscles to DROP (ie. Try Diaphragmatic Breathing).  Gently apply pressure over your bladder.  Change your pelvic position: lean forward, rock your pelvis forward and backward, stand up then sit back down.     Wait at least 30 seconds to 1 minute to see if a second urine stream begins.     Do not stop your urine stream or push/strain!

## 2022-08-06 ENCOUNTER — PATIENT MESSAGE (OUTPATIENT)
Dept: OBSTETRICS AND GYNECOLOGY | Facility: CLINIC | Age: 37
End: 2022-08-06
Payer: COMMERCIAL

## 2022-08-08 ENCOUNTER — PATIENT MESSAGE (OUTPATIENT)
Dept: OBSTETRICS AND GYNECOLOGY | Facility: CLINIC | Age: 37
End: 2022-08-08
Payer: COMMERCIAL

## 2022-12-10 ENCOUNTER — PATIENT MESSAGE (OUTPATIENT)
Dept: OBSTETRICS AND GYNECOLOGY | Facility: CLINIC | Age: 37
End: 2022-12-10
Payer: COMMERCIAL

## 2022-12-15 ENCOUNTER — OFFICE VISIT (OUTPATIENT)
Dept: OBSTETRICS AND GYNECOLOGY | Facility: CLINIC | Age: 37
End: 2022-12-15
Payer: COMMERCIAL

## 2022-12-15 VITALS
DIASTOLIC BLOOD PRESSURE: 77 MMHG | SYSTOLIC BLOOD PRESSURE: 134 MMHG | WEIGHT: 170.63 LBS | BODY MASS INDEX: 29.29 KG/M2

## 2022-12-15 DIAGNOSIS — Z30.431 ENCOUNTER FOR ROUTINE CHECKING OF INTRAUTERINE CONTRACEPTIVE DEVICE (IUD): Primary | ICD-10-CM

## 2022-12-15 PROCEDURE — 3008F PR BODY MASS INDEX (BMI) DOCUMENTED: ICD-10-PCS | Mod: CPTII,S$GLB,, | Performed by: NURSE PRACTITIONER

## 2022-12-15 PROCEDURE — 3075F PR MOST RECENT SYSTOLIC BLOOD PRESS GE 130-139MM HG: ICD-10-PCS | Mod: CPTII,S$GLB,, | Performed by: NURSE PRACTITIONER

## 2022-12-15 PROCEDURE — 3075F SYST BP GE 130 - 139MM HG: CPT | Mod: CPTII,S$GLB,, | Performed by: NURSE PRACTITIONER

## 2022-12-15 PROCEDURE — 1159F MED LIST DOCD IN RCRD: CPT | Mod: CPTII,S$GLB,, | Performed by: NURSE PRACTITIONER

## 2022-12-15 PROCEDURE — 99999 PR PBB SHADOW E&M-EST. PATIENT-LVL II: ICD-10-PCS | Mod: PBBFAC,,, | Performed by: NURSE PRACTITIONER

## 2022-12-15 PROCEDURE — 99999 PR PBB SHADOW E&M-EST. PATIENT-LVL II: CPT | Mod: PBBFAC,,, | Performed by: NURSE PRACTITIONER

## 2022-12-15 PROCEDURE — 3078F PR MOST RECENT DIASTOLIC BLOOD PRESSURE < 80 MM HG: ICD-10-PCS | Mod: CPTII,S$GLB,, | Performed by: NURSE PRACTITIONER

## 2022-12-15 PROCEDURE — 99499 UNLISTED E&M SERVICE: CPT | Mod: S$GLB,,, | Performed by: NURSE PRACTITIONER

## 2022-12-15 PROCEDURE — 3078F DIAST BP <80 MM HG: CPT | Mod: CPTII,S$GLB,, | Performed by: NURSE PRACTITIONER

## 2022-12-15 PROCEDURE — 99499 NO LOS: ICD-10-PCS | Mod: S$GLB,,, | Performed by: NURSE PRACTITIONER

## 2022-12-15 PROCEDURE — 3008F BODY MASS INDEX DOCD: CPT | Mod: CPTII,S$GLB,, | Performed by: NURSE PRACTITIONER

## 2022-12-15 PROCEDURE — 1159F PR MEDICATION LIST DOCUMENTED IN MEDICAL RECORD: ICD-10-PCS | Mod: CPTII,S$GLB,, | Performed by: NURSE PRACTITIONER

## 2022-12-15 NOTE — PROGRESS NOTES
CC: IUD check    Pt is a 38 y/o female  presents for IUD check. Patient had a Mirena placed in 2022. Recently she has been having some abnormal bleeding, pelvic pain, and is unable to feel the strings. She is here today for an IUD check- saw me in April for this reason and IUD strings were visualized. Had spotting the first few months then it stopped. Recently back to having almost a regular period, no bleeding today. Last few times she has had intercourse reports uterine cramping for about 20 minutes after, then spontaneously resolves. Long history of pelvic pain- has seen PT but the copay was too expensive with her insurance so she did not continue- considering restarting.    ROS:  GENERAL: Feeling well overall. Denies fever or chills.   SKIN: Denies rash or lesions.   HEAD: Denies head injury or headache.   NODES: Denies enlarged lymph nodes.   CHEST: Denies chest pain or shortness of breath.   CARDIOVASCULAR: Denies palpitations or left sided chest pain.   ABDOMEN: No abdominal pain, constipation, diarrhea, nausea, vomiting or rectal bleeding.   URINARY: No dysuria, hematuria, or burning on urination.  REPRODUCTIVE: See HPI.   BREASTS: Denies pain, lumps, or nipple discharge.   HEMATOLOGIC: No easy bruisability or excessive bleeding.   MUSCULOSKELETAL: Denies joint pain or swelling.   NEUROLOGIC: Denies syncope or weakness.   PSYCHIATRIC: Denies depression, anxiety or mood swings.      PHYSICAL EXAM:  Abdomen: Soft, non-tender, non-distended  Vulva: No lesions  Vaginal: No lesions, no abnormal discharge  Cervix: No discharge, no CMT, IUD strings visible at os (2 cm out)  Uterus: Normal size, non-tender  Adnexa: No masses, non-tender    ASSESSMENT AND PLAN:  1. IUD surveillance  2. Discussed bleeding pattern with IUD, offered pelvic US in future if pain worsens- declines at this time, no pain today.    Patient counseled on IUD danger signs and how to check the strings reviewed.    Return for annual  GYN exam

## 2023-03-05 ENCOUNTER — OFFICE VISIT (OUTPATIENT)
Dept: URGENT CARE | Facility: CLINIC | Age: 38
End: 2023-03-05
Payer: COMMERCIAL

## 2023-03-05 VITALS
OXYGEN SATURATION: 97 % | HEART RATE: 67 BPM | WEIGHT: 170.63 LBS | HEIGHT: 64 IN | TEMPERATURE: 98 F | DIASTOLIC BLOOD PRESSURE: 86 MMHG | RESPIRATION RATE: 18 BRPM | SYSTOLIC BLOOD PRESSURE: 134 MMHG | BODY MASS INDEX: 29.13 KG/M2

## 2023-03-05 DIAGNOSIS — B96.89 BACTERIAL SINUSITIS: Primary | ICD-10-CM

## 2023-03-05 DIAGNOSIS — J32.9 BACTERIAL SINUSITIS: Primary | ICD-10-CM

## 2023-03-05 PROCEDURE — 3079F DIAST BP 80-89 MM HG: CPT | Mod: CPTII,S$GLB,, | Performed by: PHYSICIAN ASSISTANT

## 2023-03-05 PROCEDURE — 1160F RVW MEDS BY RX/DR IN RCRD: CPT | Mod: CPTII,S$GLB,, | Performed by: PHYSICIAN ASSISTANT

## 2023-03-05 PROCEDURE — 3008F PR BODY MASS INDEX (BMI) DOCUMENTED: ICD-10-PCS | Mod: CPTII,S$GLB,, | Performed by: PHYSICIAN ASSISTANT

## 2023-03-05 PROCEDURE — 99214 OFFICE O/P EST MOD 30 MIN: CPT | Mod: S$GLB,,, | Performed by: PHYSICIAN ASSISTANT

## 2023-03-05 PROCEDURE — 3079F PR MOST RECENT DIASTOLIC BLOOD PRESSURE 80-89 MM HG: ICD-10-PCS | Mod: CPTII,S$GLB,, | Performed by: PHYSICIAN ASSISTANT

## 2023-03-05 PROCEDURE — 3075F PR MOST RECENT SYSTOLIC BLOOD PRESS GE 130-139MM HG: ICD-10-PCS | Mod: CPTII,S$GLB,, | Performed by: PHYSICIAN ASSISTANT

## 2023-03-05 PROCEDURE — 3075F SYST BP GE 130 - 139MM HG: CPT | Mod: CPTII,S$GLB,, | Performed by: PHYSICIAN ASSISTANT

## 2023-03-05 PROCEDURE — 1159F MED LIST DOCD IN RCRD: CPT | Mod: CPTII,S$GLB,, | Performed by: PHYSICIAN ASSISTANT

## 2023-03-05 PROCEDURE — 1159F PR MEDICATION LIST DOCUMENTED IN MEDICAL RECORD: ICD-10-PCS | Mod: CPTII,S$GLB,, | Performed by: PHYSICIAN ASSISTANT

## 2023-03-05 PROCEDURE — 1160F PR REVIEW ALL MEDS BY PRESCRIBER/CLIN PHARMACIST DOCUMENTED: ICD-10-PCS | Mod: CPTII,S$GLB,, | Performed by: PHYSICIAN ASSISTANT

## 2023-03-05 PROCEDURE — 99214 PR OFFICE/OUTPT VISIT, EST, LEVL IV, 30-39 MIN: ICD-10-PCS | Mod: S$GLB,,, | Performed by: PHYSICIAN ASSISTANT

## 2023-03-05 PROCEDURE — 3008F BODY MASS INDEX DOCD: CPT | Mod: CPTII,S$GLB,, | Performed by: PHYSICIAN ASSISTANT

## 2023-03-05 RX ORDER — CEFDINIR 300 MG/1
300 CAPSULE ORAL 2 TIMES DAILY
Qty: 14 CAPSULE | Refills: 0 | Status: SHIPPED | OUTPATIENT
Start: 2023-03-05 | End: 2023-03-12

## 2023-03-05 RX ORDER — PREDNISONE 20 MG/1
40 TABLET ORAL DAILY
Qty: 6 TABLET | Refills: 0 | Status: SHIPPED | OUTPATIENT
Start: 2023-03-05 | End: 2023-03-08

## 2023-03-05 NOTE — PROGRESS NOTES
"Subjective:       Patient ID: Lindy Ferraro is a 37 y.o. female.    Vitals:  height is 5' 4" (1.626 m) and weight is 77.4 kg (170 lb 10.2 oz). Her tympanic temperature is 97.8 °F (36.6 °C). Her blood pressure is 134/86 and her pulse is 67. Her respiration is 18 and oxygen saturation is 97%.     Chief Complaint: URI    Patient presents with URI symptoms that began a week ago.  Patient has experienced nasal congestion, fever, cough, loss of appetite and headaches.  Over the past few days she has had worsening sinus pressure, sinus pain.  Home COVID test negative.  This is consistent with her prior sinusitis.  Patient tried taking OTC medication and had no improvement.  Worried about going on flight/altitude tomorrow.    URI   This is a new problem. The current episode started in the past 7 days. The problem has been unchanged. There has been no fever. Associated symptoms include congestion, coughing, ear pain, headaches, nausea and sinus pain. Pertinent negatives include no abdominal pain, chest pain, diarrhea, dysuria, neck pain, rash, sore throat or vomiting.   Constitution: Positive for fatigue and fever. Negative for chills and sweating.   HENT:  Positive for ear pain, congestion, postnasal drip, sinus pain and sinus pressure. Negative for sore throat.    Neck: Negative for neck pain and neck stiffness.   Cardiovascular:  Negative for chest pain, leg swelling and palpitations.   Eyes:  Negative for eye itching, eye pain and eye redness.   Respiratory:  Positive for cough and sputum production. Negative for chest tightness and shortness of breath.    Gastrointestinal:  Positive for nausea. Negative for abdominal pain, vomiting and diarrhea.   Genitourinary:  Negative for dysuria, frequency and urgency.   Musculoskeletal:  Negative for pain and joint swelling.   Skin:  Negative for color change and rash.   Allergic/Immunologic: Positive for recurrent sinus infections.   Neurological:  Positive for " headaches. Negative for dizziness, light-headedness, facial drooping, disorientation, altered mental status, numbness and tingling.   Psychiatric/Behavioral:  Negative for altered mental status and disorientation.      Objective:      Physical Exam   Constitutional: She is oriented to person, place, and time. She appears well-developed. She is cooperative.  Non-toxic appearance. She does not appear ill. No distress.   HENT:   Head: Normocephalic and atraumatic.   Ears:   Right Ear: Hearing, tympanic membrane, external ear and ear canal normal.   Left Ear: Hearing, tympanic membrane, external ear and ear canal normal.   Nose: Mucosal edema present. No rhinorrhea or nasal deformity. No epistaxis. Right sinus exhibits maxillary sinus tenderness and frontal sinus tenderness. Left sinus exhibits maxillary sinus tenderness and frontal sinus tenderness.   Mouth/Throat: Uvula is midline, oropharynx is clear and moist and mucous membranes are normal. No trismus in the jaw. Normal dentition. No uvula swelling. No oropharyngeal exudate, posterior oropharyngeal edema or posterior oropharyngeal erythema. No tonsillar exudate.   Eyes: Conjunctivae and lids are normal. No scleral icterus.   Neck: Trachea normal and phonation normal. Neck supple. No edema present. No erythema present. No neck rigidity present.   Cardiovascular: Normal rate, regular rhythm, normal heart sounds and normal pulses.   Pulmonary/Chest: Effort normal and breath sounds normal. No accessory muscle usage or stridor. No tachypnea and no bradypnea. No respiratory distress. She has no decreased breath sounds. She has no wheezes. She has no rhonchi. She has no rales.   Abdominal: Normal appearance.   Musculoskeletal: Normal range of motion.         General: No deformity. Normal range of motion.   Lymphadenopathy:        Head (right side): No submandibular adenopathy present.        Head (left side): No submandibular adenopathy present.     She has no cervical  adenopathy.   Neurological: She is alert and oriented to person, place, and time. She exhibits normal muscle tone. Coordination normal.   Skin: Skin is warm, dry, intact, not diaphoretic and not pale.   Psychiatric: Her speech is normal and behavior is normal. Judgment and thought content normal.   Nursing note and vitals reviewed.      Assessment:       1. Bacterial sinusitis          Plan:       - Discussed ddx, home care, tx options, and given follow up precautions.  I have reviewed the patient's chart to view previous visits, labs, and imaging to assess PMH and look for any trends or previous treatments.  Pt allergy to pcn, has tolerated cefdinir well in the past, treated last year with cefdinir for sinusitis and this worked well.  Patient would like to go with this treatment.  Bacterial sinusitis  -     cefdinir (OMNICEF) 300 MG capsule; Take 1 capsule (300 mg total) by mouth 2 (two) times daily. for 7 days  Dispense: 14 capsule; Refill: 0  -     predniSONE (DELTASONE) 20 MG tablet; Take 2 tablets (40 mg total) by mouth once daily. for 3 days  Dispense: 6 tablet; Refill: 0      Patient Instructions   - Rest.    - Drink plenty of fluids.  - Viral upper respiratory infections typically run their course in 10-14 days.     - Tylenol (acetaminophen) or Ibuprofen as directed as needed for fever/pain. Avoid tylenol if you have a history of liver disease. Do not take ibuprofen if you have a history of GI bleeding, kidney disease, gastric surgery, or if you take blood thinners.     - You can take over-the-counter claritin, zyrtec, allegra, or xyzal as directed. These are antihistamines that can help with runny nose, nasal congestion, sneezing, and helps to dry up post-nasal drip, which usually causes sore throat and cough.   - If you do NOT have high blood pressure, you may use a decongestant form (D)  of this medication (ie. Claritin- D, zyrtec-D, allegra-D) or if you do not take the D form, you can take sudafed  (pseudoephedrine) over the counter, which is a decongestant. Do NOT take two decongestant (D) medications at the same time (such as mucinex-D and claritin-D or plain sudafed and claritin D)    - You can use Flonase (fluticasone) nasal spray as directed for sinus congestion and postnasal drip. This is a steroid nasal spray that works locally over time to decrease the inflammation in your nose/sinuses and help with allergic symptoms. This is not an quick- relief spray like afrin, but it works well if used daily.  Discontinue if you develop nose bleed  - use nasal saline prior to Flonase.  - Use Ocean Spray Nasal Saline 1-3 puffs each nostril every 2-3 hours then blow out onto tissue. This is to irrigate the nasal passage way to clear the sinus openings. Use until sinus problem resolved.    - you can take plain Mucinex (guaifenesin) 1200 mg twice a day to help loosen mucous.     -warm salt water gargles can help with sore throat    - warm tea with honey can help with cough. Honey is a natural cough suppressant.    - Dextromethorphan (DM) is a cough suppressant over the counter (ie. mucinex DM, robitussin, delsym; dayquil/nyquil has DM as well.)    - You received a steroid (prednisone) today.  This can elevate your blood pressure, elevate your blood sugar, water weight gain, nervous energy, redness to the face and dimpling of the skin where the shot goes in.   - Do not use steroids more than 3 times per year.   - If you have diabetes, please check you blood sugar frequently.  - If you have high blood pressure, please check your blood pressure frequently.     - You have been given an antibiotic to treat your condition today.    - Please complete the antibiotic as directed on the bottle.   - If you are female and on oral birth control pills, use additional methods to prevent pregnancy while on antibiotics and for one cycle after.   - you can take otc probiotic to limit upset stomach    - Follow up with your PCP or  specialty clinic as directed in the next 1-2 weeks if not improved or as needed.  You can call [phone] to schedule an appointment with the appropriate provider.      - Go to the ER if you develop new or worsening symptoms.     - You must understand that you have received an Urgent Care treatment only and that you may be released before all of your medical problems are known or treated.   - You, the patient, will arrange for follow up care as instructed.   - If your condition worsens or fails to improve we recommend that you receive another evaluation at the ER immediately or contact your PCP to discuss your concerns or return here.

## 2023-03-05 NOTE — PATIENT INSTRUCTIONS
- Rest.    - Drink plenty of fluids.  - Viral upper respiratory infections typically run their course in 10-14 days.     - Tylenol (acetaminophen) or Ibuprofen as directed as needed for fever/pain. Avoid tylenol if you have a history of liver disease. Do not take ibuprofen if you have a history of GI bleeding, kidney disease, gastric surgery, or if you take blood thinners.     - You can take over-the-counter claritin, zyrtec, allegra, or xyzal as directed. These are antihistamines that can help with runny nose, nasal congestion, sneezing, and helps to dry up post-nasal drip, which usually causes sore throat and cough.   - If you do NOT have high blood pressure, you may use a decongestant form (D)  of this medication (ie. Claritin- D, zyrtec-D, allegra-D) or if you do not take the D form, you can take sudafed (pseudoephedrine) over the counter, which is a decongestant. Do NOT take two decongestant (D) medications at the same time (such as mucinex-D and claritin-D or plain sudafed and claritin D)    - You can use Flonase (fluticasone) nasal spray as directed for sinus congestion and postnasal drip. This is a steroid nasal spray that works locally over time to decrease the inflammation in your nose/sinuses and help with allergic symptoms. This is not an quick- relief spray like afrin, but it works well if used daily.  Discontinue if you develop nose bleed  - use nasal saline prior to Flonase.  - Use Ocean Spray Nasal Saline 1-3 puffs each nostril every 2-3 hours then blow out onto tissue. This is to irrigate the nasal passage way to clear the sinus openings. Use until sinus problem resolved.    - you can take plain Mucinex (guaifenesin) 1200 mg twice a day to help loosen mucous.     -warm salt water gargles can help with sore throat    - warm tea with honey can help with cough. Honey is a natural cough suppressant.    - Dextromethorphan (DM) is a cough suppressant over the counter (ie. mucinex DM, robitussin, delsym;  dayquil/nyquil has DM as well.)    - You received a steroid (prednisone) today.  This can elevate your blood pressure, elevate your blood sugar, water weight gain, nervous energy, redness to the face and dimpling of the skin where the shot goes in.   - Do not use steroids more than 3 times per year.   - If you have diabetes, please check you blood sugar frequently.  - If you have high blood pressure, please check your blood pressure frequently.     - You have been given an antibiotic to treat your condition today.    - Please complete the antibiotic as directed on the bottle.   - If you are female and on oral birth control pills, use additional methods to prevent pregnancy while on antibiotics and for one cycle after.   - you can take otc probiotic to limit upset stomach    - Follow up with your PCP or specialty clinic as directed in the next 1-2 weeks if not improved or as needed.  You can call [phone] to schedule an appointment with the appropriate provider.      - Go to the ER if you develop new or worsening symptoms.     - You must understand that you have received an Urgent Care treatment only and that you may be released before all of your medical problems are known or treated.   - You, the patient, will arrange for follow up care as instructed.   - If your condition worsens or fails to improve we recommend that you receive another evaluation at the ER immediately or contact your PCP to discuss your concerns or return here.

## 2023-03-29 ENCOUNTER — TELEPHONE (OUTPATIENT)
Dept: OBSTETRICS AND GYNECOLOGY | Facility: CLINIC | Age: 38
End: 2023-03-29
Payer: COMMERCIAL

## 2023-04-21 ENCOUNTER — OFFICE VISIT (OUTPATIENT)
Dept: INTERNAL MEDICINE | Facility: CLINIC | Age: 38
End: 2023-04-21
Payer: COMMERCIAL

## 2023-04-21 ENCOUNTER — PATIENT MESSAGE (OUTPATIENT)
Dept: OBSTETRICS AND GYNECOLOGY | Facility: CLINIC | Age: 38
End: 2023-04-21
Payer: COMMERCIAL

## 2023-04-21 VITALS
HEART RATE: 63 BPM | BODY MASS INDEX: 27.19 KG/M2 | SYSTOLIC BLOOD PRESSURE: 138 MMHG | OXYGEN SATURATION: 100 % | WEIGHT: 158.38 LBS | DIASTOLIC BLOOD PRESSURE: 82 MMHG

## 2023-04-21 DIAGNOSIS — Z00.00 ANNUAL PHYSICAL EXAM: Primary | ICD-10-CM

## 2023-04-21 DIAGNOSIS — F41.9 ANXIETY: ICD-10-CM

## 2023-04-21 DIAGNOSIS — Z97.5 IUD (INTRAUTERINE DEVICE) IN PLACE: ICD-10-CM

## 2023-04-21 DIAGNOSIS — Z80.8 FAMILY HISTORY OF SKIN CANCER: ICD-10-CM

## 2023-04-21 PROCEDURE — 1159F PR MEDICATION LIST DOCUMENTED IN MEDICAL RECORD: ICD-10-PCS | Mod: CPTII,S$GLB,, | Performed by: PHYSICIAN ASSISTANT

## 2023-04-21 PROCEDURE — 3008F BODY MASS INDEX DOCD: CPT | Mod: CPTII,S$GLB,, | Performed by: PHYSICIAN ASSISTANT

## 2023-04-21 PROCEDURE — 3079F DIAST BP 80-89 MM HG: CPT | Mod: CPTII,S$GLB,, | Performed by: PHYSICIAN ASSISTANT

## 2023-04-21 PROCEDURE — 3079F PR MOST RECENT DIASTOLIC BLOOD PRESSURE 80-89 MM HG: ICD-10-PCS | Mod: CPTII,S$GLB,, | Performed by: PHYSICIAN ASSISTANT

## 2023-04-21 PROCEDURE — 1159F MED LIST DOCD IN RCRD: CPT | Mod: CPTII,S$GLB,, | Performed by: PHYSICIAN ASSISTANT

## 2023-04-21 PROCEDURE — 3008F PR BODY MASS INDEX (BMI) DOCUMENTED: ICD-10-PCS | Mod: CPTII,S$GLB,, | Performed by: PHYSICIAN ASSISTANT

## 2023-04-21 PROCEDURE — 99214 PR OFFICE/OUTPT VISIT, EST, LEVL IV, 30-39 MIN: ICD-10-PCS | Mod: S$GLB,,, | Performed by: PHYSICIAN ASSISTANT

## 2023-04-21 PROCEDURE — 3075F SYST BP GE 130 - 139MM HG: CPT | Mod: CPTII,S$GLB,, | Performed by: PHYSICIAN ASSISTANT

## 2023-04-21 PROCEDURE — 99999 PR PBB SHADOW E&M-EST. PATIENT-LVL III: CPT | Mod: PBBFAC,,, | Performed by: PHYSICIAN ASSISTANT

## 2023-04-21 PROCEDURE — 99214 OFFICE O/P EST MOD 30 MIN: CPT | Mod: S$GLB,,, | Performed by: PHYSICIAN ASSISTANT

## 2023-04-21 PROCEDURE — 99999 PR PBB SHADOW E&M-EST. PATIENT-LVL III: ICD-10-PCS | Mod: PBBFAC,,, | Performed by: PHYSICIAN ASSISTANT

## 2023-04-21 PROCEDURE — 3075F PR MOST RECENT SYSTOLIC BLOOD PRESS GE 130-139MM HG: ICD-10-PCS | Mod: CPTII,S$GLB,, | Performed by: PHYSICIAN ASSISTANT

## 2023-04-21 RX ORDER — BUSPIRONE HYDROCHLORIDE 5 MG/1
5 TABLET ORAL 2 TIMES DAILY
Qty: 30 TABLET | Refills: 11 | Status: SHIPPED | OUTPATIENT
Start: 2023-04-21 | End: 2023-10-06

## 2023-04-21 NOTE — Clinical Note
Hi Dr. Beck, I saw Ms. Ferraro in clinic today. She is struggling with anxiety and is interested in starting Buspar for anxiety mgmt. She is not currently pregnant but is considering another pregnancy in the future.

## 2023-04-21 NOTE — Clinical Note
Hi Dr. Beck, I saw Ms. Ferraro in clinic today. She would like to start Buspar for mgmt of anxiety but wanted to get your input -she is not currently pregnant or breast feeding but is still planning on a future pregnancy. I told her to reach out to you in clinic and that I would do the same. Thanks in advance for any recs/advice you may have. -Thalia Nicholson

## 2023-04-21 NOTE — PROGRESS NOTES
Internal Medicine Annual Exam       CHIEF COMPLAINT     The patient, Lindy Ferraro, who is a 37 y.o. female presents for an annual exam.    HPI     PCP is Gladys Armstrong MD, patient is new to me. She presents today for annual exam.     History was obtained from the patient and supplemented through chart review  Follows sal [facility] for postpartum care.     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 She has 2 kids. Youngest is almost 2 years old.     HPI     She reports that she has been under a lot of family stress lately and has noticed increased anxiety that impeding sleep and daily activities. She also repots that there seems to be a strong hormonal component to anxiety. She has done extensive med research on her own and is requesting to start a trial of Buspar for anxiety mgmt. She does not want to try SSRI because of potential for weight gain. She has not discussed this plan with her GYN, Dr. Beck yet. She admits that she may be considering another child in the future -she is not currently pregnant or breastfeeding. No SI or HI. No history of medications for depression or anxiety. She is well established with a therapist. She is not established with a psychiatrist.     FHx skin cancer:  H/o being easily sunburned. Has very few nevi.  Hasn't followed-up with derm - needs new referral     Normocytic anemia:  Borderline.  She is vegetarian. Was pregnant last year.  Has IUD placed 3/3022 d/t h/o menorrhagia, cramps.  No longer breastfeeding or taking PNV.       Overweight:  Has achieved weight loss with diet and exercise.   Exercise:    Walks, running and weight lifting -      ETOH: 2-3/week, 1-2 drinks at a time.       Wt Readings from Last 10 Encounters:   04/21/23 71.9 kg (158 lb 6.4 oz)   03/05/23 77.4 kg (170 lb 10.2 oz)   12/15/22 77.4 kg (170 lb 10.2 oz)   05/07/22 79.8 kg (176 lb)   04/20/22 80 kg (176 lb 5.9 oz)   04/20/22 80.2 kg (176 lb 12.9 oz)   03/22/22 77.9 kg (171 lb 11.8 oz)    06/15/21 76.6 kg (168 lb 14 oz)   04/29/21 83 kg (183 lb)   04/28/21 83.2 kg (183 lb 6.8 oz)         Not addressed today.  Pelvic weakness:  Concern for prolapse.  Following with OBGYN.  Referred to pelvic PT.     History of postpartum anxiety, adjustment disorder, anxiety:  Following with OSH therapist, Lilian Yee.          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       Past Surgical History:   Procedure Laterality Date    INTRAUTERINE DEVICE INSERTION  2016    MIKI---REMOVED    SMALL INTESTINE SURGERY  8/1987    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    WISDOM TOOTH EXTRACTION          Family History   Problem Relation Age of Onset    Gout Father     Skin cancer Mother     Arthritis Mother     Brain cancer Maternal Grandfather     Skin cancer Maternal Grandfather     Cancer Maternal Grandfather     Diabetes type II Paternal Grandmother     Diabetes Paternal Grandmother     Heart attack Paternal Grandfather     Heart disease Paternal Grandfather     Stroke Maternal Grandmother     Kidney cancer Maternal Uncle     Prostate cancer Paternal Uncle     Breast cancer Neg Hx     Colon cancer Neg Hx     Ovarian cancer Neg Hx     Glaucoma Neg Hx     Cataracts Neg Hx     Macular degeneration Neg Hx         Social History     Socioeconomic History    Marital status:      Spouse name: Efrain    Number of children: 1   Occupational History     Comment:     Tobacco Use    Smoking status: Never    Smokeless tobacco: Never   Substance and Sexual Activity    Alcohol use: Yes     Alcohol/week: 5.0 standard drinks     Types: 5 Glasses of wine per week    Drug use: No    Sexual activity: Yes     Partners: Male     Birth control/protection: I.U.D.        Social History     Tobacco Use   Smoking Status Never   Smokeless Tobacco Never        Allergies as of 04/21/2023 - Reviewed 04/21/2023   Allergen Reaction Noted    Asa [aspirin] Other (See  Comments) 12/01/2017    Ibuprofen  06/12/2019    Sulfa (sulfonamide antibiotics)  12/10/2015    Pcn [penicillins] Rash 12/10/2015          Home Medications:  Prior to Admission medications    Not on File       Review of Systems:  Review of Systems   Constitutional:  Negative for chills and fever.   HENT:  Negative for sore throat and trouble swallowing.    Eyes:  Negative for visual disturbance.   Respiratory:  Negative for cough and shortness of breath.    Cardiovascular:  Negative for chest pain.   Gastrointestinal:  Negative for abdominal pain, constipation, diarrhea, nausea and vomiting.   Genitourinary:  Negative for dysuria and flank pain.   Musculoskeletal:  Negative for back pain, neck pain and neck stiffness.   Skin:  Negative for rash.   Neurological:  Negative for dizziness, syncope, weakness and headaches.   Psychiatric/Behavioral:  Negative for confusion. The patient is nervous/anxious.      Health Maintainence:   Immunizations:  Health Maintenance         Date Due Completion Date    COVID-19 Vaccine (4 - Booster for Moderna series) 01/14/2022 11/19/2021    Hemoglobin A1c (Diabetic Prevention Screening) Never done ---    Cervical Cancer Screening 09/25/2025 9/25/2020    TETANUS VACCINE 02/12/2031 2/12/2021             PHYSICAL EXAM     BP (!) 141/75   Pulse 63   Wt 71.9 kg (158 lb 6.4 oz)   SpO2 100%   BMI 27.19 kg/m²  Body mass index is 27.19 kg/m².    Physical Exam  Vitals and nursing note reviewed.   Constitutional:       Appearance: Normal appearance.      Comments: Healthy appearing female in NAD or apparent pain. She makes good eye contact, speaks in clear full sentences and ambulates with ease.        HENT:      Head: Normocephalic and atraumatic.      Nose: Nose normal.      Mouth/Throat:      Pharynx: Oropharynx is clear.   Eyes:      Conjunctiva/sclera: Conjunctivae normal.   Cardiovascular:      Rate and Rhythm: Normal rate and regular rhythm.      Pulses: Normal pulses.   Pulmonary:       Effort: No respiratory distress.   Abdominal:      Tenderness: There is no abdominal tenderness.   Musculoskeletal:         General: Normal range of motion.      Cervical back: No rigidity.   Skin:     General: Skin is warm and dry.      Capillary Refill: Capillary refill takes less than 2 seconds.      Findings: No rash.   Neurological:      General: No focal deficit present.      Mental Status: She is alert.      Gait: Gait normal.   Psychiatric:         Mood and Affect: Mood normal.       LABS     No results found for: LABA1C, HGBA1C  CMP  Sodium   Date Value Ref Range Status   04/22/2022 140 136 - 145 mmol/L Final     Potassium   Date Value Ref Range Status   04/22/2022 3.8 3.5 - 5.1 mmol/L Final     Chloride   Date Value Ref Range Status   04/22/2022 106 95 - 110 mmol/L Final     CO2   Date Value Ref Range Status   04/22/2022 24 23 - 29 mmol/L Final     Glucose   Date Value Ref Range Status   04/22/2022 90 70 - 110 mg/dL Final     BUN   Date Value Ref Range Status   04/22/2022 11 6 - 20 mg/dL Final     Creatinine   Date Value Ref Range Status   04/22/2022 0.8 0.5 - 1.4 mg/dL Final     Calcium   Date Value Ref Range Status   04/22/2022 9.2 8.7 - 10.5 mg/dL Final     Total Protein   Date Value Ref Range Status   04/22/2022 7.3 6.0 - 8.4 g/dL Final     Albumin   Date Value Ref Range Status   04/22/2022 4.1 3.5 - 5.2 g/dL Final     Total Bilirubin   Date Value Ref Range Status   04/22/2022 0.7 0.1 - 1.0 mg/dL Final     Comment:     For infants and newborns, interpretation of results should be based  on gestational age, weight and in agreement with clinical  observations.    Premature Infant recommended reference ranges:  Up to 24 hours.............<8.0 mg/dL  Up to 48 hours............<12.0 mg/dL  3-5 days..................<15.0 mg/dL  6-29 days.................<15.0 mg/dL       Alkaline Phosphatase   Date Value Ref Range Status   04/22/2022 119 55 - 135 U/L Final     AST   Date Value Ref Range Status   04/22/2022  19 10 - 40 U/L Final     ALT   Date Value Ref Range Status   04/22/2022 17 10 - 44 U/L Final     Anion Gap   Date Value Ref Range Status   04/22/2022 10 8 - 16 mmol/L Final     eGFR if    Date Value Ref Range Status   04/22/2022 >60 >60 mL/min/1.73 m^2 Final     eGFR if non    Date Value Ref Range Status   04/22/2022 >60 >60 mL/min/1.73 m^2 Final     Comment:     Calculation used to obtain the estimated glomerular filtration  rate (eGFR) is the CKD-EPI equation.        Lab Results   Component Value Date    WBC 6.03 04/22/2022    HGB 14.1 04/22/2022    HCT 43.0 04/22/2022    MCV 92 04/22/2022     04/22/2022     Lab Results   Component Value Date    CHOL 141 04/22/2022     Lab Results   Component Value Date    HDL 57 04/22/2022     Lab Results   Component Value Date    LDLCALC 72.0 04/22/2022     Lab Results   Component Value Date    TRIG 60 04/22/2022     Lab Results   Component Value Date    CHOLHDL 40.4 04/22/2022     No results found for: TSH, V7LIEFN, V2DAXJI, THYROIDAB    ASSESSMENT/PLAN     Lindy Ferraro is a 37 y.o. female    Lindy was seen today for annual exam. Will prescribe low dose Buspar in the event that she would like to start this medication over the weekend. I will also reach out to Dr. Beck to get her thoughts on starting this medication with the possibility of a pregnancy in the future. Patient will see PCP later this year for follow-up in person. She will send feedback and med updates through patient portal.     Diagnoses and all orders for this visit:    Annual physical exam  -     CBC Auto Differential; Future  -     Comprehensive Metabolic Panel; Future  -     Hemoglobin A1C; Future  -     Lipid Panel; Future  -     Ambulatory referral/consult to Dermatology; Future    Anxiety    IUD (intrauterine device) in place    Family history of skin cancer    Other orders  -     busPIRone (BUSPAR) 5 MG Tab; Take 1 tablet (5 mg total) by mouth 2 (two)  times daily.    Thalia Nicholson PA-C  Department of Internal Medicine - [facility]   1:05 PM

## 2023-04-24 ENCOUNTER — PATIENT MESSAGE (OUTPATIENT)
Dept: INTERNAL MEDICINE | Facility: CLINIC | Age: 38
End: 2023-04-24
Payer: COMMERCIAL

## 2023-05-30 ENCOUNTER — OFFICE VISIT (OUTPATIENT)
Dept: OBSTETRICS AND GYNECOLOGY | Facility: CLINIC | Age: 38
End: 2023-05-30
Attending: OBSTETRICS & GYNECOLOGY
Payer: COMMERCIAL

## 2023-05-30 VITALS — SYSTOLIC BLOOD PRESSURE: 142 MMHG | DIASTOLIC BLOOD PRESSURE: 84 MMHG | WEIGHT: 156.5 LBS | BODY MASS INDEX: 26.87 KG/M2

## 2023-05-30 DIAGNOSIS — Z30.432 ENCOUNTER FOR IUD REMOVAL: Primary | ICD-10-CM

## 2023-05-30 PROCEDURE — 99499 NO LOS: ICD-10-PCS | Mod: S$GLB,,, | Performed by: OBSTETRICS & GYNECOLOGY

## 2023-05-30 PROCEDURE — 58301 REMOVAL OF INTRAUTERINE DEVICE-TODAY: ICD-10-PCS | Mod: S$GLB,,, | Performed by: OBSTETRICS & GYNECOLOGY

## 2023-05-30 PROCEDURE — 99999 PR PBB SHADOW E&M-EST. PATIENT-LVL III: ICD-10-PCS | Mod: PBBFAC,,, | Performed by: OBSTETRICS & GYNECOLOGY

## 2023-05-30 PROCEDURE — 58301 REMOVE INTRAUTERINE DEVICE: CPT | Mod: S$GLB,,, | Performed by: OBSTETRICS & GYNECOLOGY

## 2023-05-30 PROCEDURE — 99999 PR PBB SHADOW E&M-EST. PATIENT-LVL III: CPT | Mod: PBBFAC,,, | Performed by: OBSTETRICS & GYNECOLOGY

## 2023-05-30 PROCEDURE — 99499 UNLISTED E&M SERVICE: CPT | Mod: S$GLB,,, | Performed by: OBSTETRICS & GYNECOLOGY

## 2023-05-30 NOTE — PROCEDURES
Removal of Intrauterine Device-Today    Date/Time: 5/30/2023 2:30 PM  Performed by: Lilliam Beck DO  Authorized by: Lilliam Beck DO     Consent obtained:  Verbal  Consent given by:  Patient  Procedure risks and benefits discussed: yes    Patient questions answered: yes    Patient agrees, verbalizes understanding, and wants to proceed: yes    Instructions and paperwork completed: yes    Patient states understanding of procedure being performed: yes    Implant grasped by: ring forceps  Removal due to infection and inflammatory reaction: no    Other reason for removal:  Planning for future pregnancy  Removal due to mechanical complications of IUD/Nexplanon: no    Removed with no complications: yes     Elevated BP noted today, discussed following this at home with connected mom cuff.  Discussed proper timing and positioning.  Taking prenatal vitamins daily.  Discussed expectations for return of cycles, expected return to fertility etc...  F/U PRN

## 2023-06-28 ENCOUNTER — OFFICE VISIT (OUTPATIENT)
Dept: INTERNAL MEDICINE | Facility: CLINIC | Age: 38
End: 2023-06-28
Payer: COMMERCIAL

## 2023-06-28 VITALS
HEIGHT: 64 IN | HEART RATE: 73 BPM | OXYGEN SATURATION: 99 % | DIASTOLIC BLOOD PRESSURE: 70 MMHG | TEMPERATURE: 98 F | SYSTOLIC BLOOD PRESSURE: 115 MMHG | WEIGHT: 153.44 LBS | BODY MASS INDEX: 26.2 KG/M2

## 2023-06-28 DIAGNOSIS — F41.9 ANXIETY: Primary | ICD-10-CM

## 2023-06-28 DIAGNOSIS — R07.89 OTHER CHEST PAIN: ICD-10-CM

## 2023-06-28 PROCEDURE — 3078F DIAST BP <80 MM HG: CPT | Mod: CPTII,S$GLB,, | Performed by: STUDENT IN AN ORGANIZED HEALTH CARE EDUCATION/TRAINING PROGRAM

## 2023-06-28 PROCEDURE — 3074F SYST BP LT 130 MM HG: CPT | Mod: CPTII,S$GLB,, | Performed by: STUDENT IN AN ORGANIZED HEALTH CARE EDUCATION/TRAINING PROGRAM

## 2023-06-28 PROCEDURE — 99214 PR OFFICE/OUTPT VISIT, EST, LEVL IV, 30-39 MIN: ICD-10-PCS | Mod: S$GLB,,, | Performed by: STUDENT IN AN ORGANIZED HEALTH CARE EDUCATION/TRAINING PROGRAM

## 2023-06-28 PROCEDURE — 93005 ELECTROCARDIOGRAM TRACING: CPT | Mod: S$GLB,,, | Performed by: STUDENT IN AN ORGANIZED HEALTH CARE EDUCATION/TRAINING PROGRAM

## 2023-06-28 PROCEDURE — 99999 PR PBB SHADOW E&M-EST. PATIENT-LVL IV: ICD-10-PCS | Mod: PBBFAC,,, | Performed by: STUDENT IN AN ORGANIZED HEALTH CARE EDUCATION/TRAINING PROGRAM

## 2023-06-28 PROCEDURE — 93005 EKG 12-LEAD: ICD-10-PCS | Mod: S$GLB,,, | Performed by: STUDENT IN AN ORGANIZED HEALTH CARE EDUCATION/TRAINING PROGRAM

## 2023-06-28 PROCEDURE — 3074F PR MOST RECENT SYSTOLIC BLOOD PRESSURE < 130 MM HG: ICD-10-PCS | Mod: CPTII,S$GLB,, | Performed by: STUDENT IN AN ORGANIZED HEALTH CARE EDUCATION/TRAINING PROGRAM

## 2023-06-28 PROCEDURE — 1160F RVW MEDS BY RX/DR IN RCRD: CPT | Mod: CPTII,S$GLB,, | Performed by: STUDENT IN AN ORGANIZED HEALTH CARE EDUCATION/TRAINING PROGRAM

## 2023-06-28 PROCEDURE — 3078F PR MOST RECENT DIASTOLIC BLOOD PRESSURE < 80 MM HG: ICD-10-PCS | Mod: CPTII,S$GLB,, | Performed by: STUDENT IN AN ORGANIZED HEALTH CARE EDUCATION/TRAINING PROGRAM

## 2023-06-28 PROCEDURE — 1160F PR REVIEW ALL MEDS BY PRESCRIBER/CLIN PHARMACIST DOCUMENTED: ICD-10-PCS | Mod: CPTII,S$GLB,, | Performed by: STUDENT IN AN ORGANIZED HEALTH CARE EDUCATION/TRAINING PROGRAM

## 2023-06-28 PROCEDURE — 3008F BODY MASS INDEX DOCD: CPT | Mod: CPTII,S$GLB,, | Performed by: STUDENT IN AN ORGANIZED HEALTH CARE EDUCATION/TRAINING PROGRAM

## 2023-06-28 PROCEDURE — 1159F PR MEDICATION LIST DOCUMENTED IN MEDICAL RECORD: ICD-10-PCS | Mod: CPTII,S$GLB,, | Performed by: STUDENT IN AN ORGANIZED HEALTH CARE EDUCATION/TRAINING PROGRAM

## 2023-06-28 PROCEDURE — 1159F MED LIST DOCD IN RCRD: CPT | Mod: CPTII,S$GLB,, | Performed by: STUDENT IN AN ORGANIZED HEALTH CARE EDUCATION/TRAINING PROGRAM

## 2023-06-28 PROCEDURE — 3008F PR BODY MASS INDEX (BMI) DOCUMENTED: ICD-10-PCS | Mod: CPTII,S$GLB,, | Performed by: STUDENT IN AN ORGANIZED HEALTH CARE EDUCATION/TRAINING PROGRAM

## 2023-06-28 PROCEDURE — 99999 PR PBB SHADOW E&M-EST. PATIENT-LVL IV: CPT | Mod: PBBFAC,,, | Performed by: STUDENT IN AN ORGANIZED HEALTH CARE EDUCATION/TRAINING PROGRAM

## 2023-06-28 PROCEDURE — 93010 EKG 12-LEAD: ICD-10-PCS | Mod: S$GLB,,, | Performed by: INTERNAL MEDICINE

## 2023-06-28 PROCEDURE — 93010 ELECTROCARDIOGRAM REPORT: CPT | Mod: S$GLB,,, | Performed by: INTERNAL MEDICINE

## 2023-06-28 PROCEDURE — 99214 OFFICE O/P EST MOD 30 MIN: CPT | Mod: S$GLB,,, | Performed by: STUDENT IN AN ORGANIZED HEALTH CARE EDUCATION/TRAINING PROGRAM

## 2023-06-28 RX ORDER — SERTRALINE HYDROCHLORIDE 25 MG/1
25 TABLET, FILM COATED ORAL DAILY
Qty: 90 TABLET | Refills: 3 | Status: SHIPPED | OUTPATIENT
Start: 2023-06-28 | End: 2023-10-06

## 2023-06-28 NOTE — PROGRESS NOTES
SUBJECTIVE     Chief Complaint   Patient presents with    stomach tightness    Chest Pain       HPI  Lindy Ferraro is a 37 y.o. female with  Anxiety, SI joint dysfunction, chronic low back pain  that presents for same-day urgent care evaluation stomach tightness and chest pain. PCP is Gladys Armstrong MD.     2 days of constant diffuse tightness that then localized to the sternum. Associated with difficulty catching breath and abdominal tightness.   Pain does not travel.   Not aggravated by movement.   Relieved with sleeping. Not relieved with deep breathing, relaxation exercises.   Feels more out of breath going up and down stairs. Exercises regularly   No palpitations, lightheadedness.   Symptoms started after seeing talk therapist for anxiety.   History of anxiety, was prescribed Buspar. Afraid to take it due to fear of side effects.   Paternal grandfather had heart attack in late 60s/early 70s.   No personal history of HTN, HLD. LDL in 4/2022 was 72.   Has been drinking lots of coffee on empty stomach.   No fevers, chills, nausea, vomiting.     EKG in clinic today with NSR, no ischemic wave changes.       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       PAST SURGICAL HISTORY:  Past Surgical History:   Procedure Laterality Date    INTRAUTERINE DEVICE INSERTION  2016    MIKI---REMOVED    SMALL INTESTINE SURGERY  8/1987    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    WISDOM TOOTH EXTRACTION         FAMILY HISTORY:  Family History   Problem Relation Age of Onset    Gout Father     Skin cancer Mother     Arthritis Mother     Brain cancer Maternal Grandfather     Skin cancer Maternal Grandfather     Cancer Maternal Grandfather     Diabetes type II Paternal Grandmother     Diabetes Paternal Grandmother     Heart attack Paternal Grandfather     Heart disease Paternal Grandfather     Stroke Maternal Grandmother     Kidney cancer Maternal Uncle     Prostate  cancer Paternal Uncle     Breast cancer Neg Hx     Colon cancer Neg Hx     Ovarian cancer Neg Hx     Glaucoma Neg Hx     Cataracts Neg Hx     Macular degeneration Neg Hx        ALLERGIES AND MEDICATIONS: updated and reviewed.  Review of patient's allergies indicates:   Allergen Reactions    Asa [aspirin] Other (See Comments)     Blood disorder per Patient    Ibuprofen     Sulfa (sulfonamide antibiotics)     Pcn [penicillins] Rash     Current Outpatient Medications   Medication Sig Dispense Refill    busPIRone (BUSPAR) 5 MG Tab Take 1 tablet (5 mg total) by mouth 2 (two) times daily. (Patient not taking: Reported on 5/30/2023) 30 tablet 11     Current Facility-Administered Medications   Medication Dose Route Frequency Provider Last Rate Last Admin    levonorgestreL (MIRENA) 20 mcg/24 hours (7 yrs) 52 mg IUD 1 Intra Uterine Device  1 Intra Uterine Device Intrauterine  Lilliam W. Band, DO   1 Intra Uterine Device at 03/22/22 0930       ROS  Review of Systems   Constitutional:  Negative for activity change, chills and fever.   HENT:  Negative for congestion and hearing loss.    Eyes:  Negative for pain and visual disturbance.   Respiratory:  Negative for cough and shortness of breath.    Cardiovascular:  Negative for chest pain and palpitations.   Gastrointestinal:  Negative for abdominal pain, constipation, diarrhea, nausea and vomiting.   Endocrine: Negative.    Genitourinary: Negative.    Musculoskeletal:  Negative for arthralgias and myalgias.   Skin: Negative.    Allergic/Immunologic: Negative.    Neurological:  Negative for dizziness, light-headedness and headaches.   Hematological: Negative.        OBJECTIVE     Physical Exam  Vitals:    06/28/23 0816   BP: 115/70   Pulse: 73   Temp: 98.2 °F (36.8 °C)    Body mass index is 26.34 kg/m².            Physical Exam  Vitals reviewed.   Constitutional:       General: She is not in acute distress.     Appearance: Normal appearance.   HENT:      Head: Normocephalic and  atraumatic.      Mouth/Throat:      Mouth: Mucous membranes are moist.      Pharynx: Oropharynx is clear.   Eyes:      Extraocular Movements: Extraocular movements intact.      Conjunctiva/sclera: Conjunctivae normal.      Pupils: Pupils are equal, round, and reactive to light.   Cardiovascular:      Rate and Rhythm: Normal rate and regular rhythm.      Pulses: Normal pulses.      Heart sounds: Normal heart sounds.   Pulmonary:      Effort: Pulmonary effort is normal.      Breath sounds: Normal breath sounds.   Abdominal:      General: Bowel sounds are normal. There is no distension.      Palpations: Abdomen is soft. There is no mass.      Tenderness: There is no abdominal tenderness. There is no guarding.   Musculoskeletal:         General: Normal range of motion.      Cervical back: Normal range of motion and neck supple. No rigidity or tenderness.      Right lower leg: No edema.      Left lower leg: No edema.   Lymphadenopathy:      Cervical: No cervical adenopathy.   Skin:     General: Skin is warm and dry.   Neurological:      General: No focal deficit present.      Mental Status: She is alert.   Psychiatric:         Mood and Affect: Mood is anxious.         Behavior: Behavior normal.         Health Maintenance         Date Due Completion Date    COVID-19 Vaccine (4 - Moderna series) 01/14/2022 11/19/2021    Hemoglobin A1c (Diabetic Prevention Screening) Never done ---    Cervical Cancer Screening 09/25/2025 9/25/2020    TETANUS VACCINE 02/12/2031 2/12/2021              ASSESSMENT     37 y.o. female with     1. Anxiety    2. Other chest pain        PLAN:     1. Anxiety  - To start SSRI. Recommended taking Buspirone as well to act as anxiety bridge. Counseled patient on safe and effective use of new medication, including common adverse effects. Patient verbalized understanding.   - Follow up with myself of PCP in 1 month to assess response.   - Continue follow up with talk therapy.   - sertraline (ZOLOFT) 25 MG  tablet; Take 1 tablet (25 mg total) by mouth once daily.  Dispense: 90 tablet; Refill: 3    2. Other chest pain  - Low suspicion that this is cardiac related. Treat anxiety as above. EKG showing NSR with no ischemic wave changes.   - IN OFFICE EKG 12-LEAD (to Muse)      Elroy Mccarty MD  Family Medicine  [facility]  06/28/2023    This document was created using voice recognition software ([employer] Fluency Direct). Although it may be edited, this document may contain errors related to incorrect recognition of the spoken word. Please call the physician if clarification is needed.           No follow-ups on file.

## 2023-06-28 NOTE — PATIENT INSTRUCTIONS
Recommend Constance Edwards MD for PCP.   Follow up with new PCP or myself in 1 month to discuss how you're doing on new medication.     Go to the ER if chest pain worsens, develop sudden SOB, feeling like you'll pass out, or any other concerning symptom.

## 2023-09-04 ENCOUNTER — PATIENT MESSAGE (OUTPATIENT)
Dept: INTERNAL MEDICINE | Facility: CLINIC | Age: 38
End: 2023-09-04
Payer: COMMERCIAL

## 2023-09-04 DIAGNOSIS — M79.673 PAIN OF FOOT, UNSPECIFIED LATERALITY: Primary | ICD-10-CM

## 2023-09-12 ENCOUNTER — OFFICE VISIT (OUTPATIENT)
Dept: PODIATRY | Facility: CLINIC | Age: 38
End: 2023-09-12
Payer: COMMERCIAL

## 2023-09-12 ENCOUNTER — APPOINTMENT (OUTPATIENT)
Dept: RADIOLOGY | Facility: OTHER | Age: 38
End: 2023-09-12
Attending: PODIATRIST
Payer: COMMERCIAL

## 2023-09-12 VITALS — HEIGHT: 64 IN | WEIGHT: 153.44 LBS | BODY MASS INDEX: 26.2 KG/M2

## 2023-09-12 DIAGNOSIS — S90.31XA CONTUSION OF FOOT INCLUDING TOES, RIGHT, INITIAL ENCOUNTER: ICD-10-CM

## 2023-09-12 DIAGNOSIS — S90.31XA CONTUSION OF FOOT INCLUDING TOES, RIGHT, INITIAL ENCOUNTER: Primary | ICD-10-CM

## 2023-09-12 DIAGNOSIS — M79.673 PAIN OF FOOT, UNSPECIFIED LATERALITY: ICD-10-CM

## 2023-09-12 DIAGNOSIS — S90.121A CONTUSION OF FOOT INCLUDING TOES, RIGHT, INITIAL ENCOUNTER: Primary | ICD-10-CM

## 2023-09-12 DIAGNOSIS — S90.121A CONTUSION OF FOOT INCLUDING TOES, RIGHT, INITIAL ENCOUNTER: ICD-10-CM

## 2023-09-12 DIAGNOSIS — R22.41 MASS OF FOOT, RIGHT: ICD-10-CM

## 2023-09-12 PROCEDURE — 99999 PR PBB SHADOW E&M-EST. PATIENT-LVL III: ICD-10-PCS | Mod: PBBFAC,,, | Performed by: PODIATRIST

## 2023-09-12 PROCEDURE — 99999 PR PBB SHADOW E&M-EST. PATIENT-LVL III: CPT | Mod: PBBFAC,,, | Performed by: PODIATRIST

## 2023-09-12 PROCEDURE — 3008F BODY MASS INDEX DOCD: CPT | Mod: CPTII,S$GLB,, | Performed by: PODIATRIST

## 2023-09-12 PROCEDURE — 73630 X-RAY EXAM OF FOOT: CPT | Mod: 26,RT,, | Performed by: RADIOLOGY

## 2023-09-12 PROCEDURE — 1159F PR MEDICATION LIST DOCUMENTED IN MEDICAL RECORD: ICD-10-PCS | Mod: CPTII,S$GLB,, | Performed by: PODIATRIST

## 2023-09-12 PROCEDURE — 1159F MED LIST DOCD IN RCRD: CPT | Mod: CPTII,S$GLB,, | Performed by: PODIATRIST

## 2023-09-12 PROCEDURE — 99203 OFFICE O/P NEW LOW 30 MIN: CPT | Mod: S$GLB,,, | Performed by: PODIATRIST

## 2023-09-12 PROCEDURE — 99203 PR OFFICE/OUTPT VISIT, NEW, LEVL III, 30-44 MIN: ICD-10-PCS | Mod: S$GLB,,, | Performed by: PODIATRIST

## 2023-09-12 PROCEDURE — 73630 X-RAY EXAM OF FOOT: CPT | Mod: TC,RT

## 2023-09-12 PROCEDURE — 3008F PR BODY MASS INDEX (BMI) DOCUMENTED: ICD-10-PCS | Mod: CPTII,S$GLB,, | Performed by: PODIATRIST

## 2023-09-12 PROCEDURE — 73630 XR FOOT COMPLETE 3 VIEW RIGHT: ICD-10-PCS | Mod: 26,RT,, | Performed by: RADIOLOGY

## 2023-09-12 RX ORDER — LIDOCAINE AND PRILOCAINE 25; 25 MG/G; MG/G
CREAM TOPICAL
Qty: 30 G | Refills: 3 | Status: SHIPPED | OUTPATIENT
Start: 2023-09-12

## 2023-09-12 NOTE — PROGRESS NOTES
Subjective:      Patient ID: Lindy Ferraro is a 38 y.o. female.    Chief Complaint: Foot Injury (R foot )    Sharp deep pain top of the right foot in the area the 1st tarsometatarsal joint indicated with index finger.  Rapid onset about 3 weeks ago tripping on top step in [place] while carrying daughter.  Aggravated by socks shoes pressure ambulation.  No prior medical treatment.  No self-treatment.  Denies repeat trauma and surgery of the right foot.    Review of Systems   Constitutional: Negative for chills, diaphoresis, fever, malaise/fatigue and night sweats.   Cardiovascular:  Negative for claudication, cyanosis, leg swelling and syncope.   Skin:  Positive for suspicious lesions. Negative for color change, dry skin, nail changes, rash and unusual hair distribution.   Musculoskeletal:  Positive for joint pain. Negative for falls, joint swelling, muscle cramps, muscle weakness and stiffness.   Gastrointestinal:  Negative for constipation, diarrhea, nausea and vomiting.   Neurological:  Negative for brief paralysis, disturbances in coordination, focal weakness, numbness, paresthesias, sensory change and tremors.         Objective:      Physical Exam  Constitutional:       General: She is not in acute distress.     Appearance: She is well-developed. She is not diaphoretic.   Cardiovascular:      Pulses:           Popliteal pulses are 2+ on the right side and 2+ on the left side.        Dorsalis pedis pulses are 2+ on the right side and 2+ on the left side.        Posterior tibial pulses are 2+ on the right side and 2+ on the left side.      Comments: Capillary refill 3 seconds all toes/distal feet, all toes/both feet warm to touch.      Negative lymphadenopathy bilateral popliteal fossa and tarsal tunnel.      Negavie lower extremity edema bilateral.    Musculoskeletal:      Right ankle: No swelling, deformity, ecchymosis or lacerations. Normal range of motion. Normal pulse.      Right Achilles Tendon:  Normal. No defects. Colon's test negative.      Comments: Small firm focal bump at the dorsum of the 1st tarsometatarsal joint associated with the region of the extensor hallucis longus and neurovascular bundle of the deep peroneal nerve with sharp tenderness to direct palpation and manipulation, but without pain to lateral squeeze the metatarsals piano key test range of motion of the midtarsal tarsometatarsal joints.  Otherwise right foot is without gross deformity loss of function signs of acute trauma.      Otherwise Normal angle, base, station of gait. All ten toes without clubbing, cyanosis, or signs of ischemia.  No pain to palpation bilateral lower extremities.  Range of motion, stability, muscle strength, and muscle tone normal bilateral feet and legs.    Lymphadenopathy:      Lower Body: No right inguinal adenopathy. No left inguinal adenopathy.      Comments: Negative lymphadenopathy bilateral popliteal fossa and tarsal tunnel.    Negative lymphangitic streaking bilateral feet/ankles/legs.   Skin:     General: Skin is warm and dry.      Capillary Refill: Capillary refill takes 2 to 3 seconds.      Coloration: Skin is not pale.      Findings: No abrasion, bruising, burn, ecchymosis, erythema, laceration, lesion or rash.      Nails: There is no clubbing.      Comments:   Skin is normal age and health appropriate color, turgor, texture, and temperature bilateral lower extremities without ulceration, hyperpigmentation, discoloration, masses nodules or cords palpated.  No ecchymosis, erythema, edema, or cardinal signs of infection bilateral lower extremities.     Neurological:      Mental Status: She is alert and oriented to person, place, and time.      Sensory: No sensory deficit.      Motor: No tremor, atrophy or abnormal muscle tone.      Gait: Gait normal.      Deep Tendon Reflexes:      Reflex Scores:       Patellar reflexes are 2+ on the right side and 2+ on the left side.       Achilles reflexes are  2+ on the right side and 2+ on the left side.     Comments: Negative tinel sign to percussion sural, superficial peroneal, deep peroneal, saphenous, and posterior tibial nerves right and left ankles and feet.    Negative allodynia     Psychiatric:         Behavior: Behavior is cooperative.           Assessment:       Encounter Diagnoses   Name Primary?    Pain of foot, unspecified laterality     Contusion of foot including toes, right, initial encounter Yes    Mass of foot, right          Plan:       Lindy was seen today for foot injury.    Diagnoses and all orders for this visit:    Contusion of foot including toes, right, initial encounter  -     X-Ray Foot Complete Right; Future    Pain of foot, unspecified laterality  -     Ambulatory referral/consult to Podiatry  -     X-Ray Foot Complete Right; Future    Mass of foot, right  -     X-Ray Foot Complete Right; Future    Other orders  -     LIDOcaine-prilocaine (EMLA) cream; Apply topically as needed.      I counseled the patient on her conditions, their implications and medical management.    Discussed very low chance of cancer with any mass in body only disprovable by biopsy and pathology.  Patient verbalizes understanding and declines biopsy/immaging today.       Topical EMLA cream once nightly right foot dorsum for pain relief.      X-rays right foot.      Ambulation to tolerance and shoes of choice.      Recommend over-the-counter bunion pad (aperture) to offload the area relieve symptoms.      One month, sooner p.r.n.          Follow up in about 1 month (around 10/12/2023).

## 2023-09-20 ENCOUNTER — CLINICAL SUPPORT (OUTPATIENT)
Dept: OBSTETRICS AND GYNECOLOGY | Facility: CLINIC | Age: 38
End: 2023-09-20
Payer: COMMERCIAL

## 2023-09-20 DIAGNOSIS — N91.2 AMENORRHEA: Primary | ICD-10-CM

## 2023-09-20 NOTE — PROGRESS NOTES
Spoke with patient for a total of 30 minutes.  Updated chart to reflect up to date patient demographics.  Medication, pharmacy, and family history updated.  Patient was guided through expectations of OB/GYN care throughout history of pregnancy.  Pregnancy confirmation, dating u/s initial OB  scheduled for the pregnancy.   100

## 2023-10-06 ENCOUNTER — HOSPITAL ENCOUNTER (OUTPATIENT)
Dept: PERINATAL CARE | Facility: OTHER | Age: 38
Discharge: HOME | End: 2023-10-06
Attending: OBSTETRICS & GYNECOLOGY
Payer: COMMERCIAL

## 2023-10-06 ENCOUNTER — OFFICE VISIT (OUTPATIENT)
Dept: OBSTETRICS AND GYNECOLOGY | Facility: CLINIC | Age: 38
End: 2023-10-06
Payer: COMMERCIAL

## 2023-10-06 VITALS
WEIGHT: 156.31 LBS | SYSTOLIC BLOOD PRESSURE: 137 MMHG | BODY MASS INDEX: 26.83 KG/M2 | DIASTOLIC BLOOD PRESSURE: 82 MMHG

## 2023-10-06 DIAGNOSIS — N91.4 SECONDARY OLIGOMENORRHEA: Primary | ICD-10-CM

## 2023-10-06 DIAGNOSIS — N91.2 AMENORRHEA: ICD-10-CM

## 2023-10-06 DIAGNOSIS — Z11.3 ENCOUNTER FOR SCREENING FOR INFECTIONS WITH PREDOMINANTLY SEXUAL MODE OF TRANSMISSION: ICD-10-CM

## 2023-10-06 DIAGNOSIS — Z34.81 SUPERVISION OF NORMAL INTRAUTERINE PREGNANCY IN MULTIGRAVIDA IN FIRST TRIMESTER: ICD-10-CM

## 2023-10-06 DIAGNOSIS — Z32.01 POSITIVE PREGNANCY TEST: ICD-10-CM

## 2023-10-06 DIAGNOSIS — Z12.4 PAP SMEAR FOR CERVICAL CANCER SCREENING: ICD-10-CM

## 2023-10-06 PROBLEM — Z97.5 IUD (INTRAUTERINE DEVICE) IN PLACE: Status: RESOLVED | Noted: 2022-04-20 | Resolved: 2023-10-06

## 2023-10-06 PROCEDURE — 3008F BODY MASS INDEX DOCD: CPT | Mod: CPTII,S$GLB,, | Performed by: NURSE PRACTITIONER

## 2023-10-06 PROCEDURE — 87491 CHLMYD TRACH DNA AMP PROBE: CPT | Performed by: NURSE PRACTITIONER

## 2023-10-06 PROCEDURE — 99999 PR PBB SHADOW E&M-EST. PATIENT-LVL III: CPT | Mod: PBBFAC,,, | Performed by: NURSE PRACTITIONER

## 2023-10-06 PROCEDURE — 99999 PR PBB SHADOW E&M-EST. PATIENT-LVL III: ICD-10-PCS | Mod: PBBFAC,,, | Performed by: NURSE PRACTITIONER

## 2023-10-06 PROCEDURE — 3075F PR MOST RECENT SYSTOLIC BLOOD PRESS GE 130-139MM HG: ICD-10-PCS | Mod: CPTII,S$GLB,, | Performed by: NURSE PRACTITIONER

## 2023-10-06 PROCEDURE — 3079F DIAST BP 80-89 MM HG: CPT | Mod: CPTII,S$GLB,, | Performed by: NURSE PRACTITIONER

## 2023-10-06 PROCEDURE — 3008F PR BODY MASS INDEX (BMI) DOCUMENTED: ICD-10-PCS | Mod: CPTII,S$GLB,, | Performed by: NURSE PRACTITIONER

## 2023-10-06 PROCEDURE — 88175 CYTOPATH C/V AUTO FLUID REDO: CPT | Performed by: NURSE PRACTITIONER

## 2023-10-06 PROCEDURE — 87591 N.GONORRHOEAE DNA AMP PROB: CPT | Performed by: NURSE PRACTITIONER

## 2023-10-06 PROCEDURE — 87186 SC STD MICRODIL/AGAR DIL: CPT | Performed by: NURSE PRACTITIONER

## 2023-10-06 PROCEDURE — 3079F PR MOST RECENT DIASTOLIC BLOOD PRESSURE 80-89 MM HG: ICD-10-PCS | Mod: CPTII,S$GLB,, | Performed by: NURSE PRACTITIONER

## 2023-10-06 PROCEDURE — 99214 OFFICE O/P EST MOD 30 MIN: CPT | Mod: S$GLB,,, | Performed by: NURSE PRACTITIONER

## 2023-10-06 PROCEDURE — 76801 OB US < 14 WKS SINGLE FETUS: CPT

## 2023-10-06 PROCEDURE — 99214 PR OFFICE/OUTPT VISIT, EST, LEVL IV, 30-39 MIN: ICD-10-PCS | Mod: S$GLB,,, | Performed by: NURSE PRACTITIONER

## 2023-10-06 PROCEDURE — 87088 URINE BACTERIA CULTURE: CPT | Performed by: NURSE PRACTITIONER

## 2023-10-06 PROCEDURE — 1159F PR MEDICATION LIST DOCUMENTED IN MEDICAL RECORD: ICD-10-PCS | Mod: CPTII,S$GLB,, | Performed by: NURSE PRACTITIONER

## 2023-10-06 PROCEDURE — 1159F MED LIST DOCD IN RCRD: CPT | Mod: CPTII,S$GLB,, | Performed by: NURSE PRACTITIONER

## 2023-10-06 PROCEDURE — 87086 URINE CULTURE/COLONY COUNT: CPT | Performed by: NURSE PRACTITIONER

## 2023-10-06 PROCEDURE — 76801 PR US, OB <14WKS, TRANSABD, SINGLE GESTATION: ICD-10-PCS | Mod: 26,,, | Performed by: OBSTETRICS & GYNECOLOGY

## 2023-10-06 PROCEDURE — 87077 CULTURE AEROBIC IDENTIFY: CPT | Performed by: NURSE PRACTITIONER

## 2023-10-06 PROCEDURE — 87624 HPV HI-RISK TYP POOLED RSLT: CPT | Performed by: NURSE PRACTITIONER

## 2023-10-06 PROCEDURE — 76801 OB US < 14 WKS SINGLE FETUS: CPT | Mod: 26,,, | Performed by: OBSTETRICS & GYNECOLOGY

## 2023-10-06 PROCEDURE — 3075F SYST BP GE 130 - 139MM HG: CPT | Mod: CPTII,S$GLB,, | Performed by: NURSE PRACTITIONER

## 2023-10-06 NOTE — PROGRESS NOTES
CC: Positive Pregnancy Test    HISTORY OF PRESENT ILLNESS:    Lindy Ferraro is a 38 y.o. female, ,  Presents today for a routine exam complaining of amenorrhea and positive home urine pregnancy test.  Patient's last menstrual period was 2023 (exact date).  Third pregnancy- kids are ages 4 and 2. Plans to find out sex and do aneuploidy screening. Some odor aversions, no vomiting. Taking buspar for anxiety and a prenatal vitamin. Unable to take nsaids including asa due to history of G6PD- no result on file. Fu with Dr. Beck.      ROS:  GENERAL: No weight changes. No swelling. + fatigue. No fever.  CARDIOVASCULAR: No chest pain. No shortness of breath. No leg cramps.   NEUROLOGICAL: No headaches. No vision changes.  BREASTS: No pain. No lumps. No discharge.  ABDOMEN: No pain. No diarrhea. No constipation.  REPRODUCTIVE: No abnormal bleeding.   VULVA: No pain. No lesions. No itching.  VAGINA: No relaxation. No itching. No odor. No discharge. No lesions.  URINARY: No incontinence. No nocturia. No frequency. No dysuria.    MEDICATIONS AND ALLERGIES:  Reviewed    COMPREHENSIVE GYN HISTORY:  PAP History: Denies abnormal Paps.  Infection History: Denies STDs. Denies PID.  Benign History: Denies uterine fibroids. Denies ovarian cysts. Denies endometriosis. Denies other conditions.  Cancer History: Denies cervical cancer. Denies uterine cancer or hyperplasia. Denies ovarian cancer. Denies vulvar cancer or pre-cancer. Denies vaginal cancer or pre-cancer. Denies breast cancer. Denies colon cancer.  Sexual Activity History: Reports currently being sexually active  Menstrual History: None.  Contraception: None    /82   Wt 70.9 kg (156 lb 4.9 oz)   LMP 2023 (Exact Date)   BMI 26.83 kg/m²     PE:  AFFECT: Calm, alert and oriented X 3. Interactive during exam  GENERAL: Appears well-nourished, well-developed, in no acute distress.  HEAD: Normocephalic, atruamatic  TEETH: Good  dentition.  THYROID: No thyromegally   BREASTS: No masses, skin changes, nipple discharge or adenopathy bilaterally.  SKIN: Normal for race, warm, & dry. No lesions or rashes.  LUNGS: Easy and unlabored, clear to auscultation bilaterally.  HEART: Regular rate and rhythm   ABDOMEN: Soft and nontender without masses or organomegally.  VULVA: No lesions, masses or tenderness.  VAGINA: Moist and well rugated without lesions or discharge.  CERVIX: Moist and pink without lesions, discharge or tenderness.      UTERUS SIZE: 6-8 week size, nontender and without masses.  ADNEXA: No masses or tenderness.  ESTIMATE OF PELVIC CAPACITY: Adequate  EXTREMITIES: No cyanosis, clubbing or edema. No calf tenderness.  LYMPH NODES: No axillary or inguinal adenopathy.    PROCEDURES:  UPT Positive  Genprobe  Pap    ASSESSMENT/PLAN:  Amenorrhea  Positive urine pregnancy test  -  Routine prenatal care    Nausea and vomiting in pregnancy    -  Education regarding lifestyle and dietary modifications    -  Advised use of B6/Unisom. Pt will notify us if no relief/worsening symptoms, will consider Zofran if needed.    1st TRIMESTER COUNSELING: Discussed all, booklet provided:  Common complaints of pregnancy  HIV and other routine prenatal tests including  genetic screening  Risk factors identified by prenatal history  Oriented to practice - discussed anticipated course of prenatal care & indications for Ultrasound  Childbirth classes/Hospital facilities   Nutrition and weight gain counseling  Toxoplasmosis precautions (Cats/Raw Meat)  Sexual activity and exercise  Environmental/Work hazards  Travel  Tobacco (Ask, Advise, Assess, Assist, and Arrange), as well as alcohol and drug use  Use of any medications (Including supplements, Vitamins, Herbs, or OTC Drugs)  Domestic violence  Seat belt use    TERATOLOGY COUNSELING:   Discussed indications and options for aneuploidy screening - pamphlets given    -  Pt desires mt21    FOLLOW-UP in 4  weeks with Dr. Beck  IOB labs today  Pap updated  GC and urine cx pending  History of G6  C/w buspar for anxiety- only taking 5 mg daily versus 10 due to drowsiness    Kaylyn Hernandez NP  OB/GYN

## 2023-10-06 NOTE — PATIENT INSTRUCTIONS
LABOR AND DELIVERY PHONE NUMBER, [phone] (OPEN 24/7, LOCATED ON 6TH FLOOR OF HOSPITAL)  [address] PHONE NUMBER, [phone] (OPEN MON-FRI, 8a-5p)    1) Eat small frequent meals through the day versus three large meals  2) Try ginger ale or sprite to help settle the stomach   3) Eat crackers or dry toast before getting out of bed in the morning   4) Stay hydrated by drinking plenty of water-do not immediately eat or drink something after vomiting. Give your stomach a rest for 20-30 minutes. Slowly reintroduce ice chips, small sips water, crackers, etc.    5) Try OTC Unisom (use the tablets that have doxylamine not diphenhydramine in them, can use generic brands like Equate) and vitamin B6  (25 mg, can find on [employer]) together at night before bed. This can help with the nausea in the morning and is safe to use during pregnancy. You can also take the vitamin B6 alone, every 8 hours to help with the nausea.     If you are unable to keep anything down and constantly vomiting for more that 24 hours, let the office know so that dehydration can be avoided. We would recommend being seen in the emergency department if this is the case.     Call [phone] to see about coverage and any out of pocket costs regarding the Nyvggbbrl30 (MT21) testing. This can be done as early as 9 weeks in most individuals and is blood work only. We recommend waiting until 10 weeks to ensure the most accuracy. This test checks for any chromosomal abnormalities like Down Syndrome. You can also find out the sex of the baby if you choose to know. Once you find out coverage and decide to proceed, send either myself or your doctor a message and we can see what date you can do it. It is done at our second floor lab at [facility], operated by [facility].

## 2023-10-09 ENCOUNTER — PATIENT MESSAGE (OUTPATIENT)
Dept: OBSTETRICS AND GYNECOLOGY | Facility: CLINIC | Age: 38
End: 2023-10-09
Payer: COMMERCIAL

## 2023-10-09 LAB — BACTERIA UR CULT: ABNORMAL

## 2023-10-09 RX ORDER — NITROFURANTOIN 25; 75 MG/1; MG/1
100 CAPSULE ORAL 2 TIMES DAILY
Qty: 14 CAPSULE | Refills: 0 | Status: SHIPPED | OUTPATIENT
Start: 2023-10-09 | End: 2023-10-16

## 2023-10-10 ENCOUNTER — TELEPHONE (OUTPATIENT)
Dept: OBSTETRICS AND GYNECOLOGY | Facility: CLINIC | Age: 38
End: 2023-10-10
Payer: COMMERCIAL

## 2023-10-10 NOTE — TELEPHONE ENCOUNTER
Spoke with pt in regards to scheduling M21 appt. Informed pt of her up coming appt and suggested that pt gets her M21 labs done right after appt. Also, explained to pt the GA a pt has to be in order to get M21 labs drawn. Pt verbalized understanding.

## 2023-10-16 LAB
C TRACH DNA SPEC QL NAA+PROBE: NOT DETECTED
N GONORRHOEA DNA SPEC QL NAA+PROBE: NOT DETECTED

## 2023-10-19 ENCOUNTER — PATIENT MESSAGE (OUTPATIENT)
Dept: OBSTETRICS AND GYNECOLOGY | Facility: CLINIC | Age: 38
End: 2023-10-19
Payer: COMMERCIAL

## 2023-11-08 ENCOUNTER — LAB VISIT (OUTPATIENT)
Dept: LAB | Facility: OTHER | Age: 38
End: 2023-11-08
Attending: OBSTETRICS & GYNECOLOGY
Payer: COMMERCIAL

## 2023-11-08 ENCOUNTER — INITIAL PRENATAL (OUTPATIENT)
Dept: OBSTETRICS AND GYNECOLOGY | Facility: CLINIC | Age: 38
End: 2023-11-08
Attending: OBSTETRICS & GYNECOLOGY
Payer: COMMERCIAL

## 2023-11-08 VITALS — SYSTOLIC BLOOD PRESSURE: 118 MMHG | DIASTOLIC BLOOD PRESSURE: 76 MMHG

## 2023-11-08 DIAGNOSIS — Z34.81 SUPERVISION OF NORMAL INTRAUTERINE PREGNANCY IN MULTIGRAVIDA IN FIRST TRIMESTER: Primary | ICD-10-CM

## 2023-11-08 DIAGNOSIS — O23.41 URINARY TRACT INFECTION IN MOTHER DURING FIRST TRIMESTER OF PREGNANCY: ICD-10-CM

## 2023-11-08 DIAGNOSIS — D64.9 ANEMIA, UNSPECIFIED TYPE: ICD-10-CM

## 2023-11-08 DIAGNOSIS — D75.A G6PD DEFICIENCY: ICD-10-CM

## 2023-11-08 LAB
FERRITIN SERPL-MCNC: 37 NG/ML (ref 20–300)
IRON SERPL-MCNC: 159 UG/DL (ref 30–160)
SATURATED IRON: 35 % (ref 20–50)
TOTAL IRON BINDING CAPACITY: 450 UG/DL (ref 250–450)
TRANSFERRIN SERPL-MCNC: 304 MG/DL (ref 200–375)
VIT B12 SERPL-MCNC: 490 PG/ML (ref 210–950)

## 2023-11-08 PROCEDURE — 99999 PR PBB SHADOW E&M-EST. PATIENT-LVL II: ICD-10-PCS | Mod: PBBFAC,,, | Performed by: OBSTETRICS & GYNECOLOGY

## 2023-11-08 PROCEDURE — 87077 CULTURE AEROBIC IDENTIFY: CPT | Performed by: OBSTETRICS & GYNECOLOGY

## 2023-11-08 PROCEDURE — 87186 SC STD MICRODIL/AGAR DIL: CPT | Performed by: OBSTETRICS & GYNECOLOGY

## 2023-11-08 PROCEDURE — 82728 ASSAY OF FERRITIN: CPT | Performed by: OBSTETRICS & GYNECOLOGY

## 2023-11-08 PROCEDURE — 99999 PR PBB SHADOW E&M-EST. PATIENT-LVL II: CPT | Mod: PBBFAC,,, | Performed by: OBSTETRICS & GYNECOLOGY

## 2023-11-08 PROCEDURE — 0502F SUBSEQUENT PRENATAL CARE: CPT | Mod: CPTII,S$GLB,, | Performed by: OBSTETRICS & GYNECOLOGY

## 2023-11-08 PROCEDURE — 0502F PR SUBSEQUENT PRENATAL CARE: ICD-10-PCS | Mod: CPTII,S$GLB,, | Performed by: OBSTETRICS & GYNECOLOGY

## 2023-11-08 PROCEDURE — 84466 ASSAY OF TRANSFERRIN: CPT | Performed by: OBSTETRICS & GYNECOLOGY

## 2023-11-08 PROCEDURE — 87088 URINE BACTERIA CULTURE: CPT | Performed by: OBSTETRICS & GYNECOLOGY

## 2023-11-08 PROCEDURE — 36415 COLL VENOUS BLD VENIPUNCTURE: CPT | Performed by: OBSTETRICS & GYNECOLOGY

## 2023-11-08 PROCEDURE — 82607 VITAMIN B-12: CPT | Performed by: OBSTETRICS & GYNECOLOGY

## 2023-11-08 PROCEDURE — 87086 URINE CULTURE/COLONY COUNT: CPT | Performed by: OBSTETRICS & GYNECOLOGY

## 2023-11-08 NOTE — PROGRESS NOTES
Return PNC  Nausea improving able to eat more, veg. Diet, discussed iron studies and B12 labs.  History of G6PD, to hematology for eval and specifics on what she can and cannot take for pain meds.  UTI previously, treated with macrobid (told OK by pharmacist), repeat culture today.  Mt21 today.  MFM scan ordered. Discussed Aspirin for prevention of GHTN (h/o) and Pre-e, will get hematology recs on this due to G6PD.  Precautions reviewed

## 2023-11-09 ENCOUNTER — PATIENT MESSAGE (OUTPATIENT)
Dept: OBSTETRICS AND GYNECOLOGY | Facility: CLINIC | Age: 38
End: 2023-11-09
Payer: COMMERCIAL

## 2023-11-10 LAB — BACTERIA UR CULT: ABNORMAL

## 2023-11-12 ENCOUNTER — PATIENT MESSAGE (OUTPATIENT)
Dept: OBSTETRICS AND GYNECOLOGY | Facility: CLINIC | Age: 38
End: 2023-11-12
Payer: COMMERCIAL

## 2023-11-13 RX ORDER — CEPHALEXIN 500 MG/1
500 CAPSULE ORAL EVERY 12 HOURS
Qty: 14 CAPSULE | Refills: 0 | Status: SHIPPED | OUTPATIENT
Start: 2023-11-13 | End: 2023-12-18

## 2023-11-14 ENCOUNTER — PATIENT MESSAGE (OUTPATIENT)
Dept: OBSTETRICS AND GYNECOLOGY | Facility: CLINIC | Age: 38
End: 2023-11-14
Payer: COMMERCIAL

## 2023-11-14 ENCOUNTER — PATIENT MESSAGE (OUTPATIENT)
Dept: MATERNAL FETAL MEDICINE | Facility: CLINIC | Age: 38
End: 2023-11-14
Payer: COMMERCIAL

## 2023-11-14 RX ORDER — BUSPIRONE HYDROCHLORIDE 5 MG/1
5 TABLET ORAL 2 TIMES DAILY
Qty: 60 TABLET | Refills: 11 | Status: SHIPPED | OUTPATIENT
Start: 2023-11-14 | End: 2024-11-13

## 2023-11-20 ENCOUNTER — TELEPHONE (OUTPATIENT)
Dept: OBSTETRICS AND GYNECOLOGY | Facility: CLINIC | Age: 38
End: 2023-11-20
Payer: COMMERCIAL

## 2023-12-04 ENCOUNTER — PATIENT OUTREACH (OUTPATIENT)
Dept: ADMINISTRATIVE | Facility: HOSPITAL | Age: 38
End: 2023-12-04
Payer: COMMERCIAL

## 2023-12-08 ENCOUNTER — LAB VISIT (OUTPATIENT)
Dept: LAB | Facility: HOSPITAL | Age: 38
End: 2023-12-08
Attending: OBSTETRICS & GYNECOLOGY
Payer: COMMERCIAL

## 2023-12-08 ENCOUNTER — ROUTINE PRENATAL (OUTPATIENT)
Dept: OBSTETRICS AND GYNECOLOGY | Facility: CLINIC | Age: 38
End: 2023-12-08
Attending: OBSTETRICS & GYNECOLOGY
Payer: COMMERCIAL

## 2023-12-08 VITALS — DIASTOLIC BLOOD PRESSURE: 58 MMHG | SYSTOLIC BLOOD PRESSURE: 108 MMHG | WEIGHT: 167 LBS | BODY MASS INDEX: 28.67 KG/M2

## 2023-12-08 DIAGNOSIS — D75.A G6PD DEFICIENCY: ICD-10-CM

## 2023-12-08 DIAGNOSIS — O23.42 URINARY TRACT INFECTION IN MOTHER DURING SECOND TRIMESTER OF PREGNANCY: ICD-10-CM

## 2023-12-08 DIAGNOSIS — Z34.90 PREGNANCY WITH ONE FETUS, ANTEPARTUM: Primary | ICD-10-CM

## 2023-12-08 DIAGNOSIS — Z34.90 PREGNANCY WITH ONE FETUS, ANTEPARTUM: ICD-10-CM

## 2023-12-08 PROCEDURE — 36415 COLL VENOUS BLD VENIPUNCTURE: CPT | Mod: PO | Performed by: OBSTETRICS & GYNECOLOGY

## 2023-12-08 PROCEDURE — 0502F SUBSEQUENT PRENATAL CARE: CPT | Mod: CPTII,S$GLB,, | Performed by: OBSTETRICS & GYNECOLOGY

## 2023-12-08 PROCEDURE — 99999 PR PBB SHADOW E&M-EST. PATIENT-LVL III: ICD-10-PCS | Mod: PBBFAC,,, | Performed by: OBSTETRICS & GYNECOLOGY

## 2023-12-08 PROCEDURE — 82105 ALPHA-FETOPROTEIN SERUM: CPT | Performed by: OBSTETRICS & GYNECOLOGY

## 2023-12-08 PROCEDURE — 87086 URINE CULTURE/COLONY COUNT: CPT | Performed by: OBSTETRICS & GYNECOLOGY

## 2023-12-08 PROCEDURE — 99999 PR PBB SHADOW E&M-EST. PATIENT-LVL III: CPT | Mod: PBBFAC,,, | Performed by: OBSTETRICS & GYNECOLOGY

## 2023-12-08 PROCEDURE — 0502F PR SUBSEQUENT PRENATAL CARE: ICD-10-PCS | Mod: CPTII,S$GLB,, | Performed by: OBSTETRICS & GYNECOLOGY

## 2023-12-08 NOTE — PROGRESS NOTES
Doing well, no complaints. Some constipation managed by diet.  denies VB/LOF/Ctxns, reports starting to feel fetal movement.   Urine culture today to be sure has cleared.  AFp today.  Anatomy discussed and scheduled.  Connected mom enrollment today, will skip 20 week visit and come back 8 weeks.

## 2023-12-09 LAB
BACTERIA UR CULT: NORMAL
BACTERIA UR CULT: NORMAL

## 2023-12-11 LAB
# FETUSES US: NORMAL
AFP INTERPRETATION: NORMAL
AFP MOM SERPL: 0.76
AFP SERPL-MCNC: 22.5 NG/ML
AFP SERPL-MCNC: NEGATIVE NG/ML
AGE AT DELIVERY: 36
GA (DAYS): 3 D
GA (WEEKS): 15 WK
GESTATIONAL AGE METHOD: NORMAL
IDDM PATIENT QL: NORMAL
SMOKING STATUS FTND: NO

## 2023-12-12 ENCOUNTER — PATIENT MESSAGE (OUTPATIENT)
Dept: OTHER | Facility: OTHER | Age: 38
End: 2023-12-12
Payer: COMMERCIAL

## 2023-12-18 ENCOUNTER — OFFICE VISIT (OUTPATIENT)
Dept: INTERNAL MEDICINE | Facility: CLINIC | Age: 38
End: 2023-12-18
Payer: COMMERCIAL

## 2023-12-18 VITALS
WEIGHT: 169.19 LBS | HEIGHT: 64 IN | DIASTOLIC BLOOD PRESSURE: 68 MMHG | BODY MASS INDEX: 28.88 KG/M2 | SYSTOLIC BLOOD PRESSURE: 112 MMHG | HEART RATE: 83 BPM | OXYGEN SATURATION: 99 %

## 2023-12-18 DIAGNOSIS — Z80.8 FAMILY HISTORY OF SKIN CANCER: ICD-10-CM

## 2023-12-18 DIAGNOSIS — F41.9 ANXIETY: ICD-10-CM

## 2023-12-18 DIAGNOSIS — Z00.00 ROUTINE GENERAL MEDICAL EXAMINATION AT A HEALTH CARE FACILITY: Primary | ICD-10-CM

## 2023-12-18 DIAGNOSIS — D75.A G6PD DEFICIENCY: ICD-10-CM

## 2023-12-18 PROCEDURE — 99999 PR PBB SHADOW E&M-EST. PATIENT-LVL III: ICD-10-PCS | Mod: PBBFAC,,, | Performed by: INTERNAL MEDICINE

## 2023-12-18 PROCEDURE — 3044F HG A1C LEVEL LT 7.0%: CPT | Mod: CPTII,S$GLB,, | Performed by: INTERNAL MEDICINE

## 2023-12-18 PROCEDURE — 1159F MED LIST DOCD IN RCRD: CPT | Mod: CPTII,S$GLB,, | Performed by: INTERNAL MEDICINE

## 2023-12-18 PROCEDURE — 3074F PR MOST RECENT SYSTOLIC BLOOD PRESSURE < 130 MM HG: ICD-10-PCS | Mod: CPTII,S$GLB,, | Performed by: INTERNAL MEDICINE

## 2023-12-18 PROCEDURE — 3078F DIAST BP <80 MM HG: CPT | Mod: CPTII,S$GLB,, | Performed by: INTERNAL MEDICINE

## 2023-12-18 PROCEDURE — 3008F PR BODY MASS INDEX (BMI) DOCUMENTED: ICD-10-PCS | Mod: CPTII,S$GLB,, | Performed by: INTERNAL MEDICINE

## 2023-12-18 PROCEDURE — 3074F SYST BP LT 130 MM HG: CPT | Mod: CPTII,S$GLB,, | Performed by: INTERNAL MEDICINE

## 2023-12-18 PROCEDURE — 3044F PR MOST RECENT HEMOGLOBIN A1C LEVEL <7.0%: ICD-10-PCS | Mod: CPTII,S$GLB,, | Performed by: INTERNAL MEDICINE

## 2023-12-18 PROCEDURE — 3078F PR MOST RECENT DIASTOLIC BLOOD PRESSURE < 80 MM HG: ICD-10-PCS | Mod: CPTII,S$GLB,, | Performed by: INTERNAL MEDICINE

## 2023-12-18 PROCEDURE — 1159F PR MEDICATION LIST DOCUMENTED IN MEDICAL RECORD: ICD-10-PCS | Mod: CPTII,S$GLB,, | Performed by: INTERNAL MEDICINE

## 2023-12-18 PROCEDURE — 99214 PR OFFICE/OUTPT VISIT, EST, LEVL IV, 30-39 MIN: ICD-10-PCS | Mod: S$GLB,,, | Performed by: INTERNAL MEDICINE

## 2023-12-18 PROCEDURE — 1160F RVW MEDS BY RX/DR IN RCRD: CPT | Mod: CPTII,S$GLB,, | Performed by: INTERNAL MEDICINE

## 2023-12-18 PROCEDURE — 3008F BODY MASS INDEX DOCD: CPT | Mod: CPTII,S$GLB,, | Performed by: INTERNAL MEDICINE

## 2023-12-18 PROCEDURE — 99214 OFFICE O/P EST MOD 30 MIN: CPT | Mod: S$GLB,,, | Performed by: INTERNAL MEDICINE

## 2023-12-18 PROCEDURE — 99999 PR PBB SHADOW E&M-EST. PATIENT-LVL III: CPT | Mod: PBBFAC,,, | Performed by: INTERNAL MEDICINE

## 2023-12-18 PROCEDURE — 1160F PR REVIEW ALL MEDS BY PRESCRIBER/CLIN PHARMACIST DOCUMENTED: ICD-10-PCS | Mod: CPTII,S$GLB,, | Performed by: INTERNAL MEDICINE

## 2023-12-19 ENCOUNTER — PATIENT MESSAGE (OUTPATIENT)
Dept: ADMINISTRATIVE | Facility: OTHER | Age: 38
End: 2023-12-19
Payer: COMMERCIAL

## 2023-12-19 ENCOUNTER — PATIENT MESSAGE (OUTPATIENT)
Dept: OTHER | Facility: OTHER | Age: 38
End: 2023-12-19
Payer: COMMERCIAL

## 2024-01-03 ENCOUNTER — PROCEDURE VISIT (OUTPATIENT)
Dept: MATERNAL FETAL MEDICINE | Facility: CLINIC | Age: 39
End: 2024-01-03
Attending: OBSTETRICS & GYNECOLOGY
Payer: COMMERCIAL

## 2024-01-03 ENCOUNTER — PATIENT MESSAGE (OUTPATIENT)
Dept: OBSTETRICS AND GYNECOLOGY | Facility: CLINIC | Age: 39
End: 2024-01-03
Payer: COMMERCIAL

## 2024-01-03 DIAGNOSIS — Z34.81 SUPERVISION OF NORMAL INTRAUTERINE PREGNANCY IN MULTIGRAVIDA IN FIRST TRIMESTER: ICD-10-CM

## 2024-01-03 PROCEDURE — 76811 OB US DETAILED SNGL FETUS: CPT | Mod: S$GLB,,, | Performed by: OBSTETRICS & GYNECOLOGY

## 2024-01-04 ENCOUNTER — LAB VISIT (OUTPATIENT)
Dept: LAB | Facility: OTHER | Age: 39
End: 2024-01-04
Attending: OBSTETRICS & GYNECOLOGY
Payer: COMMERCIAL

## 2024-01-04 ENCOUNTER — TELEPHONE (OUTPATIENT)
Dept: OBSTETRICS AND GYNECOLOGY | Facility: CLINIC | Age: 39
End: 2024-01-04
Payer: COMMERCIAL

## 2024-01-04 DIAGNOSIS — N39.0 URINARY TRACT INFECTION WITHOUT HEMATURIA, SITE UNSPECIFIED: ICD-10-CM

## 2024-01-04 DIAGNOSIS — N39.0 URINARY TRACT INFECTION WITHOUT HEMATURIA, SITE UNSPECIFIED: Primary | ICD-10-CM

## 2024-01-04 PROCEDURE — 87086 URINE CULTURE/COLONY COUNT: CPT | Performed by: OBSTETRICS & GYNECOLOGY

## 2024-01-04 NOTE — TELEPHONE ENCOUNTER
Pt is calling she thinks that she has a UTI I will put in for the pt to go to the lab and do a urine sample.

## 2024-01-05 LAB
BACTERIA UR CULT: NORMAL
BACTERIA UR CULT: NORMAL

## 2024-01-09 ENCOUNTER — PATIENT MESSAGE (OUTPATIENT)
Dept: OTHER | Facility: OTHER | Age: 39
End: 2024-01-09
Payer: COMMERCIAL

## 2024-01-09 ENCOUNTER — OFFICE VISIT (OUTPATIENT)
Dept: HEMATOLOGY/ONCOLOGY | Facility: CLINIC | Age: 39
End: 2024-01-09
Payer: COMMERCIAL

## 2024-01-09 DIAGNOSIS — D75.A G6PD DEFICIENCY: ICD-10-CM

## 2024-01-09 PROCEDURE — 99203 OFFICE O/P NEW LOW 30 MIN: CPT | Mod: 95,,, | Performed by: INTERNAL MEDICINE

## 2024-01-09 NOTE — PROGRESS NOTES
The patient location is: home  The chief complaint leading to consultation is: G6PD deficiency    Visit type: audiovisual    Face to Face time with patient: 20  40 minutes of total time spent on the encounter, which includes face to face time and non-face to face time preparing to see the patient (eg, review of tests), Obtaining and/or reviewing separately obtained history, Documenting clinical information in the electronic or other health record, Independently interpreting results (not separately reported) and communicating results to the patient/family/caregiver, or Care coordination (not separately reported).         Each patient to whom he or she provides medical services by telemedicine is:  (1) informed of the relationship between the physician and patient and the respective role of any other health care provider with respect to management of the patient; and (2) notified that he or she may decline to receive medical services by telemedicine and may withdraw from such care at any time.    Notes:      Subjective:       Patient ID: Lindy Ferraro is a 38 y.o. female.    Chief Complaint: Abnormal Lab    HPI    Virtual new patient visit to discuss G6PD deficiency in pregnancy an implications for medications.    Known diagnosis since childhood. No subjective hemolysis with medication exposures. Was recently prescribe nitrofurantoin without symptoms of hemolysis (contraindicated medication).    With prior pregnancy diagnosed with gestational hypertension at time of delivery. Questioning ability to take aspirin with G6PD deficiency. It is on the list of medications at high risk to cause hemolysis. Reviewed ibuprofen is safe.    Discussed primary medications to cause G6PD related hemolysis are most often antibiotics, especially those used for urinary tract infections: bactrim, nitrofurantoin, ciprofloxacin. And Dapsone.    With her chronic diagnosis she has not had clinical symptoms of hemolysis or required  admission for blood transfusion for treatment.    Review of Systems   Constitutional:  Negative for appetite change and unexpected weight change.   HENT:  Negative for mouth sores.    Eyes:  Negative for visual disturbance.   Respiratory:  Negative for cough and shortness of breath.    Cardiovascular:  Negative for chest pain.   Gastrointestinal:  Negative for abdominal pain and diarrhea.   Genitourinary:  Negative for frequency.   Musculoskeletal:  Negative for back pain.   Integumentary:  Negative for rash.   Neurological:  Negative for headaches.   Hematological:  Negative for adenopathy.   Psychiatric/Behavioral:  The patient is not nervous/anxious.          Objective:      Physical Exam   No exam for telehealth visit    Current Outpatient Medications   Medication Sig Dispense Refill    busPIRone (BUSPAR) 5 MG Tab Take 1 tablet (5 mg total) by mouth 2 (two) times daily. 60 tablet 11    LIDOcaine-prilocaine (EMLA) cream Apply topically as needed. (Patient not taking: Reported on 12/18/2023) 30 g 3     No current facility-administered medications for this visit.      Lab Results   Component Value Date    WBC 7.93 10/06/2023    HGB 12.4 10/06/2023    HCT 36.9 (L) 10/06/2023    MCV 91 10/06/2023     10/06/2023        CMP  Sodium   Date Value Ref Range Status   10/06/2023 135 (L) 136 - 145 mmol/L Final     Potassium   Date Value Ref Range Status   10/06/2023 3.7 3.5 - 5.1 mmol/L Final     Chloride   Date Value Ref Range Status   10/06/2023 104 95 - 110 mmol/L Final     CO2   Date Value Ref Range Status   10/06/2023 23 23 - 29 mmol/L Final     Glucose   Date Value Ref Range Status   10/06/2023 109 70 - 110 mg/dL Final     BUN   Date Value Ref Range Status   10/06/2023 10 6 - 20 mg/dL Final     Creatinine   Date Value Ref Range Status   10/06/2023 0.7 0.5 - 1.4 mg/dL Final     Calcium   Date Value Ref Range Status   10/06/2023 9.4 8.7 - 10.5 mg/dL Final     Total Protein   Date Value Ref Range Status    04/22/2022 7.3 6.0 - 8.4 g/dL Final     Albumin   Date Value Ref Range Status   04/22/2022 4.1 3.5 - 5.2 g/dL Final     Total Bilirubin   Date Value Ref Range Status   04/22/2022 0.7 0.1 - 1.0 mg/dL Final     Comment:     For infants and newborns, interpretation of results should be based  on gestational age, weight and in agreement with clinical  observations.    Premature Infant recommended reference ranges:  Up to 24 hours.............<8.0 mg/dL  Up to 48 hours............<12.0 mg/dL  3-5 days..................<15.0 mg/dL  6-29 days.................<15.0 mg/dL       Alkaline Phosphatase   Date Value Ref Range Status   04/22/2022 119 55 - 135 U/L Final     AST   Date Value Ref Range Status   04/22/2022 19 10 - 40 U/L Final     ALT   Date Value Ref Range Status   04/22/2022 17 10 - 44 U/L Final     Anion Gap   Date Value Ref Range Status   10/06/2023 8 8 - 16 mmol/L Final     eGFR if    Date Value Ref Range Status   04/22/2022 >60 >60 mL/min/1.73 m^2 Final     eGFR if non    Date Value Ref Range Status   04/22/2022 >60 >60 mL/min/1.73 m^2 Final     Comment:     Calculation used to obtain the estimated glomerular filtration  rate (eGFR) is the CKD-EPI equation.             Assessment:       Problem List Items Addressed This Visit          Oncology    G6PD deficiency       Plan:       Recommend all medications be reviewed at [email or web address] for safety prescribing.     Listed Aspirin, Ciprofloxacin, Bactrim, Nitrofurantoin, and Dapsone as allergies to prevent future prescribing.       Ok to use Ibuprofen for analgesia, anti-inflammatory, or anti-pyretic as needed.    Return to hematology as needed.

## 2024-01-24 ENCOUNTER — PATIENT MESSAGE (OUTPATIENT)
Dept: OBSTETRICS AND GYNECOLOGY | Facility: CLINIC | Age: 39
End: 2024-01-24
Payer: COMMERCIAL

## 2024-02-02 ENCOUNTER — ROUTINE PRENATAL (OUTPATIENT)
Dept: OBSTETRICS AND GYNECOLOGY | Facility: CLINIC | Age: 39
End: 2024-02-02
Attending: OBSTETRICS & GYNECOLOGY
Payer: COMMERCIAL

## 2024-02-02 VITALS
BODY MASS INDEX: 30.02 KG/M2 | DIASTOLIC BLOOD PRESSURE: 69 MMHG | WEIGHT: 174.88 LBS | SYSTOLIC BLOOD PRESSURE: 131 MMHG

## 2024-02-02 DIAGNOSIS — M53.3 SACROILIAC PAIN DURING PREGNANCY: ICD-10-CM

## 2024-02-02 DIAGNOSIS — O99.891 SACROILIAC PAIN DURING PREGNANCY: ICD-10-CM

## 2024-02-02 DIAGNOSIS — Z34.90 PREGNANCY WITH ONE FETUS, ANTEPARTUM: Primary | ICD-10-CM

## 2024-02-02 DIAGNOSIS — D75.A G6PD DEFICIENCY: ICD-10-CM

## 2024-02-02 PROCEDURE — 0502F SUBSEQUENT PRENATAL CARE: CPT | Mod: CPTII,S$GLB,, | Performed by: OBSTETRICS & GYNECOLOGY

## 2024-02-02 PROCEDURE — 99999 PR PBB SHADOW E&M-EST. PATIENT-LVL III: CPT | Mod: PBBFAC,,, | Performed by: OBSTETRICS & GYNECOLOGY

## 2024-02-02 NOTE — PROGRESS NOTES
Back SI pain- discussed stretches, referral to Dr. Starkey.  Would like induction at 39 weeks.  Recently had friend who had IUFD at 38 weeks. Will consider PNT at 32 weeks.  Discussed limitations of taking ASA due to G6PD.  Good movement.  Precautions reviewed.

## 2024-02-06 ENCOUNTER — PATIENT MESSAGE (OUTPATIENT)
Dept: OTHER | Facility: OTHER | Age: 39
End: 2024-02-06
Payer: COMMERCIAL

## 2024-02-16 ENCOUNTER — PATIENT MESSAGE (OUTPATIENT)
Dept: INTERNAL MEDICINE | Facility: CLINIC | Age: 39
End: 2024-02-16
Payer: COMMERCIAL

## 2024-02-20 ENCOUNTER — PROCEDURE VISIT (OUTPATIENT)
Dept: MATERNAL FETAL MEDICINE | Facility: CLINIC | Age: 39
End: 2024-02-20
Attending: OBSTETRICS & GYNECOLOGY
Payer: COMMERCIAL

## 2024-02-20 ENCOUNTER — PATIENT MESSAGE (OUTPATIENT)
Dept: OTHER | Facility: OTHER | Age: 39
End: 2024-02-20
Payer: COMMERCIAL

## 2024-02-20 DIAGNOSIS — Z34.90 PREGNANCY WITH ONE FETUS, ANTEPARTUM: Primary | ICD-10-CM

## 2024-02-20 DIAGNOSIS — Z34.90 PREGNANCY WITH ONE FETUS, ANTEPARTUM: ICD-10-CM

## 2024-02-20 PROCEDURE — 76816 OB US FOLLOW-UP PER FETUS: CPT | Mod: S$GLB,,, | Performed by: OBSTETRICS & GYNECOLOGY

## 2024-02-23 NOTE — PROGRESS NOTES
Subjective:     Lindy Ferraro    Chief Complaint   Patient presents with    Back Pain     HPI    Susan is coming in today for left > right SI pain that began a few years ago, but has been exacerbated by her pregnancy. She is currently in the third trimester. She experienced similar pain exacerbation in her other pregnancies as well. She participated in PT during her previous pregnancy and also did the Healthy Back program, but didn't find that this was a good fit for her. Pt. describes the pain as a 2/10 sharp pain that does not radiate. There was not a fall/injury/ or trauma associated with the onset of symptoms. The pain is better with rest and worse with activities and when holding her 2 year old. Pt. Denies any other musculoskeletal complaints at this time. She does have an SI belt, but has not tried using it consistently.    Review of Systems   Constitutional:  Negative for chills and fever.   Musculoskeletal:  Positive for joint pain and myalgias. Negative for back pain, falls and neck pain.   Neurological:  Negative for dizziness, tingling, focal weakness, weakness and headaches.     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     PAST SURGICAL HISTORY:   Past Surgical History:   Procedure Laterality Date    INTRAUTERINE DEVICE INSERTION  2016    MIKI---REMOVED    SMALL INTESTINE SURGERY  8/1987    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    WISDOM TOOTH EXTRACTION       FAMILY HISTORY:   Family History   Problem Relation Age of Onset    Gout Father     Skin cancer Mother     Arthritis Mother     Brain cancer Maternal Grandfather     Skin cancer Maternal Grandfather     Cancer Maternal Grandfather     Diabetes type II Paternal Grandmother     Diabetes Paternal Grandmother     Heart attack Paternal Grandfather     Heart disease Paternal Grandfather     Stroke Maternal  Grandmother     Kidney cancer Maternal Uncle     Prostate cancer Paternal Uncle     Breast cancer Neg Hx     Colon cancer Neg Hx     Ovarian cancer Neg Hx     Glaucoma Neg Hx     Cataracts Neg Hx     Macular degeneration Neg Hx      SOCIAL HISTORY:   Social History     Socioeconomic History    Marital status:      Spouse name: Efrain    Number of children: 1   Occupational History     Comment:     Tobacco Use    Smoking status: Never    Smokeless tobacco: Never   Substance and Sexual Activity    Alcohol use: Yes     Alcohol/week: 5.0 standard drinks of alcohol     Types: 5 Glasses of wine per week    Drug use: No    Sexual activity: Yes     Partners: Male     Birth control/protection: I.U.D.     Social Determinants of Health     Financial Resource Strain: Low Risk  (4/19/2022)    Overall Financial Resource Strain (CARDIA)     Difficulty of Paying Living Expenses: Not hard at all   Food Insecurity: No Food Insecurity (4/19/2022)    Hunger Vital Sign     Worried About Running Out of Food in the Last Year: Never true     Ran Out of Food in the Last Year: Never true   Transportation Needs: No Transportation Needs (4/19/2022)    PRAPARE - Transportation     Lack of Transportation (Medical): No     Lack of Transportation (Non-Medical): No   Physical Activity: Unknown (1/9/2024)    Exercise Vital Sign     Days of Exercise per Week: 3 days   Stress: No Stress Concern Present (4/19/2022)    [employer] [place] [employer] - Occupational Stress Questionnaire     Feeling of Stress : Only a little   Social Connections: Unknown (4/19/2022)    Social Connection and Isolation Panel [NHANES]     Frequency of Communication with Friends and Family: More than three times a week     Frequency of Social Gatherings with Friends and Family: More than three times a week     Active Member of Clubs or Organizations: No     Marital Status:    Housing Stability: Low Risk  (4/19/2022)    Housing Stability  "Vital Sign     Unable to Pay for Housing in the Last Year: No     Number of Places Lived in the Last Year: 1     Unstable Housing in the Last Year: No     MEDICATIONS:   Current Outpatient Medications:     busPIRone (BUSPAR) 5 MG Tab, Take 1 tablet (5 mg total) by mouth 2 (two) times daily., Disp: 60 tablet, Rfl: 11    LIDOcaine-prilocaine (EMLA) cream, Apply topically as needed. (Patient not taking: Reported on 12/18/2023), Disp: 30 g, Rfl: 3  ALLERGIES:   Review of patient's allergies indicates:   Allergen Reactions    Asa [aspirin] Other (See Comments)     Blood disorder per Patient- G6PD deficiency    Bactrim [sulfamethoxazole-trimethoprim]      G6PD deficiency    Ciprofloxacin      G6PD deficiency    Dapsone      G6PD deficiency      Ibuprofen     Nitrofurantoin      G6PD deficiency    Sulfa (sulfonamide antibiotics)     Pcn [penicillins] Rash     Objective:     VITAL SIGNS: /70 (BP Location: Right arm, Patient Position: Sitting, BP Method: Medium (Manual))   Ht 5' 4" (1.626 m)   Wt 83.6 kg (184 lb 3.1 oz)   LMP 08/20/2023 (Exact Date)   BMI 31.62 kg/m²    General    Nursing note and vitals reviewed.  Constitutional: She is oriented to person, place, and time. She appears well-developed and well-nourished.   HENT:   Head: Normocephalic and atraumatic.   no nasal discharge, no external ear redness or discharge   Eyes:   EOM is full and smooth  No eye redness or discharge   Neck: Neck supple. No tracheal deviation present.   Cardiovascular:  Normal rate.            2+ Radial pulse bilaterally  2+ Dorsalis Pedis pulse bilaterally  No LE edema appreciated   Pulmonary/Chest: Effort normal. No respiratory distress.   Abdominal: She exhibits no distension.   No rigidity   Neurological: She is alert and oriented to person, place, and time. She exhibits normal muscle tone. Coordination normal.   See details below   Psychiatric: She has a normal mood and affect. Her behavior is normal.           MUSCULOSKELETAL " EXAM:  Lumbar Spine: bilateral lumbar region    Observation:    Posture:  Upright  No obvious pelvic obliquity while standing.    No edema, erythema, or ecchymosis noted in lumbosacral region.    No midline skin abnormalities.    No atrophy of lower limb musculature.  Leg lengths symmetric following OMT to the pelvis.  Gait: Non-antalgic with Neutral ankle mechanics and Neutral medial arch. Gait without trendelenberg, heel walking, toe walking, and tandem walking.    Tenderness:  Mild tenderness over the posterior pelvis, near the SI joints, L>R  No tenderness over the sacrum, piriformis, greater/lesser trochanters.  No bony deformities or step-offs palpated.     Range of Motion:  Active flexion to 60°.  Active extension to 25°.   Active rotation to 30° on left and 30° on right.  Active sidebending to 25° on left and 25° on right.   Passive hip flexion to 135° on left and 135° on right.    Passive hip internal rotation to 45° on left and 45° on right.   Passive hip external rotation to 45° on left and 45° on right.   All ROM testing is without pain.    Strength Testing (* = with pain):  Hip flexion - 5/5 on left* and 5/5 on right*  Hip extension - 5/5 on left and 5/5 on right  Knee flexion - 5/5 on left and 5/5 on right  Knee extension - 5/5 on left and 5/5 on right  Dorsiflexion - 5/5 on left and 5/5 on right  Plantarflexion - 5/5 on left and 5/5 on right  Great toe extension - 5/5 on left and 5/5 on right    Special Tests:    Seated straight leg raise - negative on left and negative on right  Supine straight leg raise - negative on left and negative on right   Slump test - negative on left and negative on right  Provocation maneuvers exhibit no worsening of symptoms.    ZAINAB test - negative  FADIR test - negative  Log roll test - negative      Structural Exam:  TART (Tissue texture abnormality, Asymmetry,  Restriction of motion and/or Tenderness) changes:     Thoracic Spine   T1 Neutral   T2 Neutral   T3 Neutral    T4 Neutral   T5 Neutral   T6 Neutral   T7 Neutral   T8 Neutral   T9 FRS RIGHT   T10 NS-right,R-left   T11 NS-right,R-left   T12 NS-right,R-left     Rib cage: R9 external torsion on right, R10 external torsion on left     Lumbar Spine   L1 NS-right,R-left   L2 Neutral   L3 Neutral   L4 FRS RIGHT   L5 FRS RIGHT     Pelvis:  Innominate:Right anterior rotation  Pubic bone:Right inferior pubic shear  Ischiorectal fossa TTA bilaterally    Sacrum:Left on Right sacral torsion    Lower extremity: bilateral anterior jabari    Key   F= Flexed   E = Extended   R = Rotated   S = Sidebent   TTA = tissue texture abnormality     Neurovascular Exam:  Reflexes +2/4 and symmetric at the L4 and S1 dermatomes.    Sensation intact to light touch in the L2-S2 dermatomes bilaterally.   No pretibial edema or abnormal hair pattern of the shin.    Intact and symmetric DP and PT pulses bilaterally.    Assessment:      Encounter Diagnoses   Name Primary?    Sacroiliac pain during pregnancy Yes    Myalgia     Somatic dysfunction of lumbar region     Somatic dysfunction of thoracic region     Somatic dysfunction of rib cage region     Sacral region somatic dysfunction     Somatic dysfunction of pelvic region     Somatic dysfunction of lower extremity         Plan:    1. Low back pain, consistent with sacroiliac joint dysfunction in pregnancy with associated biomechanical restrictions of the lower kinetic chain  - OMT performed today to address associated biomechanical restrictions  and HEP started.   - recommend bringing in SI belt to next visit  - continue to wear firm soled, supportive shoe wear  - discussed option of child carriers to unload back while lifting 3-year-old son    2. OMT 5-6 regions. Oral consent obtained.  Reviewed benefits and potential side effects.   - OMT indicated today due to signs and symptoms as well as local and remote somatic dysfunction findings and their related neurokinetic, lymphatic, fascial and/or arteriovenous  body connections.   - OMT techniques used: Myofascial Release, Muscle Energy, Still's Technique, Balanced Ligamentous Tension, and Articulatory   - Treatment was tolerated well. Improvement noted in segmental mobility post-treatment in dysfunctional regions. There were no adverse events and no complications immediately following treatment.     3. Pt. Given the following HEP:  A) Cat-cow spine extension/flexion stretch: 10 reps, twice daily. Handout given   B)  Pelvic clock exercises given to do from the 6-12 o'clock positions:10-15 reps, twice daily. Hand out of exercise also given.   C) Seated sideband exercise: hold stretch for 20-30 seconds on each side, repeat 2-3 times, twice daily    29359 HOME EXERCISE PROGRAM (HEP):  The patient was taught a homegoing physical therapy regimen as described above. The patient demonstrated understanding of the exercises and proper technique of their execution. This interaction took 15 minutes.     4. Follow-up in 2-3 weeks for reevaluation    5. Patient agreeable to today's plan and all questions were answered      This note is dictated using the M*Modal Fluency Direct word recognition program. There are word recognition mistakes that are occasionally missed on review.

## 2024-02-26 ENCOUNTER — OFFICE VISIT (OUTPATIENT)
Dept: SPORTS MEDICINE | Facility: CLINIC | Age: 39
End: 2024-02-26
Attending: OBSTETRICS & GYNECOLOGY
Payer: COMMERCIAL

## 2024-02-26 VITALS
HEIGHT: 64 IN | WEIGHT: 184.19 LBS | BODY MASS INDEX: 31.45 KG/M2 | SYSTOLIC BLOOD PRESSURE: 131 MMHG | DIASTOLIC BLOOD PRESSURE: 70 MMHG

## 2024-02-26 DIAGNOSIS — M99.04 SACRAL REGION SOMATIC DYSFUNCTION: ICD-10-CM

## 2024-02-26 DIAGNOSIS — M99.06 SOMATIC DYSFUNCTION OF LOWER EXTREMITY: ICD-10-CM

## 2024-02-26 DIAGNOSIS — M99.02 SOMATIC DYSFUNCTION OF THORACIC REGION: ICD-10-CM

## 2024-02-26 DIAGNOSIS — M53.3 SACROILIAC PAIN DURING PREGNANCY: Primary | ICD-10-CM

## 2024-02-26 DIAGNOSIS — O99.891 SACROILIAC PAIN DURING PREGNANCY: Primary | ICD-10-CM

## 2024-02-26 DIAGNOSIS — M99.08 SOMATIC DYSFUNCTION OF RIB CAGE REGION: ICD-10-CM

## 2024-02-26 DIAGNOSIS — M99.05 SOMATIC DYSFUNCTION OF PELVIC REGION: ICD-10-CM

## 2024-02-26 DIAGNOSIS — M79.10 MYALGIA: ICD-10-CM

## 2024-02-26 DIAGNOSIS — M99.03 SOMATIC DYSFUNCTION OF LUMBAR REGION: ICD-10-CM

## 2024-02-26 PROCEDURE — 3078F DIAST BP <80 MM HG: CPT | Mod: CPTII,S$GLB,, | Performed by: NEUROMUSCULOSKELETAL MEDICINE & OMM

## 2024-02-26 PROCEDURE — 3008F BODY MASS INDEX DOCD: CPT | Mod: CPTII,S$GLB,, | Performed by: NEUROMUSCULOSKELETAL MEDICINE & OMM

## 2024-02-26 PROCEDURE — 1159F MED LIST DOCD IN RCRD: CPT | Mod: CPTII,S$GLB,, | Performed by: NEUROMUSCULOSKELETAL MEDICINE & OMM

## 2024-02-26 PROCEDURE — 1160F RVW MEDS BY RX/DR IN RCRD: CPT | Mod: CPTII,S$GLB,, | Performed by: NEUROMUSCULOSKELETAL MEDICINE & OMM

## 2024-02-26 PROCEDURE — 99999 PR PBB SHADOW E&M-EST. PATIENT-LVL III: CPT | Mod: PBBFAC,,, | Performed by: NEUROMUSCULOSKELETAL MEDICINE & OMM

## 2024-02-26 PROCEDURE — 3075F SYST BP GE 130 - 139MM HG: CPT | Mod: CPTII,S$GLB,, | Performed by: NEUROMUSCULOSKELETAL MEDICINE & OMM

## 2024-02-26 PROCEDURE — 99203 OFFICE O/P NEW LOW 30 MIN: CPT | Mod: 25,S$GLB,, | Performed by: NEUROMUSCULOSKELETAL MEDICINE & OMM

## 2024-02-26 PROCEDURE — 98927 OSTEOPATH MANJ 5-6 REGIONS: CPT | Mod: S$GLB,,, | Performed by: NEUROMUSCULOSKELETAL MEDICINE & OMM

## 2024-02-26 PROCEDURE — 97110 THERAPEUTIC EXERCISES: CPT | Mod: GP,S$GLB,, | Performed by: NEUROMUSCULOSKELETAL MEDICINE & OMM

## 2024-02-29 ENCOUNTER — ROUTINE PRENATAL (OUTPATIENT)
Dept: OBSTETRICS AND GYNECOLOGY | Facility: CLINIC | Age: 39
End: 2024-02-29
Payer: COMMERCIAL

## 2024-02-29 VITALS — SYSTOLIC BLOOD PRESSURE: 123 MMHG | DIASTOLIC BLOOD PRESSURE: 80 MMHG | WEIGHT: 184.06 LBS | BODY MASS INDEX: 31.6 KG/M2

## 2024-02-29 DIAGNOSIS — Z3A.27 27 WEEKS GESTATION OF PREGNANCY: Primary | ICD-10-CM

## 2024-02-29 DIAGNOSIS — F41.9 ANXIETY: ICD-10-CM

## 2024-02-29 PROCEDURE — 99999 PR PBB SHADOW E&M-EST. PATIENT-LVL III: CPT | Mod: PBBFAC,,, | Performed by: NURSE PRACTITIONER

## 2024-02-29 PROCEDURE — 0502F SUBSEQUENT PRENATAL CARE: CPT | Mod: CPTII,S$GLB,, | Performed by: NURSE PRACTITIONER

## 2024-02-29 NOTE — PROGRESS NOTES
Here for routine OB appt at 27w2d, with no complaints.  Reports good FM.  Denies LOF, denies VB, denies contractions.  Reviewed warning signs of Labor and Preeclampsia.  Daily FM counts reinforced.  Tdap and glucose scheduled  Psych referral placed for med management- currently on Buspar 5 mg, started in the summer. Feels like anxiety has worsened and has physical symptoms at times like chest pain. Sees a therapist regularly, interested in psych for med management. Job right now is very stressful but has a project deadline coming up which may help.   Saw Dr. Starkey- found it helpful and has another visit.   Growth scan in April.   Desires RSV vaccine even if not covered.   RTC @ 29 weeks

## 2024-03-04 ENCOUNTER — LAB VISIT (OUTPATIENT)
Dept: LAB | Facility: OTHER | Age: 39
End: 2024-03-04
Attending: OBSTETRICS & GYNECOLOGY
Payer: COMMERCIAL

## 2024-03-04 DIAGNOSIS — Z34.90 PREGNANCY WITH ONE FETUS, ANTEPARTUM: ICD-10-CM

## 2024-03-04 LAB
BASOPHILS # BLD AUTO: 0.02 K/UL (ref 0–0.2)
BASOPHILS NFR BLD: 0.2 % (ref 0–1.9)
DIFFERENTIAL METHOD BLD: ABNORMAL
EOSINOPHIL # BLD AUTO: 0.1 K/UL (ref 0–0.5)
EOSINOPHIL NFR BLD: 0.9 % (ref 0–8)
ERYTHROCYTE [DISTWIDTH] IN BLOOD BY AUTOMATED COUNT: 13 % (ref 11.5–14.5)
GLUCOSE SERPL-MCNC: 133 MG/DL (ref 70–140)
HCT VFR BLD AUTO: 35.4 % (ref 37–48.5)
HGB BLD-MCNC: 11.8 G/DL (ref 12–16)
IMM GRANULOCYTES # BLD AUTO: 0.06 K/UL (ref 0–0.04)
IMM GRANULOCYTES NFR BLD AUTO: 0.7 % (ref 0–0.5)
LYMPHOCYTES # BLD AUTO: 1.6 K/UL (ref 1–4.8)
LYMPHOCYTES NFR BLD: 19.1 % (ref 18–48)
MCH RBC QN AUTO: 30.3 PG (ref 27–31)
MCHC RBC AUTO-ENTMCNC: 33.3 G/DL (ref 32–36)
MCV RBC AUTO: 91 FL (ref 82–98)
MONOCYTES # BLD AUTO: 0.4 K/UL (ref 0.3–1)
MONOCYTES NFR BLD: 4.4 % (ref 4–15)
NEUTROPHILS # BLD AUTO: 6.1 K/UL (ref 1.8–7.7)
NEUTROPHILS NFR BLD: 74.7 % (ref 38–73)
NRBC BLD-RTO: 0 /100 WBC
PLATELET # BLD AUTO: 186 K/UL (ref 150–450)
PMV BLD AUTO: 10 FL (ref 9.2–12.9)
RBC # BLD AUTO: 3.89 M/UL (ref 4–5.4)
WBC # BLD AUTO: 8.17 K/UL (ref 3.9–12.7)

## 2024-03-04 PROCEDURE — 82950 GLUCOSE TEST: CPT | Performed by: OBSTETRICS & GYNECOLOGY

## 2024-03-04 PROCEDURE — 85025 COMPLETE CBC W/AUTO DIFF WBC: CPT | Performed by: OBSTETRICS & GYNECOLOGY

## 2024-03-04 PROCEDURE — 36415 COLL VENOUS BLD VENIPUNCTURE: CPT | Performed by: OBSTETRICS & GYNECOLOGY

## 2024-03-05 ENCOUNTER — PATIENT MESSAGE (OUTPATIENT)
Dept: OTHER | Facility: OTHER | Age: 39
End: 2024-03-05
Payer: COMMERCIAL

## 2024-03-11 PROBLEM — O23.42 URINARY TRACT INFECTION IN MOTHER DURING SECOND TRIMESTER OF PREGNANCY: Status: RESOLVED | Noted: 2023-12-08 | Resolved: 2024-03-11

## 2024-03-14 NOTE — PROGRESS NOTES
Subjective:     Lindy Ferraro    Chief Complaint   Patient presents with    Follow-up     HPI    Susan is a 38 y.o. female coming in today for SI joint pain. Pt is 29w6d pregnant. Since last visit the pain has Improved.  Pt is compliant with HEP. Pt reports improvement with OMT up until the past 3-4 days. The pain is better with rest and worse with activities and when holding her 2 year old. Pt. describes the pain as a 2/10 achy pain that does not radiate. There has not been any new a fall/injury/ or traumas since last visit.  Pt. denies any new musculoskeletal complaints at this time.     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     PAST SURGICAL HISTORY:   Past Surgical History:   Procedure Laterality Date    INTRAUTERINE DEVICE INSERTION  2016    MIKI---REMOVED    SMALL INTESTINE SURGERY  8/1987    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    WISDOM TOOTH EXTRACTION       FAMILY HISTORY:   Family History   Problem Relation Age of Onset    Gout Father     Skin cancer Mother     Arthritis Mother     Brain cancer Maternal Grandfather     Skin cancer Maternal Grandfather     Cancer Maternal Grandfather     Diabetes type II Paternal Grandmother     Diabetes Paternal Grandmother     Heart attack Paternal Grandfather     Heart disease Paternal Grandfather     Stroke Maternal Grandmother     Kidney cancer Maternal Uncle     Prostate cancer Paternal Uncle     Breast cancer Neg Hx     Colon cancer Neg Hx     Ovarian cancer Neg Hx     Glaucoma Neg Hx     Cataracts Neg Hx     Macular degeneration Neg Hx      SOCIAL HISTORY:   Social History     Socioeconomic History    Marital status:      Spouse name: Efrain    Number of children: 1   Occupational History     Comment:     Tobacco Use    Smoking status: Never    Smokeless tobacco:  Never   Substance and Sexual Activity    Alcohol use: Yes     Alcohol/week: 5.0 standard drinks of alcohol     Types: 5 Glasses of wine per week    Drug use: No    Sexual activity: Yes     Partners: Male     Birth control/protection: None     Social Determinants of Health     Financial Resource Strain: Low Risk  (4/19/2022)    Overall Financial Resource Strain (CARDIA)     Difficulty of Paying Living Expenses: Not hard at all   Food Insecurity: No Food Insecurity (4/19/2022)    Hunger Vital Sign     Worried About Running Out of Food in the Last Year: Never true     Ran Out of Food in the Last Year: Never true   Transportation Needs: No Transportation Needs (4/19/2022)    PRAPARE - Transportation     Lack of Transportation (Medical): No     Lack of Transportation (Non-Medical): No   Physical Activity: Unknown (1/9/2024)    Exercise Vital Sign     Days of Exercise per Week: 3 days   Stress: No Stress Concern Present (4/19/2022)    [employer] - Occupational Stress Questionnaire     Feeling of Stress : Only a little   Social Connections: Unknown (4/19/2022)    Social Connection and Isolation Panel [NHANES]     Frequency of Communication with Friends and Family: More than three times a week     Frequency of Social Gatherings with Friends and Family: More than three times a week     Active Member of Clubs or Organizations: No     Marital Status:    Housing Stability: Low Risk  (4/19/2022)    Housing Stability Vital Sign     Unable to Pay for Housing in the Last Year: No     Number of Places Lived in the Last Year: 1     Unstable Housing in the Last Year: No     MEDICATIONS:   Current Outpatient Medications:     azelastine (ASTELIN) 137 mcg (0.1 %) nasal spray, 1 spray (137 mcg total) by Nasal route 2 (two) times daily., Disp: 30 mL, Rfl: 0    busPIRone (BUSPAR) 5 MG Tab, Take 1 tablet (5 mg total) by mouth 2 (two) times daily., Disp: 60 tablet, Rfl: 11    LIDOcaine-prilocaine (EMLA)  cream, Apply topically as needed. (Patient not taking: Reported on 12/18/2023), Disp: 30 g, Rfl: 3    sertraline (ZOLOFT) 25 MG tablet, Take 1 tablet (25 mg total) by mouth once daily., Disp: 30 tablet, Rfl: 2  ALLERGIES:   Review of patient's allergies indicates:   Allergen Reactions    Asa [aspirin] Other (See Comments)     Blood disorder per Patient- G6PD deficiency    Bactrim [sulfamethoxazole-trimethoprim]      G6PD deficiency    Ciprofloxacin      G6PD deficiency    Dapsone      G6PD deficiency      Ibuprofen     Nitrofurantoin      G6PD deficiency    Sulfa (sulfonamide antibiotics)     Pcn [penicillins] Rash     Objective:     VITAL SIGNS: /76   LMP 08/20/2023 (Exact Date)    General    Vitals reviewed.  Constitutional: She is oriented to person, place, and time. She appears well-developed and well-nourished.   Neurological: She is alert and oriented to person, place, and time.   Psychiatric: She has a normal mood and affect. Her behavior is normal.           MUSCULOSKELETAL EXAM:  Lumbar Spine: bilateral lumbar region    Observation:    Posture:  Upright  No obvious pelvic obliquity while standing.    No edema, erythema, or ecchymosis noted in lumbosacral region.    No midline skin abnormalities.    No atrophy of lower limb musculature.  Leg lengths symmetric following OMT to the pelvis.  Gait: Non-antalgic with Neutral ankle mechanics and Neutral medial arch. Gait without trendelenberg, heel walking, toe walking, and tandem walking.    Tenderness:  Mild tenderness over the posterior pelvis, near the SI joints, L>R  No tenderness over the sacrum, piriformis, greater/lesser trochanters.  No bony deformities or step-offs palpated.     Range of Motion:  Active flexion to 90°.  Active extension to 25°.   Active rotation to 30° on left and 30° on right.  Active sidebending to 25° on left and 25° on right.   Passive hip flexion to 135° on left and 135° on right.    Passive hip internal rotation to 45° on  left and 45° on right.   Passive hip external rotation to 45° on left and 45° on right.   All ROM testing is without pain.    Strength Testing (* = with pain):  Hip flexion - 5/5 on left* and 5/5 on right*  Hip extension - 5/5 on left and 5/5 on right  Knee flexion - 5/5 on left and 5/5 on right  Knee extension - 5/5 on left and 5/5 on right  Dorsiflexion - 5/5 on left and 5/5 on right  Plantarflexion - 5/5 on left and 5/5 on right  Great toe extension - 5/5 on left and 5/5 on right    Special Tests:    Seated straight leg raise - negative on left and negative on right  Supine straight leg raise - negative on left and negative on right   Slump test - negative on left and negative on right  Provocation maneuvers exhibit no worsening of symptoms.    ZAINAB test - negative  FADIR test - negative  Log roll test - negative    Structural Exam:  TART (Tissue texture abnormality, Asymmetry,  Restriction of motion and/or Tenderness) changes:     Thoracic Spine   T1 Neutral   T2 Neutral   T3 Neutral   T4 Neutral   T5 FRS LEFT   T6 FRS LEFT   T7 FRS RIGHT   T8 Neutral   T9 Neutral   T10 FRS LEFT   T11 NS-left,R-right   T12 NS-left,R-right     Rib cage: R9 external torsion on right, R10 external torsion on left     Lumbar Spine   L1 NS-left,R-right   L2 Neutral   L3 Neutral   L4 FRS RIGHT   L5 FRS RIGHT     Pelvis:  Innominate:Right anterior rotation  Pubic bone:Right inferior pubic shear  Ischiorectal fossa TTA on left    Sacrum:Left on Right sacral torsion    Lower extremity: bilateral anterior jabari    Key   F= Flexed   E = Extended   R = Rotated   S = Sidebent   TTA = tissue texture abnormality     Neurovascular Exam:  Reflexes +2/4 and symmetric at the L4 and S1 dermatomes.    Sensation intact to light touch in the L2-S2 dermatomes bilaterally.   No pretibial edema or abnormal hair pattern of the shin.    Intact and symmetric DP and PT pulses bilaterally.    Assessment:      Encounter Diagnoses   Name Primary?    Sacroiliac  pain during pregnancy Yes    Myalgia     Somatic dysfunction of thoracic region     Somatic dysfunction of rib cage region     Somatic dysfunction of lumbar region     Sacral region somatic dysfunction     Somatic dysfunction of pelvic region     Somatic dysfunction of lower extremity         Plan:    1. Low back pain, consistent with sacroiliac joint dysfunction in pregnancy with associated biomechanical restrictions of the lower kinetic chain  - OMT performed again today to address associated biomechanical restrictions  and HEP reviewed  - plan on checking SI belt at next visit  - continue to wear firm soled, supportive shoe wear  - continue to work on proper lifting mechanics    2. OMT 5-6 regions. Oral consent obtained.  Reviewed benefits and potential side effects.   - OMT indicated today due to signs and symptoms as well as local and remote somatic dysfunction findings and their related neurokinetic, lymphatic, fascial and/or arteriovenous body connections.   - OMT techniques used: Myofascial Release, Muscle Energy, Still's Technique, Balanced Ligamentous Tension, and Articulatory   - Treatment was tolerated well. Improvement noted in segmental mobility post-treatment in dysfunctional regions. There were no adverse events and no complications immediately following treatment.     3. Reviewed with pt the following HEP:  A) Cat-cow spine extension/flexion stretch: 10 reps, twice daily. Handout given   B)  Pelvic clock exercises given to do from the 6-12 o'clock positions:10-15 reps, twice daily. Hand out of exercise also given.   C) Seated sideband exercise: hold stretch for 20-30 seconds on each side, repeat 2-3 times, twice daily  Add  D) Sidelying reach and roll stretch: Laying on your side with arms extend out, pull back top arm and flex at the elbow, then rotate thoracic spine and open up arm and chest. Repeat for a total of 10 reps on each side, twice daily. Hand-out also given.     [address]81 HOME EXERCISE  PROGRAM (HEP):  The patient was taught a homegoing physical therapy regimen as described above. The patient demonstrated understanding of the exercises and proper technique of their execution. This interaction took 15 minutes.     4. Follow-up in 3 weeks for reevaluation    5. Patient agreeable to today's plan and all questions were answered      This note is dictated using the M*Modal Fluency Direct word recognition program. There are word recognition mistakes that are occasionally missed on review.

## 2024-03-15 ENCOUNTER — TELEPHONE (OUTPATIENT)
Dept: PSYCHIATRY | Facility: CLINIC | Age: 39
End: 2024-03-15
Payer: COMMERCIAL

## 2024-03-15 ENCOUNTER — ROUTINE PRENATAL (OUTPATIENT)
Dept: OBSTETRICS AND GYNECOLOGY | Facility: CLINIC | Age: 39
End: 2024-03-15
Attending: OBSTETRICS & GYNECOLOGY
Payer: COMMERCIAL

## 2024-03-15 VITALS — BODY MASS INDEX: 32.1 KG/M2 | DIASTOLIC BLOOD PRESSURE: 66 MMHG | WEIGHT: 187 LBS | SYSTOLIC BLOOD PRESSURE: 114 MMHG

## 2024-03-15 DIAGNOSIS — Z34.90 PREGNANCY WITH ONE FETUS, ANTEPARTUM: ICD-10-CM

## 2024-03-15 DIAGNOSIS — J30.9 ALLERGIC RHINITIS, UNSPECIFIED SEASONALITY, UNSPECIFIED TRIGGER: Primary | ICD-10-CM

## 2024-03-15 PROCEDURE — 0502F SUBSEQUENT PRENATAL CARE: CPT | Mod: CPTII,S$GLB,, | Performed by: OBSTETRICS & GYNECOLOGY

## 2024-03-15 PROCEDURE — 99999 PR PBB SHADOW E&M-EST. PATIENT-LVL III: CPT | Mod: PBBFAC,,, | Performed by: OBSTETRICS & GYNECOLOGY

## 2024-03-15 RX ORDER — SERTRALINE HYDROCHLORIDE 25 MG/1
25 TABLET, FILM COATED ORAL DAILY
Qty: 30 TABLET | Refills: 2 | Status: SHIPPED | OUTPATIENT
Start: 2024-03-15 | End: 2024-05-13 | Stop reason: SDUPTHER

## 2024-03-15 RX ORDER — AZELASTINE 1 MG/ML
1 SPRAY, METERED NASAL 2 TIMES DAILY
Qty: 30 ML | Refills: 0 | Status: SHIPPED | OUTPATIENT
Start: 2024-03-15 | End: 2024-04-12

## 2024-03-15 NOTE — PROGRESS NOTES
Anxiety: discussed meds, zoloft started at 25 mg.  BP:has some anxiety about this, slightly higher, discussed connected mom.  GTT: discussed- normal.  TDAP done.  Patient seen and examined.  No complaints, denies VB/LOF/Ctxns, reports good fetal movement. Precautions for Bleeding/ ROM/ Decreased fetal movement/ Pre-E reviewed.  F/U scheduled 2 weeks

## 2024-03-18 ENCOUNTER — OFFICE VISIT (OUTPATIENT)
Dept: SPORTS MEDICINE | Facility: CLINIC | Age: 39
End: 2024-03-18
Payer: COMMERCIAL

## 2024-03-18 ENCOUNTER — PATIENT MESSAGE (OUTPATIENT)
Dept: SPORTS MEDICINE | Facility: CLINIC | Age: 39
End: 2024-03-18

## 2024-03-18 VITALS — DIASTOLIC BLOOD PRESSURE: 76 MMHG | SYSTOLIC BLOOD PRESSURE: 108 MMHG

## 2024-03-18 DIAGNOSIS — M99.03 SOMATIC DYSFUNCTION OF LUMBAR REGION: ICD-10-CM

## 2024-03-18 DIAGNOSIS — O99.891 SACROILIAC PAIN DURING PREGNANCY: Primary | ICD-10-CM

## 2024-03-18 DIAGNOSIS — M99.05 SOMATIC DYSFUNCTION OF PELVIC REGION: ICD-10-CM

## 2024-03-18 DIAGNOSIS — M99.08 SOMATIC DYSFUNCTION OF RIB CAGE REGION: ICD-10-CM

## 2024-03-18 DIAGNOSIS — M99.04 SACRAL REGION SOMATIC DYSFUNCTION: ICD-10-CM

## 2024-03-18 DIAGNOSIS — M99.02 SOMATIC DYSFUNCTION OF THORACIC REGION: ICD-10-CM

## 2024-03-18 DIAGNOSIS — M99.06 SOMATIC DYSFUNCTION OF LOWER EXTREMITY: ICD-10-CM

## 2024-03-18 DIAGNOSIS — M53.3 SACROILIAC PAIN DURING PREGNANCY: Primary | ICD-10-CM

## 2024-03-18 DIAGNOSIS — M79.10 MYALGIA: ICD-10-CM

## 2024-03-18 PROCEDURE — 98927 OSTEOPATH MANJ 5-6 REGIONS: CPT | Mod: S$GLB,,, | Performed by: NEUROMUSCULOSKELETAL MEDICINE & OMM

## 2024-03-18 PROCEDURE — 1159F MED LIST DOCD IN RCRD: CPT | Mod: CPTII,S$GLB,, | Performed by: NEUROMUSCULOSKELETAL MEDICINE & OMM

## 2024-03-18 PROCEDURE — 99999 PR PBB SHADOW E&M-EST. PATIENT-LVL II: CPT | Mod: PBBFAC,,, | Performed by: NEUROMUSCULOSKELETAL MEDICINE & OMM

## 2024-03-18 PROCEDURE — 99213 OFFICE O/P EST LOW 20 MIN: CPT | Mod: 25,S$GLB,, | Performed by: NEUROMUSCULOSKELETAL MEDICINE & OMM

## 2024-03-18 PROCEDURE — 3078F DIAST BP <80 MM HG: CPT | Mod: CPTII,S$GLB,, | Performed by: NEUROMUSCULOSKELETAL MEDICINE & OMM

## 2024-03-18 PROCEDURE — 97110 THERAPEUTIC EXERCISES: CPT | Mod: GP,S$GLB,, | Performed by: NEUROMUSCULOSKELETAL MEDICINE & OMM

## 2024-03-18 PROCEDURE — 3074F SYST BP LT 130 MM HG: CPT | Mod: CPTII,S$GLB,, | Performed by: NEUROMUSCULOSKELETAL MEDICINE & OMM

## 2024-03-19 ENCOUNTER — PATIENT MESSAGE (OUTPATIENT)
Dept: OTHER | Facility: OTHER | Age: 39
End: 2024-03-19
Payer: COMMERCIAL

## 2024-03-26 ENCOUNTER — ROUTINE PRENATAL (OUTPATIENT)
Dept: OBSTETRICS AND GYNECOLOGY | Facility: CLINIC | Age: 39
End: 2024-03-26
Attending: OBSTETRICS & GYNECOLOGY
Payer: COMMERCIAL

## 2024-03-26 VITALS
BODY MASS INDEX: 32.28 KG/M2 | DIASTOLIC BLOOD PRESSURE: 68 MMHG | WEIGHT: 188.06 LBS | SYSTOLIC BLOOD PRESSURE: 114 MMHG

## 2024-03-26 DIAGNOSIS — R12 HEARTBURN DURING PREGNANCY IN THIRD TRIMESTER: Primary | ICD-10-CM

## 2024-03-26 DIAGNOSIS — Z34.90 PREGNANCY WITH ONE FETUS, ANTEPARTUM: ICD-10-CM

## 2024-03-26 DIAGNOSIS — O26.893 HEARTBURN DURING PREGNANCY IN THIRD TRIMESTER: Primary | ICD-10-CM

## 2024-03-26 PROCEDURE — 99999 PR PBB SHADOW E&M-EST. PATIENT-LVL III: CPT | Mod: PBBFAC,,, | Performed by: OBSTETRICS & GYNECOLOGY

## 2024-03-26 PROCEDURE — 0502F SUBSEQUENT PRENATAL CARE: CPT | Mod: CPTII,S$GLB,, | Performed by: OBSTETRICS & GYNECOLOGY

## 2024-03-26 RX ORDER — FAMOTIDINE 20 MG/1
20 TABLET, FILM COATED ORAL 2 TIMES DAILY
Qty: 60 TABLET | Refills: 1 | Status: SHIPPED | OUTPATIENT
Start: 2024-03-26 | End: 2024-04-26

## 2024-03-26 NOTE — PROGRESS NOTES
Seeing Dr. Wu next week.  Plans to get RSV, understands cost.  Some possible reflux. Some anxiety after friend recently had a stillbirth- may consider NST weekly if she feels the need.  Precautions for movement reviewed.  Discussed medications, etc...  Would like 39 week induction.  Patient seen and examined.  No complaints, denies VB/LOF/Ctxns, reports good fetal movement. Precautions for Bleeding/ ROM/ Decreased fetal movement/ Pre-E reviewed.  F/U scheduled 1 weeks

## 2024-03-28 ENCOUNTER — NURSE TRIAGE (OUTPATIENT)
Dept: ADMINISTRATIVE | Facility: CLINIC | Age: 39
End: 2024-03-28
Payer: COMMERCIAL

## 2024-03-28 ENCOUNTER — HOSPITAL ENCOUNTER (EMERGENCY)
Facility: OTHER | Age: 39
Discharge: HOME | End: 2024-03-28
Attending: OBSTETRICS & GYNECOLOGY
Payer: COMMERCIAL

## 2024-03-28 VITALS
HEART RATE: 81 BPM | TEMPERATURE: 97 F | DIASTOLIC BLOOD PRESSURE: 75 MMHG | OXYGEN SATURATION: 100 % | RESPIRATION RATE: 16 BRPM | SYSTOLIC BLOOD PRESSURE: 128 MMHG

## 2024-03-28 DIAGNOSIS — Z3A.31 31 WEEKS GESTATION OF PREGNANCY: ICD-10-CM
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
## 2024-03-28 LAB
BILIRUB SERPL-MCNC: ABNORMAL MG/DL
BLOOD URINE, POC: ABNORMAL
COLOR, POC UA: ABNORMAL
GLUCOSE UR QL STRIP: ABNORMAL
KETONES UR QL STRIP: ABNORMAL
LEUKOCYTE ESTERASE URINE, POC: ABNORMAL
NITRITE, POC UA: ABNORMAL
PH, POC UA: 7
PROTEIN, POC: ABNORMAL
SPECIFIC GRAVITY, POC UA: 1
UROBILINOGEN, POC UA: ABNORMAL

## 2024-03-28 PROCEDURE — 59025 FETAL NON-STRESS TEST: CPT | Mod: 26,,, | Performed by: OBSTETRICS & GYNECOLOGY

## 2024-03-28 PROCEDURE — 99284 EMERGENCY DEPT VISIT MOD MDM: CPT | Mod: 25,,, | Performed by: OBSTETRICS & GYNECOLOGY

## 2024-03-28 PROCEDURE — 99284 EMERGENCY DEPT VISIT MOD MDM: CPT | Mod: 25

## 2024-03-28 PROCEDURE — 81002 URINALYSIS NONAUTO W/O SCOPE: CPT

## 2024-03-28 PROCEDURE — 59025 FETAL NON-STRESS TEST: CPT

## 2024-03-28 NOTE — ED PROVIDER NOTES
Encounter Date: 3/28/2024       History     Chief Complaint   Patient presents with    Fall     At 8:45am     HPI  Lindy Ferraro is a 38 y.o. [id number] at 31w2d presents after a fall at 0845 today. She was walking her dog when she felt on her hands and knees. She did not hurt her stomach on the fall. She reports a few mild contractions since the incident that self resolved. Since the incident she has not had any vaginal bleeding or LOF.     Patient denies contractions at this time, denies vaginal bleeding, denies LOF. Fetal Movement: normal.   This IUP is complicated by G6PD deficiency- no macrobid, bactrim, cipro, GTT:133.    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     Past Surgical History:   Procedure Laterality Date    INTRAUTERINE DEVICE INSERTION      MIKI---REMOVED    SMALL INTESTINE SURGERY  1987    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    WISDOM TOOTH EXTRACTION       Family History   Problem Relation Age of Onset    Gout Father     Skin cancer Mother     Arthritis Mother     Brain cancer Maternal Grandfather     Skin cancer Maternal Grandfather     Cancer Maternal Grandfather     Diabetes type II Paternal Grandmother     Diabetes Paternal Grandmother     Heart attack Paternal Grandfather     Heart disease Paternal Grandfather     Stroke Maternal Grandmother     Kidney cancer Maternal Uncle     Prostate cancer Paternal Uncle     Breast cancer Neg Hx     Colon cancer Neg Hx     Ovarian cancer  Neg Hx     Glaucoma Neg Hx     Cataracts Neg Hx     Macular degeneration Neg Hx      Social History     Tobacco Use    Smoking status: Never    Smokeless tobacco: Never   Substance Use Topics    Alcohol use: Yes     Alcohol/week: 5.0 standard drinks of alcohol     Types: 5 Glasses of wine per week    Drug use: No     Review of Systems   Constitutional:  Negative for chills, diaphoresis and fever.   HENT:  Negative for congestion.    Respiratory:  Negative for shortness of breath.    Cardiovascular:  Negative for chest pain and leg swelling.   Gastrointestinal:  Negative for abdominal pain, constipation, nausea and vomiting.   Genitourinary:  Negative for dysuria, vaginal bleeding and vaginal discharge.   Musculoskeletal:  Negative for back pain.   Skin:  Negative for rash.   Neurological:  Negative for light-headedness and headaches.   Psychiatric/Behavioral:  Negative for confusion.        Physical Exam     Initial Vitals   BP Pulse Resp Temp SpO2   03/28/24 1025 03/28/24 1023 03/28/24 1025 03/28/24 1025 03/28/24 1023   131/74 82 16 97.3 °F (36.3 °C) 100 %      MAP       --                Physical Exam    Constitutional: She appears well-developed and well-nourished.   HENT:   Head: Normocephalic.   Eyes: EOM are normal.   Cardiovascular:  Normal rate.           Pulmonary/Chest: Breath sounds normal. No respiratory distress.   Abdominal: Abdomen is soft. She exhibits no distension. There is no abdominal tenderness. There is no rebound and no guarding.     Neurological: She is alert and oriented to person, place, and time.   Skin: Skin is warm and dry.   Psychiatric: She has a normal mood and affect. Thought content normal.     OB LABOR EXAM:       Method: Sterile vaginal exam per MD.       Dilatation: 0.     Effacement: 40%.   Amniotic Fluid Color: no fluid.           ED Course   Fetal non-stress test    Date/Time: 3/28/2024 12:18 PM    Performed by: Warren Higgins MD  Authorized by: Comfort Yoder MD     Nonstress Test:     Variability:  6-25 BPM    Decelerations:  None    Accelerations:  15 bpm    Acoustic Stimulator: No      Baseline:  135    Contractions:  Irregular  Biophysical Profile:     Nonstress Test Interpretation: reactive      Overall Impression:  Reassuring  Post-procedure:     Patient tolerance:  Patient tolerated the procedure well with no immediate complications    Labs Reviewed   POCT URINALYSIS, DIPSTICK OR TABLET REAGENT, AUTOMATED, WITH MICROSCOP - Abnormal; Notable for the following components:       Result Value    WBC, UA 2+ (*)     Protein, POC trace (*)     All other components within normal limits          Imaging Results    None          Medications - No data to display  Medical Decision Making  - FHT RR  - toco with < 6 contractions in 1h. Patient was monitored until 4h since the incident.  - counseled extensively on placental abruption and  labor precautions  - return to [facility] with: new onset ctx in regular pattern, new onset bleeding per vagina, decreased fetal movement, new onset nausea/vomiting, new onset severe abdominal pain  - return to primary OBGYN clinic for prenatal appointments as scheduled  - no s/s of  labor or placental abruption at this time    Amount and/or Complexity of Data Reviewed  Labs: ordered.              Attending Attestation:   Physician Attestation Statement for Resident:  As the supervising MD   Physician Attestation Statement: I have personally seen and examined this patient.   I agree with the above history.  -:   As the supervising MD I agree with the above PE.     As the supervising MD I agree with the above treatment, course, plan, and disposition.   -:   NST  I independently reviewed the fetal non-stress test with the following interpretation:  135 BPM baseline  Variability: moderate  Accelerations: present  Decelerations: absent  Contractions: occasional  Category 1    Clinical Interpretation:reactive    Patient evaluated and found to be  stable, agree with resident's assessment and plan.  Did not meet criterion for contractions for 23hr observation; less than 6 in 1hr. Bleeding, pain, labor precautions reviewed. Rh+.  No evidence of abdominal trauma on exam.    I was personally present during the critical portions of the procedure(s) performed by the resident and was immediately available in the ED to provide services and assistance as needed during the entire procedure.  I have reviewed and agree with the residents interpretation of the following: lab data.  I have reviewed the following: old records at this facility.                                       Clinical Impression:  Final diagnoses:  [W19.XXXA] Fall, initial encounter (Primary)  [Z3A.31] 31 weeks gestation of pregnancy          ED Disposition Condition    Discharge Good          ED Prescriptions    None       Follow-up Information    None          Warren Higgins MD  Resident  03/28/24 1617       Comfort Yoder MD  03/28/24 [id number]

## 2024-03-28 NOTE — DISCHARGE INSTRUCTIONS
Labor Precautions  Return if you experience contractions, uterine tightening or cramps(more than 4 in 1 hour for 2 consecutive hours)  Backache that comes and goes  Pelvic pressure  Change in vaginal discharge  Vaginal Bleeding  Leaking of fluid  Call the OB [phone]) during regular business hours or L&D([phone]) after hours for any questions or concerns  Keep previously scheduled clinic appointment  Drink 8-10 glasses of water a day

## 2024-04-02 ENCOUNTER — PROCEDURE VISIT (OUTPATIENT)
Dept: MATERNAL FETAL MEDICINE | Facility: CLINIC | Age: 39
End: 2024-04-02
Payer: COMMERCIAL

## 2024-04-02 DIAGNOSIS — Z34.90 PREGNANCY WITH ONE FETUS, ANTEPARTUM: ICD-10-CM

## 2024-04-02 PROCEDURE — 76816 OB US FOLLOW-UP PER FETUS: CPT | Mod: S$GLB,,, | Performed by: OBSTETRICS & GYNECOLOGY

## 2024-04-08 ENCOUNTER — OFFICE VISIT (OUTPATIENT)
Dept: PSYCHIATRY | Facility: CLINIC | Age: 39
End: 2024-04-08
Payer: COMMERCIAL

## 2024-04-08 ENCOUNTER — OFFICE VISIT (OUTPATIENT)
Dept: SPORTS MEDICINE | Facility: CLINIC | Age: 39
End: 2024-04-08
Payer: COMMERCIAL

## 2024-04-08 VITALS
BODY MASS INDEX: 32.56 KG/M2 | HEIGHT: 64 IN | DIASTOLIC BLOOD PRESSURE: 73 MMHG | HEART RATE: 77 BPM | SYSTOLIC BLOOD PRESSURE: 131 MMHG | WEIGHT: 190.69 LBS

## 2024-04-08 DIAGNOSIS — M99.06 SOMATIC DYSFUNCTION OF LOWER EXTREMITY: ICD-10-CM

## 2024-04-08 DIAGNOSIS — M99.08 SOMATIC DYSFUNCTION OF RIB CAGE REGION: ICD-10-CM

## 2024-04-08 DIAGNOSIS — M99.05 SOMATIC DYSFUNCTION OF PELVIC REGION: ICD-10-CM

## 2024-04-08 DIAGNOSIS — M99.07 UPPER EXTREMITY SOMATIC DYSFUNCTION: ICD-10-CM

## 2024-04-08 DIAGNOSIS — O99.891 SACROILIAC PAIN DURING PREGNANCY: Primary | ICD-10-CM

## 2024-04-08 DIAGNOSIS — M99.03 SOMATIC DYSFUNCTION OF LUMBAR REGION: ICD-10-CM

## 2024-04-08 DIAGNOSIS — M99.04 SACRAL REGION SOMATIC DYSFUNCTION: ICD-10-CM

## 2024-04-08 DIAGNOSIS — M79.10 MYALGIA: ICD-10-CM

## 2024-04-08 DIAGNOSIS — M99.02 SOMATIC DYSFUNCTION OF THORACIC REGION: ICD-10-CM

## 2024-04-08 DIAGNOSIS — F41.9 ANXIETY: Primary | ICD-10-CM

## 2024-04-08 DIAGNOSIS — M53.3 SACROILIAC PAIN DURING PREGNANCY: Primary | ICD-10-CM

## 2024-04-08 DIAGNOSIS — Z3A.33 33 WEEKS GESTATION OF PREGNANCY: ICD-10-CM

## 2024-04-08 PROCEDURE — 1160F RVW MEDS BY RX/DR IN RCRD: CPT | Mod: CPTII,S$GLB,, | Performed by: NEUROMUSCULOSKELETAL MEDICINE & OMM

## 2024-04-08 PROCEDURE — 98928 OSTEOPATH MANJ 7-8 REGIONS: CPT | Mod: S$GLB,,, | Performed by: NEUROMUSCULOSKELETAL MEDICINE & OMM

## 2024-04-08 PROCEDURE — 97110 THERAPEUTIC EXERCISES: CPT | Mod: GP,S$GLB,, | Performed by: NEUROMUSCULOSKELETAL MEDICINE & OMM

## 2024-04-08 PROCEDURE — 99213 OFFICE O/P EST LOW 20 MIN: CPT | Mod: 25,S$GLB,, | Performed by: NEUROMUSCULOSKELETAL MEDICINE & OMM

## 2024-04-08 PROCEDURE — 1160F RVW MEDS BY RX/DR IN RCRD: CPT | Mod: CPTII,95,, | Performed by: INTERNAL MEDICINE

## 2024-04-08 PROCEDURE — 1159F MED LIST DOCD IN RCRD: CPT | Mod: CPTII,95,, | Performed by: INTERNAL MEDICINE

## 2024-04-08 PROCEDURE — 3075F SYST BP GE 130 - 139MM HG: CPT | Mod: CPTII,S$GLB,, | Performed by: NEUROMUSCULOSKELETAL MEDICINE & OMM

## 2024-04-08 PROCEDURE — 3008F BODY MASS INDEX DOCD: CPT | Mod: CPTII,S$GLB,, | Performed by: NEUROMUSCULOSKELETAL MEDICINE & OMM

## 2024-04-08 PROCEDURE — 1159F MED LIST DOCD IN RCRD: CPT | Mod: CPTII,S$GLB,, | Performed by: NEUROMUSCULOSKELETAL MEDICINE & OMM

## 2024-04-08 PROCEDURE — 3078F DIAST BP <80 MM HG: CPT | Mod: CPTII,S$GLB,, | Performed by: NEUROMUSCULOSKELETAL MEDICINE & OMM

## 2024-04-08 PROCEDURE — 99999 PR PBB SHADOW E&M-EST. PATIENT-LVL III: CPT | Mod: PBBFAC,,, | Performed by: NEUROMUSCULOSKELETAL MEDICINE & OMM

## 2024-04-08 PROCEDURE — 90792 PSYCH DIAG EVAL W/MED SRVCS: CPT | Mod: 95,,, | Performed by: INTERNAL MEDICINE

## 2024-04-08 NOTE — PROGRESS NOTES
"  Subjective:     Lindy Perez Ronan    Chief Complaint   Patient presents with    SIJ pregnancy     HPI    Susan is a 38 y.o. female coming in today for SI joint pain. Pt is 32w6d pregnant. Since last visit the pain has Improved. Pt did have a fall last week while walking her dog and was seen in OB ED. States this "really jolted" her hips and back and she was sore for several days. Pt is compliant with HEP. The pain is better with rest and worse with activities and when holding her 2 year old. Pt. describes the pain as a 2/10 achy pain that does not radiate. There has not been any new a fall/injury/ or traumas since last visit.  Pt. denies any new musculoskeletal complaints at this time.     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     PAST SURGICAL HISTORY:   Past Surgical History:   Procedure Laterality Date    INTRAUTERINE DEVICE INSERTION  2016    MIKI---REMOVED    SMALL INTESTINE SURGERY  8/1987    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    WISDOM TOOTH EXTRACTION       FAMILY HISTORY:   Family History   Problem Relation Age of Onset    Gout Father     Skin cancer Mother     Arthritis Mother     Brain cancer Maternal Grandfather     Skin cancer Maternal Grandfather     Cancer Maternal Grandfather     Diabetes type II Paternal Grandmother     Diabetes Paternal Grandmother     Heart attack Paternal Grandfather     Heart disease Paternal Grandfather     Stroke Maternal Grandmother     Kidney cancer Maternal Uncle     Prostate cancer Paternal Uncle     Breast cancer Neg Hx     Colon cancer Neg Hx     Ovarian cancer Neg Hx     Glaucoma Neg Hx     Cataracts Neg Hx     Macular degeneration Neg Hx      SOCIAL HISTORY:   Social History     Socioeconomic History    Marital status:      Spouse name: Efrain    Number of children: 1   Occupational " History     Comment:     Tobacco Use    Smoking status: Never    Smokeless tobacco: Never   Substance and Sexual Activity    Alcohol use: Yes     Alcohol/week: 5.0 standard drinks of alcohol     Types: 5 Glasses of wine per week    Drug use: No    Sexual activity: Yes     Partners: Male     Birth control/protection: None     Social Determinants of Health     Financial Resource Strain: Low Risk  (4/19/2022)    Overall Financial Resource Strain (CARDIA)     Difficulty of Paying Living Expenses: Not hard at all   Food Insecurity: No Food Insecurity (4/19/2022)    Hunger Vital Sign     Worried About Running Out of Food in the Last Year: Never true     Ran Out of Food in the Last Year: Never true   Transportation Needs: No Transportation Needs (4/19/2022)    PRAPARE - Transportation     Lack of Transportation (Medical): No     Lack of Transportation (Non-Medical): No   Physical Activity: Unknown (1/9/2024)    Exercise Vital Sign     Days of Exercise per Week: 3 days   Stress: No Stress Concern Present (4/19/2022)    [employer] - Occupational Stress Questionnaire     Feeling of Stress : Only a little   Social Connections: Unknown (4/19/2022)    Social Connection and Isolation Panel [NHANES]     Frequency of Communication with Friends and Family: More than three times a week     Frequency of Social Gatherings with Friends and Family: More than three times a week     Active Member of Clubs or Organizations: No     Marital Status:    Housing Stability: Low Risk  (4/19/2022)    Housing Stability Vital Sign     Unable to Pay for Housing in the Last Year: No     Number of Places Lived in the Last Year: 1     Unstable Housing in the Last Year: No     MEDICATIONS:   Current Outpatient Medications:     azelastine (ASTELIN) 137 mcg (0.1 %) nasal spray, 1 spray (137 mcg total) by Nasal route 2 (two) times daily. (Patient not taking: Reported on 4/8/2024), Disp: 30 mL, Rfl: 0     "busPIRone (BUSPAR) 5 MG Tab, Take 1 tablet (5 mg total) by mouth 2 (two) times daily. (Patient not taking: Reported on 4/8/2024), Disp: 60 tablet, Rfl: 11    famotidine (PEPCID) 20 MG tablet, Take 1 tablet (20 mg total) by mouth 2 (two) times daily. (Patient not taking: Reported on 4/8/2024), Disp: 60 tablet, Rfl: 1    LIDOcaine-prilocaine (EMLA) cream, Apply topically as needed. (Patient not taking: Reported on 3/26/2024), Disp: 30 g, Rfl: 3    sertraline (ZOLOFT) 25 MG tablet, Take 1 tablet (25 mg total) by mouth once daily. (Patient not taking: Reported on 4/8/2024), Disp: 30 tablet, Rfl: 2  ALLERGIES:   Review of patient's allergies indicates:   Allergen Reactions    Asa [aspirin] Other (See Comments)     Blood disorder per Patient- G6PD deficiency    Bactrim [sulfamethoxazole-trimethoprim]      G6PD deficiency    Ciprofloxacin      G6PD deficiency    Dapsone      G6PD deficiency      Ibuprofen     Nitrofurantoin      G6PD deficiency    Sulfa (sulfonamide antibiotics)     Pcn [penicillins] Rash     Objective:     VITAL SIGNS: /73   Pulse 77   Ht 5' 4" (1.626 m)   Wt 86.5 kg (190 lb 11.2 oz)   LMP 08/20/2023 (Exact Date)   BMI 32.73 kg/m²    General    Vitals reviewed.  Constitutional: She is oriented to person, place, and time. She appears well-developed and well-nourished.   Neurological: She is alert and oriented to person, place, and time.   Psychiatric: She has a normal mood and affect. Her behavior is normal.           MUSCULOSKELETAL EXAM:  Lumbar Spine: bilateral lumbar region    Observation:    Posture:  Upright  No obvious pelvic obliquity while standing.    No edema, erythema, or ecchymosis noted in lumbosacral region.    No midline skin abnormalities.    No atrophy of lower limb musculature.  Leg lengths symmetric following OMT to the pelvis.  Gait: Non-antalgic with Neutral ankle mechanics and Neutral medial arch. Gait without trendelenberg, heel walking, toe walking, and tandem " walking.    Tenderness:  +tenderness over the posterior pelvis  No tenderness over the sacrum, piriformis, greater/lesser trochanters.  No bony deformities or step-offs palpated.     Range of Motion:  Active flexion to 90°.  Active extension to 25°.   Active rotation to 30° on left and 30° on right.  Active sidebending to 25° on left and 25° on right.   Passive hip flexion to 135° on left and 135° on right.    Passive hip internal rotation to 45° on left and 45° on right.   Passive hip external rotation to 45° on left and 45° on right.   All ROM testing is without pain.    Strength Testing (* = with pain):  Hip flexion - 5/5 on left and 5/5 on right  Hip extension - 5/5 on left and 5/5 on right  Knee flexion - 5/5 on left and 5/5 on right  Knee extension - 5/5 on left and 5/5 on right  Dorsiflexion - 5/5 on left and 5/5 on right  Plantarflexion - 5/5 on left and 5/5 on right  Great toe extension - 5/5 on left and 5/5 on right    Special Tests:    Seated straight leg raise - negative on left and negative on right  Supine straight leg raise - negative on left and negative on right   Slump test - negative on left and negative on right  Provocation maneuvers exhibit no worsening of symptoms.    ZAINAB test - negative  FADIR test - negative  Log roll test - negative    Structural Exam:  TART (Tissue texture abnormality, Asymmetry,  Restriction of motion and/or Tenderness) changes:     Thoracic Spine   T1 TTA LEFT   T2 Neutral   T3 Neutral   T4 Neutral   T5 Neutral   T6 Neutral   T7 Neutral   T8 Neutral   T9 Neutral   T10 Neutral   T11 Neutral   T12 NS-right,R-left     Rib cage: R9 external torsion on right, R10 external torsion on left    Upper extremity: left thoracic outlet TTA     Lumbar Spine   L1 NS-right,R-left   L2 NS-right,R-left   L3 NS-right,R-left   L4 FRS RIGHT   L5 FRS RIGHT     Pelvis:  Innominate:Right superior shear  Pubic bone:Right superior pubic shear  Ischiorectal fossa TTA on left    Sacrum:Left on  Right sacral torsion    Lower extremity: right anterior talus    Key   F= Flexed   E = Extended   R = Rotated   S = Sidebent   TTA = tissue texture abnormality     Neurovascular Exam:  Reflexes +2/4 and symmetric at the L4 and S1 dermatomes.    Sensation intact to light touch in the L2-S2 dermatomes bilaterally.   No pretibial edema or abnormal hair pattern of the shin.    Intact and symmetric DP and PT pulses bilaterally.    Assessment:      Encounter Diagnoses   Name Primary?    Sacroiliac pain during pregnancy Yes    Myalgia     Somatic dysfunction of thoracic region     Somatic dysfunction of rib cage region     Somatic dysfunction of lumbar region     Sacral region somatic dysfunction     Somatic dysfunction of pelvic region     Upper extremity somatic dysfunction     Somatic dysfunction of lower extremity         Plan:    1. Low back pain, consistent with sacroiliac joint dysfunction in pregnancy with associated biomechanical restrictions of the lower kinetic chain- improved with previous OMT then deteriorated with recent fall  - OMT performed again today to address associated biomechanical restrictions  and HEP reviewed  - Recommend SI belt wear with increased activity  - continue to wear firm soled, supportive shoe wear  - continue to work on proper lifting mechanics    2. OMT 7-8 regions. Oral consent obtained.  Reviewed benefits and potential side effects.   - OMT indicated today due to signs and symptoms as well as local and remote somatic dysfunction findings and their related neurokinetic, lymphatic, fascial and/or arteriovenous body connections.   - OMT techniques used: Myofascial Release, Muscle Energy, Still's Technique, Balanced Ligamentous Tension, and Articulatory   - Treatment was tolerated well. Improvement noted in segmental mobility post-treatment in dysfunctional regions. There were no adverse events and no complications immediately following treatment.     3. Continue the following HEP:  A)  Cat-cow spine extension/flexion stretch: 10 reps, twice daily. Handout given  Add reach through posterior shoulder stretch on each side, hold for 30 sec.  B)  Pelvic clock exercises given to do from the 6-12 o'clock positions:10-15 reps, twice daily. Hand out of exercise also given.   C) Seated sideband exercise: hold stretch for 20-30 seconds on each side, repeat 2-3 times, twice daily  Discontinue:  D) Sidelying reach and roll stretch: Laying on your side with arms extend out, pull back top arm and flex at the elbow, then rotate thoracic spine and open up arm and chest. Repeat for a total of 10 reps on each side, twice daily. Hand-out also given.     [id number] HOME EXERCISE PROGRAM (HEP):  The patient was taught a homegoing physical therapy regimen as described above. The patient demonstrated understanding of the exercises and proper technique of their execution. This interaction took 15 minutes.     4. Follow-up in 2-3 weeks for reevaluation    5. Patient agreeable to today's plan and all questions were answered    This note is dictated using the M*Modal Fluency Direct word recognition program. There are word recognition mistakes that are occasionally missed on review.

## 2024-04-08 NOTE — PROGRESS NOTES
OUTPATIENT PSYCHIATRY INITIAL VISIT    ENCOUNTER DATE:  4/8/24  SITE:  [facility]  REFFERAL SOURCE:  Kaylyn Hernandez NP  LENGTH OF SESSION:  42 minutes    The patient location is:  [place], not in a healthcare facility  Visit type:  audiovisual    Face to Face time with patient:  42 minutes  60 minutes of total time spent on the encounter, which includes face to face time and non-face to face time preparing to see the patient (eg, review of tests), Obtaining and/or reviewing separately obtained history, Documenting clinical information in the electronic or other health record, Independently interpreting results (not separately reported) and communicating results to the patient/family/caregiver, or Care coordination (not separately reported).     Each patient to whom he or she provides medical services by telemedicine is:  (1) informed of the relationship between the physician and patient and the respective role of any other health care provider with respect to management of the patient; and (2) notified that he or she may decline to receive medical services by telemedicine and may withdraw from such care at any time.    CHIEF COMPLAINT:   Anxiety      HISTORY OF PRESENTING ILLNESS:  Lindy Ferraro is a 38 y.o. female with history of Anxiety who presents for initial assessment.    History as told by patient:  Has not started the Zoloft 25mg that Dr. Beck prescribed last month.  But she is taking Buspar 5mg BID.  Told Dr. Beck and she felt like Buspar wasn't working anymore.  She has been doing therapy with someone for anxiety.  She was very resistant to medication for awhile.  During this last summer, she had a health scare with her kids and some lead paint exposure.  This gave her physical anxiety symptoms for the first time.  Had chest pain and had trouble catching her breath and racing thoughts.  Had never had these symptoms before.  She requested Buspar because her  pharmacist friend takes it so felt food about it.  Started this in July.  Went in to rule out heart issues and thinks she was prescribed Zoloft then too.  In the past pregnancy and postpartum have been very difficult to her.  Her kids are 1 y/o boy and 3 y/o girl, about to turn 3 y/o and 6 y/o this spring.  Postpartum with them had a lot of anxiety - if she wasn't with them they weren't going to be safe, a lot of control issues - had to do things a certain way.  Believes depression could have been a component to it.  Intrusive thoughts started with 1st pregnancy as well - a lot of it was surrounding travel in the car, finding the same thing this pregnancy.   took kids on a bike ride to the park yesterday and had lots of intrusive thoughts about their safety.  Intrusive thoughts are about control and safety.  Had more anger postpartum as well.  Just moved into a new house last summer and there are few new things she is very anxious about - tall staircase that has a landing.  Fears that kids will topple over it or that she would when holding the baby.  Worries about kids getting hit by a car or being in a car accident - this is a big one for her.  Does not remember being a big worrier prior to pregnancy.  But her dad was a huge worrier with travel as well.  He was always quick to anger in safety situations too.  Prior to pregnancy was a long distance runner and that was how she coped.  Currently has [place] and goes on walks but just not the same.  Exercise does help.  Sleeps well for the most part.  Work got really stressful for her and that has been a main trigger for her.  Certain work is very anxiety-inducing - even thinking about it can induce anxiety.  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  Had a stressful project with a stressful person.  Works full time from home.  This project specifically would trigger her.  This is all new as of this summer.  Does not have  true panic attacks but does have periods of high anxiety.  If it happens in the morning she can't get her day back on track.  Gets a lot of anxiety about the lead paint situation - still gets triggered.  [place] like she couldn't keep her children safe.  Fears of new house - the big stairwell, pool (has fence around it), living on busy street.  Still doesn't feel safe even when she takes the safety precautions.  Has trouble telling what's normal anxiety and assessing normal risk.  No excessive worrying about germs.  But dust brings back the lead concern.  Son got hospitalized for a cold at 2.5 weeks old so she is very worried about germs for the baby.  Baby is a boy, 33 weeks this week.  Knows her anxiety helps her be super prepared.  No depression in the past but had mood issues postpartum - mostly anxiety but not sure if depression was a factor too.  Overall she is strict with schedules, doesn't do well with transitions.  Buspar has helped the physical symptoms until this winter.  Wasn't having intrusive thoughts much until around February.  This is similar to previous pregnancies.  She and   6.5 years.  Her family is not here but 's sisters are here and close.  Her parents have a house here and coming this summer, his parents are here.  Taking 12 weeks maternity.  Will go back mid August part time.  Her work is very intense.  Recently got off of a [employer] track for family reasons.  She works 60 hours a week normally.      Medication side effects:  No  Medication compliance:  Yes    PSYCHIATRIC REVIEW OF SYSTEMS:  Trouble with sleep:  Denies  Appetite changes:  Not discussed  Weight changes:  Gain with pregnancy  Lack of energy:  Sometimes due to pregnancy  Anhedonia:  Denies  Somatic symptoms:  Denies  Libido:  Chronically low  Anxiety/panic:  Yes as above  Guilty/hopeless:  Denies  Self-injurious behavior/risky behavior:  Denies  Any drugs:  Denies  Alcohol:  Denies    MEDICAL REVIEW OF  SYSTEMS:  Complete review of systems performed covering Constitutional, Eyes, ENT/Mouth, Cardiovascular, Respiratory, Gastrointestinal, Genitourinary, Musculoskeletal, Skin, Neurologic, Endocrine, and Allergy/Immune.  All systems negative except for that discussed in HPI.    PAST PSYCHIATRIC HISTORY:  Previous Psychiatric Diagnoses:  Postpartum anxiety  Previous Psychiatric Hospitalizations:  Denies   Previous SI/HI:  Denies  Previous Suicide Attempts:  Denies   [facility]  [facility]  Psychiatric Care (current & past):  Denies  History of Psychotherapy:  Yes  History of Violence:  Denies    SUBSTANCE ABUSE HISTORY:  Tobacco:  Denies  Alcohol:  Not currently, prior to pregnancy may drink 3-4 times per week, 1-2 glasses of wine.    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       NEUROLOGIC HISTORY:  Seizures:  Denies     SOCIAL HISTORY:  History of Physical/Sexual Abuse:  Denies  Employment:  As above, /reasearcher   Relationship Status/Sexual Orientation:   as above   Children:  As above   Housing Status:  Lives with  and 2 children  Access to Gun:  Denies     FAMILY HISTORY:  Psychiatric:  Mother's side has alcoholism; Father's side with anxiety (father has not been diagnosed)    MEDICATIONS:    Current Outpatient Medications:     azelastine (ASTELIN) 137 mcg (0.1 %) nasal spray, 1 spray (137 mcg total) by Nasal route 2 (two) times daily., Disp: 30 mL, Rfl: 0    busPIRone (BUSPAR) 5 MG Tab, Take 1 tablet (5 mg total) by mouth 2 (two) times daily., Disp: 60 tablet, Rfl: 11    famotidine (PEPCID) 20 MG tablet, Take 1 tablet (20 mg total) by mouth 2 (two) times daily., Disp: 60 tablet, Rfl: 1    LIDOcaine-prilocaine (EMLA) cream, Apply topically as needed. (Patient not  "taking: Reported on 3/26/2024), Disp: 30 g, Rfl: 3    sertraline (ZOLOFT) 25 MG tablet, Take 1 tablet (25 mg total) by mouth once daily., Disp: 30 tablet, Rfl: 2    ALLERGIES:  Review of patient's allergies indicates:   Allergen Reactions    Asa [aspirin] Other (See Comments)     Blood disorder per Patient- G6PD deficiency    Bactrim [sulfamethoxazole-trimethoprim]      G6PD deficiency    Ciprofloxacin      G6PD deficiency    Dapsone      G6PD deficiency      Ibuprofen     Nitrofurantoin      G6PD deficiency    Sulfa (sulfonamide antibiotics)     Pcn [penicillins] Rash       PSYCHIATRIC EXAM:  There were no vitals filed for this visit.  Appearance:  Well groomed, appearing healthy and of stated age  Behavior:  Cooperative, pleasant, no psychomotor agitation or retardation  Speech:  Normal rate, rhythm, prosody, and volume  Mood:  "Ok"  Affect:  Euthymic  Thought Process:  Linear, logical, goal directed  Thought Content:  Negative for suicidal ideation, homicidal ideation, delusions or hallucinations.  Associations:  Intact  Memory:  Grossly Intact  Level of Consciousness/Orientation:  Grossly intact  Fund of Knowledge:  Good  Attention:  Good  Language:  Fluent, able to name abstract and concrete objects  Insight:  Good  Judgment:  Intact  Psychomotor signs:  No involuntary movements of face  Gait:  Unable to assess via virtual visit      RELEVANT LABS/STUDIES:  Lab Results   Component Value Date    WBC 8.17 03/04/2024    HGB 11.8 (L) 03/04/2024    HCT 35.4 (L) 03/04/2024    MCV 91 03/04/2024     03/04/2024     BMP  Lab Results   Component Value Date     (L) 10/06/2023    K 3.7 10/06/2023     10/06/2023    CO2 23 10/06/2023    BUN 10 10/06/2023    CREATININE 0.7 10/06/2023    CALCIUM 9.4 10/06/2023    ANIONGAP 8 10/06/2023    ESTGFRAFRICA >60 04/22/2022    EGFRNONAA >60 04/22/2022     Lab Results   Component Value Date    ALT 17 04/22/2022    AST 19 04/22/2022    ALKPHOS 119 04/22/2022    BILITOT " "0.7 04/22/2022     No results found for: "TSH"  Lab Results   Component Value Date    HGBA1C 4.6 10/06/2023       IMPRESSION:    Lindy Ferraro is a 38 y.o. female with history of Anxiety who presents for initial assessment.    Status/Progress:  Based on the examination today, the patient's problem(s) is/are inadequately controlled.  New problems have been presented today.    Risk Parameters:  Patient reports no suicidal ideation  Patient reports no homicidal ideation  Patient reports no self-injurious behavior  Patient reports no violent behavior    DIAGNOSES:    ICD-10-CM ICD-9-CM   1. Anxiety  F41.9 300.00   2. 33 weeks gestation of pregnancy  Z3A.33 V22.2       PLAN:  Discussed plan to taper off of Buspar as she is transitioning to Zoloft.  She will reduce Buspar to 5mg once daily AND start Zoloft 12.5mg daily this week.  She will then stop Buspar completely AND increase Zoloft to 25mg daily the following week.  She is aware she can use Buspar 5mg PRN anxiety if needed, especially during this time of transition.    Discussed with patient informed consent, risks versus benefits, alternative treatments, side effect profile and the inherent unpredictability of individual responses to these treatments.  The patient expresses understanding of the above and displays the capacity to agree with this current plan.  Continue individual psychotherapy with outside therapist.    RETURN TO CLINIC:  Follow up in about 4 weeks (around 5/6/2024).       "

## 2024-04-12 ENCOUNTER — ROUTINE PRENATAL (OUTPATIENT)
Dept: OBSTETRICS AND GYNECOLOGY | Facility: CLINIC | Age: 39
End: 2024-04-12
Attending: OBSTETRICS & GYNECOLOGY
Payer: COMMERCIAL

## 2024-04-12 VITALS
BODY MASS INDEX: 32.81 KG/M2 | SYSTOLIC BLOOD PRESSURE: 106 MMHG | WEIGHT: 191.13 LBS | DIASTOLIC BLOOD PRESSURE: 77 MMHG

## 2024-04-12 DIAGNOSIS — J30.9 ALLERGIC RHINITIS, UNSPECIFIED SEASONALITY, UNSPECIFIED TRIGGER: ICD-10-CM

## 2024-04-12 DIAGNOSIS — Z3A.33 33 WEEKS GESTATION OF PREGNANCY: Primary | ICD-10-CM

## 2024-04-12 PROCEDURE — 87086 URINE CULTURE/COLONY COUNT: CPT | Performed by: NURSE PRACTITIONER

## 2024-04-12 PROCEDURE — 0502F SUBSEQUENT PRENATAL CARE: CPT | Mod: CPTII,S$GLB,, | Performed by: NURSE PRACTITIONER

## 2024-04-12 PROCEDURE — 87186 SC STD MICRODIL/AGAR DIL: CPT | Performed by: NURSE PRACTITIONER

## 2024-04-12 PROCEDURE — 87077 CULTURE AEROBIC IDENTIFY: CPT | Performed by: NURSE PRACTITIONER

## 2024-04-12 PROCEDURE — 87088 URINE BACTERIA CULTURE: CPT | Performed by: NURSE PRACTITIONER

## 2024-04-12 PROCEDURE — 99999 PR PBB SHADOW E&M-EST. PATIENT-LVL III: CPT | Mod: PBBFAC,,, | Performed by: NURSE PRACTITIONER

## 2024-04-12 RX ORDER — AZELASTINE 1 MG/ML
1 SPRAY, METERED NASAL 2 TIMES DAILY
OUTPATIENT
Start: 2024-04-12

## 2024-04-12 NOTE — PROGRESS NOTES
Here for routine OB appt at 33w3d, with no complaints.  Reports good FM.  Denies LOF, denies VB, denies contractions.  Reviewed warning signs of Labor and Preeclampsia.  Daily FM counts reinforced.  Started taking BP daily with conn mom. Discussed BP parameters and when to go to [facility].  Taking magnesium, some heartburn but not taking Pepcid yet. Feels odd after eating sometimes, specifically happened recently after french fries. Felt off and swollen. Resolved with rest and water.  Started seeing Dr. Wu, stopped buspar and started Zoloft. Has fu in 4 weeks.  Sleeping fine.   Desires RSV vaccine- do in pharmacy next visit  Gets 12 weeks maternity leave, working (remotely) until due date.   Some varicose veins but not bothersome.  RTC @ 35 weeks, then weekly

## 2024-04-12 NOTE — PATIENT INSTRUCTIONS
LABOR AND DELIVERY PHONE NUMBER, [phone] (OPEN 24/7, LOCATED ON 6TH FLOOR OF HOSPITAL)  [address] PHONE NUMBER, [phone] (OPEN MON-FRI, 8a-5p)

## 2024-04-12 NOTE — TELEPHONE ENCOUNTER
Refill Decision Note   Lindy Poweran  is requesting a refill authorization.  Brief Assessment and Rationale for Refill:  Quick Discontinue     Medication Therapy Plan:  Discontinued by: Kaylyn Hernandez NP on 4/12/2024 10:09 Reason: Patient no longer taking      Comments:     Note composed:11:10 AM 04/12/2024

## 2024-04-15 ENCOUNTER — HOSPITAL ENCOUNTER (EMERGENCY)
Facility: OTHER | Age: 39
Discharge: HOME | End: 2024-04-16
Attending: OBSTETRICS & GYNECOLOGY
Payer: COMMERCIAL

## 2024-04-15 ENCOUNTER — NURSE TRIAGE (OUTPATIENT)
Dept: ADMINISTRATIVE | Facility: CLINIC | Age: 39
End: 2024-04-15
Payer: COMMERCIAL

## 2024-04-15 ENCOUNTER — PATIENT MESSAGE (OUTPATIENT)
Dept: OBSTETRICS AND GYNECOLOGY | Facility: CLINIC | Age: 39
End: 2024-04-15
Payer: COMMERCIAL

## 2024-04-15 DIAGNOSIS — R03.0 ELEVATED BLOOD PRESSURE READING: Primary | ICD-10-CM

## 2024-04-15 DIAGNOSIS — N39.0 URINARY TRACT INFECTION WITHOUT HEMATURIA, SITE UNSPECIFIED: ICD-10-CM

## 2024-04-15 DIAGNOSIS — Z3A.33 33 WEEKS GESTATION OF PREGNANCY: ICD-10-CM

## 2024-04-15 DIAGNOSIS — R51.9 NONINTRACTABLE HEADACHE, UNSPECIFIED CHRONICITY PATTERN, UNSPECIFIED HEADACHE TYPE: ICD-10-CM

## 2024-04-15 LAB
ALBUMIN SERPL BCP-MCNC: 3 G/DL (ref 3.5–5.2)
ALP SERPL-CCNC: 147 U/L (ref 55–135)
ALT SERPL W/O P-5'-P-CCNC: 13 U/L (ref 10–44)
ANION GAP SERPL CALC-SCNC: 10 MMOL/L (ref 8–16)
AST SERPL-CCNC: 15 U/L (ref 10–40)
BACTERIA UR CULT: ABNORMAL
BASOPHILS # BLD AUTO: 0.03 K/UL (ref 0–0.2)
BASOPHILS NFR BLD: 0.3 % (ref 0–1.9)
BILIRUB SERPL-MCNC: 0.3 MG/DL (ref 0.1–1)
BILIRUB SERPL-MCNC: NORMAL MG/DL
BLOOD URINE, POC: NORMAL
BUN SERPL-MCNC: 9 MG/DL (ref 6–20)
CALCIUM SERPL-MCNC: 8.9 MG/DL (ref 8.7–10.5)
CHLORIDE SERPL-SCNC: 107 MMOL/L (ref 95–110)
CO2 SERPL-SCNC: 19 MMOL/L (ref 23–29)
COLOR, POC UA: NORMAL
CREAT SERPL-MCNC: 0.6 MG/DL (ref 0.5–1.4)
CREAT UR-MCNC: 26.5 MG/DL (ref 15–325)
DIFFERENTIAL METHOD BLD: ABNORMAL
EOSINOPHIL # BLD AUTO: 0.1 K/UL (ref 0–0.5)
EOSINOPHIL NFR BLD: 0.6 % (ref 0–8)
ERYTHROCYTE [DISTWIDTH] IN BLOOD BY AUTOMATED COUNT: 13.3 % (ref 11.5–14.5)
EST. GFR  (NO RACE VARIABLE): >60 ML/MIN/1.73 M^2
GLUCOSE SERPL-MCNC: 89 MG/DL (ref 70–110)
GLUCOSE UR QL STRIP: NORMAL
HCT VFR BLD AUTO: 34.5 % (ref 37–48.5)
HGB BLD-MCNC: 11.7 G/DL (ref 12–16)
IMM GRANULOCYTES # BLD AUTO: 0.05 K/UL (ref 0–0.04)
IMM GRANULOCYTES NFR BLD AUTO: 0.5 % (ref 0–0.5)
KETONES UR QL STRIP: NORMAL
LEUKOCYTE ESTERASE URINE, POC: NORMAL
LYMPHOCYTES # BLD AUTO: 2.1 K/UL (ref 1–4.8)
LYMPHOCYTES NFR BLD: 19.3 % (ref 18–48)
MCH RBC QN AUTO: 29.6 PG (ref 27–31)
MCHC RBC AUTO-ENTMCNC: 33.9 G/DL (ref 32–36)
MCV RBC AUTO: 87 FL (ref 82–98)
MONOCYTES # BLD AUTO: 0.7 K/UL (ref 0.3–1)
MONOCYTES NFR BLD: 6.3 % (ref 4–15)
NEUTROPHILS # BLD AUTO: 7.8 K/UL (ref 1.8–7.7)
NEUTROPHILS NFR BLD: 73 % (ref 38–73)
NITRITE, POC UA: NORMAL
NRBC BLD-RTO: 0 /100 WBC
PH, POC UA: 7
PLATELET # BLD AUTO: 179 K/UL (ref 150–450)
PMV BLD AUTO: 9.9 FL (ref 9.2–12.9)
POTASSIUM SERPL-SCNC: 3.3 MMOL/L (ref 3.5–5.1)
PROT SERPL-MCNC: 6.5 G/DL (ref 6–8.4)
PROT UR-MCNC: <7 MG/DL (ref 0–15)
PROT/CREAT UR: NORMAL MG/G{CREAT} (ref 0–0.2)
PROTEIN, POC: NORMAL
RBC # BLD AUTO: 3.95 M/UL (ref 4–5.4)
SODIUM SERPL-SCNC: 136 MMOL/L (ref 136–145)
SPECIFIC GRAVITY, POC UA: 1.01
UROBILINOGEN, POC UA: NORMAL
WBC # BLD AUTO: 10.71 K/UL (ref 3.9–12.7)

## 2024-04-15 PROCEDURE — 99284 EMERGENCY DEPT VISIT MOD MDM: CPT | Mod: 25

## 2024-04-15 PROCEDURE — 85025 COMPLETE CBC W/AUTO DIFF WBC: CPT

## 2024-04-15 PROCEDURE — 80053 COMPREHEN METABOLIC PANEL: CPT | Performed by: OBSTETRICS & GYNECOLOGY

## 2024-04-15 PROCEDURE — 81002 URINALYSIS NONAUTO W/O SCOPE: CPT

## 2024-04-15 PROCEDURE — 59025 FETAL NON-STRESS TEST: CPT

## 2024-04-15 PROCEDURE — 82570 ASSAY OF URINE CREATININE: CPT

## 2024-04-15 RX ORDER — CEPHALEXIN 500 MG/1
500 CAPSULE ORAL EVERY 12 HOURS
Qty: 10 CAPSULE | Refills: 0 | Status: SHIPPED | OUTPATIENT
Start: 2024-04-15 | End: 2024-04-20

## 2024-04-15 RX ORDER — CEPHALEXIN 500 MG/1
500 CAPSULE ORAL
Status: COMPLETED | OUTPATIENT
Start: 2024-04-15 | End: 2024-04-16

## 2024-04-15 NOTE — PROGRESS NOTES
Hey- UTI @ 33 weeks. I messaged the pt to confirm allergies, but appears she is allergic to both pcn and macrobid.

## 2024-04-16 VITALS
RESPIRATION RATE: 16 BRPM | OXYGEN SATURATION: 98 % | TEMPERATURE: 98 F | HEART RATE: 88 BPM | DIASTOLIC BLOOD PRESSURE: 62 MMHG | SYSTOLIC BLOOD PRESSURE: 124 MMHG

## 2024-04-16 PROCEDURE — 59025 FETAL NON-STRESS TEST: CPT | Mod: 26,,, | Performed by: OBSTETRICS & GYNECOLOGY

## 2024-04-16 PROCEDURE — 25000003 PHARM REV CODE 250

## 2024-04-16 PROCEDURE — 99284 EMERGENCY DEPT VISIT MOD MDM: CPT | Mod: 25,,, | Performed by: OBSTETRICS & GYNECOLOGY

## 2024-04-16 RX ADMIN — CEPHALEXIN 500 MG: 500 CAPSULE ORAL at 12:04

## 2024-04-16 NOTE — DISCHARGE INSTRUCTIONS
Thank you for coming to our Emergency Department today! It is important to remember that some problems or medical conditions are difficult to diagnose and may not be found or addressed during your Emergency Department visit.     Be sure to follow up with your primary care doctor and review all labs/imaging/tests that were performed during your ER visit with them. Some labs/imaging/tests may be outside of the normal range, and require non-emergent follow-up and/or further investigation/treatment/procedures/testing to help diagnose/exclude/prevent complications or other potentially serious medical conditions that were not discussed or addressed during your ER visit.    Please take all medications as directed. All medications may potentially have side-effects and it is impossible to predict which medications may give you side-effects or what side-effects (if any) they will give you.. If you feel that you are having a negative effect or side-effect of any medication you should immediately stop taking them and seek medical attention. If you feel that you are having a life-threatening reaction call 911.    Return to the ER with any questions/concerns, new/concerning symptoms, worsening or failure to improve.

## 2024-04-16 NOTE — ED PROVIDER NOTES
Encounter Date: 4/15/2024       History     Chief Complaint   Patient presents with    Hypertension     38-year-old female  at 33w6d by MANUELA with past medical history of G6PD def now presenting with headache and hypertension.  Patient reports that during the course of the day she is experienced general sense of feeling unwell including worsening swelling of her extremities, dyspnoea on exertion, and headache.  Patient tells me that she does not typically get headaches and this is atypical symptom for her, additionally tells me that her headache is frontal, sharp, and she has not taken any medications. Patient previously had HTN in a previous pregnancy and took her BP tonight, noting , prompting her to come to the ED. Patient denies any numbeness/ weakness, vision changes, chest pain, or unilateral limb swelling.     The history is provided by the patient.     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     Past Surgical History:   Procedure Laterality Date    INTRAUTERINE DEVICE INSERTION      MIKI---REMOVED    SMALL INTESTINE SURGERY  1987    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    WISDOM TOOTH EXTRACTION       Family History   Problem Relation Name Age of Onset    Gout Father      Skin cancer Mother Twyla     Arthritis Mother Twyla     Brain cancer Maternal Grandfather Chaz rosas     Skin cancer Maternal Grandfather Chaz rosas     Cancer Maternal Grandfather Chaz rosas     Diabetes type II Paternal  Grandmother Taisha dwyer     Diabetes Paternal Grandmother Taisha dwyer     Heart attack Paternal Grandfather Bar dwyer     Heart disease Paternal Grandfather Bar dwyer     Stroke Maternal Grandmother Leah rosas     Kidney cancer Maternal Uncle      Prostate cancer Paternal Uncle      Breast cancer Neg Hx      Colon cancer Neg Hx      Ovarian cancer Neg Hx      Glaucoma Neg Hx      Cataracts Neg Hx      Macular degeneration Neg Hx       Social History     Tobacco Use    Smoking status: Never    Smokeless tobacco: Never   Substance Use Topics    Alcohol use: Yes     Alcohol/week: 5.0 standard drinks of alcohol     Types: 5 Glasses of wine per week    Drug use: No     Review of Systems   Eyes:  Negative for visual disturbance.   Respiratory:  Positive for shortness of breath.    Cardiovascular:  Positive for leg swelling. Negative for chest pain.   Neurological:  Positive for headaches.     See HPI     Physical Exam     Initial Vitals   BP Pulse Resp Temp SpO2   04/15/24 2215 04/15/24 2214 04/15/24 2215 04/15/24 2215 04/15/24 2214   (!) 158/81 75 16 97.9 °F (36.6 °C) 99 %      MAP       --              Temp:  [97.9 °F (36.6 °C)]   Pulse:  [74-88]   Resp:  [16]   BP: (123-158)/(60-83)   SpO2:  [96 %-99 %]    Physical Exam    Nursing note and vitals reviewed.      Gen: AxOx3, well nourished, appears stated age, no pallor, no jaundice, appears well hydrated  Eye: EOMI, no scleral icterus, no periorbital edema or ecchymosis  Head: Normocephalic, atraumatic, no lesions, scalp appears normal  ENT: Neck supple, no stridor, no masses, no drooling or voice changes  CVS: All distal pulses intact with normal rate and rhythm, no JVD, normal S1/S2, no murmur  Pulm: Normal breath sounds, no wheezes, rales or rhonchi, no increased work of breathing  Abd:  Distended with gravid uterus palpated 12 cm above the umbilicus, soft, nontender, no organomegaly, no CVAT  Ext: No edema, no lesions, rashes, or deformity  Neuro:    GCS15  Cranial nerves intact  Pupils equal and reactive to light  RUE  - power/tone/sensation intact   RLE  - power/tone/sensation intact   LUE  - power/tone/sensation intact   LLE  - power/tone/sensation intact   Psych: normal affect, cooperative, well groomed, makes good eye contact      ED Course   Obtain Fetal nonstress test (NST)    Date/Time: 2024 6:30 PM    Performed by: Ruth Richter MD  Authorized by: Liz Choi MD    Nonstress Test:     Variability:  6-25 BPM    Decelerations:  None    Accelerations:  15 bpm    Baseline:  130    Contractions:  Not present  Biophysical Profile:     Nonstress Test Interpretation: reactive      Overall Impression:  Reassuring    Labs Reviewed   CBC W/ AUTO DIFFERENTIAL - Abnormal; Notable for the following components:       Result Value    RBC 3.95 (*)     Hemoglobin 11.7 (*)     Hematocrit 34.5 (*)     Gran # (ANC) 7.8 (*)     Immature Grans (Abs) 0.05 (*)     All other components within normal limits   COMPREHENSIVE METABOLIC PANEL - Abnormal; Notable for the following components:    Potassium 3.3 (*)     CO2 19 (*)     Albumin 3.0 (*)     Alkaline Phosphatase 147 (*)     All other components within normal limits   PROTEIN / CREATININE RATIO, URINE    Narrative:     Specimen Source->Urine   POCT URINALYSIS, DIPSTICK OR TABLET REAGENT, AUTOMATED, WITH MICROSCOP          Imaging Results    None          Medications   cephALEXin capsule 500 mg (500 mg Oral Given 24 0001)     Medical Decision Making  Initial assessment  38-year-old female  at 33w6d by LMNP now presenting with headache and hypertension. Patient is able to vocalise, breathing spontaneously, hemodynamically stable with /, oriented, moving all 4 limbs spontaneously. Examination consistent with reassuring examination including neuro.        Differential diagnosis  Headache/ Migraine  Pre-Eclampsia  Dehydration  Considered vascular causes including sinus thrombosis but low  suspicion given physical exam        ED managemen  Patient was well-appearing with elevation in BP (158/81) and repeat /83. Patient declined analgesia for headache. Workup for Pre-E reassuring including no haemolysis, thrombocytopenia,   Review of records indicated patient has a recent UA concerning for UTI without ABX started; patient has G6PD def and decided to start keflex.   Given patient only had mild hypertension, I suspect patient's symptoms are due to headache and I have low suspicion for preeclampsia and decided not to give antihypertensives.  Patient recommended to follow up with OB in the next 1-2 days for blood pressure measurement and appropriate management.  Patient was well-appearing and discharged home    Amount and/or Complexity of Data Reviewed  Labs: ordered. Decision-making details documented in ED Course.    Risk  Prescription drug management.              Attending Attestation:   Physician Attestation Statement for Resident:  As the supervising MD   Physician Attestation Statement: I have personally seen and examined this patient.   I agree with the above history.  -:   As the supervising MD I agree with the above PE.     As the supervising MD I agree with the above treatment, course, plan, and disposition.   -: Mild range BP in the [facility].  This is her first documented elevated BP.  Pre-E labs normal.  UTI, treat with keflex.  Agree with discharge to home.  Will f/u with OB this week for BP check.  I was personally present during the critical portions of the procedure(s) performed by the resident and was immediately available in the ED to provide services and assistance as needed during the entire procedure.  I have reviewed and agree with the residents interpretation of the following: lab data.  I have reviewed the following: old records at this facility.                ED Course as of 04/17/24 1834   Mon Apr 15, 2024   2215 BP(!): 158/81 [PM]   2245 BP(!): 152/83 [PM]   2246  Hemoglobin(!): 11.7  Baseline [PM]   2246 Platelet Count: 179 [PM]   2247 Urine Protein: <7 [PM]   2247 Urine Creatinine: 26.5 [PM]   2320 BUN: 9 [PM]   2320 Creatinine: 0.6 [PM]   2320 AST: 15 [PM]   2320 ALT: 13 [PM]   2329 AST: 15 [CD]   2329 ALT: 13 [CD]   2329 Creatinine: 0.6 [CD]   2329 Hemoglobin(!): 11.7 [CD]   2329 Hematocrit(!): 34.5 [CD]   2329 Platelet Count: 179 [CD]      ED Course User Index  [CD] Ruth Richter MD  [PM] Liz Choi MD                           Clinical Impression:  Final diagnoses:  [R51.9] Nonintractable headache, unspecified chronicity pattern, unspecified headache type  [N39.0] Urinary tract infection without hematuria, site unspecified  [R03.0] Elevated blood pressure reading (Primary)  [Z3A.33] 33 weeks gestation of pregnancy          ED Disposition Condition    Discharge Stable          ED Prescriptions       Medication Sig Dispense Start Date End Date Auth. Provider    cephALEXin (KEFLEX) 500 MG capsule Take 1 capsule (500 mg total) by mouth every 12 (twelve) hours. for 5 days 10 capsule 4/15/2024 4/20/2024 Liz Choi MD          Follow-up Information       Follow up With Specialties Details Why Contact Info    Twyla Rojas MD Internal Medicine  As needed, If symptoms worsen [address] [facility] [address]  [phone]      [facility] - Emergency Dept (Obstetrics) Emergency Medicine   [address] [facility] [id number] [phone]    Your OB  Schedule an appointment as soon as possible for a visit in 1 day               Liz Choi MD  Resident  04/16/24 0008       Ruth Richter MD  04/17/24 9929

## 2024-04-16 NOTE — TELEPHONE ENCOUNTER
Pt is 34 weeks gestation. Pt reports 3 high blood pressure readings today. Highest was 142/86. Pt reports headache. Denies blurred vision, SOB, and chest pain.  Does report new onset hand swelling off and on through the week and increased swelling to feet.     Dispo is to go to L&D now. Pt v/u.     Reason for Disposition   [1] Pregnant 20 or more weeks AND [2] new hand or face swelling    Additional Information   Negative: Difficult to awaken or acting confused (e.g., disoriented, slurred speech)   Negative: SEVERE difficulty breathing (e.g., struggling for each breath, speaks in single words)   Negative: [1] Weakness of the face, arm or leg on one side of the body AND [2] new-onset   Negative: [1] Numbness (i.e., loss of sensation) of the face, arm or leg on one side of the body AND [2] new- onset   Negative: Seizure occurred and has stopped   Negative: Sounds like a life-threatening emergency to the triager    Protocols used: Pregnancy - High Blood Pressure-A-AH

## 2024-04-17 ENCOUNTER — PATIENT MESSAGE (OUTPATIENT)
Dept: OBSTETRICS AND GYNECOLOGY | Facility: CLINIC | Age: 39
End: 2024-04-17

## 2024-04-17 ENCOUNTER — CLINICAL SUPPORT (OUTPATIENT)
Dept: OBSTETRICS AND GYNECOLOGY | Facility: CLINIC | Age: 39
End: 2024-04-17
Payer: COMMERCIAL

## 2024-04-17 VITALS
WEIGHT: 196.19 LBS | BODY MASS INDEX: 33.68 KG/M2 | SYSTOLIC BLOOD PRESSURE: 126 MMHG | DIASTOLIC BLOOD PRESSURE: 79 MMHG

## 2024-04-17 DIAGNOSIS — R03.0 BORDERLINE HIGH BLOOD PRESSURE: Primary | ICD-10-CM

## 2024-04-17 PROCEDURE — 99999 PR PBB SHADOW E&M-EST. PATIENT-LVL I: CPT | Mod: PBBFAC,,, | Performed by: NURSE PRACTITIONER

## 2024-04-17 NOTE — PROGRESS NOTES
Patient was encountered for a nurse visit for a bp check. Patient's bp was within normal limits 126/78.

## 2024-04-23 ENCOUNTER — PATIENT MESSAGE (OUTPATIENT)
Dept: OBSTETRICS AND GYNECOLOGY | Facility: CLINIC | Age: 39
End: 2024-04-23
Payer: COMMERCIAL

## 2024-04-23 ENCOUNTER — PATIENT MESSAGE (OUTPATIENT)
Dept: OTHER | Facility: OTHER | Age: 39
End: 2024-04-23
Payer: COMMERCIAL

## 2024-04-24 ENCOUNTER — LAB VISIT (OUTPATIENT)
Dept: LAB | Facility: OTHER | Age: 39
End: 2024-04-24
Attending: NURSE PRACTITIONER
Payer: COMMERCIAL

## 2024-04-24 ENCOUNTER — ROUTINE PRENATAL (OUTPATIENT)
Dept: OBSTETRICS AND GYNECOLOGY | Facility: CLINIC | Age: 39
End: 2024-04-24
Payer: COMMERCIAL

## 2024-04-24 VITALS — WEIGHT: 194 LBS | BODY MASS INDEX: 33.3 KG/M2 | SYSTOLIC BLOOD PRESSURE: 130 MMHG | DIASTOLIC BLOOD PRESSURE: 84 MMHG

## 2024-04-24 DIAGNOSIS — Z34.90 PREGNANCY WITH ONE FETUS, ANTEPARTUM: ICD-10-CM

## 2024-04-24 DIAGNOSIS — O26.893 HEARTBURN DURING PREGNANCY IN THIRD TRIMESTER: ICD-10-CM

## 2024-04-24 DIAGNOSIS — Z3A.33 33 WEEKS GESTATION OF PREGNANCY: ICD-10-CM

## 2024-04-24 DIAGNOSIS — R12 HEARTBURN DURING PREGNANCY IN THIRD TRIMESTER: ICD-10-CM

## 2024-04-24 DIAGNOSIS — Z34.90 PREGNANCY WITH ONE FETUS, ANTEPARTUM: Primary | ICD-10-CM

## 2024-04-24 LAB
ALBUMIN SERPL BCP-MCNC: 3 G/DL (ref 3.5–5.2)
ALP SERPL-CCNC: 169 U/L (ref 55–135)
ALT SERPL W/O P-5'-P-CCNC: 14 U/L (ref 10–44)
ANION GAP SERPL CALC-SCNC: 9 MMOL/L (ref 8–16)
AST SERPL-CCNC: 19 U/L (ref 10–40)
BASOPHILS # BLD AUTO: 0.03 K/UL (ref 0–0.2)
BASOPHILS NFR BLD: 0.4 % (ref 0–1.9)
BILIRUB SERPL-MCNC: 0.3 MG/DL (ref 0.1–1)
BUN SERPL-MCNC: 10 MG/DL (ref 6–20)
CALCIUM SERPL-MCNC: 9.3 MG/DL (ref 8.7–10.5)
CHLORIDE SERPL-SCNC: 107 MMOL/L (ref 95–110)
CO2 SERPL-SCNC: 21 MMOL/L (ref 23–29)
CREAT SERPL-MCNC: 0.7 MG/DL (ref 0.5–1.4)
CREAT UR-MCNC: 17 MG/DL (ref 15–325)
DIFFERENTIAL METHOD BLD: ABNORMAL
EOSINOPHIL # BLD AUTO: 0 K/UL (ref 0–0.5)
EOSINOPHIL NFR BLD: 0.2 % (ref 0–8)
ERYTHROCYTE [DISTWIDTH] IN BLOOD BY AUTOMATED COUNT: 13.5 % (ref 11.5–14.5)
EST. GFR  (NO RACE VARIABLE): >60 ML/MIN/1.73 M^2
GLUCOSE SERPL-MCNC: 69 MG/DL (ref 70–110)
HCT VFR BLD AUTO: 37.6 % (ref 37–48.5)
HGB BLD-MCNC: 12.2 G/DL (ref 12–16)
HIV 1+2 AB+HIV1 P24 AG SERPL QL IA: NEGATIVE
IMM GRANULOCYTES # BLD AUTO: 0.05 K/UL (ref 0–0.04)
IMM GRANULOCYTES NFR BLD AUTO: 0.6 % (ref 0–0.5)
LYMPHOCYTES # BLD AUTO: 1.8 K/UL (ref 1–4.8)
LYMPHOCYTES NFR BLD: 20.8 % (ref 18–48)
MCH RBC QN AUTO: 29.3 PG (ref 27–31)
MCHC RBC AUTO-ENTMCNC: 32.4 G/DL (ref 32–36)
MCV RBC AUTO: 90 FL (ref 82–98)
MONOCYTES # BLD AUTO: 0.5 K/UL (ref 0.3–1)
MONOCYTES NFR BLD: 6.1 % (ref 4–15)
NEUTROPHILS # BLD AUTO: 6.1 K/UL (ref 1.8–7.7)
NEUTROPHILS NFR BLD: 71.9 % (ref 38–73)
NRBC BLD-RTO: 0 /100 WBC
PLATELET # BLD AUTO: 196 K/UL (ref 150–450)
PMV BLD AUTO: 10.4 FL (ref 9.2–12.9)
POTASSIUM SERPL-SCNC: 3.8 MMOL/L (ref 3.5–5.1)
PROT SERPL-MCNC: 6.6 G/DL (ref 6–8.4)
PROT UR-MCNC: <7 MG/DL (ref 0–15)
PROT/CREAT UR: NORMAL MG/G{CREAT} (ref 0–0.2)
RBC # BLD AUTO: 4.16 M/UL (ref 4–5.4)
SODIUM SERPL-SCNC: 137 MMOL/L (ref 136–145)
TREPONEMA PALLIDUM IGG+IGM AB [PRESENCE] IN SERUM OR PLASMA BY IMMUNOASSAY: NONREACTIVE
WBC # BLD AUTO: 8.52 K/UL (ref 3.9–12.7)

## 2024-04-24 PROCEDURE — 84156 ASSAY OF PROTEIN URINE: CPT | Performed by: STUDENT IN AN ORGANIZED HEALTH CARE EDUCATION/TRAINING PROGRAM

## 2024-04-24 PROCEDURE — 99999 PR PBB SHADOW E&M-EST. PATIENT-LVL III: CPT | Mod: PBBFAC,,, | Performed by: STUDENT IN AN ORGANIZED HEALTH CARE EDUCATION/TRAINING PROGRAM

## 2024-04-24 PROCEDURE — 87086 URINE CULTURE/COLONY COUNT: CPT | Performed by: STUDENT IN AN ORGANIZED HEALTH CARE EDUCATION/TRAINING PROGRAM

## 2024-04-24 PROCEDURE — 86593 SYPHILIS TEST NON-TREP QUANT: CPT | Performed by: NURSE PRACTITIONER

## 2024-04-24 PROCEDURE — 87389 HIV-1 AG W/HIV-1&-2 AB AG IA: CPT | Performed by: NURSE PRACTITIONER

## 2024-04-24 PROCEDURE — 36415 COLL VENOUS BLD VENIPUNCTURE: CPT | Performed by: NURSE PRACTITIONER

## 2024-04-24 PROCEDURE — 87081 CULTURE SCREEN ONLY: CPT | Performed by: STUDENT IN AN ORGANIZED HEALTH CARE EDUCATION/TRAINING PROGRAM

## 2024-04-24 PROCEDURE — 80053 COMPREHEN METABOLIC PANEL: CPT | Performed by: STUDENT IN AN ORGANIZED HEALTH CARE EDUCATION/TRAINING PROGRAM

## 2024-04-24 PROCEDURE — 0502F SUBSEQUENT PRENATAL CARE: CPT | Mod: CPTII,S$GLB,, | Performed by: STUDENT IN AN ORGANIZED HEALTH CARE EDUCATION/TRAINING PROGRAM

## 2024-04-24 PROCEDURE — 85025 COMPLETE CBC W/AUTO DIFF WBC: CPT | Performed by: NURSE PRACTITIONER

## 2024-04-24 NOTE — PROGRESS NOTES
Pt presents for BP check  She has been getting persistent mild range BP at home for over a week now. When it first happened on 4/15, she went to [facility]. She had one mild range and it resolved without treatment. Labs were normal. Since then, her BP have remained in 140/80-90, last night it was 150/87. She is swelling more in the last week or so and overall does not feel well. She has no symptoms of pre E at this time. However, she is frustrated because she does not know what are the readings that she should present to the [facility] for. Her last two pregnancies, she BP elevated towards the end, but it was at term.     /84 today.   Will send for repeat labs. PCR sent. Ucx to follow up recent UTI as well  Collected GBS today in case gHTN/Pre E does develop and warrant early delivery.  Reviewed BP's and symptoms to present to the [facility] for. Reassured patient that we can see her back before the weekend for another blood pressure check. Reviewed with her that if she has another >140/90, she would have gHTN or Pre E and be recommended for delivery at 37 week. Pt states her understanding. All questions answered. Will review with her primary OB as well

## 2024-04-24 NOTE — TELEPHONE ENCOUNTER
Refill Routing Note   Medication(s) are not appropriate for processing by [facility] for the following reason(s):        Outside of protocol    [facility] action(s):  Route               Appointments  past 12m or future 3m with PCP    Date Provider   Last Visit   3/26/2024 Lilliam Beck, DO   Next Visit   4/30/2024 Lilliam Beck, DO   ED visits in past 90 days: 2        Note composed:12:03 PM 04/24/2024

## 2024-04-24 NOTE — PROGRESS NOTES
Subjective:     Lindy Ferraro    Chief Complaint   Patient presents with    MUÑOZ Pregnancy     HPI    Susan is a 38 y.o. female coming in today for SI joint pain. Pt is 35w6d pregnant. Since last visit the pain has Improved. The pain is better with rest and worse with activities and when holding her 2 year old. Pt. describes the pain as a 2/10 achy pain that does not radiate. There has not been any new a fall/injury/ or traumas since last visit.  Pt. denies any new musculoskeletal complaints at this time.     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     PAST SURGICAL HISTORY:   Past Surgical History:   Procedure Laterality Date    INTRAUTERINE DEVICE INSERTION  2016    MIKI---REMOVED    SMALL INTESTINE SURGERY  8/1987    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    WISDOM TOOTH EXTRACTION       FAMILY HISTORY:   Family History   Problem Relation Name Age of Onset    Gout Father      Skin cancer Mother Twyla     Arthritis Mother Twyla     Brain cancer Maternal Grandfather Chaz alison     Skin cancer Maternal Grandfather Chaz rosas     Cancer Maternal Grandfather Chaz stroudwiashley     Diabetes type II Paternal Grandmother Taisha sacca     Diabetes Paternal Grandmother Taisha sacca     Heart attack Paternal Grandfather Bar dwyer     Heart disease Paternal Grandfather Bar dwyer     Stroke Maternal Grandmother Leah rosas     Kidney cancer Maternal Uncle      Prostate cancer Paternal Uncle      Breast cancer Neg Hx      Colon cancer Neg Hx      Ovarian cancer Neg Hx      Glaucoma Neg Hx      Cataracts Neg Hx      Macular degeneration Neg Hx       SOCIAL HISTORY:   Social History     Socioeconomic History    Marital status:      Spouse name: Efrain    Number of children: 1   Occupational History     Comment:     Tobacco Use     Smoking status: Never    Smokeless tobacco: Never   Substance and Sexual Activity    Alcohol use: Yes     Alcohol/week: 5.0 standard drinks of alcohol     Types: 5 Glasses of wine per week    Drug use: No    Sexual activity: Yes     Partners: Male     Birth control/protection: None     Social Determinants of Health     Financial Resource Strain: Low Risk  (4/19/2022)    Overall Financial Resource Strain (CARDIA)     Difficulty of Paying Living Expenses: Not hard at all   Food Insecurity: No Food Insecurity (4/19/2022)    Hunger Vital Sign     Worried About Running Out of Food in the Last Year: Never true     Ran Out of Food in the Last Year: Never true   Transportation Needs: No Transportation Needs (4/19/2022)    PRAPARE - Transportation     Lack of Transportation (Medical): No     Lack of Transportation (Non-Medical): No   Physical Activity: Unknown (1/9/2024)    Exercise Vital Sign     Days of Exercise per Week: 3 days   Stress: No Stress Concern Present (4/19/2022)    [employer] [employer] - Occupational Stress Questionnaire     Feeling of Stress : Only a little   Social Connections: Unknown (4/19/2022)    Social Connection and Isolation Panel [NHANES]     Frequency of Communication with Friends and Family: More than three times a week     Frequency of Social Gatherings with Friends and Family: More than three times a week     Active Member of Clubs or Organizations: No     Marital Status:    Housing Stability: Low Risk  (4/19/2022)    Housing Stability Vital Sign     Unable to Pay for Housing in the Last Year: No     Number of Places Lived in the Last Year: 1     Unstable Housing in the Last Year: No     MEDICATIONS:   Current Outpatient Medications:     famotidine (PEPCID) 20 MG tablet, TAKE 1 TABLET BY MOUTH TWICE A DAY, Disp: 180 tablet, Rfl: 1    prenatal vit/iron fum/folic ac (PRENATAL 1+1 ORAL), Take by mouth., Disp: , Rfl:     RSV, preF A and preF B,PF, (ABRYSVO) 120 mcg/0.5 mL  "SolR vaccine, Inject into the muscle., Disp: 1 each, Rfl: 0    sertraline (ZOLOFT) 25 MG tablet, Take 1 tablet (25 mg total) by mouth once daily., Disp: 30 tablet, Rfl: 2  ALLERGIES:   Review of patient's allergies indicates:   Allergen Reactions    Asa [aspirin] Other (See Comments)     Blood disorder per Patient- G6PD deficiency    Bactrim [sulfamethoxazole-trimethoprim]      G6PD deficiency    Ciprofloxacin      G6PD deficiency    Dapsone      G6PD deficiency      Ibuprofen     Nitrofurantoin      G6PD deficiency    Sulfa (sulfonamide antibiotics)     Pcn [penicillins] Rash     Objective:     VITAL SIGNS: /63   Pulse 72   Ht 5' 4" (1.626 m)   Wt 88.7 kg (195 lb 8.8 oz)   LMP 08/20/2023 (Exact Date)   BMI 33.57 kg/m²    General    Vitals reviewed.  Constitutional: She is oriented to person, place, and time. She appears well-developed and well-nourished.   Neurological: She is alert and oriented to person, place, and time.   Psychiatric: She has a normal mood and affect. Her behavior is normal.           MUSCULOSKELETAL EXAM:  Lumbar Spine: bilateral lumbar region    Observation:    Posture:  Upright  No obvious pelvic obliquity while standing.    No edema, erythema, or ecchymosis noted in lumbosacral region.    No midline skin abnormalities.    No atrophy of lower limb musculature.  Leg lengths symmetric following OMT to the pelvis.  Gait: Non-antalgic with Neutral ankle mechanics and Neutral medial arch. Gait without trendelenberg, heel walking, toe walking, and tandem walking.    Tenderness:  No tenderness over the posterior pelvis  No tenderness over the sacrum, piriformis, greater/lesser trochanters.  No bony deformities or step-offs palpated.     Range of Motion:  Active flexion to 90°.  Active extension to 25°.   Active rotation to 30° on left and 30° on right.  Active sidebending to 25° on left and 25° on right.   Passive hip flexion to 135° on left and 135° on right.    Passive hip internal " rotation to 45° on left and 45° on right.   Passive hip external rotation to 45° on left and 45° on right.   All ROM testing is without pain.    Strength Testing (* = with pain):  Hip flexion - 5/5 on left and 5/5 on right  Hip extension - 5/5 on left and 5/5 on right  Knee flexion - 5/5 on left and 5/5 on right  Knee extension - 5/5 on left and 5/5 on right  Dorsiflexion - 5/5 on left and 5/5 on right  Plantarflexion - 5/5 on left and 5/5 on right  Great toe extension - 5/5 on left and 5/5 on right    Special Tests:    Seated straight leg raise - negative on left and negative on right  Supine straight leg raise - negative on left and negative on right   Slump test - negative on left and negative on right  Provocation maneuvers exhibit no worsening of symptoms.    ZAINAB test - negative  FADIR test - negative  Log roll test - negative    Structural Exam:  TART (Tissue texture abnormality, Asymmetry,  Restriction of motion and/or Tenderness) changes:     Thoracic Spine   T1 Neutral   T2 Neutral   T3 Neutral   T4 Neutral   T5 Neutral   T6 Neutral   T7 FRS RIGHT   T8 Neutral   T9 Neutral   T10 Neutral   T11 Neutral   T12 TTA LEFT     Rib cage: R7 external torsion on left, R8 external torsion on right    Abdomen: left hemidiaphragm TTA     Lumbar Spine   L1 TTA LEFT   L2 Neutral   L3 Neutral   L4 FRS RIGHT   L5 FRS RIGHT     Pelvis:  Innominate:Right anterior rotation  Pubic bone:Right inferior pubic shear  Ischiorectal fossa TTA on left  Right round ligament TTA    Sacrum:Left on Right sacral torsion    Lower extremity: neutral    Key   F= Flexed   E = Extended   R = Rotated   S = Sidebent   TTA = tissue texture abnormality     Neurovascular Exam:  Reflexes +2/4 and symmetric at the L4 and S1 dermatomes.    Sensation intact to light touch in the L2-S2 dermatomes bilaterally.   No pretibial edema or abnormal hair pattern of the shin.    Intact and symmetric DP and PT pulses bilaterally.    Assessment:      Encounter  Diagnoses   Name Primary?    Sacroiliac pain during pregnancy Yes    Myalgia     Somatic dysfunction of thoracic region     Somatic dysfunction of rib cage region     Somatic dysfunction of lumbar region     Sacral region somatic dysfunction     Somatic dysfunction of pelvic region           Plan:    1. Low back pain, consistent with sacroiliac joint dysfunction in pregnancy with associated biomechanical restrictions of the lower kinetic chain- improved with previous OMT   - OMT performed again today to address associated biomechanical restrictions  and HEP reviewed  - Recommend SI belt wear with increased activity  - continue to wear firm soled, supportive shoe wear  - continue to work on proper lifting mechanics    2. OMT 5-6 regions. Oral consent obtained.  Reviewed benefits and potential side effects.   - OMT indicated today due to signs and symptoms as well as local and remote somatic dysfunction findings and their related neurokinetic, lymphatic, fascial and/or arteriovenous body connections.   - OMT techniques used: Myofascial Release, Muscle Energy, Still's Technique, and Balanced Ligamentous Tension   - Treatment was tolerated well. Improvement noted in segmental mobility post-treatment in dysfunctional regions. There were no adverse events and no complications immediately following treatment.     3. Continue the following HEP:  A) Cat-cow spine extension/flexion stretch: 10 reps, twice daily. Handout given  Add reach through posterior shoulder stretch on each side, hold for 30 sec.  B)  Pelvic clock exercises given to do from the 6-12 o'clock positions:10-15 reps, twice daily. Hand out of exercise also given.   C) Seated sideband exercise: hold stretch for 20-30 seconds on each side, repeat 2-3 times, twice daily  ADD post-partum:  A) Diaphragmatic breathing exercises: 10-15 reps of inhalation and exhalation, focusing inhaling into the abdomen and lower ribs and gris abdominal muscle with exhalation  Repeat 2-3 times a day. Handout given.   Then add:  B) Lower abdominal muscle isotonic exercises: Rotate pelvis into the 6 o'clock position and holding it to engage lower abdominal muscles. Then lift up leg, one at a time, alternating for 10-15 reps. Repeat exercises 1-2 times daily. Handout given.   Then add:  C) diaphragmatic breathing assisted crunch: work up to 3 sets of 10, at least 3 times a week.     [address]48 HOME EXERCISE PROGRAM (HEP):  The patient was taught a homegoing physical therapy regimen as described above. The patient demonstrated understanding of the exercises and proper technique of their execution. This interaction took 15 minutes.     4. Follow-up in 2 weeks for reevaluation    5. Patient agreeable to today's plan and all questions were answered    This note is dictated using the M*Modal Fluency Direct word recognition program. There are word recognition mistakes that are occasionally missed on review.

## 2024-04-24 NOTE — PATIENT INSTRUCTIONS
[facility], Labor and Delivery is on the 6th floor of [facility]: [phone]    [email or web address]      If top number >160 OR bottom >110, repeat  If still that high, proceed straight to the [facility]

## 2024-04-26 ENCOUNTER — CLINICAL SUPPORT (OUTPATIENT)
Dept: OBSTETRICS AND GYNECOLOGY | Facility: CLINIC | Age: 39
End: 2024-04-26
Payer: COMMERCIAL

## 2024-04-26 VITALS
WEIGHT: 188.25 LBS | HEIGHT: 64 IN | SYSTOLIC BLOOD PRESSURE: 111 MMHG | BODY MASS INDEX: 32.14 KG/M2 | DIASTOLIC BLOOD PRESSURE: 76 MMHG

## 2024-04-26 DIAGNOSIS — Z01.30 BP CHECK: Primary | ICD-10-CM

## 2024-04-26 LAB
BACTERIA UR CULT: NORMAL
BACTERIA UR CULT: NORMAL

## 2024-04-26 PROCEDURE — 99499 UNLISTED E&M SERVICE: CPT | Mod: S$GLB,,, | Performed by: NURSE PRACTITIONER

## 2024-04-26 PROCEDURE — 99999 PR PBB SHADOW E&M-EST. PATIENT-LVL III: CPT | Mod: PBBFAC,,, | Performed by: NURSE PRACTITIONER

## 2024-04-26 RX ORDER — FAMOTIDINE 20 MG/1
20 TABLET, FILM COATED ORAL 2 TIMES DAILY
Qty: 180 TABLET | Refills: 1 | Status: ON HOLD | OUTPATIENT
Start: 2024-04-26 | End: 2024-05-16 | Stop reason: HOSPADM

## 2024-04-26 NOTE — PROGRESS NOTES
Patient was seen today for a blood pressure check nurse visit. The patient had no complaints. The patients blood pressure was 111/76.

## 2024-04-27 LAB — BACTERIA SPEC AEROBE CULT: NORMAL

## 2024-04-29 ENCOUNTER — OFFICE VISIT (OUTPATIENT)
Dept: SPORTS MEDICINE | Facility: CLINIC | Age: 39
End: 2024-04-29
Payer: COMMERCIAL

## 2024-04-29 VITALS
WEIGHT: 195.56 LBS | HEART RATE: 72 BPM | HEIGHT: 64 IN | SYSTOLIC BLOOD PRESSURE: 136 MMHG | DIASTOLIC BLOOD PRESSURE: 63 MMHG | BODY MASS INDEX: 33.38 KG/M2

## 2024-04-29 DIAGNOSIS — M99.05 SOMATIC DYSFUNCTION OF PELVIC REGION: ICD-10-CM

## 2024-04-29 DIAGNOSIS — M79.10 MYALGIA: ICD-10-CM

## 2024-04-29 DIAGNOSIS — M99.08 SOMATIC DYSFUNCTION OF RIB CAGE REGION: ICD-10-CM

## 2024-04-29 DIAGNOSIS — M99.02 SOMATIC DYSFUNCTION OF THORACIC REGION: ICD-10-CM

## 2024-04-29 DIAGNOSIS — M99.03 SOMATIC DYSFUNCTION OF LUMBAR REGION: ICD-10-CM

## 2024-04-29 DIAGNOSIS — M53.3 SACROILIAC PAIN DURING PREGNANCY: Primary | ICD-10-CM

## 2024-04-29 DIAGNOSIS — O99.891 SACROILIAC PAIN DURING PREGNANCY: Primary | ICD-10-CM

## 2024-04-29 DIAGNOSIS — M99.04 SACRAL REGION SOMATIC DYSFUNCTION: ICD-10-CM

## 2024-04-29 PROCEDURE — 1160F RVW MEDS BY RX/DR IN RCRD: CPT | Mod: CPTII,S$GLB,, | Performed by: NEUROMUSCULOSKELETAL MEDICINE & OMM

## 2024-04-29 PROCEDURE — 3075F SYST BP GE 130 - 139MM HG: CPT | Mod: CPTII,S$GLB,, | Performed by: NEUROMUSCULOSKELETAL MEDICINE & OMM

## 2024-04-29 PROCEDURE — 1159F MED LIST DOCD IN RCRD: CPT | Mod: CPTII,S$GLB,, | Performed by: NEUROMUSCULOSKELETAL MEDICINE & OMM

## 2024-04-29 PROCEDURE — 3078F DIAST BP <80 MM HG: CPT | Mod: CPTII,S$GLB,, | Performed by: NEUROMUSCULOSKELETAL MEDICINE & OMM

## 2024-04-29 PROCEDURE — 97110 THERAPEUTIC EXERCISES: CPT | Mod: GP,S$GLB,, | Performed by: NEUROMUSCULOSKELETAL MEDICINE & OMM

## 2024-04-29 PROCEDURE — 99999 PR PBB SHADOW E&M-EST. PATIENT-LVL III: CPT | Mod: PBBFAC,,, | Performed by: NEUROMUSCULOSKELETAL MEDICINE & OMM

## 2024-04-29 PROCEDURE — 99213 OFFICE O/P EST LOW 20 MIN: CPT | Mod: 25,S$GLB,, | Performed by: NEUROMUSCULOSKELETAL MEDICINE & OMM

## 2024-04-29 PROCEDURE — 98927 OSTEOPATH MANJ 5-6 REGIONS: CPT | Mod: S$GLB,,, | Performed by: NEUROMUSCULOSKELETAL MEDICINE & OMM

## 2024-04-29 PROCEDURE — 3008F BODY MASS INDEX DOCD: CPT | Mod: CPTII,S$GLB,, | Performed by: NEUROMUSCULOSKELETAL MEDICINE & OMM

## 2024-05-07 ENCOUNTER — ROUTINE PRENATAL (OUTPATIENT)
Dept: OBSTETRICS AND GYNECOLOGY | Facility: CLINIC | Age: 39
End: 2024-05-07
Attending: OBSTETRICS & GYNECOLOGY
Payer: COMMERCIAL

## 2024-05-07 VITALS
SYSTOLIC BLOOD PRESSURE: 110 MMHG | WEIGHT: 192.69 LBS | BODY MASS INDEX: 33.07 KG/M2 | DIASTOLIC BLOOD PRESSURE: 80 MMHG

## 2024-05-07 DIAGNOSIS — Z34.90 PREGNANCY WITH ONE FETUS, ANTEPARTUM: Primary | ICD-10-CM

## 2024-05-07 PROCEDURE — 99999 PR PBB SHADOW E&M-EST. PATIENT-LVL III: CPT | Mod: PBBFAC,,, | Performed by: OBSTETRICS & GYNECOLOGY

## 2024-05-07 PROCEDURE — 0502F SUBSEQUENT PRENATAL CARE: CPT | Mod: CPTII,S$GLB,, | Performed by: OBSTETRICS & GYNECOLOGY

## 2024-05-07 NOTE — PROGRESS NOTES
More uncomfortable, more swollen by end of day, generally not feeling well at that time.  BP stable here.  Patient seen and examined.  denies VB/LOF/Ctxns, reports good fetal movement.   Precautions for Bleeding/ ROM/ Decreased fetal movement/ Pre-E reviewed.  Also reviewed when to check BP if not feeling well.  F/U scheduled 1 weeks

## 2024-05-13 ENCOUNTER — OFFICE VISIT (OUTPATIENT)
Dept: PSYCHIATRY | Facility: CLINIC | Age: 39
End: 2024-05-13
Payer: COMMERCIAL

## 2024-05-13 ENCOUNTER — TELEPHONE (OUTPATIENT)
Dept: OBSTETRICS AND GYNECOLOGY | Facility: CLINIC | Age: 39
End: 2024-05-13
Payer: COMMERCIAL

## 2024-05-13 ENCOUNTER — OFFICE VISIT (OUTPATIENT)
Dept: SPORTS MEDICINE | Facility: CLINIC | Age: 39
End: 2024-05-13
Payer: COMMERCIAL

## 2024-05-13 VITALS — SYSTOLIC BLOOD PRESSURE: 143 MMHG | DIASTOLIC BLOOD PRESSURE: 73 MMHG

## 2024-05-13 DIAGNOSIS — M53.3 SACROILIAC PAIN DURING PREGNANCY: Primary | ICD-10-CM

## 2024-05-13 DIAGNOSIS — R03.0 ELEVATED BLOOD PRESSURE READING: ICD-10-CM

## 2024-05-13 DIAGNOSIS — Z3A.37 37 WEEKS GESTATION OF PREGNANCY: ICD-10-CM

## 2024-05-13 DIAGNOSIS — M99.03 SOMATIC DYSFUNCTION OF LUMBAR REGION: ICD-10-CM

## 2024-05-13 DIAGNOSIS — M99.05 SOMATIC DYSFUNCTION OF PELVIC REGION: ICD-10-CM

## 2024-05-13 DIAGNOSIS — M99.02 SOMATIC DYSFUNCTION OF THORACIC REGION: ICD-10-CM

## 2024-05-13 DIAGNOSIS — M99.04 SACRAL REGION SOMATIC DYSFUNCTION: ICD-10-CM

## 2024-05-13 DIAGNOSIS — M79.10 MYALGIA: ICD-10-CM

## 2024-05-13 DIAGNOSIS — M99.08 SOMATIC DYSFUNCTION OF RIB CAGE REGION: ICD-10-CM

## 2024-05-13 DIAGNOSIS — O99.891 SACROILIAC PAIN DURING PREGNANCY: Primary | ICD-10-CM

## 2024-05-13 DIAGNOSIS — F41.1 GAD (GENERALIZED ANXIETY DISORDER): Primary | ICD-10-CM

## 2024-05-13 PROCEDURE — 99214 OFFICE O/P EST MOD 30 MIN: CPT | Mod: 95,,, | Performed by: INTERNAL MEDICINE

## 2024-05-13 PROCEDURE — 1159F MED LIST DOCD IN RCRD: CPT | Mod: CPTII,S$GLB,, | Performed by: NEUROMUSCULOSKELETAL MEDICINE & OMM

## 2024-05-13 PROCEDURE — 99213 OFFICE O/P EST LOW 20 MIN: CPT | Mod: 25,S$GLB,, | Performed by: NEUROMUSCULOSKELETAL MEDICINE & OMM

## 2024-05-13 PROCEDURE — 99999 PR PBB SHADOW E&M-EST. PATIENT-LVL II: CPT | Mod: PBBFAC,,, | Performed by: NEUROMUSCULOSKELETAL MEDICINE & OMM

## 2024-05-13 PROCEDURE — 98927 OSTEOPATH MANJ 5-6 REGIONS: CPT | Mod: S$GLB,,, | Performed by: NEUROMUSCULOSKELETAL MEDICINE & OMM

## 2024-05-13 PROCEDURE — 3078F DIAST BP <80 MM HG: CPT | Mod: CPTII,S$GLB,, | Performed by: NEUROMUSCULOSKELETAL MEDICINE & OMM

## 2024-05-13 PROCEDURE — 1159F MED LIST DOCD IN RCRD: CPT | Mod: CPTII,95,, | Performed by: INTERNAL MEDICINE

## 2024-05-13 PROCEDURE — 3077F SYST BP >= 140 MM HG: CPT | Mod: CPTII,S$GLB,, | Performed by: NEUROMUSCULOSKELETAL MEDICINE & OMM

## 2024-05-13 PROCEDURE — 1160F RVW MEDS BY RX/DR IN RCRD: CPT | Mod: CPTII,95,, | Performed by: INTERNAL MEDICINE

## 2024-05-13 RX ORDER — SERTRALINE HYDROCHLORIDE 50 MG/1
50 TABLET, FILM COATED ORAL DAILY
Qty: 90 TABLET | Refills: 3 | Status: SHIPPED | OUTPATIENT
Start: 2024-05-13 | End: 2025-05-13

## 2024-05-13 NOTE — TELEPHONE ENCOUNTER
Called to check in on patient due to concerns about a high reading at a different doctors office today. Pressure was 143/73. Patient states she is still having headache. Patient advised that she should rescheck pressure and if it is 140/90 or over she should be seen in the ob ed . Patient expressed understanding.  Patient educated on if symptoms begin such as seeing spots, having blurred vision, or having dizziness with the severe headache she should be seen. Patient expressed understanding.

## 2024-05-13 NOTE — PROGRESS NOTES
Subjective:     Lindy Ferraro    Chief Complaint   Patient presents with    Follow-up     HPI    Susan is a 38 y.o. female coming in today for SI joint pain. Pt is 37w6d pregnant. Since last visit the pain has Deteriorated. The pain is better with rest and previous OMT and worse with activities and when holding her 2 year old. Pt. describes the pain as a 2/10 achy pain that does not radiate. There has not been any new a fall/injury/ or traumas since last visit.  Pt. denies any new musculoskeletal complaints at this time.     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     PAST SURGICAL HISTORY:   Past Surgical History:   Procedure Laterality Date    INTRAUTERINE DEVICE INSERTION  2016    MIKI---REMOVED    SMALL INTESTINE SURGERY  8/1987    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    WISDOM TOOTH EXTRACTION       FAMILY HISTORY:   Family History   Problem Relation Name Age of Onset    Gout Father      Skin cancer Mother Twyla     Arthritis Mother Twyla     Brain cancer Maternal Grandfather Chaz rosas     Skin cancer Maternal Grandfather Chaz rosas     Cancer Maternal Grandfather Chaz rosas     Diabetes type II Paternal Grandmother Taisha sacca     Diabetes Paternal Grandmother Taisha sacca     Heart attack Paternal Grandfather Bar dwyer     Heart disease Paternal Grandfather Bar dwyer     Stroke Maternal Grandmother Leah rosas     Kidney cancer Maternal Uncle      Prostate cancer Paternal Uncle      Breast cancer Neg Hx      Colon cancer Neg Hx      Ovarian cancer Neg Hx      Glaucoma Neg Hx      Cataracts Neg Hx      Macular degeneration Neg Hx       SOCIAL HISTORY:   Social History     Socioeconomic History    Marital status:      Spouse name: Efrain    Number of children: 1   Occupational History     Comment:      Tobacco Use    Smoking status: Never    Smokeless tobacco: Never   Substance and Sexual Activity    Alcohol use: Not Currently     Alcohol/week: 5.0 standard drinks of alcohol     Types: 5 Glasses of wine per week    Drug use: No    Sexual activity: Yes     Partners: Male     Birth control/protection: None     Social Determinants of Health     Financial Resource Strain: Low Risk  (4/19/2022)    Overall Financial Resource Strain (CARDIA)     Difficulty of Paying Living Expenses: Not hard at all   Food Insecurity: No Food Insecurity (4/19/2022)    Hunger Vital Sign     Worried About Running Out of Food in the Last Year: Never true     Ran Out of Food in the Last Year: Never true   Transportation Needs: No Transportation Needs (4/19/2022)    PRAPARE - Transportation     Lack of Transportation (Medical): No     Lack of Transportation (Non-Medical): No   Physical Activity: Unknown (1/9/2024)    Exercise Vital Sign     Days of Exercise per Week: 3 days   Stress: No Stress Concern Present (4/19/2022)    [employer] [employer] - Occupational Stress Questionnaire     Feeling of Stress : Only a little   Housing Stability: Low Risk  (4/19/2022)    Housing Stability Vital Sign     Unable to Pay for Housing in the Last Year: No     Number of Places Lived in the Last Year: 1     Unstable Housing in the Last Year: No     MEDICATIONS:   Current Outpatient Medications:     famotidine (PEPCID) 20 MG tablet, TAKE 1 TABLET BY MOUTH TWICE A DAY (Patient not taking: Reported on 4/30/2024), Disp: 180 tablet, Rfl: 1    prenatal vit/iron fum/folic ac (PRENATAL 1+1 ORAL), Take by mouth., Disp: , Rfl:     RSV, preF A and preF B,PF, (ABRYSVO) 120 mcg/0.5 mL SolR vaccine, Inject into the muscle. (Patient not taking: Reported on 4/30/2024), Disp: 1 each, Rfl: 0    sertraline (ZOLOFT) 50 MG tablet, Take 1 tablet (50 mg total) by mouth once daily., Disp: 90 tablet, Rfl: 3  ALLERGIES:   Review of patient's allergies indicates:    Allergen Reactions    Asa [aspirin] Other (See Comments)     G6PD deficiency    Bactrim [sulfamethoxazole-trimethoprim]      G6PD deficiency    Ciprofloxacin      G6PD deficiency    Dapsone      G6PD deficiency      Nitrofurantoin      G6PD deficiency    Sulfa (sulfonamide antibiotics)      G6PD deficiency    Pcn [penicillins] Rash     Objective:     VITAL SIGNS: BP (!) 143/73   LMP 08/20/2023 (Exact Date)    General    Vitals reviewed.  Constitutional: She is oriented to person, place, and time. She appears well-developed and well-nourished.   Neurological: She is alert and oriented to person, place, and time.   Psychiatric: She has a normal mood and affect. Her behavior is normal.           MUSCULOSKELETAL EXAM:  Lumbar Spine: bilateral lumbar region    Observation:    Posture:  Upright  No obvious pelvic obliquity while standing.    No edema, erythema, or ecchymosis noted in lumbosacral region.    No midline skin abnormalities.    No atrophy of lower limb musculature.  Leg lengths symmetric following OMT to the pelvis.  Gait: Non-antalgic with Neutral ankle mechanics and Neutral medial arch. Gait without trendelenberg, heel walking, toe walking, and tandem walking.    Tenderness:  No tenderness over the posterior pelvis  No tenderness over the sacrum, piriformis, greater/lesser trochanters.  No bony deformities or step-offs palpated.   + right R7 tenderness    Range of Motion:  Active flexion to 90°.  Active extension to 25°.   Active rotation to 30° on left and 30° on right.  Active sidebending to 25° on left and 25° on right.   Passive hip flexion to 135° on left and 135° on right.    Passive hip internal rotation to 45° on left and 45° on right.   Passive hip external rotation to 45° on left and 45° on right.   All ROM testing is without pain.    Strength Testing (* = with pain):  Hip flexion - 5/5 on left and 5/5 on right  Hip extension - 5/5 on left and 5/5 on right  Knee flexion - 5/5 on left and 5/5 on  right  Knee extension - 5/5 on left and 5/5 on right  Dorsiflexion - 5/5 on left and 5/5 on right  Plantarflexion - 5/5 on left and 5/5 on right  Great toe extension - 5/5 on left and 5/5 on right    Special Tests:    Seated straight leg raise - negative on left and negative on right  Supine straight leg raise - negative on left and negative on right   Slump test - negative on left and negative on right  Provocation maneuvers exhibit no worsening of symptoms.    ZAINAB test - negative  FADIR test - negative  Log roll test - negative    Structural Exam:  TART (Tissue texture abnormality, Asymmetry,  Restriction of motion and/or Tenderness) changes:     Thoracic Spine   T1 Neutral   T2 Neutral   T3 Neutral   T4 Neutral   T5 Neutral   T6 Neutral   T7 Neutral   T8 Neutral   T9 NS-right,R-left   T10 NS-right,R-left   T11 NS-right,R-left   T12 FRS LEFT     Rib cage: R7 external torsion on right, R1 inhaled on left    Abdomen: neutral     Lumbar Spine   L1 Neutral   L2 Neutral   L3 Neutral   L4 FRS RIGHT   L5 FRS RIGHT     Pelvis:  Innominate:Left superior shear  Pubic bone:Left superior pubic shear  Ischiorectal fossa TTA on left    Sacrum:Left on Right sacral torsion    Lower extremity: neutral    Key   F= Flexed   E = Extended   R = Rotated   S = Sidebent   TTA = tissue texture abnormality     Neurovascular Exam:  Reflexes +2/4 and symmetric at the L4 and S1 dermatomes.    Sensation intact to light touch in the L2-S2 dermatomes bilaterally.   No pretibial edema or abnormal hair pattern of the shin.    Intact and symmetric DP and PT pulses bilaterally.    Assessment:      Encounter Diagnoses   Name Primary?    Sacroiliac pain during pregnancy Yes    Elevated blood pressure reading     Myalgia     Somatic dysfunction of lumbar region     Sacral region somatic dysfunction     Somatic dysfunction of pelvic region     Somatic dysfunction of thoracic region     Somatic dysfunction of rib cage region             Plan:    1. Low  back pain, consistent with sacroiliac joint dysfunction in pregnancy with associated biomechanical restrictions of the lower kinetic chain- improved with previous OMT, then deteriorated   - OMT performed again today to address associated biomechanical restrictions  and HEP reviewed  - Recommend SI belt wear with increased activity  - Continue to wear firm soled, supportive shoe wear  - Continue to work on proper lifting mechanics  - OB contacted about elevated BP readying in clinic today, asymptomatic. Recommend continued BP monitoring at home. If BP continued to elevated or pt. Started to have associated symptoms recommend contacting OB vs. evaluation in OB triage.     2. OMT 5-6 regions. Oral consent obtained.  Reviewed benefits and potential side effects.   - OMT indicated today due to signs and symptoms as well as local and remote somatic dysfunction findings and their related neurokinetic, lymphatic, fascial and/or arteriovenous body connections.   - OMT techniques used: Myofascial Release, Muscle Energy, Still's Technique, and Balanced Ligamentous Tension   - Treatment was tolerated well. Improvement noted in segmental mobility post-treatment in dysfunctional regions. There were no adverse events and no complications immediately following treatment.     3. Continue the following HEP:  A) Cat-cow spine extension/flexion stretch: 10 reps, twice daily. Handout given  Add reach through posterior shoulder stretch on each side, hold for 30 sec.  B)  Pelvic clock exercises given to do from the 6-12 o'clock positions:10-15 reps, twice daily. Hand out of exercise also given.   C) Seated sideband exercise: hold stretch for 20-30 seconds on each side, repeat 2-3 times, twice daily  ADD post-partum:  A) Diaphragmatic breathing exercises: 10-15 reps of inhalation and exhalation, focusing inhaling into the abdomen and lower ribs and gris abdominal muscle with exhalation Repeat 2-3 times a day. Handout given.   Then  add:  B) Lower abdominal muscle isotonic exercises: Rotate pelvis into the 6 o'clock position and holding it to engage lower abdominal muscles. Then lift up leg, one at a time, alternating for 10-15 reps. Repeat exercises 1-2 times daily. Handout given.   Then add:  C) diaphragmatic breathing assisted crunch: work up to 3 sets of 10, at least 3 times a week.      The patient was taught a homegoing physical therapy regimen as described above. The patient demonstrated understanding of the exercises and proper technique of their execution.      4. Follow-up as needed if pain deteriorates or new issue arises following delivery    5. Patient agreeable to today's plan and all questions were answered    This note is dictated using the M*Modal Fluency Direct word recognition program. There are word recognition mistakes that are occasionally missed on review.

## 2024-05-13 NOTE — PROGRESS NOTES
OUTPATIENT PSYCHIATRY RETURN VISIT    ENCOUNTER DATE:  5/13/24   SITE:  [facility]  LENGTH OF SESSION:  12 minutes    The patient location is:  [place], not in a healthcare facility  Visit type:  audiovisual    Face to Face time with patient:  12 minutes  18 minutes of total time spent on the encounter, which includes face to face time and non-face to face time preparing to see the patient (eg, review of tests), Obtaining and/or reviewing separately obtained history, Documenting clinical information in the electronic or other health record, Independently interpreting results (not separately reported) and communicating results to the patient/family/caregiver, or Care coordination (not separately reported).     Each patient to whom he or she provides medical services by telemedicine is:  (1) informed of the relationship between the physician and patient and the respective role of any other health care provider with respect to management of the patient; and (2) notified that he or she may decline to receive medical services by telemedicine and may withdraw from such care at any time.    CHIEF COMPLAINT:  Anxiety      HISTORY OF PRESENTING ILLNESS:  Lindy Ferraro is a 38 y.o. female with history of Anxiety who presents for follow up appointment.      Plan at last appointment on 4/8/2024:  Discussed plan to taper off of Buspar as she is transitioning to Zoloft.  She will reduce Buspar to 5mg once daily AND start Zoloft 12.5mg daily this week.  She will then stop Buspar completely AND increase Zoloft to 25mg daily the following week.  She is aware she can use Buspar 5mg PRN anxiety if needed, especially during this time of transition.    Discussed with patient informed consent, risks versus benefits, alternative treatments, side effect profile and the inherent unpredictability of individual responses to these treatments.  The patient expresses understanding of the above and displays  "the capacity to agree with this current plan.  Continue individual psychotherapy with outside therapist.    History as told by patient:  Transition to Zoloft went ok.  Definitely noticed she was more anxious at first.  But now feeling ok.  Says everything is "ok."  Overall she does feel better than on just Buspar.  Not having the physical anxiety symptoms (chest tightening for example).  Having less intrusive thoughts.  Still very anxious about safety.  Sleeping pretty well other than getting up to urinate at night.  Does feel anxious about delivery and this time period in general.  One of her best friends had a still birth around 38 weeks in November.  They are managing her BP right now.  One more high BP in office and they will go ahead and induce.      Medication side effects:  No  Medication compliance:  Yes    PSYCHIATRIC REVIEW OF SYSTEMS:  Trouble with sleep:  Denies  Appetite changes:  Not discussed  Weight changes:  Gain with pregnancy  Lack of energy:  Sometimes due to pregnancy  Anhedonia:  Denies  Somatic symptoms:  Improved  Libido:  Chronically low  Anxiety/panic:  Yes  Guilty/hopeless:  Denies  Self-injurious behavior/risky behavior:  Denies  Any drugs:  Denies  Alcohol:  Denies    MEDICAL REVIEW OF SYSTEMS:  Complete review of systems performed covering Constitutional, Musculoskeletal, Neurologic.  All systems negative except for that covered in HPI.    PAST PSYCHIATRIC, MEDICAL, AND SOCIAL HISTORY REVIEWED  The patient's past medical, family and social history have been reviewed and updated as appropriate within the electronic medical record - see encounter notes.    MEDICATIONS:    Current Outpatient Medications:     famotidine (PEPCID) 20 MG tablet, TAKE 1 TABLET BY MOUTH TWICE A DAY (Patient not taking: Reported on 4/30/2024), Disp: 180 tablet, Rfl: 1    prenatal vit/iron fum/folic ac (PRENATAL 1+1 ORAL), Take by mouth., Disp: , Rfl:     RSV, preF A and preF B,PF, (ABRYSVO) 120 mcg/0.5 mL SolR " "vaccine, Inject into the muscle. (Patient not taking: Reported on 4/30/2024), Disp: 1 each, Rfl: 0    sertraline (ZOLOFT) 50 MG tablet, Take 1 tablet (50 mg total) by mouth once daily., Disp: 90 tablet, Rfl: 3    ALLERGIES:  Review of patient's allergies indicates:   Allergen Reactions    Asa [aspirin] Other (See Comments)     G6PD deficiency    Bactrim [sulfamethoxazole-trimethoprim]      G6PD deficiency    Ciprofloxacin      G6PD deficiency    Dapsone      G6PD deficiency      Nitrofurantoin      G6PD deficiency    Sulfa (sulfonamide antibiotics)      G6PD deficiency    Pcn [penicillins] Rash       PSYCHIATRIC EXAM:  There were no vitals filed for this visit.  Appearance:  Well groomed, appearing healthy and of stated age  Behavior:  Cooperative, pleasant, no psychomotor agitation or retardation  Speech:  Normal rate, rhythm, prosody, and volume  Mood:  "Fine"  Affect:  Euthymic  Thought Process:  Linear, logical, goal directed  Thought Content:  Negative for suicidal ideation, homicidal ideation, delusions or hallucinations.  Associations:  Intact  Memory:  Grossly Intact  Level of Consciousness/Orientation:  Grossly intact  Fund of Knowledge:  Good  Attention:  Good  Language:  Fluent, able to name abstract and concrete objects  Insight:  Good  Judgment:  Intact  Psychomotor signs:  No involuntary movements of face  Gait:  Unable to assess via virtual visit      RELEVANT LABS/STUDIES:    Lab Results   Component Value Date    WBC 8.52 04/24/2024    HGB 12.2 04/24/2024    HCT 37.6 04/24/2024    MCV 90 04/24/2024     04/24/2024     BMP  Lab Results   Component Value Date     04/24/2024    K 3.8 04/24/2024     04/24/2024    CO2 21 (L) 04/24/2024    BUN 10 04/24/2024    CREATININE 0.7 04/24/2024    CALCIUM 9.3 04/24/2024    ANIONGAP 9 04/24/2024    ESTGFRAFRICA >60 04/22/2022    EGFRNONAA >60 04/22/2022     Lab Results   Component Value Date    ALT 14 04/24/2024    AST 19 04/24/2024    ALKPHOS 169 " "(H) 04/24/2024    BILITOT 0.3 04/24/2024     No results found for: "TSH"  Lab Results   Component Value Date    HGBA1C 4.6 10/06/2023       IMPRESSION:    Lindy Ferraro is a 38 y.o. female with history of Anxiety who presents for follow up appointment.    Status/Progress:  Based on the examination today, the patient's problem(s) is/are inadequately controlled.  New problems have not been presented today.     Risk Parameters:  Patient reports no suicidal ideation  Patient reports no homicidal ideation  Patient reports no self-injurious behavior  Patient reports no violent behavior      DIAGNOSES:    ICD-10-CM ICD-9-CM   1. KEVIN (generalized anxiety disorder)  F41.1 300.02   2. 37 weeks gestation of pregnancy  Z3A.37 V22.2       PLAN:  Increase Zoloft to 50mg daily to better treat anxiety.  Discussed with patient informed consent, risks versus benefits, alternative treatments, side effect profile and the inherent unpredictability of individual responses to these treatments.  The patient expresses understanding of the above and displays the capacity to agree with this current plan.  Continue individual psychotherapy with outside therapist.    RETURN TO CLINIC:  Follow up in about 3 weeks (around 6/3/2024).    "

## 2024-05-14 ENCOUNTER — ANESTHESIA (OUTPATIENT)
Dept: OBSTETRICS AND GYNECOLOGY | Facility: OTHER | Age: 39
End: 2024-05-14
Payer: COMMERCIAL

## 2024-05-14 ENCOUNTER — ANESTHESIA EVENT (OUTPATIENT)
Dept: OBSTETRICS AND GYNECOLOGY | Facility: OTHER | Age: 39
End: 2024-05-14
Payer: COMMERCIAL

## 2024-05-14 ENCOUNTER — PATIENT MESSAGE (OUTPATIENT)
Dept: OBSTETRICS AND GYNECOLOGY | Facility: CLINIC | Age: 39
End: 2024-05-14
Payer: COMMERCIAL

## 2024-05-14 ENCOUNTER — TELEPHONE (OUTPATIENT)
Dept: OBSTETRICS AND GYNECOLOGY | Facility: CLINIC | Age: 39
End: 2024-05-14
Payer: COMMERCIAL

## 2024-05-14 ENCOUNTER — HOSPITAL ENCOUNTER (INPATIENT)
Facility: OTHER | Age: 39
LOS: 2 days | Discharge: HOME | End: 2024-05-16
Attending: OBSTETRICS & GYNECOLOGY | Admitting: OBSTETRICS & GYNECOLOGY
Payer: COMMERCIAL

## 2024-05-14 DIAGNOSIS — Z34.90 ENCOUNTER FOR INDUCTION OF LABOR: ICD-10-CM

## 2024-05-14 DIAGNOSIS — R07.89 CHEST DISCOMFORT: ICD-10-CM

## 2024-05-14 LAB
ABO + RH BLD: NORMAL
ALBUMIN SERPL BCP-MCNC: 3 G/DL (ref 3.5–5.2)
ALP SERPL-CCNC: 203 U/L (ref 55–135)
ALT SERPL W/O P-5'-P-CCNC: 14 U/L (ref 10–44)
ANION GAP SERPL CALC-SCNC: 10 MMOL/L (ref 8–16)
AST SERPL-CCNC: 17 U/L (ref 10–40)
BASOPHILS # BLD AUTO: 0.05 K/UL (ref 0–0.2)
BASOPHILS NFR BLD: 0.5 % (ref 0–1.9)
BILIRUB SERPL-MCNC: 0.4 MG/DL (ref 0.1–1)
BLD GP AB SCN CELLS X3 SERPL QL: NORMAL
BUN SERPL-MCNC: 9 MG/DL (ref 6–20)
CALCIUM SERPL-MCNC: 9.2 MG/DL (ref 8.7–10.5)
CHLORIDE SERPL-SCNC: 106 MMOL/L (ref 95–110)
CO2 SERPL-SCNC: 19 MMOL/L (ref 23–29)
CREAT SERPL-MCNC: 0.6 MG/DL (ref 0.5–1.4)
CREAT UR-MCNC: 17.5 MG/DL (ref 15–325)
DIFFERENTIAL METHOD BLD: ABNORMAL
EOSINOPHIL # BLD AUTO: 0 K/UL (ref 0–0.5)
EOSINOPHIL NFR BLD: 0.4 % (ref 0–8)
ERYTHROCYTE [DISTWIDTH] IN BLOOD BY AUTOMATED COUNT: 13.8 % (ref 11.5–14.5)
EST. GFR  (NO RACE VARIABLE): >60 ML/MIN/1.73 M^2
GLUCOSE SERPL-MCNC: 78 MG/DL (ref 70–110)
HCT VFR BLD AUTO: 39.5 % (ref 37–48.5)
HGB BLD-MCNC: 13.2 G/DL (ref 12–16)
IMM GRANULOCYTES # BLD AUTO: 0.08 K/UL (ref 0–0.04)
IMM GRANULOCYTES NFR BLD AUTO: 0.8 % (ref 0–0.5)
LYMPHOCYTES # BLD AUTO: 1.9 K/UL (ref 1–4.8)
LYMPHOCYTES NFR BLD: 17.7 % (ref 18–48)
MCH RBC QN AUTO: 29.7 PG (ref 27–31)
MCHC RBC AUTO-ENTMCNC: 33.4 G/DL (ref 32–36)
MCV RBC AUTO: 89 FL (ref 82–98)
MONOCYTES # BLD AUTO: 0.6 K/UL (ref 0.3–1)
MONOCYTES NFR BLD: 5.2 % (ref 4–15)
NEUTROPHILS # BLD AUTO: 7.9 K/UL (ref 1.8–7.7)
NEUTROPHILS NFR BLD: 75.4 % (ref 38–73)
NRBC BLD-RTO: 0 /100 WBC
PLATELET # BLD AUTO: 237 K/UL (ref 150–450)
PLATELET BLD QL SMEAR: ABNORMAL
PMV BLD AUTO: 10.5 FL (ref 9.2–12.9)
POTASSIUM SERPL-SCNC: 3.7 MMOL/L (ref 3.5–5.1)
PROT SERPL-MCNC: 6.7 G/DL (ref 6–8.4)
PROT UR-MCNC: <7 MG/DL (ref 0–15)
PROT/CREAT UR: NORMAL MG/G{CREAT} (ref 0–0.2)
RBC # BLD AUTO: 4.45 M/UL (ref 4–5.4)
SODIUM SERPL-SCNC: 135 MMOL/L (ref 136–145)
SPECIMEN OUTDATE: NORMAL
TREPONEMA PALLIDUM IGG+IGM AB [PRESENCE] IN SERUM OR PLASMA BY IMMUNOASSAY: NONREACTIVE
WBC # BLD AUTO: 10.51 K/UL (ref 3.9–12.7)

## 2024-05-14 PROCEDURE — 27200710 HC EPIDURAL INFUSION PUMP SET: Performed by: ANESTHESIOLOGY

## 2024-05-14 PROCEDURE — 86850 RBC ANTIBODY SCREEN: CPT | Performed by: OBSTETRICS & GYNECOLOGY

## 2024-05-14 PROCEDURE — 59400 OBSTETRICAL CARE: CPT | Mod: AA,,, | Performed by: ANESTHESIOLOGY

## 2024-05-14 PROCEDURE — 63600175 PHARM REV CODE 636 W HCPCS

## 2024-05-14 PROCEDURE — 85025 COMPLETE CBC W/AUTO DIFF WBC: CPT | Performed by: OBSTETRICS & GYNECOLOGY

## 2024-05-14 PROCEDURE — 93010 ELECTROCARDIOGRAM REPORT: CPT | Mod: ,,, | Performed by: INTERNAL MEDICINE

## 2024-05-14 PROCEDURE — 82570 ASSAY OF URINE CREATININE: CPT | Performed by: STUDENT IN AN ORGANIZED HEALTH CARE EDUCATION/TRAINING PROGRAM

## 2024-05-14 PROCEDURE — 63600175 PHARM REV CODE 636 W HCPCS: Performed by: STUDENT IN AN ORGANIZED HEALTH CARE EDUCATION/TRAINING PROGRAM

## 2024-05-14 PROCEDURE — C1751 CATH, INF, PER/CENT/MIDLINE: HCPCS | Performed by: ANESTHESIOLOGY

## 2024-05-14 PROCEDURE — 72100002 HC LABOR CARE, 1ST 8 HOURS

## 2024-05-14 PROCEDURE — 25000003 PHARM REV CODE 250

## 2024-05-14 PROCEDURE — 11000001 HC ACUTE MED/SURG PRIVATE ROOM

## 2024-05-14 PROCEDURE — 25000003 PHARM REV CODE 250: Performed by: STUDENT IN AN ORGANIZED HEALTH CARE EDUCATION/TRAINING PROGRAM

## 2024-05-14 PROCEDURE — 62326 NJX INTERLAMINAR LMBR/SAC: CPT | Performed by: ANESTHESIOLOGY

## 2024-05-14 PROCEDURE — 25000003 PHARM REV CODE 250: Performed by: ANESTHESIOLOGY

## 2024-05-14 PROCEDURE — 10907ZC DRAINAGE OF AMNIOTIC FLUID, THERAPEUTIC FROM PRODUCTS OF CONCEPTION, VIA NATURAL OR ARTIFICIAL OPENING: ICD-10-PCS | Performed by: STUDENT IN AN ORGANIZED HEALTH CARE EDUCATION/TRAINING PROGRAM

## 2024-05-14 PROCEDURE — 93005 ELECTROCARDIOGRAM TRACING: CPT

## 2024-05-14 PROCEDURE — 80053 COMPREHEN METABOLIC PANEL: CPT | Performed by: OBSTETRICS & GYNECOLOGY

## 2024-05-14 PROCEDURE — 3E033VJ INTRODUCTION OF OTHER HORMONE INTO PERIPHERAL VEIN, PERCUTANEOUS APPROACH: ICD-10-PCS | Performed by: STUDENT IN AN ORGANIZED HEALTH CARE EDUCATION/TRAINING PROGRAM

## 2024-05-14 PROCEDURE — 86593 SYPHILIS TEST NON-TREP QUANT: CPT | Performed by: OBSTETRICS & GYNECOLOGY

## 2024-05-14 RX ORDER — OXYTOCIN/RINGER'S LACTATE 30/500 ML
95 PLASTIC BAG, INJECTION (ML) INTRAVENOUS ONCE
Status: DISCONTINUED | OUTPATIENT
Start: 2024-05-14 | End: 2024-05-15

## 2024-05-14 RX ORDER — SODIUM CHLORIDE, SODIUM LACTATE, POTASSIUM CHLORIDE, CALCIUM CHLORIDE 600; 310; 30; 20 MG/100ML; MG/100ML; MG/100ML; MG/100ML
INJECTION, SOLUTION INTRAVENOUS CONTINUOUS
Status: DISCONTINUED | OUTPATIENT
Start: 2024-05-14 | End: 2024-05-15

## 2024-05-14 RX ORDER — DIPHENHYDRAMINE HYDROCHLORIDE 50 MG/ML
12.5 INJECTION INTRAMUSCULAR; INTRAVENOUS ONCE
Status: COMPLETED | OUTPATIENT
Start: 2024-05-14 | End: 2024-05-14

## 2024-05-14 RX ORDER — SIMETHICONE 80 MG
1 TABLET,CHEWABLE ORAL 4 TIMES DAILY PRN
Status: DISCONTINUED | OUTPATIENT
Start: 2024-05-14 | End: 2024-05-15

## 2024-05-14 RX ORDER — SODIUM CITRATE AND CITRIC ACID MONOHYDRATE 334; 500 MG/5ML; MG/5ML
30 SOLUTION ORAL ONCE
Status: DISCONTINUED | OUTPATIENT
Start: 2024-05-14 | End: 2024-05-15

## 2024-05-14 RX ORDER — ONDANSETRON 8 MG/1
8 TABLET, ORALLY DISINTEGRATING ORAL EVERY 8 HOURS PRN
Status: DISCONTINUED | OUTPATIENT
Start: 2024-05-14 | End: 2024-05-15

## 2024-05-14 RX ORDER — SODIUM CHLORIDE 9 MG/ML
INJECTION, SOLUTION INTRAVENOUS
Status: DISCONTINUED | OUTPATIENT
Start: 2024-05-14 | End: 2024-05-15

## 2024-05-14 RX ORDER — METHYLERGONOVINE MALEATE 0.2 MG/ML
200 INJECTION INTRAVENOUS ONCE AS NEEDED
Status: DISCONTINUED | OUTPATIENT
Start: 2024-05-14 | End: 2024-05-15

## 2024-05-14 RX ORDER — METOCLOPRAMIDE HYDROCHLORIDE 5 MG/ML
10 INJECTION INTRAMUSCULAR; INTRAVENOUS ONCE
Status: COMPLETED | OUTPATIENT
Start: 2024-05-14 | End: 2024-05-14

## 2024-05-14 RX ORDER — ACETAMINOPHEN 325 MG/1
650 TABLET ORAL EVERY 6 HOURS PRN
Status: DISCONTINUED | OUTPATIENT
Start: 2024-05-14 | End: 2024-05-15

## 2024-05-14 RX ORDER — OXYTOCIN 10 [USP'U]/ML
10 INJECTION, SOLUTION INTRAMUSCULAR; INTRAVENOUS ONCE AS NEEDED
Status: DISCONTINUED | OUTPATIENT
Start: 2024-05-14 | End: 2024-05-15

## 2024-05-14 RX ORDER — CARBOPROST TROMETHAMINE 250 UG/ML
250 INJECTION, SOLUTION INTRAMUSCULAR
Status: DISCONTINUED | OUTPATIENT
Start: 2024-05-14 | End: 2024-05-15

## 2024-05-14 RX ORDER — OXYTOCIN/RINGER'S LACTATE 30/500 ML
95 PLASTIC BAG, INJECTION (ML) INTRAVENOUS ONCE AS NEEDED
Status: DISCONTINUED | OUTPATIENT
Start: 2024-05-14 | End: 2024-05-15

## 2024-05-14 RX ORDER — OXYTOCIN/RINGER'S LACTATE 30/500 ML
334 PLASTIC BAG, INJECTION (ML) INTRAVENOUS ONCE AS NEEDED
Status: DISCONTINUED | OUTPATIENT
Start: 2024-05-14 | End: 2024-05-15

## 2024-05-14 RX ORDER — OXYTOCIN/RINGER'S LACTATE 30/500 ML
0-32 PLASTIC BAG, INJECTION (ML) INTRAVENOUS CONTINUOUS
Status: DISCONTINUED | OUTPATIENT
Start: 2024-05-14 | End: 2024-05-15

## 2024-05-14 RX ORDER — DIPHENOXYLATE HYDROCHLORIDE AND ATROPINE SULFATE 2.5; .025 MG/1; MG/1
2 TABLET ORAL EVERY 6 HOURS PRN
Status: DISCONTINUED | OUTPATIENT
Start: 2024-05-14 | End: 2024-05-15

## 2024-05-14 RX ORDER — TRANEXAMIC ACID 10 MG/ML
1000 INJECTION, SOLUTION INTRAVENOUS EVERY 30 MIN PRN
Status: DISCONTINUED | OUTPATIENT
Start: 2024-05-14 | End: 2024-05-15

## 2024-05-14 RX ORDER — HYDROXYZINE PAMOATE 25 MG/1
25 CAPSULE ORAL EVERY 8 HOURS PRN
Status: DISCONTINUED | OUTPATIENT
Start: 2024-05-14 | End: 2024-05-16 | Stop reason: HOSPADM

## 2024-05-14 RX ORDER — SERTRALINE HYDROCHLORIDE 50 MG/1
50 TABLET, FILM COATED ORAL DAILY
Status: DISCONTINUED | OUTPATIENT
Start: 2024-05-14 | End: 2024-05-16 | Stop reason: HOSPADM

## 2024-05-14 RX ORDER — FENTANYL/BUPIVACAINE/NS/PF 2MCG/ML-.1
PLASTIC BAG, INJECTION (ML) INJECTION
Status: COMPLETED
Start: 2024-05-14 | End: 2024-05-14

## 2024-05-14 RX ORDER — LIDOCAINE HYDROCHLORIDE AND EPINEPHRINE 15; 5 MG/ML; UG/ML
INJECTION, SOLUTION EPIDURAL
Status: DISCONTINUED | OUTPATIENT
Start: 2024-05-14 | End: 2024-05-15

## 2024-05-14 RX ORDER — MISOPROSTOL 200 UG/1
800 TABLET ORAL ONCE AS NEEDED
Status: DISCONTINUED | OUTPATIENT
Start: 2024-05-14 | End: 2024-05-15

## 2024-05-14 RX ORDER — CALCIUM CARBONATE 200(500)MG
500 TABLET,CHEWABLE ORAL 3 TIMES DAILY PRN
Status: DISCONTINUED | OUTPATIENT
Start: 2024-05-14 | End: 2024-05-15

## 2024-05-14 RX ORDER — FENTANYL/BUPIVACAINE/NS/PF 2MCG/ML-.1
PLASTIC BAG, INJECTION (ML) INJECTION CONTINUOUS
Status: DISCONTINUED | OUTPATIENT
Start: 2024-05-14 | End: 2024-05-15

## 2024-05-14 RX ORDER — HYDROXYZINE HYDROCHLORIDE 25 MG/ML
25 INJECTION, SOLUTION INTRAMUSCULAR ONCE
Status: DISCONTINUED | OUTPATIENT
Start: 2024-05-14 | End: 2024-05-14

## 2024-05-14 RX ORDER — SODIUM CHLORIDE 0.9 % (FLUSH) 0.9 %
2 SYRINGE (ML) INJECTION
Status: DISCONTINUED | OUTPATIENT
Start: 2024-05-14 | End: 2024-05-15

## 2024-05-14 RX ORDER — DIPHENHYDRAMINE HYDROCHLORIDE 50 MG/ML
25 INJECTION INTRAMUSCULAR; INTRAVENOUS ONCE
Status: DISCONTINUED | OUTPATIENT
Start: 2024-05-14 | End: 2024-05-14

## 2024-05-14 RX ORDER — OXYTOCIN/RINGER'S LACTATE 30/500 ML
334 PLASTIC BAG, INJECTION (ML) INTRAVENOUS ONCE
Status: DISCONTINUED | OUTPATIENT
Start: 2024-05-14 | End: 2024-05-15

## 2024-05-14 RX ORDER — FAMOTIDINE 10 MG/ML
20 INJECTION INTRAVENOUS ONCE
Status: DISCONTINUED | OUTPATIENT
Start: 2024-05-14 | End: 2024-05-16 | Stop reason: HOSPADM

## 2024-05-14 RX ORDER — LIDOCAINE HYDROCHLORIDE 10 MG/ML
10 INJECTION INFILTRATION; PERINEURAL ONCE AS NEEDED
Status: DISCONTINUED | OUTPATIENT
Start: 2024-05-14 | End: 2024-05-15

## 2024-05-14 RX ADMIN — METOCLOPRAMIDE 10 MG: 5 INJECTION, SOLUTION INTRAMUSCULAR; INTRAVENOUS at 09:05

## 2024-05-14 RX ADMIN — DIPHENHYDRAMINE HYDROCHLORIDE 12.5 MG: 50 INJECTION, SOLUTION INTRAMUSCULAR; INTRAVENOUS at 09:05

## 2024-05-14 RX ADMIN — SODIUM CHLORIDE, POTASSIUM CHLORIDE, SODIUM LACTATE AND CALCIUM CHLORIDE 1000 ML: 600; 310; 30; 20 INJECTION, SOLUTION INTRAVENOUS at 07:05

## 2024-05-14 RX ADMIN — MISOPROSTOL 50 MCG: 100 TABLET ORAL at 01:05

## 2024-05-14 RX ADMIN — OXYTOCIN 4 MILLI-UNITS/MIN: 10 INJECTION INTRAVENOUS at 06:05

## 2024-05-14 RX ADMIN — Medication 8 ML/HR: at 07:05

## 2024-05-14 RX ADMIN — SERTRALINE HYDROCHLORIDE 50 MG: 50 TABLET ORAL at 08:05

## 2024-05-14 RX ADMIN — ACETAMINOPHEN 650 MG: 325 TABLET, FILM COATED ORAL at 08:05

## 2024-05-14 RX ADMIN — LIDOCAINE HYDROCHLORIDE,EPINEPHRINE BITARTRATE 3 ML: 15; .005 INJECTION, SOLUTION EPIDURAL; INFILTRATION; INTRACAUDAL; PERINEURAL at 07:05

## 2024-05-14 RX ADMIN — SODIUM CHLORIDE, POTASSIUM CHLORIDE, SODIUM LACTATE AND CALCIUM CHLORIDE: 600; 310; 30; 20 INJECTION, SOLUTION INTRAVENOUS at 12:05

## 2024-05-14 NOTE — TELEPHONE ENCOUNTER
Spoke w pt. C/o mild headache that she has had for a few days. Took tylenol.     BP readings as follows:    8:57am- 145/90  9:45am- 152/90    Per NP Borner, pt needs to report to OB ED for eval  Patient verbalized understanding

## 2024-05-14 NOTE — PLAN OF CARE
Problem:  Fall Injury Risk  Goal: Absence of Fall, Infant Drop and Related Injury  Outcome: Progressing     Problem: Adult Inpatient Plan of Care  Goal: Plan of Care Review  Outcome: Progressing  Goal: Patient-Specific Goal (Individualized)  Outcome: Progressing  Goal: Absence of Hospital-Acquired Illness or Injury  Outcome: Progressing  Goal: Optimal Comfort and Wellbeing  Outcome: Progressing  Goal: Readiness for Transition of Care  Outcome: Progressing     Problem: Infection  Goal: Absence of Infection Signs and Symptoms  Outcome: Progressing     Problem: Labor  Goal: Hemostasis  Outcome: Progressing  Goal: Stable Fetal Wellbeing  Outcome: Progressing  Goal: Effective Progression to Delivery  Outcome: Progressing  Goal: Absence of Infection Signs and Symptoms  Outcome: Progressing  Goal: Acceptable Pain Control  Outcome: Progressing  Goal: Normal Uterine Contraction Pattern  Outcome: Progressing

## 2024-05-14 NOTE — H&P
HISTORY AND PHYSICAL                                                OBSTETRICS          Subjective:       Lindy Ferraro is a 38 y.o.  female with IUP at 38w0d weeks gestation who presents for IOL 2/2 gHTN.    Patient denies contractions, denies vaginal bleeding, denies LOF.   Fetal Movement: normal.    This IUP is complicated by gHTN, G6PD deficiency, and anxiety.    Review of Systems   Constitutional:  Negative for chills and fever.   Respiratory:  Negative for shortness of breath.    Cardiovascular:  Negative for chest pain.   Gastrointestinal:  Negative for abdominal pain, diarrhea and vomiting.   Genitourinary:  Negative for vaginal bleeding and vaginal discharge.   Neurological:  Negative for headaches.   Psychiatric/Behavioral:  Negative for depression. The patient is not nervous/anxious.        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       PSHx:   Past Surgical History:   Procedure Laterality Date    INTRAUTERINE DEVICE INSERTION      MIKI---REMOVED    SMALL INTESTINE SURGERY  1987    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    WISDOM TOOTH EXTRACTION         All:   Review of patient's allergies indicates:   Allergen Reactions    Asa [aspirin] Other (See Comments)     G6PD deficiency    Bactrim [sulfamethoxazole-trimethoprim]      G6PD deficiency    Ciprofloxacin      G6PD deficiency    Dapsone      G6PD deficiency      Nitrofurantoin      G6PD deficiency    Sulfa (sulfonamide antibiotics)      G6PD deficiency    Pcn [penicillins] Rash       Meds:   Medications Prior to Admission   Medication Sig Dispense Refill Last Dose    famotidine (PEPCID) 20 MG tablet TAKE 1 TABLET BY MOUTH TWICE A DAY (Patient not taking: Reported on 2024) 180 tablet 1     prenatal vit/iron fum/folic ac (PRENATAL 1+1 ORAL) Take by mouth.       RSV, preF A and preF B,PF, (ABRYSVO) 120 mcg/0.5 mL  SolR vaccine Inject into the muscle. (Patient not taking: Reported on 4/30/2024) 1 each 0     sertraline (ZOLOFT) 50 MG tablet Take 1 tablet (50 mg total) by mouth once daily. 90 tablet 3        SH:   Social History     Socioeconomic History    Marital status:      Spouse name: Efrain    Number of children: 1   Occupational History     Comment:     Tobacco Use    Smoking status: Never    Smokeless tobacco: Never   Substance and Sexual Activity    Alcohol use: Not Currently     Alcohol/week: 5.0 standard drinks of alcohol     Types: 5 Glasses of wine per week    Drug use: No    Sexual activity: Yes     Partners: Male     Birth control/protection: None     Social Determinants of Health     Financial Resource Strain: Low Risk  (5/14/2024)    Overall Financial Resource Strain (CARDIA)     Difficulty of Paying Living Expenses: Not hard at all   Food Insecurity: No Food Insecurity (5/14/2024)    Hunger Vital Sign     Worried About Running Out of Food in the Last Year: Never true     Ran Out of Food in the Last Year: Never true   Transportation Needs: No Transportation Needs (5/14/2024)    TRANSPORTATION NEEDS     Transportation : No   Physical Activity: Unknown (1/9/2024)    Exercise Vital Sign     Days of Exercise per Week: 3 days   Stress: No Stress Concern Present (5/14/2024)    [place] [employer] - Occupational Stress Questionnaire     Feeling of Stress : Not at all   Housing Stability: Low Risk  (5/14/2024)    Housing Stability Vital Sign     Unable to Pay for Housing in the Last Year: No     Homeless in the Last Year: No       FH:   Family History   Problem Relation Name Age of Onset    Gout Father      Skin cancer Mother Twyla     Arthritis Mother Twyla     Brain cancer Maternal Grandfather Chaz rosas     Skin cancer Maternal Grandfather Chaz rosas     Cancer Maternal Grandfather Chaz rosas     Diabetes type II Paternal Grandmother Taisha yuliya     Diabetes Paternal  Grandmother Taisha dwyer     Heart attack Paternal Grandfather Bar dwyer     Heart disease Paternal Grandfather Bar dwyer     Stroke Maternal Grandmother Leah rosas     Kidney cancer Maternal Uncle      Prostate cancer Paternal Uncle      Breast cancer Neg Hx      Colon cancer Neg Hx      Ovarian cancer Neg Hx      Glaucoma Neg Hx      Cataracts Neg Hx      Macular degeneration Neg Hx         OBHx:   OB History    Para Term  AB Living   3 2 2 0 0 2   SAB IAB Ectopic Multiple Live Births   0 0 0 0 2      # Outcome Date GA Lbr Elie/2nd Weight Sex Type Anes PTL Lv   3 Current            2 Term 21 39w6d / 00:17 3.53 kg (7 lb 12.5 oz) M Vag-Spont EPI, Local N BROOKE      Name: TONNY FERGUSON      Apgar1: 9  Apgar5: 9   1 Term 19 40w3d  2.97 kg (6 lb 8.8 oz) F Vag-Spont EPI N BROOKE      Name: IVANA FERGUSON      Apgar1: 9  Apgar5: 9       Objective:       LMP 2023 (Exact Date)   Breastfeeding No     There were no vitals filed for this visit.    General: NAD, alert, oriented, cooperative  HEENT: NCAT, EOM grossly intact  Lungs: Normal WOB  Heart: regular rate  Abdomen: gravid, soft, nondistended, nontender, no rebound or guarding  Extremities: no edema    FHT: 140 bpm, moderate BTBV, +accels, -decels; Cat 1 (reassuring)  [place]: q 4-6 mins  Presentation: cephalic by ultrasound    Cervix: 70/-4    EFW by Leopold's: 7#    Maternal pelvis proven to     Recent Growth Scan: 7 Ib 12.5 oz    Lab Review  Blood Type O POS  GBBS: negative  Rubella: Immune  RPR: negative  HIV: negative  HepB: negative  Treponema: negative       Assessment:       38w0d weeks gestation with gHTN, G6PD deficiency, and anxiety who presents for IOL 2/2 gHTN.    Active Hospital Problems    Diagnosis  POA    *Encounter for induction of labor [Z34.90]  Not Applicable    G6PD deficiency- NO MACROBID, BACTRIM, CIPRO, ASPIRIN, DAPSONE [D75.A]  Yes    Gestational hypertension, third trimester [O13.3]  Yes       Resolved Hospital Problems   No resolved problems to display.          Plan:     IOL 2/2 gHTN   Risks, benefits, alternatives and possible complications have been discussed in detail with the patient.   - Consents signed and to chart  - Admit to Labor and Delivery unit  - Induction agent: cytotec and collins balloon  - Epidural per Anesthesia  - Draw CBC, T&S  - Notify Staff  - Recheck in 4 hrs or PRN  - Post-Partum Hemorrhage risk - low  - Postpartum lovenox: not indicated    2. gHTN  - BP as above  - asymptomatic  - preE labs pending  - Mag: not indicated  - Hypertensive agent none    3. G6PD Deficiency  - Last G6PD quant 4.2  - Avoid macrobid, bactrim, cipro, aspirin, or dapsone    4. Anxiety  - Mood stable  - Medications: none  - Will need 1-2 week postpartum mood check      Pablo Barone MD PGY1  Obstetrics and Gynecology

## 2024-05-14 NOTE — ANESTHESIA PREPROCEDURE EVALUATION
[facility]  Anesthesia Pre-Operative Evaluation         Patient Name: Lindy Ferraro  YOB: 1985  MRN: [id number]    2024      Lindy Ferraro is a 38 y.o. female  @ 38w0d who presents for IOL 2/2 gHTN. OB hx includes 2  under epidural, first was one-sided, second with no issues. PMHx includes g6pd deficiency. She denies asthma, cardiopulmonary disease, coagulopathy, or spinal pathology.      OB History    Para Term  AB Living   3 2 2 0 0 2   SAB IAB Ectopic Multiple Live Births   0 0 0 0 2      # Outcome Date GA Lbr Elie/2nd Weight Sex Type Anes PTL Lv   3 Current            2 Term 21 39w6d / 00:17 3.53 kg (7 lb 12.5 oz) M Vag-Spont EPI, Local N BROOKE   1 Term 19 40w3d  2.97 kg (6 lb 8.8 oz) F Vag-Spont EPI N BROOKE       Review of patient's allergies indicates:   Allergen Reactions    Asa [aspirin] Other (See Comments)     G6PD deficiency    Bactrim [sulfamethoxazole-trimethoprim]      G6PD deficiency    Ciprofloxacin      G6PD deficiency    Dapsone      G6PD deficiency      Nitrofurantoin      G6PD deficiency    Sulfa (sulfonamide antibiotics)      G6PD deficiency    Pcn [penicillins] Rash       Wt Readings from Last 1 Encounters:   24 0835 87.4 kg (192 lb 10.9 oz)       BP Readings from Last 3 Encounters:   24 (!) 143/73   24 110/80   24 138/68       Patient Active Problem List   Diagnosis    Anxiety    SI (sacroiliac) joint dysfunction    Decreased strength of trunk and back    Posture abnormality    Family history of skin cancer    Chronic low back pain    Gestational hypertension, third trimester    Abdominal weakness    Muscle weakness    Myalgia of pelvic floor    Amenorrhea    Pregnancy with one fetus, antepartum    G6PD deficiency- NO MACROBID, BACTRIM, CIPRO, ASPIRIN, DAPSONE    Encounter for induction of labor       Past Surgical History:   Procedure Laterality Date    INTRAUTERINE DEVICE  INSERTION  2016    MIKI---REMOVED    SMALL INTESTINE SURGERY  8/1987    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    WISDOM TOOTH EXTRACTION         Social History     Socioeconomic History    Marital status:      Spouse name: Efrain    Number of children: 1   Occupational History     Comment:     Tobacco Use    Smoking status: Never    Smokeless tobacco: Never   Substance and Sexual Activity    Alcohol use: Not Currently     Alcohol/week: 5.0 standard drinks of alcohol     Types: 5 Glasses of wine per week    Drug use: No    Sexual activity: Yes     Partners: Male     Birth control/protection: None     Social Determinants of Health     Financial Resource Strain: Low Risk  (5/14/2024)    Overall Financial Resource Strain (CARDIA)     Difficulty of Paying Living Expenses: Not hard at all   Food Insecurity: No Food Insecurity (5/14/2024)    Hunger Vital Sign     Worried About Running Out of Food in the Last Year: Never true     Ran Out of Food in the Last Year: Never true   Transportation Needs: No Transportation Needs (5/14/2024)    TRANSPORTATION NEEDS     Transportation : No   Physical Activity: Unknown (1/9/2024)    Exercise Vital Sign     Days of Exercise per Week: 3 days   Stress: No Stress Concern Present (5/14/2024)    [place] [employer] - Occupational Stress Questionnaire     Feeling of Stress : Not at all   Housing Stability: Low Risk  (5/14/2024)    Housing Stability Vital Sign     Unable to Pay for Housing in the Last Year: No     Homeless in the Last Year: No         Chemistry        Component Value Date/Time     04/24/2024 1030    K 3.8 04/24/2024 1030     04/24/2024 1030    CO2 21 (L) 04/24/2024 1030    BUN 10 04/24/2024 1030    CREATININE 0.7 04/24/2024 1030    GLU 69 (L) 04/24/2024 1030        Component Value Date/Time    CALCIUM 9.3 04/24/2024 1030    ALKPHOS 169 (H) 04/24/2024 1030    AST 19 04/24/2024 1030    ALT 14 04/24/2024 1030    BILITOT 0.3 04/24/2024  "1030    ESTGFRAFRICA >60 04/22/2022 0830    EGFRNONAA >60 04/22/2022 0830            Lab Results   Component Value Date    WBC 8.52 04/24/2024    HGB 12.2 04/24/2024    HCT 37.6 04/24/2024    MCV 90 04/24/2024     04/24/2024       No results for input(s): "PT", "INR", "PROTIME", "APTT" in the last 72 hours.          Pre-op Assessment    I have reviewed the Patient Summary Reports.     I have reviewed the Nursing Notes. I have reviewed the NPO Status.   I have reviewed the Medications.     Review of Systems  Anesthesia Hx:  No problems with previous Anesthesia   History of prior surgery of interest to airway management or planning:          Denies Family Hx of Anesthesia complications.    Denies Personal Hx of Anesthesia complications.                    Social:  Non-Smoker, No Alcohol Use       Hematology/Oncology:    Oncology Normal    -- Denies Anemia:               Hematology Comments: G6pd deficiency                    EENT/Dental:  EENT/Dental Normal           Cardiovascular:  Exercise tolerance: good       Denies CAD.     Denies Dysrhythmias.                                    Pulmonary:    Denies COPD.      Denies Sleep Apnea.                Hepatic/GI:      Denies GERD.             Neurological:    Denies Neuromuscular Disease.                                   Endocrine:  Denies Diabetes.           Psych:  Denies Psychiatric History.                  Physical Exam  General: Well nourished, Cooperative, Alert and Oriented    Airway:  Mallampati: II   Mouth Opening: Normal  TM Distance: Normal  Tongue: Normal  Neck ROM: Normal ROM    Dental:  Intact    Chest/Lungs:  Clear to auscultation, Normal Respiratory Rate    Heart:  Rate: Normal  Rhythm: Regular Rhythm  Sounds: Normal    Abdomen:  Nontender, Soft, Normal        Anesthesia Plan  Type of Anesthesia, risks & benefits discussed:    Anesthesia Type: Gen ETT, Epidural, Spinal, CSE  Intra-op Monitoring Plan: Standard ASA Monitors  Post Op Pain Control " Plan: multimodal analgesia  Induction:  IV  Airway Plan: Video  Informed Consent: Informed consent signed with the Patient and all parties understand the risks and agree with anesthesia plan.  All questions answered.   ASA Score: 2  Day of Surgery Review of History & Physical: I have interviewed and examined the patient. I have reviewed the patient's H&P dated: There are no significant changes.     Ready For Surgery From Anesthesia Perspective.     .

## 2024-05-14 NOTE — TELEPHONE ENCOUNTER
----- Message from Marga mSith MA sent at 5/13/2024  6:06 PM CDT -----  CAITY FERGUSON [[id number]]     What is the request in detail:Pt would like to know if she should be seen because she has been having high blood pressure.please assist         Can the clinic reply by [facility]:       Check in on whether she went in or is feeling better  Called her 5/13/2024 recommended ed if pressure did not go down

## 2024-05-15 ENCOUNTER — PATIENT MESSAGE (OUTPATIENT)
Dept: OBSTETRICS AND GYNECOLOGY | Facility: CLINIC | Age: 39
End: 2024-05-15

## 2024-05-15 LAB
BASOPHILS # BLD AUTO: 0.03 K/UL (ref 0–0.2)
BASOPHILS NFR BLD: 0.3 % (ref 0–1.9)
DIFFERENTIAL METHOD BLD: ABNORMAL
EOSINOPHIL # BLD AUTO: 0 K/UL (ref 0–0.5)
EOSINOPHIL NFR BLD: 0.3 % (ref 0–8)
ERYTHROCYTE [DISTWIDTH] IN BLOOD BY AUTOMATED COUNT: 13.8 % (ref 11.5–14.5)
HCT VFR BLD AUTO: 37.5 % (ref 37–48.5)
HGB BLD-MCNC: 12.1 G/DL (ref 12–16)
IMM GRANULOCYTES # BLD AUTO: 0.05 K/UL (ref 0–0.04)
IMM GRANULOCYTES NFR BLD AUTO: 0.4 % (ref 0–0.5)
LYMPHOCYTES # BLD AUTO: 1.7 K/UL (ref 1–4.8)
LYMPHOCYTES NFR BLD: 14.8 % (ref 18–48)
MCH RBC QN AUTO: 28.9 PG (ref 27–31)
MCHC RBC AUTO-ENTMCNC: 32.3 G/DL (ref 32–36)
MCV RBC AUTO: 90 FL (ref 82–98)
MONOCYTES # BLD AUTO: 0.7 K/UL (ref 0.3–1)
MONOCYTES NFR BLD: 6.2 % (ref 4–15)
NEUTROPHILS # BLD AUTO: 9 K/UL (ref 1.8–7.7)
NEUTROPHILS NFR BLD: 78 % (ref 38–73)
NRBC BLD-RTO: 0 /100 WBC
OHS QRS DURATION: 80 MS
OHS QTC CALCULATION: 437 MS
PLATELET # BLD AUTO: 206 K/UL (ref 150–450)
PMV BLD AUTO: 9.9 FL (ref 9.2–12.9)
RBC # BLD AUTO: 4.18 M/UL (ref 4–5.4)
WBC # BLD AUTO: 11.54 K/UL (ref 3.9–12.7)

## 2024-05-15 PROCEDURE — 0KQM0ZZ REPAIR PERINEUM MUSCLE, OPEN APPROACH: ICD-10-PCS | Performed by: STUDENT IN AN ORGANIZED HEALTH CARE EDUCATION/TRAINING PROGRAM

## 2024-05-15 PROCEDURE — 72100003 HC LABOR CARE, EA. ADDL. 8 HRS

## 2024-05-15 PROCEDURE — 51702 INSERT TEMP BLADDER CATH: CPT

## 2024-05-15 PROCEDURE — 85025 COMPLETE CBC W/AUTO DIFF WBC: CPT | Performed by: OBSTETRICS & GYNECOLOGY

## 2024-05-15 PROCEDURE — 11000001 HC ACUTE MED/SURG PRIVATE ROOM

## 2024-05-15 PROCEDURE — 72200005 HC VAGINAL DELIVERY LEVEL II

## 2024-05-15 PROCEDURE — 51701 INSERT BLADDER CATHETER: CPT

## 2024-05-15 PROCEDURE — 25000003 PHARM REV CODE 250

## 2024-05-15 PROCEDURE — 59400 OBSTETRICAL CARE: CPT | Mod: ,,, | Performed by: STUDENT IN AN ORGANIZED HEALTH CARE EDUCATION/TRAINING PROGRAM

## 2024-05-15 PROCEDURE — 36415 COLL VENOUS BLD VENIPUNCTURE: CPT | Performed by: OBSTETRICS & GYNECOLOGY

## 2024-05-15 RX ORDER — ONDANSETRON 8 MG/1
8 TABLET, ORALLY DISINTEGRATING ORAL EVERY 8 HOURS PRN
Status: DISCONTINUED | OUTPATIENT
Start: 2024-05-15 | End: 2024-05-16 | Stop reason: HOSPADM

## 2024-05-15 RX ORDER — PRENATAL WITH FERROUS FUM AND FOLIC ACID 3080; 920; 120; 400; 22; 1.84; 3; 20; 10; 1; 12; 200; 27; 25; 2 [IU]/1; [IU]/1; MG/1; [IU]/1; MG/1; MG/1; MG/1; MG/1; MG/1; MG/1; UG/1; MG/1; MG/1; MG/1; MG/1
1 TABLET ORAL DAILY
Status: DISCONTINUED | OUTPATIENT
Start: 2024-05-15 | End: 2024-05-16 | Stop reason: HOSPADM

## 2024-05-15 RX ORDER — METHYLERGONOVINE MALEATE 0.2 MG/ML
200 INJECTION INTRAVENOUS ONCE AS NEEDED
Status: DISCONTINUED | OUTPATIENT
Start: 2024-05-15 | End: 2024-05-16 | Stop reason: HOSPADM

## 2024-05-15 RX ORDER — HYDROCORTISONE 25 MG/G
CREAM TOPICAL 3 TIMES DAILY PRN
Status: DISCONTINUED | OUTPATIENT
Start: 2024-05-15 | End: 2024-05-16 | Stop reason: HOSPADM

## 2024-05-15 RX ORDER — DOCUSATE SODIUM 100 MG/1
200 CAPSULE, LIQUID FILLED ORAL 2 TIMES DAILY PRN
Status: DISCONTINUED | OUTPATIENT
Start: 2024-05-15 | End: 2024-05-16 | Stop reason: HOSPADM

## 2024-05-15 RX ORDER — TRANEXAMIC ACID 10 MG/ML
1000 INJECTION, SOLUTION INTRAVENOUS EVERY 30 MIN PRN
Status: DISCONTINUED | OUTPATIENT
Start: 2024-05-15 | End: 2024-05-16 | Stop reason: HOSPADM

## 2024-05-15 RX ORDER — HYDROCODONE BITARTRATE AND ACETAMINOPHEN 5; 325 MG/1; MG/1
1 TABLET ORAL EVERY 4 HOURS PRN
Status: DISCONTINUED | OUTPATIENT
Start: 2024-05-15 | End: 2024-05-16 | Stop reason: HOSPADM

## 2024-05-15 RX ORDER — DIPHENHYDRAMINE HYDROCHLORIDE 50 MG/ML
25 INJECTION INTRAMUSCULAR; INTRAVENOUS EVERY 4 HOURS PRN
Status: DISCONTINUED | OUTPATIENT
Start: 2024-05-15 | End: 2024-05-16 | Stop reason: HOSPADM

## 2024-05-15 RX ORDER — ACETAMINOPHEN 325 MG/1
650 TABLET ORAL EVERY 6 HOURS PRN
Status: DISCONTINUED | OUTPATIENT
Start: 2024-05-15 | End: 2024-05-16 | Stop reason: HOSPADM

## 2024-05-15 RX ORDER — DIPHENOXYLATE HYDROCHLORIDE AND ATROPINE SULFATE 2.5; .025 MG/1; MG/1
2 TABLET ORAL EVERY 6 HOURS PRN
Status: DISCONTINUED | OUTPATIENT
Start: 2024-05-15 | End: 2024-05-16 | Stop reason: HOSPADM

## 2024-05-15 RX ORDER — MISOPROSTOL 200 UG/1
800 TABLET ORAL ONCE AS NEEDED
Status: DISCONTINUED | OUTPATIENT
Start: 2024-05-15 | End: 2024-05-16 | Stop reason: HOSPADM

## 2024-05-15 RX ORDER — HYDROCODONE BITARTRATE AND ACETAMINOPHEN 10; 325 MG/1; MG/1
1 TABLET ORAL EVERY 4 HOURS PRN
Status: DISCONTINUED | OUTPATIENT
Start: 2024-05-15 | End: 2024-05-16 | Stop reason: HOSPADM

## 2024-05-15 RX ORDER — OXYTOCIN/RINGER'S LACTATE 30/500 ML
334 PLASTIC BAG, INJECTION (ML) INTRAVENOUS ONCE AS NEEDED
Status: DISCONTINUED | OUTPATIENT
Start: 2024-05-15 | End: 2024-05-16 | Stop reason: HOSPADM

## 2024-05-15 RX ORDER — SODIUM CHLORIDE 0.9 % (FLUSH) 0.9 %
2 SYRINGE (ML) INJECTION
Status: DISCONTINUED | OUTPATIENT
Start: 2024-05-15 | End: 2024-05-16 | Stop reason: HOSPADM

## 2024-05-15 RX ORDER — OXYTOCIN 10 [USP'U]/ML
10 INJECTION, SOLUTION INTRAMUSCULAR; INTRAVENOUS ONCE AS NEEDED
Status: DISCONTINUED | OUTPATIENT
Start: 2024-05-15 | End: 2024-05-16 | Stop reason: HOSPADM

## 2024-05-15 RX ORDER — CARBOPROST TROMETHAMINE 250 UG/ML
250 INJECTION, SOLUTION INTRAMUSCULAR
Status: DISCONTINUED | OUTPATIENT
Start: 2024-05-15 | End: 2024-05-16 | Stop reason: HOSPADM

## 2024-05-15 RX ORDER — DIPHENHYDRAMINE HCL 25 MG
25 CAPSULE ORAL EVERY 4 HOURS PRN
Status: DISCONTINUED | OUTPATIENT
Start: 2024-05-15 | End: 2024-05-16 | Stop reason: HOSPADM

## 2024-05-15 RX ORDER — OXYTOCIN/RINGER'S LACTATE 30/500 ML
95 PLASTIC BAG, INJECTION (ML) INTRAVENOUS ONCE
Status: DISCONTINUED | OUTPATIENT
Start: 2024-05-15 | End: 2024-05-16 | Stop reason: HOSPADM

## 2024-05-15 RX ORDER — SIMETHICONE 80 MG
1 TABLET,CHEWABLE ORAL EVERY 6 HOURS PRN
Status: DISCONTINUED | OUTPATIENT
Start: 2024-05-15 | End: 2024-05-16 | Stop reason: HOSPADM

## 2024-05-15 RX ORDER — IBUPROFEN 600 MG/1
600 TABLET ORAL EVERY 6 HOURS
Status: DISCONTINUED | OUTPATIENT
Start: 2024-05-15 | End: 2024-05-15

## 2024-05-15 RX ORDER — OXYTOCIN/RINGER'S LACTATE 30/500 ML
95 PLASTIC BAG, INJECTION (ML) INTRAVENOUS ONCE AS NEEDED
Status: DISCONTINUED | OUTPATIENT
Start: 2024-05-15 | End: 2024-05-16 | Stop reason: HOSPADM

## 2024-05-15 RX ADMIN — PRENATAL VIT W/ FE FUMARATE-FA TAB 27-0.8 MG 1 TABLET: 27-0.8 TAB at 10:05

## 2024-05-15 RX ADMIN — DOCUSATE SODIUM 200 MG: 100 CAPSULE, LIQUID FILLED ORAL at 10:05

## 2024-05-15 RX ADMIN — ACETAMINOPHEN 650 MG: 325 TABLET, FILM COATED ORAL at 12:05

## 2024-05-15 RX ADMIN — ACETAMINOPHEN 650 MG: 325 TABLET, FILM COATED ORAL at 05:05

## 2024-05-15 RX ADMIN — DOCUSATE SODIUM 200 MG: 100 CAPSULE, LIQUID FILLED ORAL at 07:05

## 2024-05-15 RX ADMIN — ACETAMINOPHEN 650 MG: 325 TABLET, FILM COATED ORAL at 07:05

## 2024-05-15 NOTE — PLAN OF CARE
05/15/24 0816   OB SCREEN   Assessment Type Discharge Planning Brief Assessment   Source of Information health record   Received Prenatal Care Yes   Any indications/suspicions for None   Is this a teen pregnancy No   Is the baby in NICU No   Indication for adoption/Safe Haven No   Indication for DME/post-acute needs No   HIV (+) No   Any congenital  disorders No   Fetal demise/ death No     Patient has been screened for Social Work discharge planning needs. Based on documentation in medical record, no discharge planning needs are anticipated at this time. Should any discharge planning needs arise, please consult .

## 2024-05-15 NOTE — LACTATION NOTE
Lactation Basics education completed. LC reviewed Breastfeeding Guide and encouraged tracking feeds and output. Encouraged use of STS, frequent feeds based on baby's cues, and avoiding artificial nipples. LC encouraged Pt to hand express and spoon feed if baby reluctant to latch. Pt declined needing demonstration at this time.  Pt verbalized understanding and questions answered. Pt aware to call LC for assistance with feeding.

## 2024-05-15 NOTE — PLAN OF CARE
Problem:  Fall Injury Risk  Goal: Absence of Fall, Infant Drop and Related Injury  Outcome: Progressing     Problem: Adult Inpatient Plan of Care  Goal: Plan of Care Review  Outcome: Progressing  Goal: Patient-Specific Goal (Individualized)  Outcome: Progressing  Goal: Absence of Hospital-Acquired Illness or Injury  Outcome: Progressing  Goal: Optimal Comfort and Wellbeing  Outcome: Progressing  Goal: Readiness for Transition of Care  Outcome: Progressing     Problem: Infection  Goal: Absence of Infection Signs and Symptoms  Outcome: Progressing     Problem: Labor  Goal: Hemostasis  Outcome: Progressing  Goal: Stable Fetal Wellbeing  Outcome: Progressing  Goal: Effective Progression to Delivery  Outcome: Progressing  Goal: Absence of Infection Signs and Symptoms  Outcome: Progressing  Goal: Acceptable Pain Control  Outcome: Progressing  Goal: Normal Uterine Contraction Pattern  Outcome: Progressing     Problem: Breastfeeding  Goal: Effective Breastfeeding  Outcome: Progressing

## 2024-05-15 NOTE — PROGRESS NOTES
LABOR NOTE    S:  MD to bedside for routine cervical exam. Epidural working:  not applicable      O: /69   Pulse 79   Temp 98.2 °F (36.8 °C) (Oral)   Resp 16   LMP 08/20/2023 (Exact Date)   SpO2 95%   Breastfeeding No     FHT: 140 bpm, moderate variability, +accels, -decels, Cat 1 (reassuring)  CTX: q 2 minutes, pit @ 4  SVE: 4/70/-2, collins balloon out    TIMELINE:  1330: Cytotec administered  1530: 2/60/-3, collins balloon placed  1930: 4/70/-2, collins balloon out    PLAN:    Continue Close Maternal/Fetal Monitoring  Pitocin Augmentation per protocol  Patient desires epidural then AROM  Will return after epidural placement for AROM      Lilian Donohue MD  OB/GYN PGY1

## 2024-05-15 NOTE — CARE UPDATE
To bedside, patient reporting chest pressure, nausea, headache and anxiety. Reports sx began after epidural. Reports chest pressure sometimes occurs with increased anxiety. Denies chest pain or SOB, vision changes, abdominal pain. Reports epidural is working well. Vitals are all stable and WNL.     Temp:  [97.5 °F (36.4 °C)-98.2 °F (36.8 °C)] 97.5 °F (36.4 °C)  Pulse:  [62-92] 73  Resp:  [16-20] 20  SpO2:  [95 %-100 %] 98 %  BP: ()/(51-78) 99/59    Will order reglan 10mg, benadrlyl 12.5mg IV for nausea and HA. Will order vistaril 25mg for anxiety. Will order EKG. FHT cat 1. CTM closely.

## 2024-05-15 NOTE — ANESTHESIA PROCEDURE NOTES
Epidural    Patient location during procedure: OB   Reason for block: primary anesthetic   Reason for block: labor analgesia requested by patient and obstetrician  Diagnosis: IUP   Start time: 5/14/2024 7:51 PM  Timeout: 5/14/2024 7:50 PM  End time: 5/14/2024 7:55 PM  Surgery related to: Vaginal Delivery    Staffing  Performing Provider: Lilina Pikn MD  Authorizing Provider: Lilian Pink MD    Staffing  Performed by: Lilian Pink MD  Authorized by: Lilian Pink MD        Preanesthetic Checklist  Completed: patient identified, IV checked, site marked, risks and benefits discussed, surgical consent, monitors and equipment checked, pre-op evaluation, timeout performed, anesthesia consent given, hand hygiene performed and patient being monitored  Preparation  Patient position: sitting  Prep: ChloraPrep  Patient monitoring: Pulse Ox  Reason for block: primary anesthetic   Epidural  Skin Anesthetic: lidocaine 1%  Skin Wheal: 3 mL  Administration type: continuous  Approach: midline  Interspace: L3-4    Injection technique: LIO air  Needle and Epidural Catheter  Needle type: Tuohy   Needle gauge: 17  Needle length: 3.5 inches  Needle insertion depth: 4.5 cm  Catheter type: springwOverblog  Catheter size: 19 G  Catheter at skin depth: 9.5 cm  Insertion Attempts: 1  Test dose: 3 mL of lidocaine 1.5% with Epi 1-to-200,000  Additional Documentation: incremental injection, negative aspiration for heme and CSF, no paresthesia on injection, no signs/symptoms of IV or SA injection, no significant pain on injection and no significant complaints from patient  Needle localization: anatomical landmarks  Medications:  Volume per aspiration: 5 mL  Time between aspirations: 5 minutes   Assessment  Ease of block: easy  Patient's tolerance of the procedure: comfortable throughout block and no complaints No inadvertent dural puncture with Tuohy.  Dural puncture not performed with spinal needle

## 2024-05-15 NOTE — L&D DELIVERY NOTE
[facility] - Labor & Delivery  Vaginal Delivery   Operative Note    SUMMARY     Normal spontaneous vaginal delivery of live infant after approximately 3 minutes of maternal pushing effort. Infant delivered in OA presentation. No nuchal cord was noted with delivery. Infant was placed on mothers abdomen for skin to skin and bulb suctioning performed. Delayed cord clamping was performed. Cord was cut and clamped and cord blood was collected.    Spontaneous delivery of placenta with gentle traction applied. IV pitocin was given noting good uterine tone. No trailing membranes were noted on bimanual examination. Placenta was inspected and noted to be intact.    2nd degree laceration noted and repaired in normal fashion with 2-0 vicryl .    Patient tolerated delivery well. Sponge, needle, and lap counted correctly x2. EBL 50 cc.    Lilian Donohue MD  OB/GYN PGY1      Indications:  (spontaneous vaginal delivery)  Pregnancy complicated by:   Patient Active Problem List   Diagnosis    Anxiety    SI (sacroiliac) joint dysfunction    Decreased strength of trunk and back    Posture abnormality    Family history of skin cancer    Chronic low back pain    Gestational hypertension, third trimester    Abdominal weakness    Muscle weakness    Myalgia of pelvic floor    Amenorrhea    Pregnancy with one fetus, antepartum    G6PD deficiency- NO MACROBID, BACTRIM, CIPRO, ASPIRIN, DAPSONE    Encounter for induction of labor     (spontaneous vaginal delivery)     Admitting GA: 38w1d    Delivery Information for Enoc Ferraro    Birth information:  YOB: 2022   Time of birth: 12:57 AM   Sex: male   Head Delivery Date/Time: 5/15/2024 12:57 AM   Delivery type: Vaginal, Spontaneous   Gestational Age: 38w1d        Delivery Providers    Delivering clinician: Siena Qiu MD   Provider Role    Lilian Donohue MD 1st Call Resident    Antonina Yi RN Charge Nurse    Hilaria Hernandez RN Delivery Nurse     Delmy Ruiz, RN Delivery Nurse    Isreal Polk,  Surgical Tech              Measurements    Weight:   Length:          Apgars    Living status: Living  Apgar Component Scores:  1 min.:  5 min.:  10 min.:  15 min.:  20 min.:    Skin color:  1  1       Heart rate:  2  2       Reflex irritability:  2  2       Muscle tone:  2  2       Respiratory effort:  2  2       Total:  9  9       Apgars assigned by: DEIDRA RESENDEZ RN         Operative Delivery    Forceps attempted?: No  Vacuum extractor attempted?: No         Shoulder Dystocia    Shoulder dystocia present?: No           Presentation    Presentation: Vertex  Position: Left Occiput Anterior           Interventions/Resuscitation    Method: Bulb Suctioning, Tactile Stimulation       Cord    No data filed       Placenta    Placenta delivery date/time:   Placenta removal:            Labor Events:       labor: No     Labor Onset Date/Time:         Dilation Complete Date/Time:         Start Pushing Date/Time:         Start Pushing Date/Time:       Rupture Date/Time: 24         Rupture type: ARM (Artificial Rupture)         Fluid Amount:       Fluid Color: Clear               steroids: Unknown     Antibiotics given for GBS: No     Induction: misoprostol     Indications for induction:  Hypertension     Augmentation: oxytocin     Indications for augmentation: Ineffective Contraction Pattern     Labor complications: None     Additional complications:          Cervical ripening:                     Delivery:      Episiotomy: None     Indication for Episiotomy:       Perineal Lacerations:   Repaired:      Periurethral Laceration:   Repaired:     Labial Laceration:   Repaired:     Sulcus Laceration:   Repaired:     Vaginal Laceration:   Repaired:     Cervical Laceration:   Repaired:     Repair suture:       Repair # of packets:       Last Value - EBL - Nursing (mL):       Sum - EBL - Nursing (mL): 0     Last Value - EBL - Anesthesia (mL):       Calculated QBL (mL):       Running total QBL (mL):       Vaginal Sweep Performed:       Surgicount Correct:       Vaginal Packing:   Quantity:       Other providers:       Anesthesia    Method: Epidural          Details (if applicable):  Trial of Labor      Categorization:      Priority:     Indications for :     Incision Type:       Additional  information:  Forceps:    Vacuum:    Breech:    Observed anomalies    Other (Comments):

## 2024-05-15 NOTE — PROGRESS NOTES
LABOR NOTE    S:  MD to bedside for routine cervical exam. Epidural working:  yes      O: BP (!) 101/55   Pulse 60   Temp 97.5 °F (36.4 °C) (Oral)   Resp 20   LMP 08/20/2023 (Exact Date)   SpO2 100%   Breastfeeding No     FHT: 125 bpm, moderate variability, +accels, -decels, Cat 1 (reassuring)  CTX: q 2 minutes, pit @ 8  SVE: 5/80/-2, AROM clr    Patient reports that HA, chest pressure, and nausea have resolved. She reports drowsiness from the benadryl.     TIMELINE:  1330: Cytotec administered  1530: 2/60/-3, collins balloon placed  1930: 4/70/-2, collins balloon out  2230: 5/80/-2, AROM clr, pit @ 8    PLAN:    Continue Close Maternal/Fetal Monitoring  Pitocin Augmentation per protocol  Recheck 2-4 hours or PRN      Lilian Donohue MD  OB/GYN PGY1

## 2024-05-15 NOTE — PROGRESS NOTES
LABOR NOTE    S:  MD to bedside for report of increased pelvic pressure. Epidural working:  yes      O: BP (!) 106/57   Pulse 60   Temp 98.2 °F (36.8 °C) (Oral)   Resp 18   LMP 2023 (Exact Date)   SpO2 100%   Breastfeeding No     FHT: 125 bpm, moderate variability, +accels, -decels, Cat 1 (reassuring)  CTX: q 2 minutes, pit @ 16  SVE: 10/100/+1    TIMELINE:  1330: Cytotec administered  1530: 260/-3, collins balloon placed  1930: /-2, collins balloon out  2230: 5/80/-2, AROM clr, pit @ 8  0030: 10/100/+2    PLAN:  Will set up for second stage of labor  Continue Close Maternal/Fetal Monitoring  Pitocin Augmentation per protocol  Staff notified  Anticipate       Lilian Donohue MD  OB/GYN PGY1

## 2024-05-16 VITALS
HEART RATE: 87 BPM | TEMPERATURE: 98 F | RESPIRATION RATE: 18 BRPM | SYSTOLIC BLOOD PRESSURE: 126 MMHG | DIASTOLIC BLOOD PRESSURE: 81 MMHG | OXYGEN SATURATION: 96 %

## 2024-05-16 PROBLEM — Z34.90 ENCOUNTER FOR INDUCTION OF LABOR: Status: RESOLVED | Noted: 2024-05-14 | Resolved: 2024-05-16

## 2024-05-16 LAB
BASOPHILS # BLD AUTO: 0.05 K/UL (ref 0–0.2)
BASOPHILS NFR BLD: 0.5 % (ref 0–1.9)
DIFFERENTIAL METHOD BLD: ABNORMAL
EOSINOPHIL # BLD AUTO: 0.1 K/UL (ref 0–0.5)
EOSINOPHIL NFR BLD: 0.8 % (ref 0–8)
ERYTHROCYTE [DISTWIDTH] IN BLOOD BY AUTOMATED COUNT: 14.1 % (ref 11.5–14.5)
HCT VFR BLD AUTO: 35.1 % (ref 37–48.5)
HGB BLD-MCNC: 11.2 G/DL (ref 12–16)
IMM GRANULOCYTES # BLD AUTO: 0.04 K/UL (ref 0–0.04)
IMM GRANULOCYTES NFR BLD AUTO: 0.4 % (ref 0–0.5)
LYMPHOCYTES # BLD AUTO: 1.5 K/UL (ref 1–4.8)
LYMPHOCYTES NFR BLD: 14.9 % (ref 18–48)
MCH RBC QN AUTO: 29.2 PG (ref 27–31)
MCHC RBC AUTO-ENTMCNC: 31.9 G/DL (ref 32–36)
MCV RBC AUTO: 92 FL (ref 82–98)
MONOCYTES # BLD AUTO: 0.4 K/UL (ref 0.3–1)
MONOCYTES NFR BLD: 3.9 % (ref 4–15)
NEUTROPHILS # BLD AUTO: 7.9 K/UL (ref 1.8–7.7)
NEUTROPHILS NFR BLD: 79.5 % (ref 38–73)
NRBC BLD-RTO: 0 /100 WBC
PLATELET # BLD AUTO: 178 K/UL (ref 150–450)
PMV BLD AUTO: 9.7 FL (ref 9.2–12.9)
RBC # BLD AUTO: 3.83 M/UL (ref 4–5.4)
WBC # BLD AUTO: 9.87 K/UL (ref 3.9–12.7)

## 2024-05-16 PROCEDURE — 36415 COLL VENOUS BLD VENIPUNCTURE: CPT | Performed by: ADVANCED PRACTICE MIDWIFE

## 2024-05-16 PROCEDURE — 85025 COMPLETE CBC W/AUTO DIFF WBC: CPT | Performed by: ADVANCED PRACTICE MIDWIFE

## 2024-05-16 PROCEDURE — 25000003 PHARM REV CODE 250

## 2024-05-16 RX ORDER — ACETAMINOPHEN 325 MG/1
650 TABLET ORAL EVERY 6 HOURS PRN
Qty: 40 TABLET | Refills: 0 | Status: SHIPPED | OUTPATIENT
Start: 2024-05-16

## 2024-05-16 RX ADMIN — ACETAMINOPHEN 650 MG: 325 TABLET, FILM COATED ORAL at 07:05

## 2024-05-16 RX ADMIN — ACETAMINOPHEN 650 MG: 325 TABLET, FILM COATED ORAL at 01:05

## 2024-05-16 RX ADMIN — DOCUSATE SODIUM 200 MG: 100 CAPSULE, LIQUID FILLED ORAL at 08:05

## 2024-05-16 RX ADMIN — PRENATAL VIT W/ FE FUMARATE-FA TAB 27-0.8 MG 1 TABLET: 27-0.8 TAB at 08:05

## 2024-05-16 RX ADMIN — SERTRALINE HYDROCHLORIDE 50 MG: 50 TABLET ORAL at 01:05

## 2024-05-16 NOTE — ANESTHESIA POSTPROCEDURE EVALUATION
Anesthesia Post Evaluation    Patient: Lindy Ferraro    Procedure(s) Performed: * No procedures listed *    Final Anesthesia Type: epidural      Patient location during evaluation: med/surg floor  Patient participation: Yes- Able to Participate  Level of consciousness: awake and alert  Pain management: adequate  Airway patency: patent    PONV status at discharge: No PONV  Anesthetic complications: no      Cardiovascular status: blood pressure returned to baseline  Respiratory status: unassisted and spontaneous ventilation  Hydration status: euvolemic  Follow-up not needed.              Vitals Value Taken Time   /62 05/16/24 0425   Temp 36.7 °C (98.1 °F) 05/16/24 0425   Pulse 77 05/16/24 0425   Resp 16 05/16/24 0425   SpO2 95 % 05/16/24 0425         No case tracking events are documented in the log.      Pain/Fish Score: Pain Rating Prior to Med Admin: 5 (5/16/2024  1:21 AM)  Pain Rating Post Med Admin: 3 (5/15/2024  6:06 PM)

## 2024-05-16 NOTE — LACTATION NOTE
Lactation discharge education reviewed via breastfeeding guide. Questions answered. Pt encouraged to feed the baby 8 or more times in 24hrs on cue until content. Breastfeeding resources given to contact after for breastfeeding support. Pt verbalized understanding.    05/16/24 1045   Maternal Assessment   Breast Shape Bilateral:;round   Breast Density Bilateral:;soft   Areola Bilateral:;elastic   Nipples Bilateral:;everted;graspable   Left Nipple Symptoms bruised   Right Nipple Symptoms bruised   Maternal Infant Feeding   Maternal Emotional State relaxed;assist needed   Infant Positioning clutch/football;cross-cradle   Signs of Milk Transfer audible swallow;infant jaw motion present   Latch Assistance other (see comments)  (minimal)   Community Referrals   Community Referrals outpatient lactation program;pediatric care provider;support group

## 2024-05-16 NOTE — DISCHARGE INSTRUCTIONS
Community Resources for Breastfeeding Mothers:   Hospital Breastfeeding Centers/ Lactation Consultants:   [facility]........................................................................................[phone]   [facility]....................................................................................[phone]   [facility]..........................................................................................[phone]   [facility].................................................[phone]   [facility].......................................................[phone]   [facility].................................[phone]   [facility].......................................[phone]   [facility]..................................................[phone]   [facility].....................................................................[phone]      [facility] (Poison Control)...........................................................[phone]    PoisonHelp.org   Free medical advice 24/7 through the Poison Help Line and the online tool      Online Resources:   International Breastfeeding [place] ...............................................................................ibconline.ca   Dr Jeronimo Morris online resource provides videos, articles, and information sheets.     Coeffective...............................................................................................................coeffective.com   Download the free mobile cady to help get off to a great start with breastfeeding.   Droplet.....................................................................................................................IndiPharm.com   Global Health  Media...........................................................................................Mentis Technology.org   Videos that teach and empower mothers and caregivers   Infant [facility].............................................................................[phone]      Out[employer] Promotions   Provides up to date information for medication use by moms during pregnancy and while breastfeeding.   Concha Hatfield....................................................................................................................kellymom.com   Provides online information on breastfeeding and parenting      La Leche League........................................................................................ lllalmsla.org   /   llli.org   Mother-to-mother support groups with education, information support, and encouragement    Work and Pump........................................................................................... [employer].com   Information about breastfeeding for working moms     [place] Resources:   [place] Breastfeeding [facility]............................... [phone]    [facility]   Find local breastfeeding support   [place] Breastfeeding Support............................................................ LaBreastfeedingSport.org   Zip code search of breastfeeding resources in your area   [facility] for Healthy Babies............................................................[phone]   4511759sfwr.org   Connects [place] moms and their families to health and pregnancy resources.  24/7   WIC (Women, Infant, Children)......................................................... [phone]   ldh.la.gov/WIC   Download the [employer] cady, get code from WIC office    [facility] Resources:     Baby Cafe............................................................................................................. babycafeusa.org   Free, drop in, informal  breastfeeding support groups offering professional lactation care and intervention.    [facility]/ [place] [facility]....................................... birthmarkAVG Technologies.com   Infant feeding drop in clinics, Lactation services, support groups, education programs   [facility]...............................................[phone]   Nexway.com/groups/[facility]   Free breastfeeding support group for families of color   Mothers Milk Bank [facility]....................................................[phone]                                                             Perception [employer]iny[employer]/services/mothers-milk-bank[facility]   ENEDELIA Nesting..................................................................................[phone] nolanesting.com   In person and virtual support for families through pregnancy, birth, and early parenthood.       [facility] [place]- Dr. Radhika Cintron.......................[phone]   [address] [place] [address]                                  www.[email or web address].[email or web address]   [email or web address]   [facility] Lactation Care, [facility] (Shila Metz RN, IBCLC) ............................[phone] [email or web address]    Healthy Start [place].....................................[phone] ([place])  [phone] ([place])   [facility].gov/health-department/healthy-start   Serves women of childbearing age and addresses issues for pregnant women and their children from birth  to age two.          La Leche League- [place]............................. [place] PREVENTIVE MEDICINE.VHT/ Nexway.VHT/ MoviePassmariia   In person and virtual mother to mother support groups with education, information support and   encouragement to women who want to breastfeed      [place] Resources:   Breastfeeding Resources- [facility].....msdh.[place].gov (under womens services)   Find resources and info about planning for breastfeeding, its benefits, and help with breastfeeding  s uccessfully.    [facility] For Pregnancy Choices- [place]....................................... "[employer]"   [phone]   [address] [place], [place]. Call or text 24/7   [facility]feeding Center.......................................................[email or web address].BillGuard   [facility] Lactation Consultants sere [facility], including [facility],   [facility], and surrounding areas.    [place] Breastfeeding Coalition...............................................................................msbfc.org   Promotes and supports breastfeeding with families, health providers, and communities.   [facility] breastfeeding Coalition.....................................................................smbfc.org   Find breastfeeding resources and support groups in your area.    WIC Nutrition Program- [facility] of [facility].................................... ms.gov

## 2024-05-16 NOTE — DISCHARGE SUMMARY
[facility] ([place])  Obstetrics  Discharge Summary      Patient Name: Lindy Ferraro  MRN: [id number]  Admission Date: 2024  Hospital Length of Stay: 2 days  Discharge Date and Time:  2024 10:04 AM  Attending Physician: Lilliam Beck DO   Discharging Provider: Jami Elizondo CNM   Primary Care Provider: Twyla Rojas MD    HPI: PP day #1    * No surgery found *     Hospital Course:   38 year old  s/p  of live infant male/boy. 2nd degree laceration. Now, PP day #1. Strongly desires discharge to home with infant following infant circumcision if infant cleared by PEDS. Doing well. VSS, normotensive and afebrile. Known Gestational HTN (no meds). Normal labs and no S&S of pre eclampsia. A.M. CBC pending. Known G6PD def. Normal postpartum discomforts managed with po Tylenol. She does not recall ever taking NSAID in lifetime. Additional pericare/comfort measures other than medication, discussed. Anxiety well controlled with po SSRI (plans to continue). Good support at home. 1 week B/P check and mood check discussed along with 6 week PP visit. Undecided as to method of contraception and will discuss with primary OB.    Doing well, ambulating, voiding, and tolerating regular diet  Denies dizziness or light headed sensation. Denies SOB or difficulty breathing.   Denies headaches, visual disturbances or upper GI pain, nausea, or vomiting.  Reports passing flatus.   Lochia: steadily decreasing  Pain: Controlled with po Tylenol.  Breasts/nipples:   Depression/anxiety: Stable, plans to continue SSRI.   Support at home: yes  Contraception: Undecided; understands that progesterone only options are appropriate with breastfeeding  : is doing well, will f/u with pediatrician.     Gen: A&O x 4, NAD  Abdomen: soft, non-tender, uterus firm at U - 1 fb  Perineum: approximated, no edema   Lochia: minimal rubra  Ext: bilaterally no pedal edema, without signs of DVT      Final  "Active Diagnoses:    Diagnosis Date Noted POA    PRINCIPAL PROBLEM:   (spontaneous vaginal delivery) [O80] 05/15/2024 Not Applicable    Obstetrical laceration [O71.9] 2024 No    G6PD deficiency- NO MACROBID, BACTRIM, CIPRO, ASPIRIN, DAPSONE [D75.A] 2023 Yes    Gestational hypertension, third trimester [O13.3] 2021 Yes    Anxiety [F41.9] 2019 Yes      Problems Resolved During this Admission:    Diagnosis Date Noted Date Resolved POA    Encounter for induction of labor [Z34.90] 2024 Not Applicable        Significant Diagnostic Studies: Labs: CBC   Recent Labs   Lab 24  1232 05/15/24  1133   WBC 10.51 11.54   HGB 13.2 12.1   HCT 39.5 37.5    206         Feeding Method: breast    Immunizations       Date Immunization Status Dose Route/Site Given by    05/15/24 041 MMR Incomplete 0.5 mL Subcutaneous/     05/15/24 0411 Tdap Incomplete 0.5 mL Intramuscular/             Delivery:    Episiotomy: None   Lacerations: 2nd   Repair suture:     Repair # of packets: 1   Blood loss (ml):       Birth information:  YOB: 2022   Time of birth: 12:57 AM   Sex: male   Delivery type: Vaginal, Spontaneous   Gestational Age: 38w1d     Measurements    Weight: 3110 g  Weight (lbs): 6 lb 13.7 oz  Length: 50.8 cm  Length (in): 20"  Head circumference: 35 cm  Chest circumference: 13.5 cm         Delivery Clinician: Delivery Providers    Delivering clinician: Siena Qiu MD   Provider Role    Lilian Donohue MD 1st Call Resident    Antonina Yi, RN Charge Nurse    Hilaria Hernandez RN Delivery Nurse    Delmy Ruiz, KEESHA Delivery Nurse    [facility]             Additional  information:  Forceps:    Vacuum:    Breech:    Observed anomalies      Living?:     Apgars    Living status: Living  Apgar Component Scores:  1 min.:  5 min.:  10 min.:  15 min.:  20 min.:    Skin color:  1  1       Heart rate:  2  2       Reflex " irritability:  2  2       Muscle tone:  2  2       Respiratory effort:  2  2       Total:  9  9       Apgars assigned by: DEIDRA RESENDEZ RN         Placenta: Delivered:       appearance  Pending Diagnostic Studies:       Procedure Component Value Units Date/Time    CBC auto differential [[id number]]     Order Status: Sent Lab Status: No result     Specimen: Blood             Discharged Condition: stable    Disposition: Home or Self Care    Follow Up:   Follow-up Information       Lilliam Beck DO. Schedule an appointment as soon as possible for a visit in 6 week(s).    Specialty: Obstetrics and Gynecology  Why: 1 week blood pressure check, 1-2 weeks for mood check, 6 weeks for routine postpartum visit  Contact information:  [address] [place] [id number]  [phone]                           Patient Instructions:      Diet Adult Regular     Lifting restrictions     Pelvic Rest     Notify your health care provider if you experience any of the following:  temperature >100.4     Notify your health care provider if you experience any of the following:  persistent nausea and vomiting or diarrhea     Notify your health care provider if you experience any of the following:  severe uncontrolled pain     Notify your health care provider if you experience any of the following:  redness, tenderness, or signs of infection (pain, swelling, redness, odor or green/yellow discharge around incision site)     Notify your health care provider if you experience any of the following:  difficulty breathing or increased cough     Notify your health care provider if you experience any of the following:  severe persistent headache     Notify your health care provider if you experience any of the following:  worsening rash     Notify your health care provider if you experience any of the following:  persistent dizziness, light-headedness, or visual disturbances     Notify your health care provider if you experience any  of the following:  increased confusion or weakness     Medications:  Current Discharge Medication List        START taking these medications    Details   acetaminophen (TYLENOL) 325 MG tablet Take 2 tablets (650 mg total) by mouth every 6 (six) hours as needed for Pain.  Qty: 40 tablet, Refills: 0      jack singer ointment Apply topically 3 (three) times daily. Apply after feeding. Do not wash off. This compounded medication expires in 30 days.  Qty: 30 g, Refills: 1           CONTINUE these medications which have NOT CHANGED    Details   prenatal vit/iron fum/folic ac (PRENATAL 1+1 ORAL) Take by mouth.      sertraline (ZOLOFT) 50 MG tablet Take 1 tablet (50 mg total) by mouth once daily.  Qty: 90 tablet, Refills: 3    Associated Diagnoses: KEVIN (generalized anxiety disorder)           STOP taking these medications       famotidine (PEPCID) 20 MG tablet Comments:   Reason for Stopping:         RSV, preF A and preF B,PF, (ABRYSVO) 120 mcg/0.5 mL SolR vaccine Comments:   Reason for Stopping:             Follow Up/Patient Instructions:   1. Reviewed postpartum recommendations and precautions ~ encouraged to call for fever, severe or increased pain in head, chest, abdomen, perineum or legs; heavy bleeding, foul smelling lochia, signs of depression, or any other concern. Reviewed postpartum precautions r/t high blood pressure ~ encouraged to call for HA, vision changes, epigastric discomfort, or abnormal swelling.  2. Rx provided: Tylenol, Continue po Zoloft and PNV.  3. Contraception: Undecided. will f/u at postpartum visit. Encouraged abstinence and pelvic rest for healing until after postpartum check-up.   4. Encouraged to continue PNV while breastfeeding or at least for 6 weeks postpartum.   5. Car seat law will be reviewed with patient at discharge per protocol  6. RTO in 1 week for b/p check and mood check in 1-2 weeks. 6 weeks or sooner prn for PP visit.  7. Discharge home today with written and verbal postpartum  instructions and precautions.      Jami Elizondo CNM  Obstetrics  [facility]

## 2024-05-16 NOTE — HOSPITAL COURSE
38 year old  s/p  of live infant male/boy. 2nd degree laceration. Now, PP day #1. Strongly desires discharge to home with infant following infant circumcision if infant cleared by PEDS. Doing well. VSS, normotensive and afebrile. Known Gestational HTN (no meds). Normal labs and no S&S of pre eclampsia. A.M. CBC pending. Known G6PD def. Normal postpartum discomforts managed with po Tylenol. She does not recall ever taking NSAID in lifetime. Additional pericare/comfort measures other than medication, discussed. Anxiety well controlled with po SSRI (plans to continue). Good support at home. 1 week B/P check and mood check discussed along with 6 week PP visit.

## 2024-05-17 ENCOUNTER — PATIENT MESSAGE (OUTPATIENT)
Dept: OBSTETRICS AND GYNECOLOGY | Facility: OTHER | Age: 39
End: 2024-05-17
Payer: COMMERCIAL

## 2024-05-20 ENCOUNTER — PATIENT MESSAGE (OUTPATIENT)
Dept: OBSTETRICS AND GYNECOLOGY | Facility: CLINIC | Age: 39
End: 2024-05-20
Payer: COMMERCIAL

## 2024-05-21 ENCOUNTER — CLINICAL SUPPORT (OUTPATIENT)
Dept: OBSTETRICS AND GYNECOLOGY | Facility: CLINIC | Age: 39
End: 2024-05-21
Payer: COMMERCIAL

## 2024-05-21 VITALS — WEIGHT: 182.31 LBS | DIASTOLIC BLOOD PRESSURE: 70 MMHG | BODY MASS INDEX: 31.3 KG/M2 | SYSTOLIC BLOOD PRESSURE: 122 MMHG

## 2024-05-21 DIAGNOSIS — Z01.30 BP CHECK: Primary | ICD-10-CM

## 2024-05-21 PROCEDURE — 99499 UNLISTED E&M SERVICE: CPT | Mod: S$GLB,,, | Performed by: NURSE PRACTITIONER

## 2024-05-21 PROCEDURE — 99999 PR PBB SHADOW E&M-EST. PATIENT-LVL III: CPT | Mod: PBBFAC,,, | Performed by: NURSE PRACTITIONER

## 2024-05-21 NOTE — PROGRESS NOTES
Patient was seen in office for a bp check. Patients bp fell within normal range with a bp of 122/70

## 2024-05-22 ENCOUNTER — PATIENT MESSAGE (OUTPATIENT)
Dept: OBSTETRICS AND GYNECOLOGY | Facility: CLINIC | Age: 39
End: 2024-05-22
Payer: COMMERCIAL

## 2024-05-22 DIAGNOSIS — R30.0 DYSURIA: Primary | ICD-10-CM

## 2024-05-24 ENCOUNTER — HOSPITAL ENCOUNTER (EMERGENCY)
Facility: OTHER | Age: 39
Discharge: HOME | End: 2024-05-24
Attending: OBSTETRICS & GYNECOLOGY
Payer: COMMERCIAL

## 2024-05-24 VITALS
HEART RATE: 91 BPM | WEIGHT: 182.31 LBS | OXYGEN SATURATION: 95 % | BODY MASS INDEX: 31.3 KG/M2 | DIASTOLIC BLOOD PRESSURE: 60 MMHG | SYSTOLIC BLOOD PRESSURE: 113 MMHG | TEMPERATURE: 100 F

## 2024-05-24 DIAGNOSIS — R00.0 TACHYCARDIA: ICD-10-CM

## 2024-05-24 DIAGNOSIS — A41.9 SEPSIS: ICD-10-CM

## 2024-05-24 DIAGNOSIS — N12 PYELONEPHRITIS: Primary | ICD-10-CM

## 2024-05-24 LAB
ALBUMIN SERPL BCP-MCNC: 3.2 G/DL (ref 3.5–5.2)
ALP SERPL-CCNC: 152 U/L (ref 55–135)
ALT SERPL W/O P-5'-P-CCNC: 36 U/L (ref 10–44)
ANION GAP SERPL CALC-SCNC: 13 MMOL/L (ref 8–16)
AST SERPL-CCNC: 28 U/L (ref 10–40)
BACTERIA #/AREA URNS HPF: ABNORMAL /HPF
BASOPHILS # BLD AUTO: 0.04 K/UL (ref 0–0.2)
BASOPHILS NFR BLD: 0.4 % (ref 0–1.9)
BILIRUB SERPL-MCNC: 0.7 MG/DL (ref 0.1–1)
BILIRUB UR QL STRIP: NEGATIVE
BUN SERPL-MCNC: 10 MG/DL (ref 6–20)
CALCIUM SERPL-MCNC: 9.3 MG/DL (ref 8.7–10.5)
CHLORIDE SERPL-SCNC: 103 MMOL/L (ref 95–110)
CLARITY UR: CLEAR
CO2 SERPL-SCNC: 19 MMOL/L (ref 23–29)
COLOR UR: YELLOW
CREAT SERPL-MCNC: 0.7 MG/DL (ref 0.5–1.4)
DIFFERENTIAL METHOD BLD: ABNORMAL
EOSINOPHIL # BLD AUTO: 0 K/UL (ref 0–0.5)
EOSINOPHIL NFR BLD: 0.2 % (ref 0–8)
ERYTHROCYTE [DISTWIDTH] IN BLOOD BY AUTOMATED COUNT: 13.2 % (ref 11.5–14.5)
EST. GFR  (NO RACE VARIABLE): >60 ML/MIN/1.73 M^2
GLUCOSE SERPL-MCNC: 90 MG/DL (ref 70–110)
GLUCOSE UR QL STRIP: NEGATIVE
HCT VFR BLD AUTO: 42.6 % (ref 37–48.5)
HGB BLD-MCNC: 14 G/DL (ref 12–16)
HGB UR QL STRIP: ABNORMAL
IMM GRANULOCYTES # BLD AUTO: 0.04 K/UL (ref 0–0.04)
IMM GRANULOCYTES NFR BLD AUTO: 0.4 % (ref 0–0.5)
KETONES UR QL STRIP: NEGATIVE
LACTATE SERPL-SCNC: 1.4 MMOL/L (ref 0.5–2.2)
LEUKOCYTE ESTERASE UR QL STRIP: ABNORMAL
LYMPHOCYTES # BLD AUTO: 0.4 K/UL (ref 1–4.8)
LYMPHOCYTES NFR BLD: 3.9 % (ref 18–48)
MCH RBC QN AUTO: 29 PG (ref 27–31)
MCHC RBC AUTO-ENTMCNC: 32.9 G/DL (ref 32–36)
MCV RBC AUTO: 88 FL (ref 82–98)
MICROSCOPIC COMMENT: ABNORMAL
MONOCYTES # BLD AUTO: 0.4 K/UL (ref 0.3–1)
MONOCYTES NFR BLD: 3.8 % (ref 4–15)
NEUTROPHILS # BLD AUTO: 9.8 K/UL (ref 1.8–7.7)
NEUTROPHILS NFR BLD: 91.3 % (ref 38–73)
NITRITE UR QL STRIP: NEGATIVE
NON-SQ EPI CELLS #/AREA URNS HPF: 1 /HPF
NRBC BLD-RTO: 0 /100 WBC
PH UR STRIP: 6 [PH] (ref 5–8)
PLATELET # BLD AUTO: 229 K/UL (ref 150–450)
PMV BLD AUTO: 9.4 FL (ref 9.2–12.9)
POTASSIUM SERPL-SCNC: 3.4 MMOL/L (ref 3.5–5.1)
PROT SERPL-MCNC: 7.3 G/DL (ref 6–8.4)
PROT UR QL STRIP: NEGATIVE
RBC # BLD AUTO: 4.83 M/UL (ref 4–5.4)
RBC #/AREA URNS HPF: 14 /HPF (ref 0–4)
SODIUM SERPL-SCNC: 135 MMOL/L (ref 136–145)
SP GR UR STRIP: 1.01 (ref 1–1.03)
SQUAMOUS #/AREA URNS HPF: 0 /HPF
URN SPEC COLLECT METH UR: ABNORMAL
UROBILINOGEN UR STRIP-ACNC: NEGATIVE EU/DL
WBC # BLD AUTO: 10.68 K/UL (ref 3.9–12.7)
WBC #/AREA URNS HPF: 51 /HPF (ref 0–5)
WBC CLUMPS URNS QL MICRO: ABNORMAL

## 2024-05-24 PROCEDURE — 63600175 PHARM REV CODE 636 W HCPCS: Performed by: OBSTETRICS & GYNECOLOGY

## 2024-05-24 PROCEDURE — 85025 COMPLETE CBC W/AUTO DIFF WBC: CPT

## 2024-05-24 PROCEDURE — 81000 URINALYSIS NONAUTO W/SCOPE: CPT

## 2024-05-24 PROCEDURE — 96365 THER/PROPH/DIAG IV INF INIT: CPT

## 2024-05-24 PROCEDURE — 93010 ELECTROCARDIOGRAM REPORT: CPT | Mod: ,,, | Performed by: INTERNAL MEDICINE

## 2024-05-24 PROCEDURE — 25000003 PHARM REV CODE 250: Performed by: OBSTETRICS & GYNECOLOGY

## 2024-05-24 PROCEDURE — 63600175 PHARM REV CODE 636 W HCPCS

## 2024-05-24 PROCEDURE — 96375 TX/PRO/DX INJ NEW DRUG ADDON: CPT

## 2024-05-24 PROCEDURE — 80053 COMPREHEN METABOLIC PANEL: CPT

## 2024-05-24 PROCEDURE — 87086 URINE CULTURE/COLONY COUNT: CPT

## 2024-05-24 PROCEDURE — 93005 ELECTROCARDIOGRAM TRACING: CPT

## 2024-05-24 PROCEDURE — 87088 URINE BACTERIA CULTURE: CPT

## 2024-05-24 PROCEDURE — 87040 BLOOD CULTURE FOR BACTERIA: CPT | Mod: 59

## 2024-05-24 PROCEDURE — 99285 EMERGENCY DEPT VISIT HI MDM: CPT | Mod: 25

## 2024-05-24 PROCEDURE — 83605 ASSAY OF LACTIC ACID: CPT

## 2024-05-24 PROCEDURE — 25000003 PHARM REV CODE 250

## 2024-05-24 PROCEDURE — 87077 CULTURE AEROBIC IDENTIFY: CPT

## 2024-05-24 PROCEDURE — 87186 SC STD MICRODIL/AGAR DIL: CPT

## 2024-05-24 PROCEDURE — 25500020 PHARM REV CODE 255: Performed by: OBSTETRICS & GYNECOLOGY

## 2024-05-24 RX ORDER — ACETAMINOPHEN 500 MG
1000 TABLET ORAL ONCE
Status: COMPLETED | OUTPATIENT
Start: 2024-05-24 | End: 2024-05-24

## 2024-05-24 RX ORDER — BUTALBITAL, ACETAMINOPHEN AND CAFFEINE 50; 325; 40 MG/1; MG/1; MG/1
2 TABLET ORAL ONCE
Status: COMPLETED | OUTPATIENT
Start: 2024-05-24 | End: 2024-05-24

## 2024-05-24 RX ORDER — METOCLOPRAMIDE HYDROCHLORIDE 5 MG/ML
10 INJECTION INTRAMUSCULAR; INTRAVENOUS ONCE
Status: COMPLETED | OUTPATIENT
Start: 2024-05-24 | End: 2024-05-24

## 2024-05-24 RX ORDER — DIPHENHYDRAMINE HYDROCHLORIDE 50 MG/ML
50 INJECTION INTRAMUSCULAR; INTRAVENOUS ONCE
Status: COMPLETED | OUTPATIENT
Start: 2024-05-24 | End: 2024-05-24

## 2024-05-24 RX ORDER — CEPHALEXIN 250 MG/1
250 CAPSULE ORAL EVERY 6 HOURS
Qty: 28 CAPSULE | Refills: 0 | Status: SHIPPED | OUTPATIENT
Start: 2024-05-24 | End: 2024-05-29 | Stop reason: SDUPTHER

## 2024-05-24 RX ADMIN — METOCLOPRAMIDE 10 MG: 5 INJECTION, SOLUTION INTRAMUSCULAR; INTRAVENOUS at 03:05

## 2024-05-24 RX ADMIN — ACETAMINOPHEN 1000 MG: 500 TABLET ORAL at 11:05

## 2024-05-24 RX ADMIN — DIPHENHYDRAMINE HYDROCHLORIDE 50 MG: 50 INJECTION, SOLUTION INTRAMUSCULAR; INTRAVENOUS at 03:05

## 2024-05-24 RX ADMIN — IOHEXOL 75 ML: 350 INJECTION, SOLUTION INTRAVENOUS at 01:05

## 2024-05-24 RX ADMIN — SODIUM CHLORIDE, POTASSIUM CHLORIDE, SODIUM LACTATE AND CALCIUM CHLORIDE 2000 ML: 600; 310; 30; 20 INJECTION, SOLUTION INTRAVENOUS at 11:05

## 2024-05-24 RX ADMIN — GENTAMICIN SULFATE 329.6 MG: 40 INJECTION, SOLUTION INTRAMUSCULAR; INTRAVENOUS at 01:05

## 2024-05-24 RX ADMIN — BUTALBITAL, ACETAMINOPHEN, AND CAFFEINE 2 TABLET: 50; 325; 40 TABLET ORAL at 02:05

## 2024-05-24 RX ADMIN — AMPICILLIN SODIUM 2 G: 2 INJECTION, POWDER, FOR SOLUTION INTRAMUSCULAR; INTRAVENOUS at 12:05

## 2024-05-24 NOTE — ED PROVIDER NOTES
Encounter Date: 2024       History     Chief Complaint   Patient presents with    Fever     HPI  Lindy Ferraro is a 38 y.o.  female who is post-partum day 9 presenting with fever.     Patient endorses feeling subjective chills and fevers since yesterday night prompting presentation to [facility]. She reports headaches since delivery that responds to tylenol, back pain, and stable vaginal bleeding.She reports dysuria but is unsure if this is secondary to her vaginal delivery complicated by a second degree laceration. She reports bilateral breast tenderness with no skin abrasions or tears since lactating. She denies gross hematuria, vomiting, nausea. Pregnancy course was notable for 3 prior UTIs managed with antibiotics, and gestational hypertension.      Past medical history is notable for G6PD deficiency, anxiety, and several drug allergies.   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     Past Surgical History:   Procedure Laterality Date    INTRAUTERINE DEVICE INSERTION      MIKI---REMOVED    SMALL INTESTINE SURGERY  1987    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    WISDOM TOOTH EXTRACTION       Family History   Problem Relation Name Age of Onset    Gout Father      Skin cancer Mother Twyla     Arthritis Mother Twyla     Brain cancer Maternal Grandfather Chaz rosas     Skin cancer Maternal Grandfather Chaz rosas     Cancer Maternal Grandfather Chaz rosas     Diabetes type II Paternal Grandmother Taisha dwyer      Diabetes Paternal Grandmother Taisha dwyer     Heart attack Paternal Grandfather Bar dwyer     Heart disease Paternal Grandfather Bar dwyer     Stroke Maternal Grandmother Leah rosas     Kidney cancer Maternal Uncle      Prostate cancer Paternal Uncle      Breast cancer Neg Hx      Colon cancer Neg Hx      Ovarian cancer Neg Hx      Glaucoma Neg Hx      Cataracts Neg Hx      Macular degeneration Neg Hx       Social History     Tobacco Use    Smoking status: Never    Smokeless tobacco: Never   Substance Use Topics    Alcohol use: Not Currently     Alcohol/week: 5.0 standard drinks of alcohol     Types: 5 Glasses of wine per week    Drug use: No     Constitutional:  Positive for chills, diaphoresis, fatigue and fever. Negative for activity change and appetite change.   HENT:  Negative for nasal congestion.    Eyes:  Negative for visual disturbance.   Respiratory:  Negative for cough, shortness of breath and wheezing.    Cardiovascular:  Negative for chest pain, palpitations and leg swelling.   Gastrointestinal:  Positive for abdominal pain. Negative for blood in stool, diarrhea, nausea, vomiting and reflux.   Endocrine: Negative for diabetes, hot flashes, hyperthyroidism and hypothyroidism.   Genitourinary:  Positive for dysuria, flank pain, frequency, pelvic pain, vaginal bleeding, vaginal discharge and vaginal pain. Negative for bladder incontinence, decreased libido, dysmenorrhea, dyspareunia, hematuria, urgency and urinary incontinence.   Musculoskeletal:  Positive for back pain. Negative for arthralgias, joint swelling, leg pain and myalgias.   Integumentary:  Positive for breast tenderness. Negative for rash.   Neurological:  Positive for headaches.   Psychiatric/Behavioral:  Negative for depression. The patient is not nervous/anxious.    Breast: Positive for tenderness.Negative for asymmetry    Physical Exam     Initial Vitals   BP Pulse Resp Temp SpO2   05/24/24 1045 05/24/24 1050 -- 05/24/24 1045  05/24/24 1050   135/85 (!) 144  (!) 101 °F (38.3 °C) 97 %      MAP       --                Physical Exam    Constitutional: She appears well-developed and well-nourished. No distress.   HENT:   Head: Normocephalic and atraumatic.   Eyes: EOM are normal.   Cardiovascular:  Normal rate, regular rhythm and normal heart sounds.     Exam reveals no gallop and no friction rub.       No murmur heard.  Pulmonary/Chest: Breath sounds normal. No respiratory distress. She has no wheezes. She has no rhonchi. She has no rales.   Bilateral breast exam wnl, no concern for mastitis.    Abdominal: Abdomen is soft. She exhibits no distension. There is abdominal tenderness (diffusely).   Diffuse abdominal tenderness. No particular fundal tenderness appreciated. Right CVA tenderness on exam There is no rebound and no guarding.   Musculoskeletal:         General: Normal range of motion.     Neurological: She is alert and oriented to person, place, and time.   Skin: Skin is warm, dry and intact. No rash noted.   Psychiatric: She has a normal mood and affect. Her speech is normal and behavior is normal.         ED Course   Procedures  Labs Reviewed   URINALYSIS - Abnormal; Notable for the following components:       Result Value    Occult Blood UA 3+ (*)     Leukocytes, UA 3+ (*)     All other components within normal limits   URINALYSIS MICROSCOPIC - Abnormal; Notable for the following components:    RBC, UA 14 (*)     WBC, UA 51 (*)     WBC Clumps, UA Occasional (*)     Bacteria Moderate (*)     Non-Squam Epith 1 (*)     All other components within normal limits   CBC W/ AUTO DIFFERENTIAL - Abnormal; Notable for the following components:    Gran # (ANC) 9.8 (*)     Lymph # 0.4 (*)     Gran % 91.3 (*)     Lymph % 3.9 (*)     Mono % 3.8 (*)     All other components within normal limits   COMPREHENSIVE METABOLIC PANEL - Abnormal; Notable for the following components:    Sodium 135 (*)     Potassium 3.4 (*)     CO2 19 (*)     Albumin 3.2  (*)     Alkaline Phosphatase 152 (*)     All other components within normal limits   CULTURE, BLOOD   CULTURE, BLOOD   LACTIC ACID, PLASMA   URINALYSIS, REFLEX TO URINE CULTURE   LACTIC ACID, PLASMA          Imaging Results              X-Ray Chest 1 View (Final result)  Result time 05/24/24 13:40:08   Procedure changed from X-Ray Chest AP Portable     Final result by Samson Ojeda III, MD (05/24/24 13:40:08)                   Impression:      No acute process seen.      Electronically signed by: Samson Ojeda MD  Date:    05/24/2024  Time:    13:40               Narrative:    EXAMINATION:  XR CHEST 1 VIEW    CLINICAL HISTORY:  sepsis;  Sepsis, unspecified organism    FINDINGS:  Chest one view:    Heart size is normal.  Lungs are clear.  The bones bowel gas are noncontributory.                                       CT Abdomen Pelvis With IV Contrast NO Oral Contrast (Final result)  Result time 05/24/24 13:45:05      Final result by Samson Ojeda III, MD (05/24/24 13:45:05)                   Impression:      There is bilateral mild hydroureteronephrosis secondary to and enlarged uterus.  No renal calculi, bladder calculi, or ureter calculi are seen.      Electronically signed by: Samson Ojeda MD  Date:    05/24/2024  Time:    13:45               Narrative:    EXAMINATION:  CT ABDOMEN PELVIS WITH IV CONTRAST    CLINICAL HISTORY:  Abdominal pain, acute, nonlocalized;    FINDINGS:  Patient was administered 75 cc of Omnipaque 350 intravenously.  Lung bases are clear.  The liver, gallbladder, biliary tree, spleen, stomach, pancreas, and duodenum show nothing unusual.  The adrenal glands are not enlarged.  The kidneys enhance normally.  No renal mass, scar, stone or hydronephrosis is seen.  Vessels enhance normally.  There is diastasis recti on the left.    there is enlargement of the uterus.  There is mild dilatation of the ureters from mass effect from the uterus.  No ascites is seen.  The bowel loops are  normal.  No para-aortic, retroperitoneal, or mesenteric adenopathy is seen.  Bones reveal no significant focal abnormality.                                       Medications   ampicillin (OMNIPEN) 2 g in sodium chloride 0.9 % 100 mL IVPB (MB+) (0 g Intravenous Stopped 24 1300)   gentamicin (GARAMYCIN) 329.6 mg in sodium chloride 0.9% 100 mL IVPB (329.6 mg Intravenous New Bag 24 1344)   lactated ringers bolus 2,000 mL (2,000 mLs Intravenous New Bag 24 1124)   acetaminophen tablet 1,000 mg (1,000 mg Oral Given 24 1130)   iohexoL (OMNIPAQUE 350) injection 75 mL (75 mLs Intravenous Given 24 1327)   butalbital-acetaminophen-caffeine -40 mg per tablet 2 tablet (2 tablets Oral Given 24 1431)   diphenhydrAMINE injection 50 mg (50 mg Intravenous Given 24 1537)   metoclopramide injection 10 mg (10 mg Intravenous Given 24 1537)     Medical Decision Making  38 y.o.  PPD#9 from uncomplicated   - Vitals on admit febrile to 101 and tachycardiac to 144  - Code sepsis called  - Physical exam notable for diffuse abdominal tenderness and right CVA tenderness. Breast, pulm and cardiac exams benign   - UA notable for moderate bacteria and 3+ leuks   - 2 liters LR bolus ordered.   - Suspect pyelonephritis based on exam and UA, but due to diffuse abd pain, will get CT abd/pelvis and start on amp/gent to cover for pyelo and endometritis.  - CXR wnl; CT with mild bilateral hydroureteronephrosis   - Fever improved with tylenol, although HA persists, 5/10 --> no improvement with fioricet; then relief with benadryl/reglan.  - Overall stable for discharge home.  Will d/c home with Keflex (known to tolerate) x 14 days.    - Will plan close f/u with primary OB.  Strict return precautions given.  All questions answered.     Amount and/or Complexity of Data Reviewed  Labs: ordered.  Radiology: ordered.    Risk  OTC drugs.  Prescription drug management.                                       Clinical Impression:  Final diagnoses:  [A41.9] Sepsis  [R00.0] Tachycardia          ED Disposition Condition    Discharge Stable          ED Prescriptions       Medication Sig Dispense Start Date End Date Auth. Provider    cephALEXin (KEFLEX) 250 MG capsule Take 1 capsule (250 mg total) by mouth every 6 (six) hours. for 14 days 28 capsule 5/24/2024 6/7/2024 Cristy Walsh MD          Follow-up Information    None          Tahmina Azar MD  Resident  05/24/24 0399       Cristy Walsh MD  05/24/24 7963

## 2024-05-25 NOTE — ED PROVIDER NOTES
Encounter Date: 2024       History     Chief Complaint   Patient presents with    Fever     HPI  Lindy Ferraro is a 38 y.o.  female who is post-partum day 9 presenting with fever.     Patient endorses feeling subjective chills and fevers since yesterday night prompting presentation to [facility]. She reports headaches since delivery that responds to tylenol, back pain, and stable vaginal bleeding.She reports dysuria but is unsure if this is secondary to her vaginal delivery complicated by a second degree laceration. She reports bilateral breast tenderness with no skin abrasions or tears since lactating. She denies gross hematuria, vomiting, nausea. Pregnancy course was notable for 3 prior UTIs managed with antibiotics, and gestational hypertension.      Past medical history is notable for G6PD deficiency, anxiety, and several drug allergies.   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     Past Surgical History:   Procedure Laterality Date    INTRAUTERINE DEVICE INSERTION      MIKI---REMOVED    SMALL INTESTINE SURGERY  1987    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    WISDOM TOOTH EXTRACTION       Family History   Problem Relation Name Age of Onset    Gout Father      Skin cancer Mother Twyla     Arthritis Mother Twyla     Brain cancer Maternal Grandfather Chaz rosas     Skin cancer Maternal Grandfather Chaz rosas     Cancer Maternal Grandfather Chaz rosas     Diabetes type II Paternal Grandmother  Taisha sacca     Diabetes Paternal Grandmother Taisha dwyer     Heart attack Paternal Grandfather Bar dwyer     Heart disease Paternal Grandfather Bar dwyer     Stroke Maternal Grandmother Leah rosas     Kidney cancer Maternal Uncle      Prostate cancer Paternal Uncle      Breast cancer Neg Hx      Colon cancer Neg Hx      Ovarian cancer Neg Hx      Glaucoma Neg Hx      Cataracts Neg Hx      Macular degeneration Neg Hx       Social History     Tobacco Use    Smoking status: Never    Smokeless tobacco: Never   Substance Use Topics    Alcohol use: Not Currently     Alcohol/week: 5.0 standard drinks of alcohol     Types: 5 Glasses of wine per week    Drug use: No     Constitutional:  Positive for chills, diaphoresis, fatigue and fever. Negative for activity change and appetite change.   HENT:  Negative for nasal congestion.    Eyes:  Negative for visual disturbance.   Respiratory:  Negative for cough, shortness of breath and wheezing.    Cardiovascular:  Negative for chest pain, palpitations and leg swelling.   Gastrointestinal:  Positive for abdominal pain. Negative for blood in stool, diarrhea, nausea, vomiting and reflux.   Endocrine: Negative for diabetes, hot flashes, hyperthyroidism and hypothyroidism.   Genitourinary:  Positive for dysuria, flank pain, frequency, pelvic pain, vaginal bleeding, vaginal discharge and vaginal pain. Negative for bladder incontinence, decreased libido, dysmenorrhea, dyspareunia, hematuria, urgency and urinary incontinence.   Musculoskeletal:  Positive for back pain. Negative for arthralgias, joint swelling, leg pain and myalgias.   Integumentary:  Positive for breast tenderness. Negative for rash.   Neurological:  Positive for headaches.   Psychiatric/Behavioral:  Negative for depression. The patient is not nervous/anxious.    Breast: Positive for tenderness.Negative for asymmetry    Physical Exam     Initial Vitals   BP Pulse Resp Temp SpO2   05/24/24  1045 05/24/24 1050 -- 05/24/24 1045 05/24/24 1050   135/85 (!) 144  (!) 101 °F (38.3 °C) 97 %      MAP       --                Physical Exam    Constitutional: She appears well-developed and well-nourished. No distress.   HENT:   Head: Normocephalic and atraumatic.   Eyes: EOM are normal.   Cardiovascular:  Normal rate, regular rhythm and normal heart sounds.     Exam reveals no gallop and no friction rub.       No murmur heard.  Pulmonary/Chest: Breath sounds normal. No respiratory distress. She has no wheezes. She has no rhonchi. She has no rales.   Bilateral breast exam wnl, no concern for mastitis.    Abdominal: Abdomen is soft. She exhibits no distension. There is abdominal tenderness (diffusely).   Diffuse abdominal tenderness. No particular fundal tenderness appreciated. Right CVA tenderness on exam There is no rebound and no guarding.   Musculoskeletal:         General: Normal range of motion.     Neurological: She is alert and oriented to person, place, and time.   Skin: Skin is warm, dry and intact. No rash noted.   Psychiatric: She has a normal mood and affect. Her speech is normal and behavior is normal.         ED Course   Procedures  Labs Reviewed   URINALYSIS - Abnormal; Notable for the following components:       Result Value    Occult Blood UA 3+ (*)     Leukocytes, UA 3+ (*)     All other components within normal limits   URINALYSIS MICROSCOPIC - Abnormal; Notable for the following components:    RBC, UA 14 (*)     WBC, UA 51 (*)     WBC Clumps, UA Occasional (*)     Bacteria Moderate (*)     Non-Squam Epith 1 (*)     All other components within normal limits   CBC W/ AUTO DIFFERENTIAL - Abnormal; Notable for the following components:    Gran # (ANC) 9.8 (*)     Lymph # 0.4 (*)     Gran % 91.3 (*)     Lymph % 3.9 (*)     Mono % 3.8 (*)     All other components within normal limits   COMPREHENSIVE METABOLIC PANEL - Abnormal; Notable for the following components:    Sodium 135 (*)     Potassium 3.4  (*)     CO2 19 (*)     Albumin 3.2 (*)     Alkaline Phosphatase 152 (*)     All other components within normal limits   CULTURE, BLOOD   CULTURE, BLOOD   CULTURE, URINE   LACTIC ACID, PLASMA          Imaging Results              X-Ray Chest 1 View (Final result)  Result time 05/24/24 13:40:08   Procedure changed from X-Ray Chest AP Portable     Final result by Samson Ojeda III, MD (05/24/24 13:40:08)                   Impression:      No acute process seen.      Electronically signed by: Samson Ojeda MD  Date:    05/24/2024  Time:    13:40               Narrative:    EXAMINATION:  XR CHEST 1 VIEW    CLINICAL HISTORY:  sepsis;  Sepsis, unspecified organism    FINDINGS:  Chest one view:    Heart size is normal.  Lungs are clear.  The bones bowel gas are noncontributory.                                       CT Abdomen Pelvis With IV Contrast NO Oral Contrast (Final result)  Result time 05/24/24 13:45:05      Final result by Samson Ojeda III, MD (05/24/24 13:45:05)                   Impression:      There is bilateral mild hydroureteronephrosis secondary to and enlarged uterus.  No renal calculi, bladder calculi, or ureter calculi are seen.      Electronically signed by: Samson Ojeda MD  Date:    05/24/2024  Time:    13:45               Narrative:    EXAMINATION:  CT ABDOMEN PELVIS WITH IV CONTRAST    CLINICAL HISTORY:  Abdominal pain, acute, nonlocalized;    FINDINGS:  Patient was administered 75 cc of Omnipaque 350 intravenously.  Lung bases are clear.  The liver, gallbladder, biliary tree, spleen, stomach, pancreas, and duodenum show nothing unusual.  The adrenal glands are not enlarged.  The kidneys enhance normally.  No renal mass, scar, stone or hydronephrosis is seen.  Vessels enhance normally.  There is diastasis recti on the left.    there is enlargement of the uterus.  There is mild dilatation of the ureters from mass effect from the uterus.  No ascites is seen.  The bowel loops are normal.  No  para-aortic, retroperitoneal, or mesenteric adenopathy is seen.  Bones reveal no significant focal abnormality.                                       Medications   lactated ringers bolus 2,000 mL (2,000 mLs Intravenous New Bag 24 1124)   acetaminophen tablet 1,000 mg (1,000 mg Oral Given 24 1130)   iohexoL (OMNIPAQUE 350) injection 75 mL (75 mLs Intravenous Given 24 1327)   butalbital-acetaminophen-caffeine -40 mg per tablet 2 tablet (2 tablets Oral Given 24 1431)   diphenhydrAMINE injection 50 mg (50 mg Intravenous Given 24 1537)   metoclopramide injection 10 mg (10 mg Intravenous Given 24 1537)     Medical Decision Making  38 y.o.  PPD#9 from uncomplicated   - Vitals on admit febrile to 101 and tachycardiac to 144  - Code sepsis called  - Physical exam notable for diffuse abdominal tenderness and right CVA tenderness. Breast, pulm and cardiac exams benign   - UA notable for moderate bacteria and 3+ leuks   - 2 liters LR bolus ordered.   - Amp and Gent ordered    Amount and/or Complexity of Data Reviewed  Labs: ordered.  Radiology: ordered.    Risk  OTC drugs.  Prescription drug management.                                      Clinical Impression:  Final diagnoses:  [A41.9] Sepsis  [R00.0] Tachycardia          ED Disposition Condition    Discharge Stable          ED Prescriptions       Medication Sig Dispense Start Date End Date Auth. Provider    cephALEXin (KEFLEX) 250 MG capsule Take 1 capsule (250 mg total) by mouth every 6 (six) hours. for 14 days 28 capsule 2024 Cristy Walsh MD          Follow-up Information    None          Tahmina Azar MD  Resident  [id number]

## 2024-05-26 LAB — BACTERIA UR CULT: ABNORMAL

## 2024-05-28 ENCOUNTER — PATIENT MESSAGE (OUTPATIENT)
Dept: OBSTETRICS AND GYNECOLOGY | Facility: CLINIC | Age: 39
End: 2024-05-28
Payer: COMMERCIAL

## 2024-05-28 LAB
OHS QRS DURATION: 78 MS
OHS QTC CALCULATION: 416 MS

## 2024-05-29 LAB
BACTERIA BLD CULT: NORMAL
BACTERIA BLD CULT: NORMAL

## 2024-05-29 RX ORDER — CEPHALEXIN 250 MG/1
250 CAPSULE ORAL EVERY 6 HOURS
Qty: 28 CAPSULE | Refills: 0 | Status: SHIPPED | OUTPATIENT
Start: 2024-05-29 | End: 2024-06-05

## 2024-05-31 NOTE — TELEPHONE ENCOUNTER
Patient with history of bipolar disorder  Has made comments multiple times since admission that he wants to hang himself  When questioned if he actively wants to kill himself, he denies this.  Seen by psychiatry in consultation, no inpatient needs at this time.   Spoke with patient about [facility] not being covered and gave the pt financial services number she will give the office a call back once she gets everything settled.

## 2024-06-07 ENCOUNTER — OFFICE VISIT (OUTPATIENT)
Dept: PSYCHIATRY | Facility: CLINIC | Age: 39
End: 2024-06-07
Payer: COMMERCIAL

## 2024-06-07 DIAGNOSIS — F41.1 GAD (GENERALIZED ANXIETY DISORDER): Primary | ICD-10-CM

## 2024-06-07 PROCEDURE — 1159F MED LIST DOCD IN RCRD: CPT | Mod: CPTII,95,, | Performed by: INTERNAL MEDICINE

## 2024-06-07 PROCEDURE — 1111F DSCHRG MED/CURRENT MED MERGE: CPT | Mod: CPTII,95,, | Performed by: INTERNAL MEDICINE

## 2024-06-07 PROCEDURE — 99213 OFFICE O/P EST LOW 20 MIN: CPT | Mod: 95,,, | Performed by: INTERNAL MEDICINE

## 2024-06-07 PROCEDURE — 1160F RVW MEDS BY RX/DR IN RCRD: CPT | Mod: CPTII,95,, | Performed by: INTERNAL MEDICINE

## 2024-06-07 NOTE — PROGRESS NOTES
OUTPATIENT PSYCHIATRY RETURN VISIT    ENCOUNTER DATE:  6/7/24   SITE:  [facility]  LENGTH OF SESSION:  12 minutes    The patient location is:  [place], not in a healthcare facility  Visit type:  audiovisual    Face to Face time with patient:  12 minutes  16 minutes of total time spent on the encounter, which includes face to face time and non-face to face time preparing to see the patient (eg, review of tests), Obtaining and/or reviewing separately obtained history, Documenting clinical information in the electronic or other health record, Independently interpreting results (not separately reported) and communicating results to the patient/family/caregiver, or Care coordination (not separately reported).     Each patient to whom he or she provides medical services by telemedicine is:  (1) informed of the relationship between the physician and patient and the respective role of any other health care provider with respect to management of the patient; and (2) notified that he or she may decline to receive medical services by telemedicine and may withdraw from such care at any time.    CHIEF COMPLAINT:  Follow-up      HISTORY OF PRESENTING ILLNESS:  Lindy Ferraro is a 38 y.o. female with history of Anxiety who presents for follow up appointment.      Plan at last appointment on 5/13/2024:  Increase Zoloft to 50mg daily to better treat anxiety.  Discussed with patient informed consent, risks versus benefits, alternative treatments, side effect profile and the inherent unpredictability of individual responses to these treatments.  The patient expresses understanding of the above and displays the capacity to agree with this current plan.  Continue individual psychotherapy with outside therapist.    History as told by patient:  After our last appointment, she had high blood pressure and they admitted her for induction.  Delivery went well.  Baby is doing well.  At home was feeling  pretty bad and it was getting worse - she thought it might be a UTI.  Went to ED and it was - pyelonephritis.  Had been in pain for about a week and now feeling a lot better.  Not getting great response from Ob office right now but going on Monday and hoping they will check her urine.  Baby boy - Brandt, 3 weeks old now.  Breastfeeding.  Anxiety has been good actually.  Feeling a huge difference on Zoloft 50mg daily.  Says she is a lot better.  Things that previously sent her into anxiety spiral aren't doing that at all.  Its been an adjustment to a new baseline - feels like some of her anxiety helped her.  Will had a diaper rash and she wasn't that worried and then  said something.  Benefits outweigh negatives so far.  Day to day is a lot easier.  Denies depression.  No issues when sleeping - sleeps when baby sleeps.      Medication side effects:  No  Medication compliance:  Yes    PSYCHIATRIC REVIEW OF SYSTEMS:  Trouble with sleep:  Sleeping when baby sleeps  Appetite changes:  Not discussed  Weight changes:  Gain with pregnancy  Lack of energy:  Only due to lack of sleep  Anhedonia:  Denies  Somatic symptoms:  Denies  Libido:  Chronically low  Anxiety/panic:  Significantly improved  Guilty/hopeless:  Denies  Self-injurious behavior/risky behavior:  Denies  Any drugs:  Denies  Alcohol:  Denies    MEDICAL REVIEW OF SYSTEMS:  Complete review of systems performed covering Constitutional, Musculoskeletal, Neurologic.  All systems negative except for that covered in HPI.    PAST PSYCHIATRIC, MEDICAL, AND SOCIAL HISTORY REVIEWED  The patient's past medical, family and social history have been reviewed and updated as appropriate within the electronic medical record - see encounter notes.    MEDICATIONS:    Current Outpatient Medications:     acetaminophen (TYLENOL) 325 MG tablet, Take 2 tablets (650 mg total) by mouth every 6 (six) hours as needed for Pain., Disp: 40 tablet, Rfl: 0    jack singer ointment, Apply  "topically 3 (three) times daily. Apply after feeding. Do not wash off. This compounded medication expires in 30 days., Disp: 30 g, Rfl: 1    prenatal vit/iron fum/folic ac (PRENATAL 1+1 ORAL), Take by mouth., Disp: , Rfl:     sertraline (ZOLOFT) 50 MG tablet, Take 1 tablet (50 mg total) by mouth once daily., Disp: 90 tablet, Rfl: 3    ALLERGIES:  Review of patient's allergies indicates:   Allergen Reactions    Asa [aspirin] Other (See Comments)     G6PD deficiency    Bactrim [sulfamethoxazole-trimethoprim]      G6PD deficiency    Ciprofloxacin      G6PD deficiency    Dapsone      G6PD deficiency      Nitrofurantoin      G6PD deficiency    Sulfa (sulfonamide antibiotics)      G6PD deficiency    Pcn [penicillins] Rash       PSYCHIATRIC EXAM:  There were no vitals filed for this visit.  Appearance:  Well groomed, appearing healthy and of stated age  Behavior:  Cooperative, pleasant, no psychomotor agitation or retardation  Speech:  Normal rate, rhythm, prosody, and volume  Mood:  "Good"  Affect:  Euthymic  Thought Process:  Linear, logical, goal directed  Thought Content:  Negative for suicidal ideation, homicidal ideation, delusions or hallucinations.  Associations:  Intact  Memory:  Grossly Intact  Level of Consciousness/Orientation:  Grossly intact  Fund of Knowledge:  Good  Attention:  Good  Language:  Fluent, able to name abstract and concrete objects  Insight:  Good  Judgment:  Intact  Psychomotor signs:  No involuntary movements of face  Gait:  Unable to assess via virtual visit      RELEVANT LABS/STUDIES:    Lab Results   Component Value Date    WBC 10.68 05/24/2024    HGB 14.0 05/24/2024    HCT 42.6 05/24/2024    MCV 88 05/24/2024     05/24/2024     BMP  Lab Results   Component Value Date     (L) 05/24/2024    K 3.4 (L) 05/24/2024     05/24/2024    CO2 19 (L) 05/24/2024    BUN 10 05/24/2024    CREATININE 0.7 05/24/2024    CALCIUM 9.3 05/24/2024    ANIONGAP 13 05/24/2024    ESTGFRAFRICA >60 " "04/22/2022    EGFRNONAA >60 04/22/2022     Lab Results   Component Value Date    ALT 36 05/24/2024    AST 28 05/24/2024    ALKPHOS 152 (H) 05/24/2024    BILITOT 0.7 05/24/2024     No results found for: "TSH"  Lab Results   Component Value Date    HGBA1C 4.6 10/06/2023       IMPRESSION:    Lindy Ferraro is a 38 y.o. female with history of Anxiety who presents for follow up appointment.    Status/Progress:  Based on the examination today, the patient's problem(s) is/are improved.  New problems have not been presented today.    Risk Parameters:  Patient reports no suicidal ideation  Patient reports no homicidal ideation  Patient reports no self-injurious behavior  Patient reports no violent behavior        DIAGNOSES:    ICD-10-CM ICD-9-CM   1. KEVIN (generalized anxiety disorder)  F41.1 300.02         PLAN:  Significant improvement in anxiety symptoms on Zoloft 50mg daily.  Continue this dose for now.  She may consider decreasing to 37.5mg at some point in the future.    Discussed with patient informed consent, risks versus benefits, alternative treatments, side effect profile and the inherent unpredictability of individual responses to these treatments.  The patient expresses understanding of the above and displays the capacity to agree with this current plan.  Continue individual psychotherapy with outside therapist.    RETURN TO CLINIC:  Follow up in about 2 months (around 8/7/2024).      "

## 2024-06-10 ENCOUNTER — POSTPARTUM VISIT (OUTPATIENT)
Dept: OBSTETRICS AND GYNECOLOGY | Facility: CLINIC | Age: 39
End: 2024-06-10
Attending: OBSTETRICS & GYNECOLOGY
Payer: COMMERCIAL

## 2024-06-10 VITALS
SYSTOLIC BLOOD PRESSURE: 118 MMHG | HEIGHT: 64 IN | BODY MASS INDEX: 30.2 KG/M2 | WEIGHT: 176.88 LBS | DIASTOLIC BLOOD PRESSURE: 76 MMHG

## 2024-06-10 DIAGNOSIS — B96.20 E. COLI UTI: Primary | ICD-10-CM

## 2024-06-10 DIAGNOSIS — N39.0 E. COLI UTI: Primary | ICD-10-CM

## 2024-06-10 DIAGNOSIS — N95.2 VAGINAL ATROPHY: ICD-10-CM

## 2024-06-10 LAB
BILIRUB SERPL-MCNC: NORMAL MG/DL
BILIRUB UR QL STRIP: NEGATIVE
BLOOD URINE, POC: NORMAL
CLARITY UR REFRACT.AUTO: CLEAR
CLARITY, POC UA: NORMAL
COLOR UR AUTO: COLORLESS
COLOR, POC UA: NORMAL
GLUCOSE UR QL STRIP: NEGATIVE
GLUCOSE UR QL STRIP: NORMAL
HGB UR QL STRIP: NEGATIVE
KETONES UR QL STRIP: NEGATIVE
KETONES UR QL STRIP: NORMAL
LEUKOCYTE ESTERASE UR QL STRIP: ABNORMAL
LEUKOCYTE ESTERASE URINE, POC: 2
MICROSCOPIC COMMENT: NORMAL
NITRITE UR QL STRIP: NEGATIVE
NITRITE, POC UA: NORMAL
PH UR STRIP: 8 [PH] (ref 5–8)
PH, POC UA: 7
PROT UR QL STRIP: NEGATIVE
PROTEIN, POC: NORMAL
RBC #/AREA URNS AUTO: 0 /HPF (ref 0–4)
SP GR UR STRIP: 1 (ref 1–1.03)
SPECIFIC GRAVITY, POC UA: 1
URN SPEC COLLECT METH UR: ABNORMAL
UROBILINOGEN, POC UA: NORMAL
WBC #/AREA URNS AUTO: 3 /HPF (ref 0–5)

## 2024-06-10 PROCEDURE — 81001 URINALYSIS AUTO W/SCOPE: CPT | Performed by: OBSTETRICS & GYNECOLOGY

## 2024-06-10 PROCEDURE — 87086 URINE CULTURE/COLONY COUNT: CPT | Performed by: OBSTETRICS & GYNECOLOGY

## 2024-06-10 RX ORDER — ESTRADIOL 0.1 MG/G
1 CREAM VAGINAL DAILY
Qty: 42.5 G | Refills: 1 | Status: SHIPPED | OUTPATIENT
Start: 2024-06-10 | End: 2024-06-10

## 2024-06-10 RX ORDER — ESTRADIOL 0.1 MG/G
1 CREAM VAGINAL
Qty: 42.5 G | Refills: 1 | Status: SHIPPED | OUTPATIENT
Start: 2024-06-10 | End: 2025-06-10

## 2024-06-10 NOTE — PROGRESS NOTES
"   Lindy Ferraro is a 38 y.o.  who presents for a postpartum visit.  She is status post  {Blank single:::"vacuum-assisted vaginal delivery","forceps-assisted vaginal delivery","vaginal delivery complicated by ***"," delivery secondary to ***","uncomplicated vaginal delivery"} {NUMBER:07101} weeks ago.  Her hospitalization {WAS WAS NOT:51743} complicated.  She {IS / IS NOT:68275} breastfeeding.  She desires {Blank multiple:34519::"nothing","depo-provera","to use withdrawal","to use fertility awareness","Nexplanon","Mirena","Kyleena","Paragard","condoms","OCPS"}for contraception.  She {Reports/denies:11677} signs and symptoms of postpartum depression.    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       Objective:     /76   Ht 5' 4" (1.626 m)   Wt 80.2 kg (176 lb 14.4 oz)   LMP 2023 (Exact Date)   Breastfeeding Yes   BMI 30.36 kg/m²     General: {GENERAL APPEARANCE:59005}  Abdomen: {ABDOMEN EXAM:30964::"no masses, hepatosplenomegaly, no hernias"}  External Genitalia: {:606293::"normal, well-healed, without lesions or masses"}  Vagina: {:398504::"normal, well-healed, physiologic discharge, without lesions"}  Cervix: {:646975::"normal, well-healed, without lesions"}  Uterus: {:835839::"normal size, well involuted, firm","non-tender"}  Adnexa: {:326479::"no masses palpable","nontender"}  Psych: {PSO ROS:05169}  Assessment:     E. coli UTI  -     Urine culture    Vaginal atrophy  -     Discontinue: estradioL (ESTRACE) 0.01 % (0.1 mg/gram) vaginal cream; Place 1 g vaginally once daily.  Dispense: 42.5 g; Refill: 1  -     estradioL (ESTRACE) 0.01 % (0.1 mg/gram) vaginal cream; Place 1 g vaginally 3 (three) times a week.  Dispense: 42.5 g; Refill: 1        Plan:     1. Return to clinic in 3-6 months for annual exam    "

## 2024-06-11 LAB
BACTERIA UR CULT: NORMAL
BACTERIA UR CULT: NORMAL

## 2024-06-20 ENCOUNTER — PATIENT MESSAGE (OUTPATIENT)
Dept: OBSTETRICS AND GYNECOLOGY | Facility: CLINIC | Age: 39
End: 2024-06-20
Payer: COMMERCIAL

## 2024-07-03 ENCOUNTER — POSTPARTUM VISIT (OUTPATIENT)
Dept: OBSTETRICS AND GYNECOLOGY | Facility: CLINIC | Age: 39
End: 2024-07-03
Attending: OBSTETRICS & GYNECOLOGY
Payer: COMMERCIAL

## 2024-07-03 VITALS
WEIGHT: 182.56 LBS | HEART RATE: 71 BPM | DIASTOLIC BLOOD PRESSURE: 76 MMHG | BODY MASS INDEX: 31.33 KG/M2 | SYSTOLIC BLOOD PRESSURE: 113 MMHG

## 2024-07-03 DIAGNOSIS — N39.0 RECURRENT UTI: ICD-10-CM

## 2024-07-03 DIAGNOSIS — Z87.448 HISTORY OF PYELONEPHRITIS: ICD-10-CM

## 2024-07-03 PROCEDURE — 0503F POSTPARTUM CARE VISIT: CPT | Mod: CPTII,S$GLB,, | Performed by: NURSE PRACTITIONER

## 2024-07-03 PROCEDURE — 99999 PR PBB SHADOW E&M-EST. PATIENT-LVL III: CPT | Mod: PBBFAC,,, | Performed by: NURSE PRACTITIONER

## 2024-07-03 NOTE — PROGRESS NOTES
Subjective:      Susan is a 38 y.o.  who presents for a postpartum visit.  She is status post  uncomplicated vaginal delivery 7 weeks ago. 2nd degree laceration. IOL 2/2 gHTN. Her hospitalization was not complicated.  She is breastfeeding.  Working with lactation - Dr. Cintron. She desires abstinence for contraception.  Will consider Mirena IUD in 6 months. Her partner is also planning a vasectomy. She denies signs and symptoms of postpartum depression. Currently on Sertraline 50 mg. Seeing Psychiatry, Dr. Wu. Doing well.     She is healing well. She is questioning if she has prolapse again. Has been doing some home exercises. She would like a referral to PFPT.   She was seen in the ER on  due to pyelonephritis. E. Coli >100,000 CFUs.   Repeat urine culture on 6/10 with multiple organisms isolated. None in predominance. She denies any urinary concerns but also feels as if she typically is not symptomatic with UTIs.   She was treated for 3 UTIs during pregnancy.     Dr. Beck prescribed her vaginal estrogen but she has yet to start. Is considering.     Her last pap was NILM, HPV negative on 10/12/2023      Review the Delivery Report for details.     Objective:     /76   Pulse 71   Wt 82.8 kg (182 lb 8.7 oz)   Breastfeeding No   BMI 31.33 kg/m²     General: Alert and no distress  External Genitalia: normal, well-healed, without lesions or masses  Vagina: normal, well-healed, physiologic discharge, without lesions    Assessment:     Encounter for postpartum visit    Recurrent UTI    History of pyelonephritis      Plan:     Breastfeeding - yes; working with Dr. Cintron  Contraception - abstinence; considering Mirena IUD in 6 months and partner planning vasectomy   Postpartum Depression - no; working with Dr. Wu; mood stable on Sertraline   Pap test up to date   Referral to PFPT placed (Call [phone] to schedule appointment)  Discussed trial of vaginal estrogen - pea size amount to lower  third of vagina every night for 2 weeks then twice weekly   Will consider referral to Urology if recurrent UTIs occur outside of pregnancy; encouraged Susan to notify office with any recurrent symptoms

## 2024-07-23 ENCOUNTER — CLINICAL SUPPORT (OUTPATIENT)
Dept: REHABILITATION | Facility: HOSPITAL | Age: 39
End: 2024-07-23
Payer: COMMERCIAL

## 2024-07-23 DIAGNOSIS — N39.0 RECURRENT UTI: ICD-10-CM

## 2024-07-23 DIAGNOSIS — R53.1 WEAKNESS: ICD-10-CM

## 2024-07-23 DIAGNOSIS — M62.89 PELVIC FLOOR DYSFUNCTION: Primary | ICD-10-CM

## 2024-07-23 PROBLEM — M79.18 MYALGIA OF PELVIC FLOOR: Status: RESOLVED | Noted: 2022-06-10 | Resolved: 2024-07-23

## 2024-07-23 PROBLEM — R19.8 ABDOMINAL WEAKNESS: Status: RESOLVED | Noted: 2021-06-25 | Resolved: 2024-07-23

## 2024-07-23 PROBLEM — M62.81 MUSCLE WEAKNESS: Status: RESOLVED | Noted: 2022-06-07 | Resolved: 2024-07-23

## 2024-07-23 PROCEDURE — 97161 PT EVAL LOW COMPLEX 20 MIN: CPT

## 2024-07-23 PROCEDURE — 97530 THERAPEUTIC ACTIVITIES: CPT

## 2024-07-23 PROCEDURE — 97112 NEUROMUSCULAR REEDUCATION: CPT

## 2024-07-23 NOTE — PLAN OF CARE
[facility]   Physical Therapy Initial Evaluation        Date: 2024   Name: Lindy Ferraro  Clinic Number: [id number]    Therapy Diagnosis:   Encounter Diagnoses   Name Primary?    Pelvic floor dysfunction Yes    Weakness      Physician: Rosaline Bean NP    Physician Orders: PT Eval and Treat   Medical Diagnosis from Referral: encounter for postpartum visit  Evaluation Date: 2024  Authorization Period Expiration: 24  Plan of Care Expiration: 10-14-24  Progress Note Due: 24  Visit # / Visits authorized:    FOTO: 1/3    Precautions: Standard and postpartum      Time In: 1520  Time Out: 1620  Total Appointment Time (timed & untimed codes): 50 minutes    SUBJECTIVE       Date of onset: 2 months    History of current condition - Susan reports: She delivered her 3rd baby 2 months ago. She sustained a grade 2 tear again.  She reports she is feeling symptom(s) of pelvic organ prolapse again.  These symptom(s) started after her 2nd delivery and have worsened after her 3rd baby.  She also reports sensitivity and is nervous about trying partnered intimacy at this time.  She also has right-sided hip and glut tension. She saw a sport-medicine doctor during pregnancy which helped ([facility] [place] This helped with her back pain during pregnancy.  She has started a peloton post-partum core program.  She notices after doing this routine she sometimes notices increased sensation of pelvic organ prolapse.  She would like to return to running.      Past medical history is notable for G6PD deficiency, anxiety, and several drug allergies.     OB/GYN History:     vaginal delivery and perineal laceration grade 3.  Urethral abrasion after her 3rd delivery.  She had to be hospitalized for sepsis related a urinary tract infection(s).    Hysterectomy? No  Oophorectomy? No  Using vaginal estrogen cream: No but has a prescription.    Sexually active? Not currently due to worry  "about pain   Pelvic Pain with: vaginal exams and possibly intercourse.    Pelvic pressure? Yes (worse with exercise and lifting) BM don't increased symptom(s) at this time.  Still taking colace.      Bladder/Bowel History:   Frequency of urination:   Daytime: every 1-2 hours           Nighttime: only when getting up feed  Difficulty initiating urine stream: No  Do you use the bathroom "just in case"? No  Are urinary symptom(s) worse around ovulation or period? Unknown at this time   Urine stream: strong  Complete emptying: Yes  Post-micturition dribble? No  Bladder leakage: No  Urinary Urgency: No.  Able to delay the urge for at least 15 minute(s).      Frequency of bowel movements: once a day  Difficulty initiating BM: No  Quality/Shape of BM: Freedom Stool Chart type 4  Does Patient Feel Empty after BM? Yes  Bowel Urgency: No.  Able to delay the urge for at least 15 minute(s).  Fiber Supplements or Laxative Use? Yes colace    Pain with BM: No   History of hemorrhoids/anal fissures?  Yes, hemorrhoids  Bleeding with BM: No   Colon leakage: No    Form of protection: none reported      Types of fluid intake: water  Diet: Vegetarian  Habitus: well developed, well nourished  Abuse/Neglect: Pt denies a history of physical or emotional abuse at this visit.       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     Surgical History: Lindy Perez Ronan  has a past surgical history that includes Intrauterine device insertion (2016); Small intestine surgery (8/1987); and Corrigan tooth extraction.    Medications: Lindy has a current medication list which includes the following prescription(s): " acetaminophen, estradiol, angelika singer ointment, prenatal vit/iron fum/folic ac, and sertraline.    Allergies:   Review of patient's allergies indicates:   Allergen Reactions    Asa [aspirin] Other (See Comments)     G6PD deficiency    Bactrim [sulfamethoxazole-trimethoprim]      G6PD deficiency    Ciprofloxacin      G6PD deficiency    Dapsone      G6PD deficiency      Nitrofurantoin      G6PD deficiency    Sulfa (sulfonamide antibiotics)      G6PD deficiency    Pcn [penicillins] Rash        Imaging: None reported for this condition     Prior Therapy/Previous treatment included: Pelvic floor physical therapy   Social History/Living Arrangements: lives family   Current exercise: peloton post-partum core class   Occupation: researcher   Prior Level of Function: Pt was independent with all ADLs and iADLs without pain, no reports of incontinence of bowel or bladder.  Current Level of Function: pelvic pain with activities of daily living     Constitutional Symptoms Review: The patient denies having any constitutional symptoms.     Flags Screening  Red Flags:family history of cancer: mother had skin cancer      Pts goals: decrease pain and improve symptom(s) of pelvic organ prolapse     OBJECTIVE     See EMR under MEDIA for written consent provided 7/23/2024  Chaperone: declined    ORTHO SCREEN  Posture in sitting: slouched  and sits squarely   Posture in standing: forward head and forward and rounded shoulders        ABDOMINAL WALL ASSESSMENT: Deferred due to time constraints     BREATHING MECHANICS ASSESSMENT   Thorax Assessment During Quiet Respiration: WNL excursion of ribcage and WNL excursion of abdominal wall  Thorax Assessment During Deep Respiration: Decreased excursion of lateral ribs, Decreased excursion of anterior ribs, and Decreased excursion of posterior ribs    VAGINAL PELVIC FLOOR EXAM    EXTERNAL ASSESSMENT  Introitus: WNL  Skin condition: WNL  Scarring: perineal laceration scar noted grade II     Sensation: WNL   Pain: at introitus, bilateral STP L>R   Voluntary contraction: visible lift  Voluntary relaxation: visible drop  Involuntary contraction: bulge  Bearing down: reflex tightening  Perineal descent: absent        INTERNAL ASSESSMENT  Pain: tender areas noted as follows: layer 1 bilaterally, introitus   Sensation: able to localized pressure appropriately   Vaginal vault: WNL   Muscle Bulk: increased resting tension   Muscle Power: 2/5  Muscle Endurance: Not tested   # Reps To Fatigue: Not tested    Fast Contractions in 10 seconds: Not tested      Quality of contraction: slow rise and slow relaxation   Specificity: WNL   Coordination: tends to hold breath during PFM contration   Bearing down: reflex tightening  Prolapse check: mild redundant tissue anterior vaginal wall   Does Pelvic Floor drop and relax with a diaphragmatic breath? no        Intake Outcome Measures for FOTO Survey    Therapist reviewed FOTO scores for Lindy Ferraro on 7/23/2024.     FOTO report- see Media section in Epic or account episode details in FOTO.      Intake Score:            TREATMENT        Treatment Time In: 1602  Treatment Time Out: 1620  Total Treatment time (time-based codes) separate from Evaluation: 16 minutes    Neuromuscular Re-education to develop Coordination, Control, and Down training for 8 minutes including:   [x]pelvic floor relaxation/bulging training       Therapeutic Activity Patient participated in dynamic functional therapeutic activities to improve functional performance for 8 minutes. Including: Education as described below.     [x] Instruction on diaphragmatic breathing   [x] Education on visual cues/mental imagery for pelvic floor activation   [x] Pelvic anatomy edu and impact on current level of function   [x] HEP building  [x] Edu on pelvic floor check in's for downtraining       Written Home Exercises and Education Provided: yes. Exercises were reviewed and Susan was able to demonstrate  them prior to the end of the session.  Susan demonstrated good  understanding of the education provided. See EMR under Patient Instructions for exercises and education provided during therapy sessions.      ASSESSMENT     Lindy is a 38 y.o. female referred to outpatient Physical Therapy with a medical diagnosis of pelvic pain and pelvic organ prolapse post vaginal delivery. Pt presents with pelvic floor tenderness, adhered/painful perineal scar, increased tension of the pelvic muscles, and poor quality of pelvic muscle contraction. She reports her pelvic pain is affecting access to vaginal exams and partnered intimacy.  She is also worried about her pelvic organ prolapse symptom(s) and describes them as bothersome with activities of daily living.   Examination reveals pelvic floor coordination deficits and increased muscular tone with soft tissue restrictions. The patient is expected to benefit from skilled intervention to work towards improving lumbopelvic dysfunction, pelvic floor relaxation and coordination as well as improving overall strength and ROM in order to return towards prior level of function and ADL participation.         Pt prognosis is Excellent.   Pt will benefit from skilled outpatient Physical Therapy to address the deficits stated above and in the chart below, provide pt/family education, and to maximize pt's level of independence.     Plan of care discussed with patient: Yes  Pt's spiritual, cultural and educational needs considered and patient is agreeable to the plan of care and goals as stated below:     Anticipated Barriers for therapy: none    Medical Necessity is demonstrated by the following  History  Co-morbidities and personal factors that may impact the plan of care [x] LOW: no personal factors / co-morbidities  [] MODERATE: 1-2 personal factors / co-morbidities  [] HIGH: 3+ personal factors / co-morbidities    Moderate / High Support Documentation:   Co-morbidities affecting plan  "of care: recent vaginal delivery    Personal Factors:   no deficits     Examination  Body Structures and Functions, activity limitations and participation restrictions that may impact the plan of care [x] LOW: addressing 1-2 elements  [] MODERATE: 3+ elements  [] HIGH: 4+ elements (please support below)    Moderate / High Support Documentation: pelvic floor muscle(s) dysfunction      Clinical Presentation [x] LOW: stable  [] MODERATE: Evolving  [] HIGH: Unstable     Decision Making/ Complexity Score: low         Goals:  Short Term Goals: 4 weeks   Pt to perform "the knack" prior to coughing, laughing or sneezing to decrease strain to pelvic organs with increased intraabdominal pressure.        Pt to demonstrate being able to correctly and consistently perform a kegel which is needed for improved support of pelvic organs with activities that increase intraabdominal pressure.        Pt to report a decrease in pelvic pressure and fullness to no more than 45% of the time to demonstrate improving pelvic support of pelvic structures.  Pt to demonstrate proper diaphragmatic breathing to help with calming the nervous system in order to decrease pain and to improve abdominal wall and pelvic floor musculature extensibility.   Pt will report minimal to no pain with single digit pelvic assessment and display relaxation demonstrating improving pelvic floor muscle relaxation and coordination.       Long Term Goals: 12 weeks ,   Pt to be discharged with home plan for carry over after discharge.   Pt to report a decrease in urinary frequency from every 1 hours to no more than once every 3 hour(s) to improve ability to participate in social activities.  Pt to report being able to have a comfortable vaginal exam without significant increase in pelvic pain.  Pt to report a decrease in pelvic pressure and fullness to no more than 20% of the time to demonstrate improving pelvic support of pelvic structures.   Pt to demonstrate an " improved score in the FOTO PFDI Prolapse survey  to at least 20 to demonstrate improving pelvic floor muscle(s) function and support with activities of daily living.       PLAN     Plan of care Certification: 7/23/2024 to 10-14-24.    Outpatient Physical Therapy 1-2 times weekly for 12 weeks to include the following interventions: therapeutic exercises, therapeutic activity, neuromuscular re-education, gait training, manual therapy, modalities PRN, patient/family education, and self care/home management, and dry needling.     Margarita Atwood, PT

## 2024-07-23 NOTE — PATIENT INSTRUCTIONS
"Home Exercise Program: 7/23/2024      "CHECK IN" WITH YOUR PELVIC FLOOR    Every hour, "check in" with your pelvic floor.  Is it tight and contracted?  Is it relaxed and dropped?    Take 10 seconds and try to release any tension in the pelvic floor muscles and external anal sphincter. You can try one or more of the following techniques.   Diaphragmatic Breathing  One quick flick  Mindfulness  Body scan   Gently pushing out your pelvic floor     Then return to your regular tasks.    Do this every hour throughout the day in any position.      Pelvic Floor Relaxation Training Exercises:    1) Gently push out your pelvic floor.  Try to breathe out as you do so.  Perform 5 pushes 2-3x/day.     2) Perform a quick kegel and quickly relax.  Try to let go of the contraction as quickly as possible.  Perform 5-10 squeeze and quick releases in a row 1-2x/day.        "

## 2024-07-23 NOTE — PLAN OF CARE
[facility]   Physical Therapy Initial Evaluation        Date: 2024   Name: Lindy Ferraro  Clinic Number: [id number]    Therapy Diagnosis:   Encounter Diagnoses   Name Primary?    Pelvic floor dysfunction Yes    Weakness      Physician: Rosaline Bean NP    Physician Orders: PT Eval and Treat   Medical Diagnosis from Referral: ***  Evaluation Date: 2024  Authorization Period Expiration: 24  Plan of Care Expiration: 10-14-24  Progress Note Due: 24  Visit # / Visits authorized:    FOTO: 1/3    Precautions: Standard and postpartum      Time In: 1520  Time Out: ***  Total Appointment Time (timed & untimed codes): *** minutes    SUBJECTIVE       Date of onset: 2 months    History of current condition - Susan reports: She delivered her 3rd baby 2 months ago. She sustained a grade 2 tear again.  She reports she is feeling symptom(s) of pelvic organ prolapse again.  These symptom(s) started after her 2nd delivery and have worsened after her 3rd baby.  She also reports sensitivity and is nervous about trying partnered intimacy at this time.  She also has right-sided hip and glut tension. She saw a sport-medicine doctor during pregnancy which helped ([facility]). This helped with her back pain during pregnancy.  She has started a peloton post-partum core program.  She notices after doing this routine she sometimes notices increased sensation of pelvic organ prolapse.  She would like to return to running.      OB/GYN History:    *** vaginal delivery and perineal laceration grade 3.  Urethral abrasion after her 3rd delivery.  She had to be hospitalized for sepsis related a urinary tract infection(s).    Hysterectomy? No  Oophorectomy? No  Using vaginal estrogen cream: No but has a prescription.    Sexually active? Not currently due to worry about pain   Pelvic Pain with: vaginal exams and possibly intercourse.    Pelvic pressure? Yes (worse with exercise  "and lifting) BM don't increased symptom(s) at this time.  Still taking colace.      Bladder/Bowel History:   Frequency of urination:   Daytime: every 1-2 hours           Nighttime: only when getting up feed  Difficulty initiating urine stream: No  Do you use the bathroom "just in case"? No  Are urinary symptom(s) worse around ovulation or period? Unknown at this time   Urine stream: strong  Complete emptying: Yes  Post-micturition dribble? No  Bladder leakage: No  Urinary Urgency: No.  Able to delay the urge for at least 15 minute(s).      Frequency of bowel movements: once a day  Difficulty initiating BM: No  Quality/Shape of BM: [place] Stool Chart type 4  Does Patient Feel Empty after BM? Yes  Bowel Urgency: No.  Able to delay the urge for at least 15 minute(s).  Fiber Supplements or Laxative Use? Yes colace    Pain with BM: No   History of hemorrhoids/anal fissures?  Yes, hemorrhoids  Bleeding with BM: No   Colon leakage: No    Form of protection: none reported      Types of fluid intake: water  Diet: Vegetarian  Habitus: well developed, well nourished  Abuse/Neglect: Pt denies a history of physical or emotional abuse at this visit.       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     Surgical History: Lindy Ferraro  has a past surgical history that includes Intrauterine device insertion (2016); Small intestine surgery (8/1987); and [place] tooth extraction.    Medications: Lindy has a current medication list which includes the following prescription(s): acetaminophen, estradiol, angelika enmanuel ointment, prenatal vit/iron fum/folic ac, and sertraline.    Allergies: "   Review of patient's allergies indicates:   Allergen Reactions    Asa [aspirin] Other (See Comments)     G6PD deficiency    Bactrim [sulfamethoxazole-trimethoprim]      G6PD deficiency    Ciprofloxacin      G6PD deficiency    Dapsone      G6PD deficiency      Nitrofurantoin      G6PD deficiency    Sulfa (sulfonamide antibiotics)      G6PD deficiency    Pcn [penicillins] Rash        Imaging: None reported for this condition     Prior Therapy/Previous treatment included: Pelvic floor physical therapy   Social History/Living Arrangements: lives family   Current exercise: peloton post-partum core class   Occupation: researcher   Prior Level of Function: Pt was independent with all ADLs and iADLs without pain, no reports of incontinence of bowel or bladder.  Current Level of Function: pelvic pain with activities of daily living     Constitutional Symptoms Review: The patient denies having any constitutional symptoms.     Flags Screening  Red Flags:family history of cancer: mother had skin cancer      Pts goals: decrease pain and improve symptom(s) of pelvic organ prolapse     OBJECTIVE     See EMR under MEDIA for written consent provided 7/23/2024  Chaperone: declined    ORTHO SCREEN  Posture in sitting: slouched  and sits squarely   Posture in standing: forward head and forward and rounded shoulders   Pelvic alignment: {AMB PT PELVIC FLOOR SI JOINT ASSESSMENT:33677}   SI Joint Palpation: {NSR SI JOINT PALPATION:78418}  Sacral spring test: {POSITIVE/NEGATIVE:38343} (Positive=NO spring)  Adductor Palpation: {AMB PT PELVIC FLOOR MUSCLE PALPATION:29365}    Squat: {sehrtsquat:51850}    Lumbar Active Range of Motion:      ROM    Percentage  Pain    Flexion  ***  {YES***/NO:56698}    Extension  ***  {YES***/NO:18643}    Left Side Bending  ***  {YES***/NO:09940}    Right Side Bending  ***  {YES***/NO:65735}    Left Rotation  ***  {YES***/NO:81196}    Right Rotation  ***  {YES***/NO:21911}       LE Strength Testing:   Hip Left  Right   Flexion {AMB PT VESTIBULAR STRENGTH:32530} {AMB PT VESTIBULAR STRENGTH:59301}   Abduction {AMB PT VESTIBULAR STRENGTH:03867} {AMB PT VESTIBULAR STRENGTH:54388}   Adduction {AMB PT VESTIBULAR STRENGTH:54600} {AMB PT VESTIBULAR STRENGTH:24965}   Extension {AMB PT VESTIBULAR STRENGTH:66662} {AMB PT VESTIBULAR STRENGTH:37304}   External Rot {AMB PT VESTIBULAR STRENGTH:53083} {AMB PT VESTIBULAR STRENGTH:82468}   Internal Rot {AMB PT VESTIBULAR STRENGTH:65044} {AMB PT VESTIBULAR STRENGTH:89100}     Knee Left Right   Extension {AMB PT VESTIBULAR STRENGTH:03987} {AMB PT VESTIBULAR STRENGTH:56567}   Flexion {AMB PT VESTIBULAR STRENGTH:82040} {AMB PT VESTIBULAR STRENGTH:36484}     Gait Analysis: {AMB PT PELVIC FLOOR GAIT:50867}       Balance Reactions:    Static standing: {DESC; POOR/FAIR/GOOD/EXCELLENT:19665}   Dynamic standing:  {DESC; POOR/FAIR/GOOD/EXCELLENT:19665}   Comments: ***     Special Tests for Load Transfer Assessment Between the Trunk and Lower Extremities:    Active Straight Leg Test:    Right: {AMB PT PELVIC FLOOR ASLR:18064}   Left: {AMB PT PELVIC FLOOR ASLR:94780}   Active Straight Leg Test with *** Overpressure:   Right: {AMB PT PELVIC FLOOR ASLR:03433}   Left: {AMB PT PELVIC FLOOR ASLR:59653}   Single Leg Stance Test:    Right: {AMB PT PELVIC FLOOR SLS:74521}   Left: {AMB PT PELVIC FLOOR SLS:96571}          ABDOMINAL WALL ASSESSMENT  Palpation: {AMB PT PELVIC FLOOR MUSCLE PALPATION:16387} ***  Abdominal strength: Rectus abdominus: {AMB PT VESTIBULAR STRENGTH:03242}     Transverse abdominus: {AMB PT VESTIBULAR STRENGTH:00865}  Scarring: ***  Pelvic Floor Muscle and Transverse Abdominus Synergy: {PRESENT/ABSENT:79731}  Diastasis: {PRESENT/ABSENT:96361}   Pelvic Girdle Stability: Pt demonstrates {mildly/moderately/severely:18401} impaired ability to stabilize pelvis with Active Straight Leg Raise Test. Able to improve {Desc; slightly/moderately/markedly:17855} with verbal/manual cues for transverse  "abdominus activation.     BREATHING MECHANICS ASSESSMENT   Thorax Assessment During Quiet Respiration: {AMB PT PELVIC FLOOR BREATHIN}  Thorax Assessment During Deep Respiration: {AMB PT PELVIC FLOOR BREATHIN}    VAGINAL PELVIC FLOOR EXAM    EXTERNAL ASSESSMENT  Introitus: {AMB PT PELVIC FLOOR INTROITUS:51730}  Skin condition: {AMB PT PELVIC FLOOR SKIN CONDITION:27670}  Scarring: {Pelvic Floor Scarin}   Sensation: {AMB PT PELVIC FLOOR SENSATION:32950}   Pain: {Blank single:::"none","Q tip test positive","***"}  Voluntary contraction: {AMB PT PELVIC FLOOR CONTRACTION RESPONSE:86110}  Voluntary relaxation: {AMB PT PELVIC FLOOR CONTRACTION RESPONSE:29854}  Involuntary contraction: {AMB PT PELVIC FLOOR CONTRACTION RESPONSE:25571}  Bearing down: {AMB PT PELVIC FLOOR CONTRACTION RESPONSE:51512}  Perineal descent: {Blank single:::"present","absent"}  Comments: ***      INTERNAL ASSESSMENT  Pain: {AMB PT PELVIC FLOOR INTERAL PAIN:48558}   Sensation: {Blank single:::"able to localized pressure appropriately","able to sense generalized pressure, unable to identify location","numbness noted ***","paresthesia noted ***"}   Vaginal vault: {AMB PT PELVIC FLOOR VAGINAL VAULT:31620}   Muscle Bulk: {AMB PT PELVIC FLOOR MUSCLE BULK:60758}   Muscle Power: ***/5  Muscle Endurance: *** sec  # Reps To Fatigue: ***    Fast Contractions in 10 seconds: ***     Quality of contraction: {AMB PT PELVIC FLOOR QUALITY OF CONTRACTION:25147}   Specificity: {AMB PT PELVIC FLOOR SPECIFICITY:23744}   Coordination: {AMB PT PELVIC FLOOR COORDINATION:45821}   Bearing down: {AMB PT PELVIC FLOOR CONTRACTION RESPONSE:56801}  Prolapse check: {AMB PT Pelvic Floor Prolapse:97296}  Does Pelvic Floor drop and relax with a diaphragmatic breath? {YES/NO/WILD CARDS:48316}  Comments: ***    RECTAL PELVIC FLOOR EXAM    EXTERNAL ASSESSMENT  Anus: {AMB PT PELVIC FLOOR INTROITUS:35742}  Skin condition: {AMB PT PELVIC FLOOR SKIN " "CONDITION:06446}   Scarring: {AMB PT PELVIC FLOOR SCARRIN}  Sensation: {AMB PT PELVIC FLOOR SENSATION:04892}   Pain: {Blank single:::"none","Q tip test positive","***"}  Voluntary contraction: {AMB PT PELVIC FLOOR CONTRACTION RESPONSE:58981}  Voluntary relaxation: {AMB PT PELVIC FLOOR CONTRACTION RESPONSE:69947}  Involuntary contraction: {AMB PT PELVIC FLOOR CONTRACTION RESPONSE:03490}  Bearing down: {AMB PT PELVIC FLOOR CONTRACTION RESPONSE:74910}  Anal Wayne: {Blank single:::"intact","diminished","difficult to elicit","***"}  Discharge: {Blank single:::"none","brown","bloody","***"}       INTERNAL ASSESSMENT  EAS tone: {Blank single:::"WNL","hypotonic","hypertonic","***"}   Impaction: {Blank single:::"none","stool in vault","***"}   Pain: {AMB PT PELVIC FLOOR INTERAL PAIN:41478}  Sensation: {Blank single:::"able to localized pressure appropriately","able to sense generalized pressure, unable to identify location","numbness noted ***","paresthesia noted ***"}   Muscle Bulk: {AMB PT PELVIC FLOOR MUSCLE BULK:49892}   Muscle Power: ***/5  Muscle Endurance: *** sec  # Reps To Fatigue: ***    Fast Contractions in 10 seconds: ***     Bearing down: {AMB PT PELVIC FLOOR CONTRACTION RESPONSE:34771}  Quality of contraction: {AMB PT PELVIC FLOOR QUALITY OF CONTRACTION:00889}   Specificity: {AMB PT PELVIC FLOOR SPECIFICITY:85912}  Coordination: {AMB PT PELVIC FLOOR COORDINATION:59327}   Does Pelvic Floor drop and relax with a diaphragmatic breath? {YES/NO/WILD CARDS:93410}  Comments: ***    Intake Outcome Measures for FOTO Survey    Therapist reviewed FOTO scores for Lindy Ferraro on 2024.     FOTO report- see Media section in Epic or account episode details in FOTO.      Intake Score:   ***         TREATMENT        Treatment Time In: ***  Treatment Time Out: ***  Total Treatment time (time-based codes) separate from Evaluation: *** minutes    Neuromuscular Re-education to " "develop {Pelvic Health Neuro Re-Ed:11844} for *** minutes including:   []{Pelvic PT NMR:67724}   []{Pelvic PT TherEx:41502}  [] {Pelvic Floor Edu:26619}     Manual Therapy to develop {PF manual:47363} for *** minutes including:   []{Pelvic Floor Manual Ther:34683}  []{Pelvic Floor Manual Ther:85982}      Therapeutic Activity Patient participated in dynamic functional therapeutic activities to improve functional performance for *** minutes. Including: Education as described below.     [x] {Pelvic Floor Edu:92135}   [x] Instruction on diaphragmatic breathing   [x] Education on visual cues/mental imagery for pelvic floor relaxation  [x] Education on visual cues/mental imagery for pelvic floor activation   [x] Instruction on the Knack for reduction of stress urinary incontinence  [x] Pelvic anatomy edu and impact on current level of function   [x] HEP building      Written Home Exercises and Education Provided: {Blank single:88726::"yes","Patient instructed to cont prior HEP"}. Exercises were reviewed and Susan was able to demonstrate them prior to the end of the session.  Susan demonstrated {Desc; good/fair/poor:27720} understanding of the education provided. See EMR under Patient Instructions for exercises and education provided during therapy sessions.      ASSESSMENT     Lindy is a 38 y.o. female referred to outpatient Physical Therapy with a medical diagnosis of ***. Pt presents with {AMB PT PELVIC FLOOR PROBLEM LIST:68781}. ***     Pt prognosis is Excellent.   Pt will benefit from skilled outpatient Physical Therapy to address the deficits stated above and in the chart below, provide pt/family education, and to maximize pt's level of independence.     Plan of care discussed with patient: Yes  Pt's spiritual, cultural and educational needs considered and patient is agreeable to the plan of care and goals as stated below:     Anticipated Barriers for therapy: {PT PELVIC FLOOR EDUCATIONAL/SPIRITUAL/CULTURAL " NEEDS:62223}    Medical Necessity is demonstrated by the following  History  Co-morbidities and personal factors that may impact the plan of care [x] LOW: no personal factors / co-morbidities  [] MODERATE: 1-2 personal factors / co-morbidities  [] HIGH: 3+ personal factors / co-morbidities    Moderate / High Support Documentation:   Co-morbidities affecting plan of care: ***    Personal Factors:   {Personal Factors:94881}     Examination  Body Structures and Functions, activity limitations and participation restrictions that may impact the plan of care [x] LOW: addressing 1-2 elements  [] MODERATE: 3+ elements  [] HIGH: 4+ elements (please support below)    Moderate / High Support Documentation: ***     Clinical Presentation [x] LOW: stable  [] MODERATE: Evolving  [] HIGH: Unstable     Decision Making/ Complexity Score: {Desc; low/moderate/high:412150}         Goals:  Short Term Goals: 4 weeks   {UI ST}  {Pelvic Pain ST}  {Constipation ST}  {FI ST}  {Pregnancy ST}    Long Term Goals: 12 weeks ,   {UI LT}  {Pelvic Pain LT}  {Constipation LT}  {FI LT}  {Pregnancy LT}    PLAN     Plan of care Certification: 2024 to 10-14-24.    Outpatient Physical Therapy 1-2 times weekly for 12 weeks to include the following interventions: therapeutic exercises, therapeutic activity, neuromuscular re-education, gait training, manual therapy, modalities PRN, patient/family education, and self care/home management, and dry needling.     Margarita Atwood, PT

## 2024-07-31 ENCOUNTER — CLINICAL SUPPORT (OUTPATIENT)
Dept: REHABILITATION | Facility: OTHER | Age: 39
End: 2024-07-31
Payer: COMMERCIAL

## 2024-07-31 DIAGNOSIS — M62.89 PELVIC FLOOR DYSFUNCTION: Primary | ICD-10-CM

## 2024-07-31 PROCEDURE — 97530 THERAPEUTIC ACTIVITIES: CPT

## 2024-07-31 PROCEDURE — 97112 NEUROMUSCULAR REEDUCATION: CPT

## 2024-07-31 NOTE — PROGRESS NOTES
[facility] OUTPATIENT THERAPY AND WELLNESS   Physical Therapy Treatment Note      Name: Lindy Ferraro  Clinic Number: [id number]    Therapy Diagnosis:   Encounter Diagnosis   Name Primary?    Pelvic floor dysfunction Yes     Physician: Rosaline Bean NP    Visit Date: 7/31/2024    Physician Orders: PT Eval and Treat   Medical Diagnosis from Referral: encounter for postpartum visit  Evaluation Date: 7/23/2024  Authorization Period Expiration: 12-31-24  Plan of Care Expiration: 10-14-24  Progress Note Due: 8-23-24  Visit # / Visits authorized: 1/ 20  FOTO: 1/3    Time In: 1:30 pm  Time Out: 2:30 pm  Total Billable Time: 55 minutes    Precautions: Standard    Subjective     Pt reports: she states that she has been doing a post partum core exercises which seems to be helping. Scar is very senstiive   She was compliant with home exercise program.  Response to previous treatment: none  Functional change: none    Pain: none currently       Objective      ABDOMINAL WALL ASSESSMENT  Palpation: boggy and trigger points  Pelvic Girdle Stability: Pt demonstrates severely impaired ability to stabilize pelvis with Active Straight Leg Raise Test. Able to improve slightly with verbal/manual cues for transverse abdominus activation.   Diastasis Recti: present, 3-inch width, present with supine curl-up task  Motor Control: unable to demonstrate appropriate transverse abdominis contraction on command, with verbal and tactile cues       Treatment   Susan received the treatments listed below:    Pt verbally consents to intravagnial treatment today.  Signed consent form already on file.  Chaperone declined today.      Susan received the following manual therapy techniques: to develop flexibility and extensibility for 0 minutes including:   []            Susan participated in neuromuscular re-education activities to develop Coordination, Control, and Down training for 25 minutes including:   pelvic floor relaxation/bulging training  "and diaphragmatic breathing   [x] Kegel edu and practice.  Increased time spent on edu on proper technique.  Pt improves with practice and verbal cues.  [x] Box breathing 5x5"   [x] Tra activation w/ shhh 10x5"  [x] Supine braced marching w/ TrA activation x10 B  [x] SEMG biofeed back w/ guided down training and body scanning, PFM relaxation practice-Pt presents with resting tone of 5-7 microvolts and appropriate PFM contraction on command. Pt was able to downtrain to 3-4 microvolts with PT cues for diaphragmatic breathing, relaxation, visualization, and body scanning. She struggled to consistently lower PFM tone and maintain. She notes difficulty sensing the difference between initial tone and eventual lower tone.       Susan participated in dynamic functional therapeutic activities to improve functional performance for 25  minutes, including:  [x] Abdominal assessment   [x] Anatomy review and discussion of the anatomy on current level of function   [x] Education on visual cues/mental imagery for pelvic floor relaxation  [x] Coordination of kegels with functional activities such as cough, laugh, sneeze, lift, etc.   [x] Home exercise program issued and reviewed     Home Exercises Provided and Patient Education Provided     Education provided: See above  Discussed progression of plan of care with patient; educated pt in activity modification; reviewed HEP with pt. Pt demonstrated and verbalized understanding of all instruction and was provided with a handout of HEP (see Patient Instructions).    Written Home Exercises Provided: yes.  Exercises were reviewed and Susan was able to demonstrate them prior to the end of the session.  Susan demonstrated good  understanding of the education provided.     See EMR under Patient Instructions for exercises provided 7/31/2024.    Assessment     Pt also demonstrates increased tension throughout abdominal muscles with poor breathing mechanics. Initiated education on diaphragmatic " "breathing and performing pelvic floor check-ins this visit to focus on relaxation and downtraining. Pt sent updated HEP via text link from HEP[employer]. Pt will continue to benefit from skilled outpatient physical therapy to address the deficits listed in the problem list box on initial evaluation, provide pt/family education and to maximize pt's level of independence in the home and community environment.       Susan Is progressing well towards her goals.   Pt prognosis is Good.     Pt will continue to benefit from skilled outpatient physical therapy to address the deficits listed in the problem list box on initial evaluation, provide pt/family education and to maximize pt's level of independence in the home and community environment.     Pt's spiritual, cultural and educational needs considered and pt agreeable to plan of care and goals.     Anticipated barriers to physical therapy: None      Short Term Goals: 4 weeks   Pt to perform "the knack" prior to coughing, laughing or sneezing to decrease strain to pelvic organs with increased intraabdominal pressure.        Pt to demonstrate being able to correctly and consistently perform a kegel which is needed for improved support of pelvic organs with activities that increase intraabdominal pressure.        Pt to report a decrease in pelvic pressure and fullness to no more than 45% of the time to demonstrate improving pelvic support of pelvic structures.  Pt to demonstrate proper diaphragmatic breathing to help with calming the nervous system in order to decrease pain and to improve abdominal wall and pelvic floor musculature extensibility.   Pt will report minimal to no pain with single digit pelvic assessment and display relaxation demonstrating improving pelvic floor muscle relaxation and coordination.         Long Term Goals: 12 weeks ,   Pt to be discharged with home plan for carry over after discharge.   Pt to report a decrease in urinary frequency from every 1 hours " to no more than once every 3 hour(s) to improve ability to participate in social activities.  Pt to report being able to have a comfortable vaginal exam without significant increase in pelvic pain.  Pt to report a decrease in pelvic pressure and fullness to no more than 20% of the time to demonstrate improving pelvic support of pelvic structures.   Pt to demonstrate an improved score in the FOTO PFDI Prolapse survey  to at least 20 to demonstrate improving pelvic floor muscle(s) function and support with activities of daily living.       Plan     Continue with established Plan of Care, working toward established PT goals.      At next visit: continue to progress down training techniques     Kemal Valentin, PT

## 2024-08-14 ENCOUNTER — CLINICAL SUPPORT (OUTPATIENT)
Dept: REHABILITATION | Facility: OTHER | Age: 39
End: 2024-08-14
Payer: COMMERCIAL

## 2024-08-14 DIAGNOSIS — M62.89 PELVIC FLOOR DYSFUNCTION: Primary | ICD-10-CM

## 2024-08-14 PROCEDURE — 97112 NEUROMUSCULAR REEDUCATION: CPT

## 2024-08-14 PROCEDURE — 97530 THERAPEUTIC ACTIVITIES: CPT

## 2024-08-14 NOTE — PROGRESS NOTES
[facility]   Physical Therapy Treatment Note      Name: Lindy Ferraro  Clinic Number: [id number]    Therapy Diagnosis:   Encounter Diagnosis   Name Primary?    Pelvic floor dysfunction Yes       Physician: Rosaline Bean NP    Visit Date: 8/14/2024    Physician Orders: PT Eval and Treat   Medical Diagnosis from Referral: encounter for postpartum visit  Evaluation Date: 7/23/2024  Authorization Period Expiration: 12-31-24  Plan of Care Expiration: 10-14-24  Progress Note Due: 8-23-24  Visit # / Visits authorized: 2/ 20  FOTO: 1/3    Time In: 4:30 pm  Time Out: 5:30 pm  Total Billable Time: 55 minutes    Precautions: Standard    Subjective     Pt reports: had some trouble with figuring out if she was fully relaxing her pelvic floor. She would like some help with this today  She was compliant with home exercise program.  Response to previous treatment: none  Functional change: none    Pain: none currently       Objective      ABDOMINAL WALL ASSESSMENT  Palpation: boggy and trigger points  Pelvic Girdle Stability: Pt demonstrates severely impaired ability to stabilize pelvis with Active Straight Leg Raise Test. Able to improve slightly with verbal/manual cues for transverse abdominus activation.   Diastasis Recti: present, 3-inch width, present with supine curl-up task  Motor Control: unable to demonstrate appropriate transverse abdominis contraction on command, with verbal and tactile cues       Treatment   Susan received the treatments listed below:    Pt verbally consents to intravagnial treatment today.  Signed consent form already on file.  Chaperone declined today.      Susan received the following manual therapy techniques: to develop flexibility and extensibility for 0 minutes including:   []            Susan participated in neuromuscular re-education activities to develop Coordination, Control, and Down training for 25 minutes including:   pelvic floor relaxation/bulging  "training and diaphragmatic breathing   [x] PFM relaxation practice in standing   [x] SL glute med strengthening 10x10" GTB  [x] Tra activation w/ shhh 10x5"  [x] SL hip circles 3x30"  [x] SEMG biofeed back w/  PFM relaxation practice-Pt presents with resting tone of 5-7 microvolts and appropriate PFM contraction on command. Pt was able to downtrain to 0-.5 microvolts with PT cues for diaphragmatic breathing, relaxation, visualization, and body scanning.        Susan participated in dynamic functional therapeutic activities to improve functional performance for 25  minutes, including:  [x] Education on dilator training   [x] Anatomy review and discussion of the anatomy on current level of function   [x] Education on visual cues/mental imagery for pelvic floor relaxation  [x] Home exercise program issued and reviewed     Home Exercises Provided and Patient Education Provided     Education provided: See above  Discussed progression of plan of care with patient; educated pt in activity modification; reviewed HEP with pt. Pt demonstrated and verbalized understanding of all instruction and was provided with a handout of HEP (see Patient Instructions).    Written Home Exercises Provided: yes.  Exercises were reviewed and Susan was able to demonstrate them prior to the end of the session.  Susan demonstrated good  understanding of the education provided.     See EMR under Patient Instructions for exercises provided 7/31/2024.    Assessment     Time spent education pt on purpose and benefits of dilator training. Purchase link sent to patient. Good tolerance to PFM relaxation in supine with great carry over into standing as pt was able to relax PFM in standing with minimal cues in standing. Pt sent updated HEP via text link from [employer]. Pt will continue to benefit from skilled outpatient physical therapy to address the deficits listed in the problem list box on initial evaluation, provide pt/family education and to maximize " "pt's level of independence in the home and community environment.       Susan Is progressing well towards her goals.   Pt prognosis is Good.     Pt will continue to benefit from skilled outpatient physical therapy to address the deficits listed in the problem list box on initial evaluation, provide pt/family education and to maximize pt's level of independence in the home and community environment.     Pt's spiritual, cultural and educational needs considered and pt agreeable to plan of care and goals.     Anticipated barriers to physical therapy: None      Short Term Goals: 4 weeks   Pt to perform "the knack" prior to coughing, laughing or sneezing to decrease strain to pelvic organs with increased intraabdominal pressure.        Pt to demonstrate being able to correctly and consistently perform a kegel which is needed for improved support of pelvic organs with activities that increase intraabdominal pressure.        Pt to report a decrease in pelvic pressure and fullness to no more than 45% of the time to demonstrate improving pelvic support of pelvic structures.  Pt to demonstrate proper diaphragmatic breathing to help with calming the nervous system in order to decrease pain and to improve abdominal wall and pelvic floor musculature extensibility.   Pt will report minimal to no pain with single digit pelvic assessment and display relaxation demonstrating improving pelvic floor muscle relaxation and coordination.         Long Term Goals: 12 weeks ,   Pt to be discharged with home plan for carry over after discharge.   Pt to report a decrease in urinary frequency from every 1 hours to no more than once every 3 hour(s) to improve ability to participate in social activities.  Pt to report being able to have a comfortable vaginal exam without significant increase in pelvic pain.  Pt to report a decrease in pelvic pressure and fullness to no more than 20% of the time to demonstrate improving pelvic support of pelvic " structures.   Pt to demonstrate an improved score in the FOTO PFDI Prolapse survey  to at least 20 to demonstrate improving pelvic floor muscle(s) function and support with activities of daily living.       Plan     Continue with established Plan of Care, working toward established PT goals.      At next visit: continue to progress down training techniques     Kemal Valentin, PT

## 2024-08-19 PROBLEM — O13.3 GESTATIONAL HYPERTENSION, THIRD TRIMESTER: Status: RESOLVED | Noted: 2021-04-29 | Resolved: 2024-08-19

## 2024-08-21 ENCOUNTER — CLINICAL SUPPORT (OUTPATIENT)
Dept: REHABILITATION | Facility: OTHER | Age: 39
End: 2024-08-21
Payer: COMMERCIAL

## 2024-08-21 DIAGNOSIS — M62.89 PELVIC FLOOR DYSFUNCTION: Primary | ICD-10-CM

## 2024-08-21 PROCEDURE — 97112 NEUROMUSCULAR REEDUCATION: CPT

## 2024-08-21 NOTE — PROGRESS NOTES
[facility]   Physical Therapy Treatment Note      Name: Lindy Ferraro  Clinic Number: [id number]    Therapy Diagnosis:   Encounter Diagnosis   Name Primary?    Pelvic floor dysfunction Yes         Physician: Rosaline Bean NP    Visit Date: 8/21/2024    Physician Orders: PT Eval and Treat   Medical Diagnosis from Referral: encounter for postpartum visit  Evaluation Date: 7/23/2024  Authorization Period Expiration: 12-31-24  Plan of Care Expiration: 10-14-24  Progress Note Due: 8-23-24  Visit # / Visits authorized: 3/ 20  FOTO: 1/3    Time In: 4:30 pm  Time Out: 5:30 pm  Total Billable Time: 55 minutes    Precautions: Standard    Subjective     Pt reports: she states that she feels as though she has had great improvements in her ability to relax PFM in supine and standing.   She was compliant with home exercise program.  Response to previous treatment: none  Functional change: none    Pain: none currently       Objective      ABDOMINAL WALL ASSESSMENT  Palpation: boggy and trigger points  Pelvic Girdle Stability: Pt demonstrates severely impaired ability to stabilize pelvis with Active Straight Leg Raise Test. Able to improve slightly with verbal/manual cues for transverse abdominus activation.   Diastasis Recti: present, 3-inch width, present with supine curl-up task  Motor Control: unable to demonstrate appropriate transverse abdominis contraction on command, with verbal and tactile cues       Treatment   Susan received the treatments listed below:    Pt verbally consents to intravagnial treatment today.  Signed consent form already on file.  Chaperone declined today.      Susan received the following manual therapy techniques: to develop flexibility and extensibility for 0 minutes including:   []            Susan participated in neuromuscular re-education activities to develop Coordination, Control, and Down training for 55 minutes including:   pelvic floor relaxation/bulging  "training and diaphragmatic breathing   [x] PFM relaxation practice in standing   [x] TM warm up x 5 minutes brisk walk  [x] Figure 4 glute bridge 10x10"  [x] Iso hip adduction 10x10"  [x] Iso hip abduction 10x10"  [x] Bridge w/ lat pulldown 10x10"  [x] Hip extension w. Pilates ring w/ hip flexion 10x5" B  [x] SL hip abduction w/ pilates ring press down 10x10"  [x] Bear plank 10x5"   [x] Posterior sling step up GTB x15 B  [x] Med ex extension machine 40# warm up x10----2x10" 60#         Susan participated in dynamic functional therapeutic activities to improve functional performance for 0  minutes, including:  [] Education on dilator training   [] Anatomy review and discussion of the anatomy on current level of function   [] Education on visual cues/mental imagery for pelvic floor relaxation  [] Home exercise program issued and reviewed     Home Exercises Provided and Patient Education Provided     Education provided: See above  Discussed progression of plan of care with patient; educated pt in activity modification; reviewed HEP with pt. Pt demonstrated and verbalized understanding of all instruction and was provided with a handout of HEP (see Patient Instructions).    Written Home Exercises Provided: yes.  Exercises were reviewed and Susan was able to demonstrate them prior to the end of the session.  Susan demonstrated good  understanding of the education provided.     See EMR under Patient Instructions for exercises provided 7/31/2024.    Assessment   Susan returned to PT with excellent improvements with her ability to fully relax pelvic floor in standing compared to supine with no cues from therapist. Progressed exercises this visit with emphasis on core and hip strength. Pt reported appropriate muscular fatigue. Pt sent updated HEP via text link from [employer]. Pt will continue to benefit from skilled outpatient physical therapy to address the deficits listed in the problem list box on initial evaluation, provide " "pt/family education and to maximize pt's level of independence in the home and community environment.       Susan Is progressing well towards her goals.   Pt prognosis is Good.     Pt will continue to benefit from skilled outpatient physical therapy to address the deficits listed in the problem list box on initial evaluation, provide pt/family education and to maximize pt's level of independence in the home and community environment.     Pt's spiritual, cultural and educational needs considered and pt agreeable to plan of care and goals.     Anticipated barriers to physical therapy: None      Short Term Goals: 4 weeks   Pt to perform "the knack" prior to coughing, laughing or sneezing to decrease strain to pelvic organs with increased intraabdominal pressure.        Pt to demonstrate being able to correctly and consistently perform a kegel which is needed for improved support of pelvic organs with activities that increase intraabdominal pressure.        Pt to report a decrease in pelvic pressure and fullness to no more than 45% of the time to demonstrate improving pelvic support of pelvic structures.  Pt to demonstrate proper diaphragmatic breathing to help with calming the nervous system in order to decrease pain and to improve abdominal wall and pelvic floor musculature extensibility.   Pt will report minimal to no pain with single digit pelvic assessment and display relaxation demonstrating improving pelvic floor muscle relaxation and coordination.         Long Term Goals: 12 weeks ,   Pt to be discharged with home plan for carry over after discharge.   Pt to report a decrease in urinary frequency from every 1 hours to no more than once every 3 hour(s) to improve ability to participate in social activities.  Pt to report being able to have a comfortable vaginal exam without significant increase in pelvic pain.  Pt to report a decrease in pelvic pressure and fullness to no more than 20% of the time to demonstrate " improving pelvic support of pelvic structures.   Pt to demonstrate an improved score in the FOTO PFDI Prolapse survey  to at least 20 to demonstrate improving pelvic floor muscle(s) function and support with activities of daily living.       Plan     Continue with established Plan of Care, working toward established PT goals.      At next visit: continue to progress down training techniques     Kemal Valentin PT

## 2024-08-26 ENCOUNTER — PATIENT OUTREACH (OUTPATIENT)
Dept: ADMINISTRATIVE | Facility: HOSPITAL | Age: 39
End: 2024-08-26
Payer: COMMERCIAL

## 2024-08-28 ENCOUNTER — CLINICAL SUPPORT (OUTPATIENT)
Dept: REHABILITATION | Facility: OTHER | Age: 39
End: 2024-08-28
Payer: COMMERCIAL

## 2024-08-28 DIAGNOSIS — M62.89 PELVIC FLOOR DYSFUNCTION: Primary | ICD-10-CM

## 2024-08-28 PROCEDURE — 97112 NEUROMUSCULAR REEDUCATION: CPT

## 2024-08-28 NOTE — PROGRESS NOTES
[facility]   Physical Therapy Treatment Note      Name: Lindy Ferraro  Clinic Number: [id number]    Therapy Diagnosis:   Encounter Diagnosis   Name Primary?    Pelvic floor dysfunction Yes           Physician: Rosaline Bean NP    Visit Date: 8/28/2024    Physician Orders: PT Eval and Treat   Medical Diagnosis from Referral: encounter for postpartum visit  Evaluation Date: 7/23/2024  Authorization Period Expiration: 12-31-24  Plan of Care Expiration: 10-14-24  Progress Note Due: 8-23-24  Visit # / Visits authorized: 3/ 20  FOTO: 1/3    Time In: 4:30 pm  Time Out: 5:30 pm  Total Billable Time: 55 minutes    Precautions: Standard    Subjective     Pt reports: she states that she feels as though she has had great improvements in her ability to relax PFM in supine and standing.   She was compliant with home exercise program.  Response to previous treatment: none  Functional change: none    Pain: none currently       Objective      ABDOMINAL WALL ASSESSMENT  Palpation: boggy and trigger points  Pelvic Girdle Stability: Pt demonstrates severely impaired ability to stabilize pelvis with Active Straight Leg Raise Test. Able to improve slightly with verbal/manual cues for transverse abdominus activation.   Diastasis Recti: present, 3-inch width, present with supine curl-up task  Motor Control: unable to demonstrate appropriate transverse abdominis contraction on command, with verbal and tactile cues       Treatment   Susan received the treatments listed below:    Pt verbally consents to intravagnial treatment today.  Signed consent form already on file.  Chaperone declined today.      Susan received the following manual therapy techniques: to develop flexibility and extensibility for 0 minutes including:   []            Susan participated in neuromuscular re-education activities to develop Coordination, Control, and Down training for 55 minutes including:   pelvic floor  "relaxation/bulging training and diaphragmatic breathing   [x] RUSI training for TrA activation  [x] TM warm up x 5 minutes brisk walk  [x] Figure 4 glute bridge 10x10"  [x] Iso hip adduction 10x10"  [x] Iso hip abduction 10x10"  [x] Bridge w/ lat pulldown 10x10"  [x] Hip extension w. Pilates ring w/ hip flexion 10x5" B  [x] SL hip abduction w/ pilates ring press down 10x10"  [x] Bear plank 10x5"   [x] Posterior sling step up GTB x15 B  [x] Med ex extension machine 40# warm up x10----2x10" 60#  [x] Med ex leg press 130# 3x10       Susan participated in dynamic functional therapeutic activities to improve functional performance for 0  minutes, including:  [] Education on dilator training   [] Anatomy review and discussion of the anatomy on current level of function   [] Education on visual cues/mental imagery for pelvic floor relaxation  [] Home exercise program issued and reviewed     Home Exercises Provided and Patient Education Provided     Education provided: See above  Discussed progression of plan of care with patient; educated pt in activity modification; reviewed HEP with pt. Pt demonstrated and verbalized understanding of all instruction and was provided with a handout of HEP (see Patient Instructions).    Written Home Exercises Provided: yes.  Exercises were reviewed and Susan was able to demonstrate them prior to the end of the session.  Susan demonstrated good  understanding of the education provided.     See EMR under Patient Instructions for exercises provided 7/31/2024.    Assessment   Susan completed progressive lumbopelvic stabilization exercises well and with no reports of increased pain. Added RUSI this visit to ensure proper engagement of TRA. Pt sent updated HEP via text link from [employer]. Pt will continue to benefit from skilled outpatient physical therapy to address the deficits listed in the problem list box on initial evaluation, provide pt/family education and to maximize pt's level of " "independence in the home and community environment.       Susan Is progressing well towards her goals.   Pt prognosis is Good.     Pt will continue to benefit from skilled outpatient physical therapy to address the deficits listed in the problem list box on initial evaluation, provide pt/family education and to maximize pt's level of independence in the home and community environment.     Pt's spiritual, cultural and educational needs considered and pt agreeable to plan of care and goals.     Anticipated barriers to physical therapy: None      Short Term Goals: 4 weeks   Pt to perform "the knack" prior to coughing, laughing or sneezing to decrease strain to pelvic organs with increased intraabdominal pressure.        Pt to demonstrate being able to correctly and consistently perform a kegel which is needed for improved support of pelvic organs with activities that increase intraabdominal pressure.        Pt to report a decrease in pelvic pressure and fullness to no more than 45% of the time to demonstrate improving pelvic support of pelvic structures.  Pt to demonstrate proper diaphragmatic breathing to help with calming the nervous system in order to decrease pain and to improve abdominal wall and pelvic floor musculature extensibility.   Pt will report minimal to no pain with single digit pelvic assessment and display relaxation demonstrating improving pelvic floor muscle relaxation and coordination.         Long Term Goals: 12 weeks ,   Pt to be discharged with home plan for carry over after discharge.   Pt to report a decrease in urinary frequency from every 1 hours to no more than once every 3 hour(s) to improve ability to participate in social activities.  Pt to report being able to have a comfortable vaginal exam without significant increase in pelvic pain.  Pt to report a decrease in pelvic pressure and fullness to no more than 20% of the time to demonstrate improving pelvic support of pelvic structures. "   Pt to demonstrate an improved score in the FOTO PFDI Prolapse survey  to at least 20 to demonstrate improving pelvic floor muscle(s) function and support with activities of daily living.       Plan     Continue with established Plan of Care, working toward established PT goals.      At next visit: continue to progress down training techniques     Kemal Valentin PT

## 2024-09-05 ENCOUNTER — CLINICAL SUPPORT (OUTPATIENT)
Dept: REHABILITATION | Facility: OTHER | Age: 39
End: 2024-09-05
Payer: COMMERCIAL

## 2024-09-05 DIAGNOSIS — M62.89 PELVIC FLOOR DYSFUNCTION: Primary | ICD-10-CM

## 2024-09-05 PROCEDURE — 97112 NEUROMUSCULAR REEDUCATION: CPT

## 2024-09-05 NOTE — PROGRESS NOTES
[facility]   Physical Therapy Treatment Note      Name: Lindy Ferraro  Clinic Number: [id number]    Therapy Diagnosis:   Encounter Diagnosis   Name Primary?    Pelvic floor dysfunction Yes             Physician: Rosaline Bean NP    Visit Date: 9/5/2024    Physician Orders: PT Eval and Treat   Medical Diagnosis from Referral: encounter for postpartum visit  Evaluation Date: 7/23/2024  Authorization Period Expiration: 12-31-24  Plan of Care Expiration: 10-14-24  Progress Note Due: 8-23-24  Visit # / Visits authorized: 3/ 20  FOTO: 1/3    Time In: 4:30 pm  Time Out: 5:30 pm  Total Billable Time: 55 minutes    Precautions: Standard    Subjective     Pt reports: she states that she feels as though she has had great improvements in her ability to relax PFM in supine and standing.   She was compliant with home exercise program.  Response to previous treatment: none  Functional change: none    Pain: none currently       Objective      ABDOMINAL WALL ASSESSMENT  Palpation: boggy and trigger points  Pelvic Girdle Stability: Pt demonstrates severely impaired ability to stabilize pelvis with Active Straight Leg Raise Test. Able to improve slightly with verbal/manual cues for transverse abdominus activation.   Diastasis Recti: present, 3-inch width, present with supine curl-up task  Motor Control: unable to demonstrate appropriate transverse abdominis contraction on command, with verbal and tactile cues       Treatment   Susan received the treatments listed below:    Pt verbally consents to intravagnial treatment today.  Signed consent form already on file.  Chaperone declined today.      Susan received the following manual therapy techniques: to develop flexibility and extensibility for 0 minutes including:   []            Susan participated in neuromuscular re-education activities to develop Coordination, Control, and Down training for 55 minutes including:   pelvic floor  "relaxation/bulging training and diaphragmatic breathing   [x] SL bridge 10x5"  [x] TM warm up x 5 minutes brisk walk  [x] Side plank w/ clamshell x 20 B  [x] Iso hip adduction 10x10"  [x] Iso hip abduction 10x10"  [x] Bridge w/ lat pulldown 10x10"  [x] Hip extension w. Pilates ring w/ hip flexion 10x5" B  [x] SL hip abduction w/ pilates ring press down 10x10"  [x] Bear plank 10x5"   [x] Posterior sling step up GTB x15 B  [x] Med ex extension machine 40# warm up x10----2x10" 60#  [x] Med ex leg press 130# 3x10       Susan participated in dynamic functional therapeutic activities to improve functional performance for 0  minutes, including:  [] Education on dilator training   [] Anatomy review and discussion of the anatomy on current level of function   [] Education on visual cues/mental imagery for pelvic floor relaxation  [] Home exercise program issued and reviewed     Home Exercises Provided and Patient Education Provided     Education provided: See above  Discussed progression of plan of care with patient; educated pt in activity modification; reviewed HEP with pt. Pt demonstrated and verbalized understanding of all instruction and was provided with a handout of HEP (see Patient Instructions).    Written Home Exercises Provided: yes.  Exercises were reviewed and Susan was able to demonstrate them prior to the end of the session.  Susan demonstrated good  understanding of the education provided.     See EMR under Patient Instructions for exercises provided 7/31/2024.    Assessment   Incorporated return to run, single leg strengthening and plyo exercises this visit with good tolerance. Plan to continue to progress exercises per pts tolerance       Susan Is progressing well towards her goals.   Pt prognosis is Good.     Pt will continue to benefit from skilled outpatient physical therapy to address the deficits listed in the problem list box on initial evaluation, provide pt/family education and to maximize pt's " "level of independence in the home and community environment.     Pt's spiritual, cultural and educational needs considered and pt agreeable to plan of care and goals.     Anticipated barriers to physical therapy: None      Short Term Goals: 4 weeks   Pt to perform "the knack" prior to coughing, laughing or sneezing to decrease strain to pelvic organs with increased intraabdominal pressure.        Pt to demonstrate being able to correctly and consistently perform a kegel which is needed for improved support of pelvic organs with activities that increase intraabdominal pressure.        Pt to report a decrease in pelvic pressure and fullness to no more than 45% of the time to demonstrate improving pelvic support of pelvic structures.  Pt to demonstrate proper diaphragmatic breathing to help with calming the nervous system in order to decrease pain and to improve abdominal wall and pelvic floor musculature extensibility.   Pt will report minimal to no pain with single digit pelvic assessment and display relaxation demonstrating improving pelvic floor muscle relaxation and coordination.         Long Term Goals: 12 weeks ,   Pt to be discharged with home plan for carry over after discharge.   Pt to report a decrease in urinary frequency from every 1 hours to no more than once every 3 hour(s) to improve ability to participate in social activities.  Pt to report being able to have a comfortable vaginal exam without significant increase in pelvic pain.  Pt to report a decrease in pelvic pressure and fullness to no more than 20% of the time to demonstrate improving pelvic support of pelvic structures.   Pt to demonstrate an improved score in the FOTO PFDI Prolapse survey  to at least 20 to demonstrate improving pelvic floor muscle(s) function and support with activities of daily living.       Plan     Continue with established Plan of Care, working toward established PT goals.      At next visit: continue to progress down " training techniques     Kemal Valentin, PT

## 2024-09-09 ENCOUNTER — CLINICAL SUPPORT (OUTPATIENT)
Dept: REHABILITATION | Facility: OTHER | Age: 39
End: 2024-09-09
Payer: COMMERCIAL

## 2024-09-09 DIAGNOSIS — M62.89 PELVIC FLOOR DYSFUNCTION: Primary | ICD-10-CM

## 2024-09-09 PROCEDURE — 97112 NEUROMUSCULAR REEDUCATION: CPT

## 2024-09-09 NOTE — PROGRESS NOTES
[facility]   Physical Therapy Treatment Note      Name: Lindy Ferraro  Clinic Number: [id number]    Therapy Diagnosis:   Encounter Diagnosis   Name Primary?    Pelvic floor dysfunction Yes               Physician: Rosaline Bean NP    Visit Date: 9/9/2024    Physician Orders: PT Eval and Treat   Medical Diagnosis from Referral: encounter for postpartum visit  Evaluation Date: 7/23/2024  Authorization Period Expiration: 12-31-24  Plan of Care Expiration: 10-14-24  Progress Note Due: 10/09/2024  Visit # / Visits authorized: 7/ 20  FOTO: 1/3    Time In: 12:15 pm  Time Out: 1:00 pm  Total Billable Time: 45 minutes    Precautions: Standard    Subjective     Pt reports: she was able to complete her exercises and strengthening at home with no increased pressure which she is very pleased about   She was compliant with home exercise program.  Response to previous treatment: none  Functional change: none    Pain: none currently       Objective      ABDOMINAL WALL ASSESSMENT  Palpation: boggy and trigger points  Pelvic Girdle Stability: Pt demonstrates severely impaired ability to stabilize pelvis with Active Straight Leg Raise Test. Able to improve slightly with verbal/manual cues for transverse abdominus activation.   Diastasis Recti: present, 3-inch width, present with supine curl-up task  Motor Control: unable to demonstrate appropriate transverse abdominis contraction on command, with verbal and tactile cues       Treatment   Susan received the treatments listed below:    Pt verbally consents to intravagnial treatment today.  Signed consent form already on file.  Chaperone declined today.      Susan received the following manual therapy techniques: to develop flexibility and extensibility for 0 minutes including:   []            Susan participated in neuromuscular re-education activities to develop Coordination, Control, and Down training for 45 minutes including:   pelvic floor  "relaxation/bulging training and diaphragmatic breathing   [x] SL bridge 10x5"  [x] TM warm up x 30 second on-30 seconds off jogging  x 8 minutes   [x] Side plank w/ clamshell x 20 B  [x] Iso hip adduction 10x10"  [x] Iso hip abduction 10x10"  [x] Bridge w/ lat pulldown 10x10"  [x] Lateral stepping w/ GTB around mid foot   [x] Lateral bounding x 2 laps 3" hold   [x] Iso oblique activation 2x30"  [x] Band assisted pogo's double leg 2x30"  [x] Band assisted pogo's single leg 2x30"  [x] Med ex leg press 130# 3x10 SL       Susan participated in dynamic functional therapeutic activities to improve functional performance for 0  minutes, including:  [] Education on dilator training   [] Anatomy review and discussion of the anatomy on current level of function   [] Education on visual cues/mental imagery for pelvic floor relaxation  [] Home exercise program issued and reviewed     Home Exercises Provided and Patient Education Provided     Education provided: See above  Discussed progression of plan of care with patient; educated pt in activity modification; reviewed HEP with pt. Pt demonstrated and verbalized understanding of all instruction and was provided with a handout of HEP (see Patient Instructions).    Written Home Exercises Provided: yes.  Exercises were reviewed and Susan was able to demonstrate them prior to the end of the session.  Susan demonstrated good  understanding of the education provided.     See EMR under Patient Instructions for exercises provided 7/31/2024.    Assessment   Continued to progress core and lumbopelvic strengthening with proper pressure management with excellent tolerance and appropriate muscular fatigue. Plan to continue to progress exercises per pts tolerance.       Susan Is progressing well towards her goals.   Pt prognosis is Good.     Pt will continue to benefit from skilled outpatient physical therapy to address the deficits listed in the problem list box on initial evaluation, " "provide pt/family education and to maximize pt's level of independence in the home and community environment.     Pt's spiritual, cultural and educational needs considered and pt agreeable to plan of care and goals.     Anticipated barriers to physical therapy: None      Short Term Goals: 4 weeks   Pt to perform "the knack" prior to coughing, laughing or sneezing to decrease strain to pelvic organs with increased intraabdominal pressure.        Pt to demonstrate being able to correctly and consistently perform a kegel which is needed for improved support of pelvic organs with activities that increase intraabdominal pressure.        Pt to report a decrease in pelvic pressure and fullness to no more than 45% of the time to demonstrate improving pelvic support of pelvic structures.  Pt to demonstrate proper diaphragmatic breathing to help with calming the nervous system in order to decrease pain and to improve abdominal wall and pelvic floor musculature extensibility.   Pt will report minimal to no pain with single digit pelvic assessment and display relaxation demonstrating improving pelvic floor muscle relaxation and coordination.         Long Term Goals: 12 weeks ,   Pt to be discharged with home plan for carry over after discharge.   Pt to report a decrease in urinary frequency from every 1 hours to no more than once every 3 hour(s) to improve ability to participate in social activities.  Pt to report being able to have a comfortable vaginal exam without significant increase in pelvic pain.  Pt to report a decrease in pelvic pressure and fullness to no more than 20% of the time to demonstrate improving pelvic support of pelvic structures.   Pt to demonstrate an improved score in the FOTO PFDI Prolapse survey  to at least 20 to demonstrate improving pelvic floor muscle(s) function and support with activities of daily living.       Plan     Continue with established Plan of Care, working toward established PT " goals.      At next visit: continue to progress down training techniques     Kemal Valentin, PT

## 2024-09-10 ENCOUNTER — E-VISIT (OUTPATIENT)
Dept: FAMILY MEDICINE | Facility: CLINIC | Age: 39
End: 2024-09-10
Payer: COMMERCIAL

## 2024-09-10 DIAGNOSIS — H10.9 CONJUNCTIVITIS, UNSPECIFIED CONJUNCTIVITIS TYPE, UNSPECIFIED LATERALITY: Primary | ICD-10-CM

## 2024-09-10 DIAGNOSIS — H57.9 PRURITUS OF EYE: ICD-10-CM

## 2024-09-10 RX ORDER — ERYTHROMYCIN 5 MG/G
OINTMENT OPHTHALMIC NIGHTLY
Qty: 3.5 G | Refills: 0 | Status: SHIPPED | OUTPATIENT
Start: 2024-09-10 | End: 2024-09-17

## 2024-09-10 RX ORDER — LORATADINE 10 MG/1
10 TABLET ORAL DAILY PRN
Qty: 5 TABLET | Refills: 0 | Status: SHIPPED | OUTPATIENT
Start: 2024-09-10 | End: 2024-09-15

## 2024-09-10 NOTE — PROGRESS NOTES
Patient ID: Lindy Ferraro is a 39 y.o. female.    Chief Complaint: General Illness (Entered automatically based on patient selection in [email or web address]          274}  The patient initiated a request through [email or web address] on 9/10/2024 for evaluation and management with a chief complaint of General Illness (Entered automatically based on patient selection in [email or web address]     I evaluated the questionnaire submission on 09/10/2024 .    Total Time (in minutes): 11     [facility] Peq Evisit Supergroup-Common Problems    9/10/2024  7:49 AM CDT - Filed by Patient   What do you need help with? Red Eye   Do you agree to participate in an E-Visit? Yes   If you have any of the following symptoms, please present to your local emergency room or call 911:  I acknowledge   Are you pregnant, could you be pregnant, or are you breast feeding? Breast feeding   What is the main issue you would like addressed today? Conjunctivitis treatment   Which of the following have you been experiencing? One eye is red;  Eye itching;  Eye drainage or crusting   Have you had any of the following? None of the above    How long have you been having these symptoms? Just today   Do you have a fever? No, I do not have a fever   Are your symptoms associated with swimming? No, I have not been swimming recently   Have your eyes been exposed to any chemicals, creams, or drops that may be causing irritation? No   Have you suffered any recent injury to your eyes? No   Have you been exposed to anyone with similar symptoms? No   Did or do you wear contact lenses? No   Have you had any of the following? None of the above   Have you had any of the following in the past? I have not had any past problems with my eyes.   What medications are you currently using for these symptoms? Nothing   Provide any additional information you feel is important. I have 3 kids under 5, no one is sick and no one else has symptoms   At least one photo is required for treatment to be provided. You  can upload a maximum of three photos of the affected area.     Are you able to take your vital signs? Yes   Systolic Blood Pressure:    Diastolic Blood Pressure:    Weight: 175   Height: 64   Pulse: 60   Temperature: 97.6   Respiration rate: 18   Pulse Oxygen:           Active Problem List with Overview Notes    Diagnosis Date Noted    Pelvic floor dysfunction 07/23/2024    Obstetrical laceration 05/16/2024     Self care/pericare reviewed.      Pregnancy with one fetus, antepartum 12/08/2023    G6PD deficiency- NO MACROBID, BACTRIM, CIPRO, ASPIRIN, DAPSONE 12/08/2023    Amenorrhea 10/06/2023    Family history of skin cancer 03/03/2021    Chronic low back pain 03/03/2021    Decreased strength of trunk and back 12/18/2020    Posture abnormality 12/18/2020    SI (sacroiliac) joint dysfunction 12/01/2020    Weakness 08/30/2019    KEVIN (generalized anxiety disorder) 05/30/2019     Continue SSRI. 2 week mood check planned. S&S of PP depression/worsening anxiety reviewed.        Recent Labs Obtained:  Lab Results   Component Value Date    WBC 10.68 05/24/2024    HGB 14.0 05/24/2024    HCT 42.6 05/24/2024    MCV 88 05/24/2024     05/24/2024     (L) 05/24/2024    K 3.4 (L) 05/24/2024    GLU 90 05/24/2024    CREATININE 0.7 05/24/2024    EGFRNORACEVR >60 05/24/2024    HGBA1C 4.6 10/06/2023      Review of patient's allergies indicates:   Allergen Reactions    Asa [aspirin] Other (See Comments)     G6PD deficiency    Bactrim [sulfamethoxazole-trimethoprim]      G6PD deficiency    Ciprofloxacin      G6PD deficiency    Dapsone      G6PD deficiency      Nitrofurantoin      G6PD deficiency    Sulfa (sulfonamide antibiotics)      G6PD deficiency    Pcn [penicillins] Rash       Encounter Diagnoses   Name Primary?    Conjunctivitis, unspecified conjunctivitis type, unspecified laterality Yes    Pruritus of eye         No orders of the defined types were placed in this encounter.     Medications Ordered This Encounter    Medications    erythromycin (ROMYCIN) ophthalmic ointment     Sig: Place into both eyes every evening. for 7 days     Dispense:  3.5 g     Refill:  0    loratadine (CLARITIN) 10 mg tablet     Sig: Take 1 tablet (10 mg total) by mouth daily as needed (itching).     Dispense:  5 tablet     Refill:  0        E-Visit Time Tracking:    Day 1 Time (in minutes): 11    Total Time (in minutes): 11      274}

## 2024-09-16 ENCOUNTER — CLINICAL SUPPORT (OUTPATIENT)
Dept: REHABILITATION | Facility: OTHER | Age: 39
End: 2024-09-16
Payer: COMMERCIAL

## 2024-09-16 DIAGNOSIS — M62.89 PELVIC FLOOR DYSFUNCTION: Primary | ICD-10-CM

## 2024-09-16 PROCEDURE — 97112 NEUROMUSCULAR REEDUCATION: CPT

## 2024-09-16 NOTE — PROGRESS NOTES
[facility]   Physical Therapy Treatment Note      Name: Lindy Ferraro  Clinic Number: [id number]    Therapy Diagnosis:   Encounter Diagnosis   Name Primary?    Pelvic floor dysfunction Yes                 Physician: Rosaline Bean NP    Visit Date: 9/16/2024    Physician Orders: PT Eval and Treat   Medical Diagnosis from Referral: encounter for postpartum visit  Evaluation Date: 7/23/2024  Authorization Period Expiration: 12-31-24  Plan of Care Expiration: 10-14-24  Progress Note Due: 10/09/2024  Visit # / Visits authorized: 7/ 20  FOTO: 1/3    Time In: 12:15 pm  Time Out: 1:00 pm  Total Billable Time: 45 minutes    Precautions: Standard    Subjective     Pt reports: she was able to complete her exercises and strengthening at home with no increased pressure which she is very pleased about   She was compliant with home exercise program.  Response to previous treatment: none  Functional change: none    Pain: none currently       Objective      ABDOMINAL WALL ASSESSMENT  Palpation: boggy and trigger points  Pelvic Girdle Stability: Pt demonstrates severely impaired ability to stabilize pelvis with Active Straight Leg Raise Test. Able to improve slightly with verbal/manual cues for transverse abdominus activation.   Diastasis Recti: present, 3-inch width, present with supine curl-up task  Motor Control: unable to demonstrate appropriate transverse abdominis contraction on command, with verbal and tactile cues       Treatment   Susan received the treatments listed below:    Pt verbally consents to intravagnial treatment today.  Signed consent form already on file.  Chaperone declined today.      Susan received the following manual therapy techniques: to develop flexibility and extensibility for 0 minutes including:   []            Susan participated in neuromuscular re-education activities to develop Coordination, Control, and Down training for 45 minutes including:   pelvic floor  "relaxation/bulging training and diaphragmatic breathing   [x] SL bridge 10x5"  [x] TM warm up x 30 second on-30 seconds off jogging  x 8 minutes   [x] Side plank w/ clamshell x 20 B  [x] Iso hip adduction 10x10"  [x] Iso hip abduction 10x10"  [x] Bridge w/ lat pulldown 10x10"  [x] Lateral stepping w/ GTB around mid foot   [x] Lateral bounding x 2 laps 3" hold   [x] Iso oblique activation 2x30"  [x] Band assisted pogo's double leg 2x30"  [x] Band assisted pogo's single leg 2x30"  [x] Med ex leg press 130# 3x10 SL       Susan participated in dynamic functional therapeutic activities to improve functional performance for 0  minutes, including:  [] Education on dilator training   [] Anatomy review and discussion of the anatomy on current level of function   [] Education on visual cues/mental imagery for pelvic floor relaxation  [] Home exercise program issued and reviewed     Home Exercises Provided and Patient Education Provided     Education provided: See above  Discussed progression of plan of care with patient; educated pt in activity modification; reviewed HEP with pt. Pt demonstrated and verbalized understanding of all instruction and was provided with a handout of HEP (see Patient Instructions).    Written Home Exercises Provided: yes.  Exercises were reviewed and Susan was able to demonstrate them prior to the end of the session.  Susan demonstrated good  understanding of the education provided.     See EMR under Patient Instructions for exercises provided 7/31/2024.    Assessment   Continued to progress core and lumbopelvic strengthening with proper pressure management with excellent tolerance and appropriate muscular fatigue. Plan to continue to progress exercises per pts tolerance.       Susan Is progressing well towards her goals.   Pt prognosis is Good.     Pt will continue to benefit from skilled outpatient physical therapy to address the deficits listed in the problem list box on initial evaluation, " "provide pt/family education and to maximize pt's level of independence in the home and community environment.     Pt's spiritual, cultural and educational needs considered and pt agreeable to plan of care and goals.     Anticipated barriers to physical therapy: None      Short Term Goals: 4 weeks   Pt to perform "the knack" prior to coughing, laughing or sneezing to decrease strain to pelvic organs with increased intraabdominal pressure.        Pt to demonstrate being able to correctly and consistently perform a kegel which is needed for improved support of pelvic organs with activities that increase intraabdominal pressure.        Pt to report a decrease in pelvic pressure and fullness to no more than 45% of the time to demonstrate improving pelvic support of pelvic structures.  Pt to demonstrate proper diaphragmatic breathing to help with calming the nervous system in order to decrease pain and to improve abdominal wall and pelvic floor musculature extensibility.   Pt will report minimal to no pain with single digit pelvic assessment and display relaxation demonstrating improving pelvic floor muscle relaxation and coordination.         Long Term Goals: 12 weeks ,   Pt to be discharged with home plan for carry over after discharge.   Pt to report a decrease in urinary frequency from every 1 hours to no more than once every 3 hour(s) to improve ability to participate in social activities.  Pt to report being able to have a comfortable vaginal exam without significant increase in pelvic pain.  Pt to report a decrease in pelvic pressure and fullness to no more than 20% of the time to demonstrate improving pelvic support of pelvic structures.   Pt to demonstrate an improved score in the FOTO PFDI Prolapse survey  to at least 20 to demonstrate improving pelvic floor muscle(s) function and support with activities of daily living.       Plan     Continue with established Plan of Care, working toward established PT " goals.      At next visit: continue to progress down training techniques     Kemal Valentin, PT

## 2024-09-23 ENCOUNTER — CLINICAL SUPPORT (OUTPATIENT)
Dept: REHABILITATION | Facility: OTHER | Age: 39
End: 2024-09-23
Payer: COMMERCIAL

## 2024-09-23 DIAGNOSIS — M62.89 PELVIC FLOOR DYSFUNCTION: Primary | ICD-10-CM

## 2024-09-23 PROCEDURE — 97112 NEUROMUSCULAR REEDUCATION: CPT

## 2024-09-23 NOTE — PROGRESS NOTES
[facility]   Physical Therapy Treatment Note      Name: Lindy Ferraro  Clinic Number: [id number]    Therapy Diagnosis:   Encounter Diagnosis   Name Primary?    Pelvic floor dysfunction Yes       Physician: Rosaline Bean NP    Visit Date: 9/23/2024    Physician Orders: PT Eval and Treat   Medical Diagnosis from Referral: encounter for postpartum visit  Evaluation Date: 7/23/2024  Authorization Period Expiration: 12-31-24  Plan of Care Expiration: 10-14-24  Progress Note Due: 10/09/2024  Visit # / Visits authorized: 8/ 20  FOTO: 1/3    Time In: 2:00 pm  Time Out: 3:00 pm  Total Billable Time: 55 minutes    Precautions: Standard    Subjective     Pt reports: she was able to complete her exercises and strengthening at home with no increased pressure which she is very pleased about   She was compliant with home exercise program.  Response to previous treatment: none  Functional change: none    Pain: none currently       Objective      ABDOMINAL WALL ASSESSMENT  Palpation: boggy and trigger points  Pelvic Girdle Stability: Pt demonstrates severely impaired ability to stabilize pelvis with Active Straight Leg Raise Test. Able to improve slightly with verbal/manual cues for transverse abdominus activation.   Diastasis Recti: present, 3-inch width, present with supine curl-up task  Motor Control: unable to demonstrate appropriate transverse abdominis contraction on command, with verbal and tactile cues       Treatment   Susan received the treatments listed below:    Pt verbally consents to intravagnial treatment today.  Signed consent form already on file.  Chaperone declined today.      Susan received the following manual therapy techniques: to develop flexibility and extensibility for 0 minutes including:   []            Susan participated in neuromuscular re-education activities to develop Coordination, Control, and Down training for 55minutes including:   pelvic floor  "relaxation/bulging training and diaphragmatic breathing   [x] SL bridge 10x5"  [x] TM warm up x 30 second on-30 seconds off jogging  x 8 minutes   [x] Side plank w/ clamshell x 20 B  [x] Iso hip adduction 10x10"  [x] Iso hip abduction 10x10"  [x] Bridge w/ lat pulldown 10x10"  [x] Lateral stepping w/ GTB around mid foot   [x] Lateral bounding x 2 laps 3" hold   [x] Iso oblique activation 2x30"  [x] Band assisted pogo's double leg 2x30"  [x] Band assisted pogo's single leg 2x30"  [x] Med ex leg press 110# 3x10 SL       Susan participated in dynamic functional therapeutic activities to improve functional performance for 0  minutes, including:  [] Education on dilator training   [] Anatomy review and discussion of the anatomy on current level of function   [] Education on visual cues/mental imagery for pelvic floor relaxation  [] Home exercise program issued and reviewed     Home Exercises Provided and Patient Education Provided     Education provided: See above  Discussed progression of plan of care with patient; educated pt in activity modification; reviewed HEP with pt. Pt demonstrated and verbalized understanding of all instruction and was provided with a handout of HEP (see Patient Instructions).    Written Home Exercises Provided: yes.  Exercises were reviewed and Susan was able to demonstrate them prior to the end of the session.  Susan demonstrated good  understanding of the education provided.     See EMR under Patient Instructions for exercises provided 7/31/2024.    Assessment   Continued to progress core and lumbopelvic strengthening with proper pressure management with excellent tolerance and appropriate muscular fatigue. Plan to see pt back in 4 weeks to update HEP and fully discharge.       Susan Is progressing well towards her goals.   Pt prognosis is Good.     Pt will continue to benefit from skilled outpatient physical therapy to address the deficits listed in the problem list box on initial " "evaluation, provide pt/family education and to maximize pt's level of independence in the home and community environment.     Pt's spiritual, cultural and educational needs considered and pt agreeable to plan of care and goals.     Anticipated barriers to physical therapy: None      Short Term Goals: 4 weeks   Pt to perform "the knack" prior to coughing, laughing or sneezing to decrease strain to pelvic organs with increased intraabdominal pressure.        Pt to demonstrate being able to correctly and consistently perform a kegel which is needed for improved support of pelvic organs with activities that increase intraabdominal pressure.        Pt to report a decrease in pelvic pressure and fullness to no more than 45% of the time to demonstrate improving pelvic support of pelvic structures.  Pt to demonstrate proper diaphragmatic breathing to help with calming the nervous system in order to decrease pain and to improve abdominal wall and pelvic floor musculature extensibility.   Pt will report minimal to no pain with single digit pelvic assessment and display relaxation demonstrating improving pelvic floor muscle relaxation and coordination.         Long Term Goals: 12 weeks ,   Pt to be discharged with home plan for carry over after discharge.   Pt to report a decrease in urinary frequency from every 1 hours to no more than once every 3 hour(s) to improve ability to participate in social activities.  Pt to report being able to have a comfortable vaginal exam without significant increase in pelvic pain.  Pt to report a decrease in pelvic pressure and fullness to no more than 20% of the time to demonstrate improving pelvic support of pelvic structures.   Pt to demonstrate an improved score in the FOTO PFDI Prolapse survey  to at least 20 to demonstrate improving pelvic floor muscle(s) function and support with activities of daily living.       Plan     Continue with established Plan of Care, working toward " established PT goals.      At next visit: continue to progress down training techniques     Kemal Valentin, PT

## 2024-10-18 ENCOUNTER — CLINICAL SUPPORT (OUTPATIENT)
Dept: REHABILITATION | Facility: OTHER | Age: 39
End: 2024-10-18
Payer: COMMERCIAL

## 2024-10-18 DIAGNOSIS — M62.89 PELVIC FLOOR DYSFUNCTION: Primary | ICD-10-CM

## 2024-10-18 PROCEDURE — 97112 NEUROMUSCULAR REEDUCATION: CPT

## 2024-10-18 NOTE — PROGRESS NOTES
[facility] THERAP[facility] AND [facility]LL[facility]   Physical Therapy Discharge and UPOC Note      Name: Lindy Ferraro  Clinic Number: [id number]    Therapy Diagnosis:   Encounter Diagnosis   Name Primary?    Pelvic floor dysfunction Yes         Physician: Rosaline Bean NP    Visit Date: 10/18/2024    Physician Orders: PT Eval and Treat   Medical Diagnosis from Referral: encounter for postpartum visit  Evaluation Date: 7/23/2024  Authorization Period Expiration: 12-31-24  Plan of Care Expiration: 10/21/2024  Progress Note Due: 10/09/2024  Visit # / Visits authorized: 69/ 20  FOTO: 1/3    Date of Last visit: 10/18/2024  Total Visits Received: 9    Time In: 10:30 am  Time Out: 11:15 am  Total Billable Time: 45 minutes    Precautions: Standard    Subjective     Pt reports: she was able to complete her exercises and strengthening at home with no increased pressure which she is very pleased about. Able to return to running with no reports of leaking or increased pressure. Able to return to intercourse with no increased pain.   She was compliant with home exercise program.  Response to previous treatment: none  Functional change: none    Pain: none currently       Objective      ABDOMINAL WALL ASSESSMENT  Palpation: boggy and trigger points  Pelvic Girdle Stability: Pt demonstrates severely impaired ability to stabilize pelvis with Active Straight Leg Raise Test. Able to improve significantly with verbal/manual cues for transverse abdominus activation.   Diastasis Recti: present, 2-inch width, present with supine curl-up task  Motor Control: able to demonstrate appropriate transverse abdominis contraction on command, with verbal and tactile cues       Treatment   Susan received the treatments listed below:    Pt verbally consents to intravagnial treatment today.  Signed consent form already on file.  Chaperone declined today.      Susan received the following manual therapy techniques: to develop flexibility and  "extensibility for 0 minutes including:   []            Susan participated in neuromuscular re-education activities to develop Coordination, Control, and Down training for 45 minutes including:   pelvic floor relaxation/bulging training and diaphragmatic breathing   [x] SL bridge 10x5"  [x] TM warm up x 30 second on-30 seconds off jogging  x 8 minutes   [x] Side plank w/ clamshell x 20 B  [x] Iso hip adduction 10x10"  [x] Iso hip abduction 10x10"  [x] Bridge w/ lat pulldown 10x10"  [x] Lateral stepping w/ GTB around mid foot   [x] Lateral bounding x 2 laps 3" hold   [x] Iso oblique activation 2x30"  [x] Band assisted pogo's double leg 2x30"  [x] Band assisted pogo's single leg 2x30"  [x] Med ex leg press 110# 3x10 SL       Susan participated in dynamic functional therapeutic activities to improve functional performance for 0  minutes, including:  [] Education on dilator training   [] Anatomy review and discussion of the anatomy on current level of function   [] Education on visual cues/mental imagery for pelvic floor relaxation  [] Home exercise program issued and reviewed     Home Exercises Provided and Patient Education Provided     Education provided: See above  Discussed progression of plan of care with patient; educated pt in activity modification; reviewed HEP with pt. Pt demonstrated and verbalized understanding of all instruction and was provided with a handout of HEP (see Patient Instructions).    Written Home Exercises Provided: yes.  Exercises were reviewed and Susan was able to demonstrate them prior to the end of the session.  Susan demonstrated good  understanding of the education provided.     See EMR under Patient Instructions for exercises provided 7/31/2024.    Assessment   Discharge patient today. Patient has fully met their short and long term goals. PT educated pt on progression of home exercise program and pt demonstrated understanding. All questions/concerns were answered/addressed by PT. " "          Short Term Goals: 4 weeks   Pt to perform "the knack" prior to coughing, laughing or sneezing to decrease strain to pelvic organs with increased intraabdominal pressure.      MET  Pt to demonstrate being able to correctly and consistently perform a kegel which is needed for improved support of pelvic organs with activities that increase intraabdominal pressure.      MET  Pt to report a decrease in pelvic pressure and fullness to no more than 45% of the time to demonstrate improving pelvic support of pelvic structures. MET  Pt to demonstrate proper diaphragmatic breathing to help with calming the nervous system in order to decrease pain and to improve abdominal wall and pelvic floor musculature extensibility. MET  Pt will report minimal to no pain with single digit pelvic assessment and display relaxation demonstrating improving pelvic floor muscle relaxation and coordination. MET        Long Term Goals: 12 weeks ,   Pt to be discharged with home plan for carry over after discharge. MET  Pt to report a decrease in urinary frequency from every 1 hours to no more than once every 3 hour(s) to improve ability to participate in social activities. MET  Pt to report being able to have a comfortable vaginal exam without significant increase in pelvic pain. MET  Pt to report a decrease in pelvic pressure and fullness to no more than 20% of the time to demonstrate improving pelvic support of pelvic structures. MET  Pt to demonstrate an improved score in the FOTO PFDI Prolapse survey  to at least 20 to demonstrate improving pelvic floor muscle(s) function and support with activities of daily living.   (progressing, not met)    Plan   Certification Period: 10/14/2024 to 10/21/2024  Recommended Treatment Plan: 1 times per week for 2 weeks: Electrical Stimulation PRN, Manual Therapy, Moist Heat/ Ice, Neuromuscular Re-ed, Patient Education, Self Care, Therapeutic Activities, and Therapeutic Exercise  Other " Recommendations: modalities prn, ASTYM prn, kinesiotape prn, Functional Dry Needling prn Progress HEP towards D/C. Recommend F/U with MD if symptoms worsen or do not resolve. Patient may be seen by a PTA for treatment to carry out their plan of care.  Face-to-face conferences will be held.       Therapist: Kemal Valentin DPT     I CERTIFY THE NEED FOR THESE SERVICES FURNISHED UNDER THIS PLAN OF TREATMENT AND WHILE UNDER MY CARE    Physician's comments: ________________________________________________________________________________________________________________________________________________      Physician's Name: ___________________________________      This patient is discharged from Physical Therapy        Kemal Valentin, PT

## 2024-10-21 NOTE — PLAN OF CARE
[facility]   Physical Therapy Discharge and UPOC Note      Name: Lindy Ferraro  Clinic Number: [id number]    Therapy Diagnosis:   Encounter Diagnosis   Name Primary?    Pelvic floor dysfunction Yes         Physician: Rosaline Bean NP    Visit Date: 10/18/2024    Physician Orders: PT Eval and Treat   Medical Diagnosis from Referral: encounter for postpartum visit  Evaluation Date: 7/23/2024  Authorization Period Expiration: 12-31-24  Plan of Care Expiration: 10/21/2024  Progress Note Due: 10/09/2024  Visit # / Visits authorized: 69/ 20  FOTO: 1/3    Date of Last visit: 10/18/2024  Total Visits Received: 9    Time In: 10:30 am  Time Out: 11:15 am  Total Billable Time: 45 minutes    Precautions: Standard    Subjective     Pt reports: she was able to complete her exercises and strengthening at home with no increased pressure which she is very pleased about. Able to return to running with no reports of leaking or increased pressure. Able to return to intercourse with no increased pain.   She was compliant with home exercise program.  Response to previous treatment: none  Functional change: none    Pain: none currently       Objective      ABDOMINAL WALL ASSESSMENT  Palpation: boggy and trigger points  Pelvic Girdle Stability: Pt demonstrates severely impaired ability to stabilize pelvis with Active Straight Leg Raise Test. Able to improve significantly with verbal/manual cues for transverse abdominus activation.   Diastasis Recti: present, 2-inch width, present with supine curl-up task  Motor Control: able to demonstrate appropriate transverse abdominis contraction on command, with verbal and tactile cues       Treatment   Susan received the treatments listed below:    Pt verbally consents to intravagnial treatment today.  Signed consent form already on file.  Chaperone declined today.      Susan received the following manual therapy techniques: to develop flexibility and  "extensibility for 0 minutes including:   []            Susan participated in neuromuscular re-education activities to develop Coordination, Control, and Down training for 45 minutes including:   pelvic floor relaxation/bulging training and diaphragmatic breathing   [x] SL bridge 10x5"  [x] TM warm up x 30 second on-30 seconds off jogging  x 8 minutes   [x] Side plank w/ clamshell x 20 B  [x] Iso hip adduction 10x10"  [x] Iso hip abduction 10x10"  [x] Bridge w/ lat pulldown 10x10"  [x] Lateral stepping w/ GTB around mid foot   [x] Lateral bounding x 2 laps 3" hold   [x] Iso oblique activation 2x30"  [x] Band assisted pogo's double leg 2x30"  [x] Band assisted pogo's single leg 2x30"  [x] Med ex leg press 110# 3x10 SL       Susan participated in dynamic functional therapeutic activities to improve functional performance for 0  minutes, including:  [] Education on dilator training   [] Anatomy review and discussion of the anatomy on current level of function   [] Education on visual cues/mental imagery for pelvic floor relaxation  [] Home exercise program issued and reviewed     Home Exercises Provided and Patient Education Provided     Education provided: See above  Discussed progression of plan of care with patient; educated pt in activity modification; reviewed HEP with pt. Pt demonstrated and verbalized understanding of all instruction and was provided with a handout of HEP (see Patient Instructions).    Written Home Exercises Provided: yes.  Exercises were reviewed and Susan was able to demonstrate them prior to the end of the session.  Susan demonstrated good  understanding of the education provided.     See EMR under Patient Instructions for exercises provided 7/31/2024.    Assessment   Discharge patient today. Patient has fully met their short and long term goals. PT educated pt on progression of home exercise program and pt demonstrated understanding. All questions/concerns were answered/addressed by PT. " "          Short Term Goals: 4 weeks   Pt to perform "the knack" prior to coughing, laughing or sneezing to decrease strain to pelvic organs with increased intraabdominal pressure.      MET  Pt to demonstrate being able to correctly and consistently perform a kegel which is needed for improved support of pelvic organs with activities that increase intraabdominal pressure.      MET  Pt to report a decrease in pelvic pressure and fullness to no more than 45% of the time to demonstrate improving pelvic support of pelvic structures. MET  Pt to demonstrate proper diaphragmatic breathing to help with calming the nervous system in order to decrease pain and to improve abdominal wall and pelvic floor musculature extensibility. MET  Pt will report minimal to no pain with single digit pelvic assessment and display relaxation demonstrating improving pelvic floor muscle relaxation and coordination. MET        Long Term Goals: 12 weeks ,   Pt to be discharged with home plan for carry over after discharge. MET  Pt to report a decrease in urinary frequency from every 1 hours to no more than once every 3 hour(s) to improve ability to participate in social activities. MET  Pt to report being able to have a comfortable vaginal exam without significant increase in pelvic pain. MET  Pt to report a decrease in pelvic pressure and fullness to no more than 20% of the time to demonstrate improving pelvic support of pelvic structures. MET  Pt to demonstrate an improved score in the FOTO PFDI Prolapse survey  to at least 20 to demonstrate improving pelvic floor muscle(s) function and support with activities of daily living.   (progressing, not met)    Plan   Certification Period: 10/14/2024 to 10/21/2024  Recommended Treatment Plan: 1 times per week for 2 weeks: Electrical Stimulation PRN, Manual Therapy, Moist Heat/ Ice, Neuromuscular Re-ed, Patient Education, Self Care, Therapeutic Activities, and Therapeutic Exercise  Other " Recommendations: modalities prn, ASTYM prn, kinesiotape prn, Functional Dry Needling prn Progress HEP towards D/C. Recommend F/U with MD if symptoms worsen or do not resolve. Patient may be seen by a PTA for treatment to carry out their plan of care.  Face-to-face conferences will be held.       Therapist: Kemal Valentin DPT     I CERTIFY THE NEED FOR THESE SERVICES FURNISHED UNDER THIS PLAN OF TREATMENT AND WHILE UNDER MY CARE    Physician's comments: ________________________________________________________________________________________________________________________________________________      Physician's Name: ___________________________________      This patient is discharged from Physical Therapy        Kemal Valentin, PT

## 2024-12-09 ENCOUNTER — OFFICE VISIT (OUTPATIENT)
Dept: OBSTETRICS AND GYNECOLOGY | Facility: CLINIC | Age: 39
End: 2024-12-09
Payer: COMMERCIAL

## 2024-12-09 VITALS
HEIGHT: 64 IN | SYSTOLIC BLOOD PRESSURE: 112 MMHG | BODY MASS INDEX: 32.18 KG/M2 | DIASTOLIC BLOOD PRESSURE: 74 MMHG | WEIGHT: 188.5 LBS

## 2024-12-09 DIAGNOSIS — N76.0 ACUTE VAGINITIS: Primary | ICD-10-CM

## 2024-12-09 PROCEDURE — 0352U VAGINOSIS SCREEN BY DNA PROBE: CPT | Performed by: FAMILY MEDICINE

## 2024-12-09 PROCEDURE — 1160F RVW MEDS BY RX/DR IN RCRD: CPT | Mod: CPTII,S$GLB,, | Performed by: FAMILY MEDICINE

## 2024-12-09 PROCEDURE — 3008F BODY MASS INDEX DOCD: CPT | Mod: CPTII,S$GLB,, | Performed by: FAMILY MEDICINE

## 2024-12-09 PROCEDURE — 3078F DIAST BP <80 MM HG: CPT | Mod: CPTII,S$GLB,, | Performed by: FAMILY MEDICINE

## 2024-12-09 PROCEDURE — 99213 OFFICE O/P EST LOW 20 MIN: CPT | Mod: S$GLB,,, | Performed by: FAMILY MEDICINE

## 2024-12-09 PROCEDURE — 99999 PR PBB SHADOW E&M-EST. PATIENT-LVL III: CPT | Mod: PBBFAC,,, | Performed by: FAMILY MEDICINE

## 2024-12-09 PROCEDURE — 3074F SYST BP LT 130 MM HG: CPT | Mod: CPTII,S$GLB,, | Performed by: FAMILY MEDICINE

## 2024-12-09 PROCEDURE — 1159F MED LIST DOCD IN RCRD: CPT | Mod: CPTII,S$GLB,, | Performed by: FAMILY MEDICINE

## 2024-12-09 RX ORDER — FLUCONAZOLE 150 MG/1
150 TABLET ORAL
Qty: 2 TABLET | Refills: 0 | Status: SHIPPED | OUTPATIENT
Start: 2024-12-09

## 2024-12-09 NOTE — PROGRESS NOTES
CC: Vaginitis  HPI: Patient presents for evaluation of an abnormal vaginal discharge. Symptoms have been present for 3 days. Vaginal symptoms: discharge described as green and yellow, local irritation, and vulvar itching. Contraception: none. She denies odor and urinary symptoms of dysuria, hematuria, and fever . Sexually transmitted infection risk: very low risk of STD exposure. SA male partner, does not use condoms. +BF. This is the extent of the patient's complaints at this time.     ROS:  GENERAL: No fever, chills, fatigability or weight loss.  VULVAR: No pain, no lesions and + itching.  VAGINAL: No relaxation, + itching, + discharge, no abnormal bleeding and no lesions.  URINARY: No incontinence, no nocturia, no frequency and no dysuria.     PHYSICAL EXAM:  Physical Exam:   Constitutional: She appears well-developed and well-nourished. She does not appear ill. No distress.               Genitourinary:    Uterus, right adnexa and left adnexa normal.      Pelvic exam was performed with patient in the lithotomy position.   The external female genitalia was normal.     Labial bartholins normal.There is rash on the right labia. There is no tenderness or lesion on the right labia. There is rash on the left labia. There is no tenderness or lesion on the left labia. Cervix is normal. Right adnexum displays no mass, no tenderness and no fullness. Left adnexum displays no mass, no tenderness and no fullness. There is vaginal discharge (scant white, no odor) in the vagina. No tenderness, bleeding, rectocele, cystocele or prolapse of vaginal walls in the vagina.    No foreign body in the vagina.   Cervix exhibits no motion tenderness, no lesion, no discharge, no friability and no polyp. Normal urethral meatus.Urethra findings: no urethral mass   Genitourinary Comments: Mild erythema bilaterally, no vesicles/pustules                   Neurological: She is alert. GCS eye subscore is 4. GCS verbal subscore is 5. GCS motor  subscore is 6.           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    WISDOM TOOTH EXTRACTION         Family History   Problem Relation Name Age of Onset    Gout Father      Skin cancer Mother Twyla     Arthritis Mother Twyla     Brain cancer Maternal Grandfather Chaz rosas     Skin cancer Maternal Grandfather Chaz rosas     Cancer Maternal Grandfather Chaz rosas     Diabetes type II Paternal Grandmother Taisha sacca     Diabetes Paternal Grandmother Taisha sacckatie     Heart attack Paternal Grandfather Bar sacca     Heart disease Paternal Grandfather Bar sacckatie     Stroke Maternal Grandmother Leah rosas     Kidney cancer Maternal Uncle      Prostate cancer Paternal Uncle      Breast cancer Neg Hx      Colon cancer Neg Hx      Ovarian cancer Neg Hx      Glaucoma Neg Hx      Cataracts Neg Hx      Macular degeneration Neg Hx         Social History     Socioeconomic History    Marital status:      Spouse name: Efrain    Number of children: 1   Occupational History     Comment:     Tobacco Use    Smoking status: Never    Smokeless tobacco: Never   Substance and Sexual Activity    Alcohol use: Not Currently     Alcohol/week: 5.0 standard drinks of alcohol     Types: 5 Glasses of wine per week    Drug use: No    Sexual activity: Yes     Partners: Male     Birth control/protection: None     Social Drivers of Health     Financial Resource Strain: Low Risk  (5/14/2024)    Overall Financial Resource Strain (CARDIA)     Difficulty of Paying Living Expenses: Not hard at all   Food Insecurity: No Food Insecurity (5/14/2024)    Hunger Vital Sign     Worried About Running Out of Food in the Last Year: Never true      Ran Out of Food in the Last Year: Never true   Transportation Needs: No Transportation Needs (5/14/2024)    TRANSPORTATION NEEDS     Transportation : No   Physical Activity: Unknown (1/9/2024)    Exercise Vital Sign     Days of Exercise per Week: 3 days   Stress: No Stress Concern Present (5/14/2024)    [employer] [place] - Occupational Stress Questionnaire     Feeling of Stress : Not at all   Housing Stability: Low Risk  (5/14/2024)    Housing Stability Vital Sign     Unable to Pay for Housing in the Last Year: No     Homeless in the Last Year: No       Current Outpatient Medications   Medication Sig Dispense Refill    acetaminophen (TYLENOL) 325 MG tablet Take 2 tablets (650 mg total) by mouth every 6 (six) hours as needed for Pain. 40 tablet 0    prenatal vit/iron fum/folic ac (PRENATAL 1+1 ORAL) Take by mouth.      sertraline (ZOLOFT) 50 MG tablet Take 1 tablet (50 mg total) by mouth once daily. 90 tablet 3    estradioL (ESTRACE) 0.01 % (0.1 mg/gram) vaginal cream Place 1 g vaginally 3 (three) times a week. 42.5 g 1    fluconazole (DIFLUCAN) 150 MG Tab Take 1 tablet (150 mg total) by mouth every 72 hours as needed (vaginal itching/discharge). 2 tablet 0    jack singer ointment Apply topically 3 (three) times daily. Apply after feeding. Do not wash off. This compounded medication expires in 30 days. (Patient not taking: Reported on 12/9/2024) 30 g 1    loratadine (CLARITIN) 10 mg tablet Take 1 tablet (10 mg total) by mouth daily as needed (itching). 5 tablet 0     No current facility-administered medications for this visit.       Review of patient's allergies indicates:   Allergen Reactions    Asa [aspirin] Other (See Comments)     G6PD deficiency    Bactrim [sulfamethoxazole-trimethoprim]      G6PD deficiency    Ciprofloxacin      G6PD deficiency    Dapsone      G6PD deficiency      Nitrofurantoin      G6PD deficiency    Sulfa (sulfonamide antibiotics)      G6PD deficiency    Pcn  [penicillins] Rash          OB History    Para Term  AB Living   3 3 3 0 0 3   SAB IAB Ectopic Multiple Live Births   0 0 0 0 3      # Outcome Date GA Lbr Elie/2nd Weight Sex Type Anes PTL Lv   3 Term 05/15/24 38w1d  3.11 kg (6 lb 13.7 oz) M Vag-Spont EPI N BROOKE   2 Term 21 39w6d / 00:17 3.53 kg (7 lb 12.5 oz) M Vag-Spont EPI, Local N BROOKE   1 Term 19 40w3d  2.97 kg (6 lb 8.8 oz) F Vag-Spont EPI N BROOKE          Assessment/Plan:    Acute vaginitis  -     Vaginosis Screen by DNA Probe  -     fluconazole (DIFLUCAN) 150 MG Tab; Take 1 tablet (150 mg total) by mouth every 72 hours as needed (vaginal itching/discharge).  Dispense: 2 tablet; Refill: 0      Discussed unsure of insurance coverage with vaginosis swab. Pt would still like to do. Will do metrogel if needed      Patient was counseled today on vaginitis prevention including :  a. avoiding feminine products such as deoderant soaps, body wash, bubble bath, douches, scented toilet paper, deoderant tampons or pads, feminine wipes, chronic pad use, etc.  b. avoiding other vulvovaginal irritants such as long hot baths, humidity, tight, synthetic clothing, chlorine and sitting around in wet bathing suits  c. wearing cotton underwear, avoiding thong underwear and no underwear to bed  d. taking showers instead of baths and use a hair dryer on cool setting afterwards to dry  e. wearing cotton to exercise and shower immediately after exercise and change clothes  f. using polyurethane condoms without spermicide if sexually active and symptoms are triggered by intercourse     FOLLOW UP: PRN/lack of improvement.

## 2024-12-14 LAB
BACTERIAL VAGINOSIS DNA: NOT DETECTED
CANDIDA GLABRATA/KRUSEI: NOT DETECTED
CANDIDA RRNA VAG QL PROBE: NOT DETECTED
TRICHOMONAS VAGINALIS: NOT DETECTED

## 2024-12-26 ENCOUNTER — ON-DEMAND VIRTUAL (OUTPATIENT)
Dept: URGENT CARE | Facility: CLINIC | Age: 39
End: 2024-12-26
Payer: COMMERCIAL

## 2024-12-26 DIAGNOSIS — J02.9 PHARYNGITIS, UNSPECIFIED ETIOLOGY: Primary | ICD-10-CM

## 2024-12-26 RX ORDER — AMOXICILLIN 500 MG/1
500 TABLET, FILM COATED ORAL EVERY 12 HOURS
Qty: 20 TABLET | Refills: 0 | Status: SHIPPED | OUTPATIENT
Start: 2024-12-26 | End: 2025-01-05

## 2024-12-27 NOTE — PROGRESS NOTES
Subjective:      Patient ID: Lindy Ferraro is a 39 y.o. female.    Vitals:  vitals were not taken for this visit.     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    WISDOM TOOTH EXTRACTION       Review of patient's allergies indicates:   Allergen Reactions    Asa [aspirin] Other (See Comments)     G6PD deficiency    Bactrim [sulfamethoxazole-trimethoprim]      G6PD deficiency    Ciprofloxacin      G6PD deficiency    Dapsone      G6PD deficiency      Nitrofurantoin      G6PD deficiency    Sulfa (sulfonamide antibiotics)      G6PD deficiency    Pcn [penicillins] Rash     Current Outpatient Medications on File Prior to Visit   Medication Sig Dispense Refill    acetaminophen (TYLENOL) 325 MG tablet Take 2 tablets (650 mg total) by mouth every 6 (six) hours as needed for Pain. 40 tablet 0    fluconazole (DIFLUCAN) 150 MG Tab Take 1 tablet (150 mg total) by mouth every 72 hours as needed (vaginal itching/discharge). 2 tablet 0    loratadine (CLARITIN) 10 mg tablet Take 1 tablet (10 mg total) by mouth daily as needed (itching). 5 tablet 0    prenatal vit/iron fum/folic ac (PRENATAL 1+1 ORAL) Take by mouth.      sertraline (ZOLOFT) 50 MG tablet Take 1 tablet (50 mg total) by mouth once daily. 90 tablet 3     No current facility-administered medications on file prior to visit.     Family History   Problem Relation Name Age of Onset    Gout Father      Skin cancer Mother Twyla     Arthritis Mother Twyla     Brain cancer Maternal Grandfather Chaz rosas      Skin cancer Maternal Grandfather Chaz rosas     Cancer Maternal Grandfather Chaz rosas     Diabetes type II Paternal Grandmother Taisha dywer     Diabetes Paternal Grandmother Taisha dwyer     Heart attack Paternal Grandfather Bar dwyer     Heart disease Paternal Grandfather Bar dwyer     Stroke Maternal Grandmother Leah rosas     Kidney cancer Maternal Uncle      Prostate cancer Paternal Uncle      Breast cancer Neg Hx      Colon cancer Neg Hx      Ovarian cancer Neg Hx      Glaucoma Neg Hx      Cataracts Neg Hx      Macular degeneration Neg Hx         Medications Ordered                [id number] - [place], [place] - [address]   [address] [address]    Telephone: [phone]   Fax: [phone]   Hours: Not open 24 hours                         E-Prescribed (1 of 1)              amoxicillin (AMOXIL) 500 MG Tab    Sig: Take 1 tablet (500 mg total) by mouth every 12 (twelve) hours. for 10 days       Start: 12/26/24     Quantity: 20 tablet Refills: 0                           Ohs Peq Odvv Intake    12/26/2024  4:09 PM CST - Filed by Patient   What is your current physical address in the event of a medical emergency? [address]   Are you able to take your vital signs? Yes   Systolic Blood Pressure:    Diastolic Blood Pressure:    Weight: 185   Height: 60   Pulse: 81   Temperature: 99.7   Respiration rate: 20   Pulse Oxygen: 96   Please attach any relevant images or files    Is your employer contracted with [facility]? No         40 y/o female with c/o sore throat, fever, erythema, and swollen/warm lymph nodes. Pt reports  was diagnosed with strep throat toay.       ROS     Objective:   The physical exam was conducted virtually.  Physical Exam   Constitutional: She is oriented to person, place, and time. She does not appear ill. No distress.   HENT:   Head: Normocephalic.   Abdominal: Normal appearance.   Neurological: She is alert and  oriented to person, place, and time.   Psychiatric: Judgment normal.       Assessment:     1. Pharyngitis, unspecified etiology        Plan:     Pharyngitis, unspecified etiology  -     amoxicillin (AMOXIL) 500 MG Tab; Take 1 tablet (500 mg total) by mouth every 12 (twelve) hours. for 10 days  Dispense: 20 tablet; Refill: 0      Continue Tylenol/ibuprofen as directed for ferver/pain    Follow-up with Primary Care as discussed for further refills.     Patient encouraged to monitor symptoms closely and instructed to follow-up for new or worsening symptoms. Further, in-person, evaluation may be necessary for continued treatment. Please follow up with your primary care doctor or specialist as needed. Verbally discussed plan. Patient confirms understanding and is in agreement with treatment and plan.      You must understand that you've received a Virtual Care evaluation only and that you may be released before all your medical problems are known or treated. You, the patient, will arrange for follow up care as instructed.

## 2025-02-04 ENCOUNTER — OFFICE VISIT (OUTPATIENT)
Dept: INTERNAL MEDICINE | Facility: CLINIC | Age: 40
End: 2025-02-04
Payer: COMMERCIAL

## 2025-02-04 VITALS
SYSTOLIC BLOOD PRESSURE: 110 MMHG | DIASTOLIC BLOOD PRESSURE: 66 MMHG | BODY MASS INDEX: 31.99 KG/M2 | WEIGHT: 187.38 LBS | HEART RATE: 61 BPM | OXYGEN SATURATION: 97 % | HEIGHT: 64 IN

## 2025-02-04 DIAGNOSIS — F41.1 GAD (GENERALIZED ANXIETY DISORDER): ICD-10-CM

## 2025-02-04 DIAGNOSIS — E66.9 OBESITY, UNSPECIFIED CLASS, UNSPECIFIED OBESITY TYPE, UNSPECIFIED WHETHER SERIOUS COMORBIDITY PRESENT: ICD-10-CM

## 2025-02-04 DIAGNOSIS — D75.A G6PD DEFICIENCY: ICD-10-CM

## 2025-02-04 DIAGNOSIS — M79.673 HEEL PAIN, UNSPECIFIED LATERALITY: ICD-10-CM

## 2025-02-04 DIAGNOSIS — Z00.00 ROUTINE GENERAL MEDICAL EXAMINATION AT A HEALTH CARE FACILITY: Primary | ICD-10-CM

## 2025-02-04 PROCEDURE — 1160F RVW MEDS BY RX/DR IN RCRD: CPT | Mod: CPTII,S$GLB,, | Performed by: INTERNAL MEDICINE

## 2025-02-04 PROCEDURE — 99999 PR PBB SHADOW E&M-EST. PATIENT-LVL IV: CPT | Mod: PBBFAC,,, | Performed by: INTERNAL MEDICINE

## 2025-02-04 PROCEDURE — 99395 PREV VISIT EST AGE 18-39: CPT | Mod: S$GLB,,, | Performed by: INTERNAL MEDICINE

## 2025-02-04 PROCEDURE — 3074F SYST BP LT 130 MM HG: CPT | Mod: CPTII,S$GLB,, | Performed by: INTERNAL MEDICINE

## 2025-02-04 PROCEDURE — 99213 OFFICE O/P EST LOW 20 MIN: CPT | Mod: 25,S$GLB,, | Performed by: INTERNAL MEDICINE

## 2025-02-04 PROCEDURE — 3078F DIAST BP <80 MM HG: CPT | Mod: CPTII,S$GLB,, | Performed by: INTERNAL MEDICINE

## 2025-02-04 PROCEDURE — 1159F MED LIST DOCD IN RCRD: CPT | Mod: CPTII,S$GLB,, | Performed by: INTERNAL MEDICINE

## 2025-02-04 PROCEDURE — 3008F BODY MASS INDEX DOCD: CPT | Mod: CPTII,S$GLB,, | Performed by: INTERNAL MEDICINE

## 2025-02-04 NOTE — PROGRESS NOTES
Subjective:      Patient ID: Lindy Ferraro is a 39 y.o. female.    Chief Complaint: Annual Exam      Lindy Ferraro is a 39 y.o. female with chronic conditions significant for G6PD deficiency, anxiety.   Presenting today for follow up / annual. Date of last annual is 12/18/2023    Recently gave birth 5/2024. She follows a vegetarian diet.      Family hx of skin CA: Follows with dermatology qyearly.      Anxiety: Currently on zoloft at this time, off of buspar. Doing well. Good insight.      G6PD deficiency: Follows with hematology.      ---------------------------  This part of the note is separate from the annual visit as this a new and separate problem(s) that was discussed during this visit. This section of the note is pertains to and is billable as part of the modifier 25.    Achilles pain: Reports achilles pain with increased walking/exercise. Declines PT at this time but is interested.     Severe obesity: Discussed weight loss medications. She is currently breast feeding and would like to hold off on initiating this.     Denies any chest pain, shortness of breath, nausea vomiting constipation diarrhea, blood in stool, heartburn    Review of Systems   Constitutional:  Negative for chills, fever and weight loss.   HENT:  Negative for congestion, ear pain and sore throat.    Eyes:  Negative for double vision.   Respiratory:  Negative for cough and shortness of breath.    Cardiovascular:  Negative for chest pain, palpitations and leg swelling.   Gastrointestinal:  Negative for abdominal pain, heartburn, nausea and vomiting.   Skin:  Negative for rash.   Neurological:  Negative for dizziness, tingling and headaches.   Psychiatric/Behavioral:  Negative for depression.           Current Outpatient Medications:     acetaminophen (TYLENOL) 325 MG tablet, Take 2 tablets (650 mg total) by mouth every 6 (six) hours as needed for Pain., Disp: 40 tablet, Rfl: 0    fluconazole (DIFLUCAN) 150 MG Tab, Take 1  "tablet (150 mg total) by mouth every 72 hours as needed (vaginal itching/discharge)., Disp: 2 tablet, Rfl: 0    prenatal vit/iron fum/folic ac (PRENATAL 1+1 ORAL), Take by mouth., Disp: , Rfl:     sertraline (ZOLOFT) 50 MG tablet, Take 1 tablet (50 mg total) by mouth once daily., Disp: 90 tablet, Rfl: 3    Lab Results   Component Value Date    HGBA1C 4.6 10/06/2023     No results found for: "MICALBCREAT"  Lab Results   Component Value Date    LDLCALC 72.0 04/22/2022    CHOL 141 04/22/2022    HDL 57 04/22/2022    TRIG 60 04/22/2022       Lab Results   Component Value Date     (L) 05/24/2024    K 3.4 (L) 05/24/2024     05/24/2024    CO2 19 (L) 05/24/2024    GLU 90 05/24/2024    BUN 10 05/24/2024    CREATININE 0.7 05/24/2024    CALCIUM 9.3 05/24/2024    PROT 7.3 05/24/2024    ALBUMIN 3.2 (L) 05/24/2024    BILITOT 0.7 05/24/2024    ALKPHOS 152 (H) 05/24/2024    AST 28 05/24/2024    ALT 36 05/24/2024    ANIONGAP 13 05/24/2024    ESTGFRAFRICA >60 04/22/2022    EGFRNONAA >60 04/22/2022    WBC 10.68 05/24/2024    HGB 14.0 05/24/2024    HGB 11.2 (L) 05/16/2024    HCT 42.6 05/24/2024    MCV 88 05/24/2024     05/24/2024    HEPCAB Non-reactive 10/06/2023       Lab Results   Component Value Date    CHEWKSEZ54 490 11/08/2023    FERRITIN 37 11/08/2023    IRON 159 11/08/2023    TRANSFERRIN 304 11/08/2023    TIBC 450 11/08/2023    FESATURATED 35 11/08/2023         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     Past Surgical History:   Procedure Laterality Date    INTRAUTERINE DEVICE INSERTION  2016    MIKI---REMOVED    SMALL INTESTINE SURGERY  8/1987    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    WISDOM TOOTH EXTRACTION       Social History     Social History Narrative    Not on file     Family History   Problem Relation Name Age of Onset    Gout Father      Skin cancer Mother Twyla     Arthritis Mother Twyla     " "Brain cancer Maternal Grandfather Chaz rosas     Skin cancer Maternal Grandfather Chaz rosas     Cancer Maternal Grandfather Chaz rosas     Diabetes type II Paternal Grandmother Taisha dwyer     Diabetes Paternal Grandmother Taisha dwyer     Heart attack Paternal Grandfather Bar dwyer     Heart disease Paternal Grandfather Bar dwyer     Stroke Maternal Grandmother Leah rosas     Kidney cancer Maternal Uncle      Prostate cancer Paternal Uncle      Breast cancer Neg Hx      Colon cancer Neg Hx      Ovarian cancer Neg Hx      Glaucoma Neg Hx      Cataracts Neg Hx      Macular degeneration Neg Hx       Vitals:    02/04/25 1106   BP: 110/66   Pulse: 61   SpO2: 97%   Weight: 85 kg (187 lb 6.3 oz)   Height: 5' 4" (1.626 m)   PainSc: 0-No pain     Objective:   Physical Exam  Vitals reviewed.   Constitutional:       Appearance: Normal appearance.   HENT:      Head: Normocephalic.      Right Ear: Tympanic membrane, ear canal and external ear normal.      Left Ear: Tympanic membrane, ear canal and external ear normal.      Nose: Nose normal.      Mouth/Throat:      Mouth: Mucous membranes are moist.      Pharynx: Oropharynx is clear.   Eyes:      Conjunctiva/sclera: Conjunctivae normal.      Pupils: Pupils are equal, round, and reactive to light.   Cardiovascular:      Rate and Rhythm: Normal rate and regular rhythm.      Pulses: Normal pulses.   Pulmonary:      Effort: Pulmonary effort is normal.      Breath sounds: Normal breath sounds.   Abdominal:      General: Abdomen is flat. Bowel sounds are normal.      Palpations: Abdomen is soft.   Musculoskeletal:      Cervical back: Neck supple.   Skin:     General: Skin is warm.   Neurological:      General: No focal deficit present.      Mental Status: She is alert.   Psychiatric:         Mood and Affect: Mood normal.       Assessment/Plan     Lindy Ferraro is a 39 y.o.female with:    Routine general medical examination at a health care facility  -     CBC " Auto Differential; Future; Expected date: 02/04/2025  -     Comprehensive Metabolic Panel; Future; Expected date: 02/04/2025  -     TSH; Future; Expected date: 02/04/2025  -     Lipid Panel; Future; Expected date: 02/04/2025  -     Iron and TIBC; Future; Expected date: 02/04/2025  -     FERRITIN; Future; Expected date: 02/04/2025    KEVIN (generalized anxiety disorder)  -     TSH; Future; Expected date: 02/04/2025    G6PD deficiency- NO MACROBID, BACTRIM, CIPRO, ASPIRIN, DAPSONE  -     CBC Auto Differential; Future; Expected date: 02/04/2025  -     Comprehensive Metabolic Panel; Future; Expected date: 02/04/2025  -     Iron and TIBC; Future; Expected date: 02/04/2025  -     FERRITIN; Future; Expected date: 02/04/2025    Obesity, unspecified class, unspecified obesity type, unspecified whether serious comorbidity present  - Discussed healthy BMI, goal weight.  Recommend diet/exercise and health lifestyle choices.     Heel pain, unspecified laterality  - consider PT       Chronic conditions status updated as per HPI.  Other than changes above, cont current medications and maintain follow up with specialists.  Return to clinic in Follow up in about 1 year (around 2/4/2026).      Twyla Rojas MD  [facility]    Total time spent on this encounter: 32 minutes. This includes face to face time and non-face to face time preparing to see the patient (eg, review of tests), obtaining and/or reviewing separately obtained history, documenting clinical information in the electronic or other health record, independently interpreting results and communicating results to the patient/family/caregiver, or care coordinator.    There are no Patient Instructions on file for this visit.  All of your core healthy metrics are met.

## 2025-02-26 ENCOUNTER — OFFICE VISIT (OUTPATIENT)
Dept: PSYCHIATRY | Facility: CLINIC | Age: 40
End: 2025-02-26
Payer: COMMERCIAL

## 2025-02-26 DIAGNOSIS — F41.1 GAD (GENERALIZED ANXIETY DISORDER): Primary | ICD-10-CM

## 2025-02-26 DIAGNOSIS — F41.8 POSTPARTUM ANXIETY: ICD-10-CM

## 2025-02-26 PROCEDURE — 1159F MED LIST DOCD IN RCRD: CPT | Mod: CPTII,95,, | Performed by: INTERNAL MEDICINE

## 2025-02-26 PROCEDURE — 98006 SYNCH AUDIO-VIDEO EST MOD 30: CPT | Mod: 95,,, | Performed by: INTERNAL MEDICINE

## 2025-02-26 PROCEDURE — 1160F RVW MEDS BY RX/DR IN RCRD: CPT | Mod: CPTII,95,, | Performed by: INTERNAL MEDICINE

## 2025-02-26 RX ORDER — SERTRALINE HYDROCHLORIDE 100 MG/1
100 TABLET, FILM COATED ORAL DAILY
Qty: 90 TABLET | Refills: 3 | Status: SHIPPED | OUTPATIENT
Start: 2025-02-26 | End: 2026-02-26

## 2025-02-26 NOTE — PROGRESS NOTES
OUTPATIENT PSYCHIATRY RETURN VISIT    ENCOUNTER DATE:  2/26/25   SITE:  [facility], [facility]  LENGTH OF SESSION:  20 minutes    The patient location is:  [place], not in a healthcare facility  Visit type:  audiovisual    Face to Face time with patient:  20 minutes  25 minutes of total time spent on the encounter, which includes face to face time and non-face to face time preparing to see the patient (eg, review of tests), Obtaining and/or reviewing separately obtained history, Documenting clinical information in the electronic or other health record, Independently interpreting results (not separately reported) and communicating results to the patient/family/caregiver, or Care coordination (not separately reported).     Each patient to whom he or she provides medical services by telemedicine is:  (1) informed of the relationship between the physician and patient and the respective role of any other health care provider with respect to management of the patient; and (2) notified that he or she may decline to receive medical services by telemedicine and may withdraw from such care at any time.    CHIEF COMPLAINT:  Anxiety      HISTORY OF PRESENTING ILLNESS:  Lindy Ferraro is a 39 y.o. female with history of Anxiety who presents for follow up appointment.      Plan at last appointment on 6/7/2024:  Significant improvement in anxiety symptoms on Zoloft 50mg daily.  Continue this dose for now.  She may consider decreasing to 37.5mg at some point in the future.    Discussed with patient informed consent, risks versus benefits, alternative treatments, side effect profile and the inherent unpredictability of individual responses to these treatments.  The patient expresses understanding of the above and displays the capacity to agree with this current plan.  Continue individual psychotherapy with outside therapist.    History as told by patient:  Things are super crazy busy.  Its a lot.  Working  and kids and marriage and travel and family and balancing all of them.   for son comes at 9am - she is great, there during the day.  She works completely from home.  Usually travels twice a quarter but they have let her take a puse on that.  She is still breastfeeding.  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  [facility] maternal [facility] child [facility].  This has been huge stressor - work is shifting and on pause.  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  Mostly things are good with mood but has had more trouble coping lately.  Has noticed a few anxiety symptoms coming out when she is triggered.  Certain things about her kids safety continue to put her into a high anxiety state/panic.  Gets irritable, feels like everything is too much.  Lately has felt anxious in the morning and carries through the day.  Physically - can't catch her breath, heart racing, clinching jaw at night and during the day.   She has started running again which helps her mood.  Building up to run a 10K in April.  Baby crawling now - never had to baby-proof this house - lead paint exposure in the past, stairwell, pool.  Worked in therapy on these topics.  Sleep is ok - still up with baby about once a night.  Wakes up clinching her jaw.      Medication side effects:  No  Medication compliance:  Yes    PSYCHIATRIC REVIEW OF SYSTEMS:  Trouble with sleep:  Denies other than getting up with baby  Appetite changes:  Not discussed  Weight changes:  Not discussed  Lack of energy:  Denies  Anhedonia:  Denies  Somatic symptoms:  Denies  Libido:  Chronically low  Anxiety/panic:  Yes  Guilty/hopeless:  Denies  Self-injurious behavior/risky behavior:  Denies  Any drugs:  Denies  Alcohol:  Denies    MEDICAL REVIEW OF SYSTEMS:  Complete review of systems performed covering Constitutional, Musculoskeletal, Neurologic.  All systems negative except for that covered in HPI.    PAST PSYCHIATRIC, MEDICAL, AND SOCIAL HISTORY REVIEWED  The  "patient's past medical, family and social history have been reviewed and updated as appropriate within the electronic medical record - see encounter notes.    MEDICATIONS:    Current Outpatient Medications:     acetaminophen (TYLENOL) 325 MG tablet, Take 2 tablets (650 mg total) by mouth every 6 (six) hours as needed for Pain., Disp: 40 tablet, Rfl: 0    fluconazole (DIFLUCAN) 150 MG Tab, Take 1 tablet (150 mg total) by mouth every 72 hours as needed (vaginal itching/discharge)., Disp: 2 tablet, Rfl: 0    prenatal vit/iron fum/folic ac (PRENATAL 1+1 ORAL), Take by mouth., Disp: , Rfl:     sertraline (ZOLOFT) 100 MG tablet, Take 1 tablet (100 mg total) by mouth once daily., Disp: 90 tablet, Rfl: 3    ALLERGIES:  Review of patient's allergies indicates:   Allergen Reactions    Asa [aspirin] Other (See Comments)     G6PD deficiency    Bactrim [sulfamethoxazole-trimethoprim]      G6PD deficiency    Ciprofloxacin      G6PD deficiency    Dapsone      G6PD deficiency      Nitrofurantoin      G6PD deficiency    Sulfa (sulfonamide antibiotics)      G6PD deficiency    Pcn [penicillins] Rash       PSYCHIATRIC EXAM:  There were no vitals filed for this visit.  Appearance:  Well groomed, appearing healthy and of stated age  Behavior:  Cooperative, pleasant, no psychomotor agitation or retardation  Speech:  Normal rate, rhythm, prosody, and volume  Mood:  "Anxious"  Affect:  Congruent  Thought Process:  Linear, logical, goal directed  Thought Content:  Negative for suicidal ideation, homicidal ideation, delusions or hallucinations.  Associations:  Intact  Memory:  Grossly Intact  Level of Consciousness/Orientation:  Grossly intact  Fund of Knowledge:  Good  Attention:  Good  Language:  Fluent, able to name abstract and concrete objects  Insight:  Good  Judgment:  Intact  Psychomotor signs:  No involuntary movements of face  Gait:  Unable to assess via virtual visit      RELEVANT LABS/STUDIES:    Lab Results   Component Value Date " "   WBC 10.68 05/24/2024    HGB 14.0 05/24/2024    HCT 42.6 05/24/2024    MCV 88 05/24/2024     05/24/2024     BMP  Lab Results   Component Value Date     (L) 05/24/2024    K 3.4 (L) 05/24/2024     05/24/2024    CO2 19 (L) 05/24/2024    BUN 10 05/24/2024    CREATININE 0.7 05/24/2024    CALCIUM 9.3 05/24/2024    ANIONGAP 13 05/24/2024    ESTGFRAFRICA >60 04/22/2022    EGFRNONAA >60 04/22/2022     Lab Results   Component Value Date    ALT 36 05/24/2024    AST 28 05/24/2024    ALKPHOS 152 (H) 05/24/2024    BILITOT 0.7 05/24/2024     No results found for: "TSH"  Lab Results   Component Value Date    HGBA1C 4.6 10/06/2023       IMPRESSION:    Lindy Ferraro is a 39 y.o. female with history of Anxiety who presents for follow up appointment.    Status/Progress:  Based on the examination today, the patient's problem(s) is/are inadequately controlled.  New problems have not been presented today.    Risk Parameters:  Patient reports no suicidal ideation  Patient reports no homicidal ideation  Patient reports no self-injurious behavior  Patient reports no violent behavior      DIAGNOSES:    ICD-10-CM ICD-9-CM   1. KEVIN (generalized anxiety disorder)  F41.1 300.02   2. Postpartum anxiety  O99.345 648.44    F41.8 300.00       PLAN:  Increase Zoloft to 100mg daily to better target anxiety.    Discussed with patient informed consent, risks versus benefits, alternative treatments, side effect profile and the inherent unpredictability of individual responses to these treatments.  The patient expresses understanding of the above and displays the capacity to agree with this current plan.  Referral to Claudia Mar LCSW, or Claudia Palencia LCSW for therapy.      RETURN TO CLINIC:  Follow up in about 2 months (around 4/26/2025).        "

## 2025-03-06 ENCOUNTER — PATIENT MESSAGE (OUTPATIENT)
Dept: PSYCHIATRY | Facility: CLINIC | Age: 40
End: 2025-03-06
Payer: COMMERCIAL

## 2025-06-03 ENCOUNTER — OFFICE VISIT (OUTPATIENT)
Dept: INTERNAL MEDICINE | Facility: CLINIC | Age: 40
End: 2025-06-03
Payer: COMMERCIAL

## 2025-06-03 DIAGNOSIS — E66.01 SEVERE OBESITY (BMI 35.0-35.9 WITH COMORBIDITY): ICD-10-CM

## 2025-06-03 DIAGNOSIS — M76.62 LEFT ACHILLES TENDINITIS: ICD-10-CM

## 2025-06-03 DIAGNOSIS — M25.551 RIGHT HIP PAIN: Primary | ICD-10-CM

## 2025-08-22 ENCOUNTER — LAB VISIT (OUTPATIENT)
Dept: LAB | Facility: OTHER | Age: 40
End: 2025-08-22
Attending: INTERNAL MEDICINE
Payer: COMMERCIAL

## 2025-08-22 DIAGNOSIS — Z00.00 ROUTINE GENERAL MEDICAL EXAMINATION AT A HEALTH CARE FACILITY: ICD-10-CM

## 2025-08-22 DIAGNOSIS — D75.A G6PD DEFICIENCY: ICD-10-CM

## 2025-08-22 DIAGNOSIS — F41.1 GAD (GENERALIZED ANXIETY DISORDER): ICD-10-CM

## 2025-08-22 DIAGNOSIS — E66.01 SEVERE OBESITY (BMI 35.0-35.9 WITH COMORBIDITY): ICD-10-CM

## 2025-08-22 LAB
ABSOLUTE EOSINOPHIL (OHS): 0.12 K/UL
ABSOLUTE MONOCYTE (OHS): 0.4 K/UL (ref 0.3–1)
ABSOLUTE NEUTROPHIL COUNT (OHS): 3.75 K/UL (ref 1.8–7.7)
ALBUMIN SERPL BCP-MCNC: 4.4 G/DL (ref 3.5–5.2)
ALP SERPL-CCNC: 91 UNIT/L (ref 40–150)
ALT SERPL W/O P-5'-P-CCNC: <8 UNIT/L (ref 10–44)
ANION GAP (OHS): 8 MMOL/L (ref 8–16)
AST SERPL-CCNC: 20 UNIT/L (ref 11–45)
BASOPHILS # BLD AUTO: 0.05 K/UL
BASOPHILS NFR BLD AUTO: 0.8 %
BILIRUB SERPL-MCNC: 0.6 MG/DL (ref 0.1–1)
BUN SERPL-MCNC: 14 MG/DL (ref 6–20)
CALCIUM SERPL-MCNC: 9.2 MG/DL (ref 8.7–10.5)
CHLORIDE SERPL-SCNC: 107 MMOL/L (ref 95–110)
CHOLEST SERPL-MCNC: 139 MG/DL (ref 120–199)
CHOLEST/HDLC SERPL: 2.5 {RATIO} (ref 2–5)
CO2 SERPL-SCNC: 22 MMOL/L (ref 23–29)
CREAT SERPL-MCNC: 0.8 MG/DL (ref 0.5–1.4)
EAG (OHS): 82 MG/DL (ref 68–131)
ERYTHROCYTE [DISTWIDTH] IN BLOOD BY AUTOMATED COUNT: 12.5 % (ref 11.5–14.5)
FERRITIN SERPL-MCNC: 57 NG/ML (ref 20–300)
GFR SERPLBLD CREATININE-BSD FMLA CKD-EPI: >60 ML/MIN/1.73/M2
GLUCOSE SERPL-MCNC: 72 MG/DL (ref 70–110)
HBA1C MFR BLD: 4.5 % (ref 4–5.6)
HCT VFR BLD AUTO: 40.1 % (ref 37–48.5)
HDLC SERPL-MCNC: 56 MG/DL (ref 40–75)
HDLC SERPL: 40.3 % (ref 20–50)
HGB BLD-MCNC: 13.5 GM/DL (ref 12–16)
IMM GRANULOCYTES # BLD AUTO: 0.02 K/UL (ref 0–0.04)
IMM GRANULOCYTES NFR BLD AUTO: 0.3 % (ref 0–0.5)
IRON SATN MFR SERPL: 15 % (ref 20–50)
IRON SERPL-MCNC: 55 UG/DL (ref 30–160)
LDLC SERPL CALC-MCNC: 72.6 MG/DL (ref 63–159)
LYMPHOCYTES # BLD AUTO: 1.61 K/UL (ref 1–4.8)
MCH RBC QN AUTO: 29.9 PG (ref 27–31)
MCHC RBC AUTO-ENTMCNC: 33.7 G/DL (ref 32–36)
MCV RBC AUTO: 89 FL (ref 82–98)
NONHDLC SERPL-MCNC: 83 MG/DL
NUCLEATED RBC (/100WBC) (OHS): 0 /100 WBC
PLATELET # BLD AUTO: 236 K/UL (ref 150–450)
PMV BLD AUTO: 10.7 FL (ref 9.2–12.9)
POTASSIUM SERPL-SCNC: 3.8 MMOL/L (ref 3.5–5.1)
PROT SERPL-MCNC: 7.4 GM/DL (ref 6–8.4)
RBC # BLD AUTO: 4.52 M/UL (ref 4–5.4)
RELATIVE EOSINOPHIL (OHS): 2 %
RELATIVE LYMPHOCYTE (OHS): 27.1 % (ref 18–48)
RELATIVE MONOCYTE (OHS): 6.7 % (ref 4–15)
RELATIVE NEUTROPHIL (OHS): 63.1 % (ref 38–73)
SODIUM SERPL-SCNC: 137 MMOL/L (ref 136–145)
TIBC SERPL-MCNC: 379 UG/DL (ref 250–450)
TRANSFERRIN SERPL-MCNC: 256 MG/DL (ref 200–375)
TRIGL SERPL-MCNC: 52 MG/DL (ref 30–150)
TSH SERPL-ACNC: 1.46 UIU/ML (ref 0.4–4)
WBC # BLD AUTO: 5.95 K/UL (ref 3.9–12.7)

## 2025-08-22 PROCEDURE — 36415 COLL VENOUS BLD VENIPUNCTURE: CPT

## 2025-08-22 PROCEDURE — 80061 LIPID PANEL: CPT

## 2025-08-22 PROCEDURE — 80053 COMPREHEN METABOLIC PANEL: CPT

## 2025-08-22 PROCEDURE — 82728 ASSAY OF FERRITIN: CPT

## 2025-08-22 PROCEDURE — 83540 ASSAY OF IRON: CPT

## 2025-08-22 PROCEDURE — 84443 ASSAY THYROID STIM HORMONE: CPT

## 2025-08-22 PROCEDURE — 85025 COMPLETE CBC W/AUTO DIFF WBC: CPT

## 2025-08-22 PROCEDURE — 83036 HEMOGLOBIN GLYCOSYLATED A1C: CPT

## 2025-08-28 ENCOUNTER — PATIENT MESSAGE (OUTPATIENT)
Dept: INTERNAL MEDICINE | Facility: CLINIC | Age: 40
End: 2025-08-28
Payer: COMMERCIAL